# Patient Record
Sex: FEMALE | Race: WHITE | Employment: OTHER | URBAN - METROPOLITAN AREA
[De-identification: names, ages, dates, MRNs, and addresses within clinical notes are randomized per-mention and may not be internally consistent; named-entity substitution may affect disease eponyms.]

---

## 2019-03-19 ENCOUNTER — APPOINTMENT (OUTPATIENT)
Dept: CT IMAGING | Age: 84
DRG: 291 | End: 2019-03-19
Attending: EMERGENCY MEDICINE
Payer: MEDICARE

## 2019-03-19 ENCOUNTER — APPOINTMENT (OUTPATIENT)
Dept: GENERAL RADIOLOGY | Age: 84
DRG: 291 | End: 2019-03-19
Attending: EMERGENCY MEDICINE
Payer: MEDICARE

## 2019-03-19 ENCOUNTER — HOSPITAL ENCOUNTER (INPATIENT)
Age: 84
LOS: 2 days | Discharge: HOME | DRG: 291 | End: 2019-03-21
Attending: EMERGENCY MEDICINE | Admitting: INTERNAL MEDICINE
Payer: MEDICARE

## 2019-03-19 DIAGNOSIS — J81.0 ACUTE PULMONARY EDEMA (HCC): Primary | ICD-10-CM

## 2019-03-19 PROBLEM — I50.9 ACUTE CHF (CONGESTIVE HEART FAILURE) (HCC): Status: ACTIVE | Noted: 2019-03-19

## 2019-03-19 LAB
ALBUMIN SERPL-MCNC: 3.4 G/DL (ref 3.5–5)
ALBUMIN/GLOB SERPL: 0.8 {RATIO} (ref 1.1–2.2)
ALP SERPL-CCNC: 111 U/L (ref 45–117)
ALT SERPL-CCNC: 27 U/L (ref 12–78)
ANION GAP SERPL CALC-SCNC: 5 MMOL/L (ref 5–15)
APPEARANCE UR: CLEAR
AST SERPL-CCNC: 33 U/L (ref 15–37)
BACTERIA URNS QL MICRO: NEGATIVE /HPF
BASOPHILS # BLD: 0 K/UL (ref 0–0.1)
BASOPHILS NFR BLD: 1 % (ref 0–1)
BILIRUB SERPL-MCNC: 0.4 MG/DL (ref 0.2–1)
BILIRUB UR QL: NEGATIVE
BNP SERPL-MCNC: 4164 PG/ML
BUN SERPL-MCNC: 25 MG/DL (ref 6–20)
BUN/CREAT SERPL: 32 (ref 12–20)
CALCIUM SERPL-MCNC: 9.4 MG/DL (ref 8.5–10.1)
CHLORIDE SERPL-SCNC: 111 MMOL/L (ref 97–108)
CK SERPL-CCNC: 72 U/L (ref 26–192)
CO2 SERPL-SCNC: 27 MMOL/L (ref 21–32)
COLOR UR: ABNORMAL
COMMENT, HOLDF: NORMAL
CREAT SERPL-MCNC: 0.79 MG/DL (ref 0.55–1.02)
DIFFERENTIAL METHOD BLD: ABNORMAL
EOSINOPHIL # BLD: 0.2 K/UL (ref 0–0.4)
EOSINOPHIL NFR BLD: 2 % (ref 0–7)
EPITH CASTS URNS QL MICRO: ABNORMAL /LPF
ERYTHROCYTE [DISTWIDTH] IN BLOOD BY AUTOMATED COUNT: 19.1 % (ref 11.5–14.5)
FLUAV AG NPH QL IA: NEGATIVE
FLUBV AG NOSE QL IA: NEGATIVE
GLOBULIN SER CALC-MCNC: 4.3 G/DL (ref 2–4)
GLUCOSE SERPL-MCNC: 84 MG/DL (ref 65–100)
GLUCOSE UR STRIP.AUTO-MCNC: NEGATIVE MG/DL
HCT VFR BLD AUTO: 39.3 % (ref 35–47)
HGB BLD-MCNC: 11.8 G/DL (ref 11.5–16)
HGB UR QL STRIP: NEGATIVE
IMM GRANULOCYTES # BLD AUTO: 0 K/UL (ref 0–0.04)
IMM GRANULOCYTES NFR BLD AUTO: 0 % (ref 0–0.5)
KETONES UR QL STRIP.AUTO: NEGATIVE MG/DL
LACTATE BLD-SCNC: 1.18 MMOL/L (ref 0.4–2)
LEUKOCYTE ESTERASE UR QL STRIP.AUTO: NEGATIVE
LYMPHOCYTES # BLD: 1.7 K/UL (ref 0.8–3.5)
LYMPHOCYTES NFR BLD: 25 % (ref 12–49)
MAGNESIUM SERPL-MCNC: 2.4 MG/DL (ref 1.6–2.4)
MCH RBC QN AUTO: 25.5 PG (ref 26–34)
MCHC RBC AUTO-ENTMCNC: 30 G/DL (ref 30–36.5)
MCV RBC AUTO: 85.1 FL (ref 80–99)
MONOCYTES # BLD: 0.6 K/UL (ref 0–1)
MONOCYTES NFR BLD: 10 % (ref 5–13)
NEUTS SEG # BLD: 4.1 K/UL (ref 1.8–8)
NEUTS SEG NFR BLD: 62 % (ref 32–75)
NITRITE UR QL STRIP.AUTO: NEGATIVE
NRBC # BLD: 0 K/UL (ref 0–0.01)
NRBC BLD-RTO: 0 PER 100 WBC
PH UR STRIP: 5.5 [PH] (ref 5–8)
PLATELET # BLD AUTO: 314 K/UL (ref 150–400)
PMV BLD AUTO: 9.7 FL (ref 8.9–12.9)
POTASSIUM SERPL-SCNC: 4.7 MMOL/L (ref 3.5–5.1)
PROT SERPL-MCNC: 7.7 G/DL (ref 6.4–8.2)
PROT UR STRIP-MCNC: 30 MG/DL
RBC # BLD AUTO: 4.62 M/UL (ref 3.8–5.2)
RBC #/AREA URNS HPF: ABNORMAL /HPF (ref 0–5)
SAMPLES BEING HELD,HOLD: NORMAL
SODIUM SERPL-SCNC: 143 MMOL/L (ref 136–145)
SP GR UR REFRACTOMETRY: 1.02 (ref 1–1.03)
TROPONIN I SERPL-MCNC: <0.05 NG/ML
UA: UC IF INDICATED,UAUC: ABNORMAL
UROBILINOGEN UR QL STRIP.AUTO: 0.2 EU/DL (ref 0.2–1)
WBC # BLD AUTO: 6.7 K/UL (ref 3.6–11)
WBC URNS QL MICRO: ABNORMAL /HPF (ref 0–4)

## 2019-03-19 PROCEDURE — 74011250636 HC RX REV CODE- 250/636: Performed by: EMERGENCY MEDICINE

## 2019-03-19 PROCEDURE — 87804 INFLUENZA ASSAY W/OPTIC: CPT

## 2019-03-19 PROCEDURE — 82550 ASSAY OF CK (CPK): CPT

## 2019-03-19 PROCEDURE — 87040 BLOOD CULTURE FOR BACTERIA: CPT

## 2019-03-19 PROCEDURE — 71046 X-RAY EXAM CHEST 2 VIEWS: CPT

## 2019-03-19 PROCEDURE — 83880 ASSAY OF NATRIURETIC PEPTIDE: CPT

## 2019-03-19 PROCEDURE — 65660000000 HC RM CCU STEPDOWN

## 2019-03-19 PROCEDURE — 36415 COLL VENOUS BLD VENIPUNCTURE: CPT

## 2019-03-19 PROCEDURE — 83735 ASSAY OF MAGNESIUM: CPT

## 2019-03-19 PROCEDURE — 83605 ASSAY OF LACTIC ACID: CPT

## 2019-03-19 PROCEDURE — 81001 URINALYSIS AUTO W/SCOPE: CPT

## 2019-03-19 PROCEDURE — 99285 EMERGENCY DEPT VISIT HI MDM: CPT

## 2019-03-19 PROCEDURE — 93005 ELECTROCARDIOGRAM TRACING: CPT

## 2019-03-19 PROCEDURE — 85025 COMPLETE CBC W/AUTO DIFF WBC: CPT

## 2019-03-19 PROCEDURE — 70450 CT HEAD/BRAIN W/O DYE: CPT

## 2019-03-19 PROCEDURE — 80053 COMPREHEN METABOLIC PANEL: CPT

## 2019-03-19 PROCEDURE — 94761 N-INVAS EAR/PLS OXIMETRY MLT: CPT

## 2019-03-19 PROCEDURE — 84484 ASSAY OF TROPONIN QUANT: CPT

## 2019-03-19 PROCEDURE — 96374 THER/PROPH/DIAG INJ IV PUSH: CPT

## 2019-03-19 RX ORDER — SODIUM CHLORIDE 0.9 % (FLUSH) 0.9 %
5-40 SYRINGE (ML) INJECTION EVERY 8 HOURS
Status: DISCONTINUED | OUTPATIENT
Start: 2019-03-19 | End: 2019-03-21 | Stop reason: HOSPADM

## 2019-03-19 RX ORDER — SODIUM CHLORIDE 0.9 % (FLUSH) 0.9 %
5-40 SYRINGE (ML) INJECTION AS NEEDED
Status: DISCONTINUED | OUTPATIENT
Start: 2019-03-19 | End: 2019-03-21 | Stop reason: HOSPADM

## 2019-03-19 RX ORDER — METOPROLOL TARTRATE 25 MG/1
25 TABLET, FILM COATED ORAL 2 TIMES DAILY
Status: DISCONTINUED | OUTPATIENT
Start: 2019-03-20 | End: 2019-03-21 | Stop reason: HOSPADM

## 2019-03-19 RX ORDER — OXYBUTYNIN CHLORIDE 5 MG/1
10 TABLET, EXTENDED RELEASE ORAL
Status: DISCONTINUED | OUTPATIENT
Start: 2019-03-19 | End: 2019-03-21 | Stop reason: HOSPADM

## 2019-03-19 RX ORDER — POLYETHYLENE GLYCOL 3350 17 G/17G
17 POWDER, FOR SOLUTION ORAL DAILY
Status: ON HOLD | COMMUNITY
End: 2021-01-01 | Stop reason: CLARIF

## 2019-03-19 RX ORDER — POLYETHYLENE GLYCOL 3350 17 G/17G
17 POWDER, FOR SOLUTION ORAL DAILY
Status: DISCONTINUED | OUTPATIENT
Start: 2019-03-20 | End: 2019-03-21 | Stop reason: HOSPADM

## 2019-03-19 RX ORDER — FUROSEMIDE 10 MG/ML
40 INJECTION INTRAMUSCULAR; INTRAVENOUS
Status: COMPLETED | OUTPATIENT
Start: 2019-03-19 | End: 2019-03-19

## 2019-03-19 RX ORDER — ONDANSETRON 2 MG/ML
4 INJECTION INTRAMUSCULAR; INTRAVENOUS
Status: DISCONTINUED | OUTPATIENT
Start: 2019-03-19 | End: 2019-03-21 | Stop reason: HOSPADM

## 2019-03-19 RX ORDER — SENNOSIDES 8.6 MG/1
1 TABLET ORAL
Status: DISCONTINUED | OUTPATIENT
Start: 2019-03-19 | End: 2019-03-21 | Stop reason: HOSPADM

## 2019-03-19 RX ORDER — METOPROLOL TARTRATE 25 MG/1
25 TABLET, FILM COATED ORAL 2 TIMES DAILY
Status: ON HOLD | COMMUNITY
End: 2021-01-01 | Stop reason: CLARIF

## 2019-03-19 RX ORDER — OXYBUTYNIN CHLORIDE 10 MG/1
10 TABLET, EXTENDED RELEASE ORAL
Status: ON HOLD | COMMUNITY
End: 2021-01-01 | Stop reason: CLARIF

## 2019-03-19 RX ORDER — LOVASTATIN 20 MG/1
20 TABLET ORAL DAILY
Status: DISCONTINUED | OUTPATIENT
Start: 2019-03-20 | End: 2019-03-21 | Stop reason: HOSPADM

## 2019-03-19 RX ORDER — FUROSEMIDE 10 MG/ML
40 INJECTION INTRAMUSCULAR; INTRAVENOUS DAILY
Status: DISCONTINUED | OUTPATIENT
Start: 2019-03-20 | End: 2019-03-20

## 2019-03-19 RX ORDER — SENNOSIDES 8.6 MG/1
1 TABLET ORAL
Status: ON HOLD | COMMUNITY
End: 2021-01-01 | Stop reason: CLARIF

## 2019-03-19 RX ADMIN — FUROSEMIDE 40 MG: 10 INJECTION, SOLUTION INTRAMUSCULAR; INTRAVENOUS at 21:55

## 2019-03-19 RX ADMIN — Medication 10 ML: at 23:52

## 2019-03-20 ENCOUNTER — APPOINTMENT (OUTPATIENT)
Dept: MRI IMAGING | Age: 84
DRG: 291 | End: 2019-03-20
Attending: INTERNAL MEDICINE
Payer: MEDICARE

## 2019-03-20 ENCOUNTER — APPOINTMENT (OUTPATIENT)
Dept: NON INVASIVE DIAGNOSTICS | Age: 84
DRG: 291 | End: 2019-03-20
Attending: HOSPITALIST
Payer: MEDICARE

## 2019-03-20 LAB
ALBUMIN SERPL-MCNC: 3.4 G/DL (ref 3.5–5)
ALBUMIN/GLOB SERPL: 0.8 {RATIO} (ref 1.1–2.2)
ALP SERPL-CCNC: 103 U/L (ref 45–117)
ALT SERPL-CCNC: 30 U/L (ref 12–78)
ANION GAP SERPL CALC-SCNC: 7 MMOL/L (ref 5–15)
AST SERPL-CCNC: 33 U/L (ref 15–37)
ATRIAL RATE: 326 BPM
BASOPHILS # BLD: 0 K/UL (ref 0–0.1)
BASOPHILS NFR BLD: 0 % (ref 0–1)
BILIRUB SERPL-MCNC: 0.5 MG/DL (ref 0.2–1)
BUN SERPL-MCNC: 25 MG/DL (ref 6–20)
BUN/CREAT SERPL: 33 (ref 12–20)
CALCIUM SERPL-MCNC: 9.3 MG/DL (ref 8.5–10.1)
CALCULATED R AXIS, ECG10: -84 DEGREES
CALCULATED T AXIS, ECG11: -7 DEGREES
CHLORIDE SERPL-SCNC: 109 MMOL/L (ref 97–108)
CK MB CFR SERPL CALC: 3.2 % (ref 0–2.5)
CK MB SERPL-MCNC: 2.3 NG/ML (ref 5–25)
CK SERPL-CCNC: 71 U/L (ref 26–192)
CO2 SERPL-SCNC: 27 MMOL/L (ref 21–32)
CREAT SERPL-MCNC: 0.76 MG/DL (ref 0.55–1.02)
DIAGNOSIS, 93000: NORMAL
DIFFERENTIAL METHOD BLD: ABNORMAL
ECHO AO ROOT DIAM: 3.28 CM
ECHO AV AREA PEAK VELOCITY: 1.8 CM2
ECHO AV AREA VTI: 2.5 CM2
ECHO AV CUSP MM: 1.11 CM
ECHO AV MEAN GRADIENT: 1.9 MMHG
ECHO AV PEAK GRADIENT: 6.3 MMHG
ECHO AV PEAK VELOCITY: 125.61 CM/S
ECHO AV VTI: 13.51 CM
ECHO LA AREA 4C: 18.6 CM2
ECHO LA MAJOR AXIS: 4.11 CM
ECHO LA TO AORTIC ROOT RATIO: 1.25
ECHO LA VOL 2C: 71.15 ML (ref 22–52)
ECHO LA VOL 4C: 54.18 ML (ref 22–52)
ECHO LA VOL BP: 74.05 ML (ref 22–52)
ECHO LA VOL/BSA BIPLANE: 50.95 ML/M2 (ref 16–28)
ECHO LA VOLUME INDEX A2C: 48.96 ML/M2 (ref 16–28)
ECHO LA VOLUME INDEX A4C: 37.28 ML/M2 (ref 16–28)
ECHO LV E' LATERAL VELOCITY: 9.3 CM/S
ECHO LV E' SEPTAL VELOCITY: 5.87 CM/S
ECHO LV INTERNAL DIMENSION DIASTOLIC: 1.92 CM (ref 3.9–5.3)
ECHO LV INTERNAL DIMENSION SYSTOLIC: 1.37 CM
ECHO LV IVSD: 1.59 CM (ref 0.6–0.9)
ECHO LV MASS 2D: 107.5 G (ref 67–162)
ECHO LV MASS INDEX 2D: 74 G/M2 (ref 43–95)
ECHO LV POSTERIOR WALL DIASTOLIC: 1.47 CM (ref 0.6–0.9)
ECHO LVOT DIAM: 1.87 CM
ECHO LVOT PEAK GRADIENT: 2.6 MMHG
ECHO LVOT PEAK VELOCITY: 81.35 CM/S
ECHO LVOT SV: 33.9 ML
ECHO LVOT VTI: 12.28 CM
ECHO MV AREA VTI: 1.5 CM2
ECHO MV E VELOCITY: 108 CM/S
ECHO MV E/E' LATERAL: 11.61
ECHO MV E/E' RATIO (AVERAGED): 15.01
ECHO MV E/E' SEPTAL: 18.4
ECHO MV MAX VELOCITY: 121.66 CM/S
ECHO MV MEAN GRADIENT: 2.1 MMHG
ECHO MV PEAK GRADIENT: 5.9 MMHG
ECHO MV VTI: 22.93 CM
ECHO PV MAX VELOCITY: 72.37 CM/S
ECHO PV PEAK GRADIENT: 2.1 MMHG
ECHO RV INTERNAL DIMENSION: 3.48 CM
ECHO RV TAPSE: 1.27 CM (ref 1.5–2)
ECHO TV REGURGITANT MAX VELOCITY: 234.07 CM/S
ECHO TV REGURGITANT PEAK GRADIENT: 21.9 MMHG
EOSINOPHIL # BLD: 0.1 K/UL (ref 0–0.4)
EOSINOPHIL NFR BLD: 2 % (ref 0–7)
ERYTHROCYTE [DISTWIDTH] IN BLOOD BY AUTOMATED COUNT: 18.6 % (ref 11.5–14.5)
GLOBULIN SER CALC-MCNC: 4.1 G/DL (ref 2–4)
GLUCOSE BLD STRIP.AUTO-MCNC: 125 MG/DL (ref 65–100)
GLUCOSE BLD STRIP.AUTO-MCNC: 161 MG/DL (ref 65–100)
GLUCOSE BLD STRIP.AUTO-MCNC: 81 MG/DL (ref 65–100)
GLUCOSE SERPL-MCNC: 91 MG/DL (ref 65–100)
HCT VFR BLD AUTO: 36.9 % (ref 35–47)
HGB BLD-MCNC: 11.4 G/DL (ref 11.5–16)
IMM GRANULOCYTES # BLD AUTO: 0 K/UL (ref 0–0.04)
IMM GRANULOCYTES NFR BLD AUTO: 0 % (ref 0–0.5)
LYMPHOCYTES # BLD: 1.7 K/UL (ref 0.8–3.5)
LYMPHOCYTES NFR BLD: 25 % (ref 12–49)
MAGNESIUM SERPL-MCNC: 2.3 MG/DL (ref 1.6–2.4)
MCH RBC QN AUTO: 25.8 PG (ref 26–34)
MCHC RBC AUTO-ENTMCNC: 30.9 G/DL (ref 30–36.5)
MCV RBC AUTO: 83.5 FL (ref 80–99)
MONOCYTES # BLD: 0.7 K/UL (ref 0–1)
MONOCYTES NFR BLD: 10 % (ref 5–13)
NEUTS SEG # BLD: 4.2 K/UL (ref 1.8–8)
NEUTS SEG NFR BLD: 63 % (ref 32–75)
NRBC # BLD: 0 K/UL (ref 0–0.01)
NRBC BLD-RTO: 0 PER 100 WBC
PHOSPHATE SERPL-MCNC: 3.6 MG/DL (ref 2.6–4.7)
PLATELET # BLD AUTO: 287 K/UL (ref 150–400)
PMV BLD AUTO: 10 FL (ref 8.9–12.9)
POTASSIUM SERPL-SCNC: 4 MMOL/L (ref 3.5–5.1)
PROT SERPL-MCNC: 7.5 G/DL (ref 6.4–8.2)
Q-T INTERVAL, ECG07: 386 MS
QRS DURATION, ECG06: 114 MS
QTC CALCULATION (BEZET), ECG08: 469 MS
RBC # BLD AUTO: 4.42 M/UL (ref 3.8–5.2)
SERVICE CMNT-IMP: ABNORMAL
SERVICE CMNT-IMP: ABNORMAL
SERVICE CMNT-IMP: NORMAL
SODIUM SERPL-SCNC: 143 MMOL/L (ref 136–145)
TROPONIN I SERPL-MCNC: <0.05 NG/ML
TSH SERPL DL<=0.05 MIU/L-ACNC: 3.16 UIU/ML (ref 0.36–3.74)
VENTRICULAR RATE, ECG03: 89 BPM
WBC # BLD AUTO: 6.8 K/UL (ref 3.6–11)

## 2019-03-20 PROCEDURE — 82550 ASSAY OF CK (CPK): CPT

## 2019-03-20 PROCEDURE — 74011250637 HC RX REV CODE- 250/637: Performed by: INTERNAL MEDICINE

## 2019-03-20 PROCEDURE — 92610 EVALUATE SWALLOWING FUNCTION: CPT

## 2019-03-20 PROCEDURE — 36415 COLL VENOUS BLD VENIPUNCTURE: CPT

## 2019-03-20 PROCEDURE — 65660000000 HC RM CCU STEPDOWN

## 2019-03-20 PROCEDURE — 93306 TTE W/DOPPLER COMPLETE: CPT

## 2019-03-20 PROCEDURE — 76450000000

## 2019-03-20 PROCEDURE — 84100 ASSAY OF PHOSPHORUS: CPT

## 2019-03-20 PROCEDURE — 70551 MRI BRAIN STEM W/O DYE: CPT

## 2019-03-20 PROCEDURE — 84484 ASSAY OF TROPONIN QUANT: CPT

## 2019-03-20 PROCEDURE — 77030020186 HC BOOT HL PROTCT SAGE -B

## 2019-03-20 PROCEDURE — 85025 COMPLETE CBC W/AUTO DIFF WBC: CPT

## 2019-03-20 PROCEDURE — 84443 ASSAY THYROID STIM HORMONE: CPT

## 2019-03-20 PROCEDURE — 74011250636 HC RX REV CODE- 250/636: Performed by: INTERNAL MEDICINE

## 2019-03-20 PROCEDURE — 83735 ASSAY OF MAGNESIUM: CPT

## 2019-03-20 PROCEDURE — 80053 COMPREHEN METABOLIC PANEL: CPT

## 2019-03-20 PROCEDURE — 82962 GLUCOSE BLOOD TEST: CPT

## 2019-03-20 RX ORDER — METOPROLOL TARTRATE 25 MG/1
25 TABLET, FILM COATED ORAL ONCE
Status: COMPLETED | OUTPATIENT
Start: 2019-03-20 | End: 2019-03-20

## 2019-03-20 RX ORDER — FUROSEMIDE 40 MG/1
40 TABLET ORAL DAILY
Status: DISCONTINUED | OUTPATIENT
Start: 2019-03-21 | End: 2019-03-21 | Stop reason: HOSPADM

## 2019-03-20 RX ADMIN — APIXABAN 2.5 MG: 2.5 TABLET, FILM COATED ORAL at 17:24

## 2019-03-20 RX ADMIN — Medication 10 ML: at 21:34

## 2019-03-20 RX ADMIN — METOPROLOL TARTRATE 25 MG: 25 TABLET, FILM COATED ORAL at 21:33

## 2019-03-20 RX ADMIN — LOVASTATIN 20 MG: 20 TABLET ORAL at 10:16

## 2019-03-20 RX ADMIN — METOPROLOL TARTRATE 25 MG: 25 TABLET ORAL at 10:16

## 2019-03-20 RX ADMIN — Medication 10 ML: at 14:00

## 2019-03-20 RX ADMIN — FUROSEMIDE 40 MG: 10 INJECTION, SOLUTION INTRAMUSCULAR; INTRAVENOUS at 10:16

## 2019-03-20 RX ADMIN — Medication 10 ML: at 06:38

## 2019-03-20 RX ADMIN — APIXABAN 2.5 MG: 2.5 TABLET, FILM COATED ORAL at 10:16

## 2019-03-20 RX ADMIN — METOPROLOL TARTRATE 25 MG: 25 TABLET ORAL at 17:24

## 2019-03-20 RX ADMIN — SENNOSIDES 8.6 MG: 8.6 TABLET, FILM COATED ORAL at 21:34

## 2019-03-20 RX ADMIN — OXYBUTYNIN CHLORIDE 10 MG: 5 TABLET, EXTENDED RELEASE ORAL at 21:33

## 2019-03-20 NOTE — PROGRESS NOTES
Admission Medication Reconciliation: 
 
Information obtained from: Son provided medication list 
 
Significant PMH/Disease States:  
Past Medical History:  
Diagnosis Date  Chronic kidney disease   
 acute tubual necrosis  Clostridium difficile infection  Elevated troponin 9/10/2014  Hypertension  Skin cancer  Stroke ([facility] [address] 2001 left residual  
 
 
Chief Complaint for this Admission:  Fatigue, irregular heart beat Allergies:  Neosporin [benzalkonium chloride] Prior to Admission Medications:  
Prior to Admission Medications Prescriptions Last Dose Informant Patient Reported? Taking? apixaban (ELIQUIS) 2.5 mg tablet 3/19/2019 at Unknown time  Yes Yes Sig: Take 2.5 mg by mouth two (2) times a day. lovastatin (MEVACOR) 20 mg tablet 3/19/2019 at Unknown time  Yes Yes Sig: Take 20 mg by mouth daily. metoprolol tartrate (LOPRESSOR) 25 mg tablet 3/19/2019 at Unknown time  Yes Yes Sig: Take 25 mg by mouth two (2) times a day. oxybutynin chloride XL (DITROPAN XL) 10 mg CR tablet   Yes Yes Sig: Take 10 mg by mouth nightly. polyethylene glycol (MIRALAX) 17 gram packet 3/19/2019 at Unknown time  Yes Yes Sig: Take 17 g by mouth daily. senna (SENNA) 8.6 mg tablet 3/18/2019 at Unknown time  Yes Yes Sig: Take 1 Tab by mouth nightly. Facility-Administered Medications: None Comments/Recommendations: Patient's son provided medication list. 
 
Note: 1. Administration: per son written instructions: \"Crush all pills. Nectar thickened liquids only\" Added all agents. Thank you for allowing me to participate in the care of your patient. Armando Moura PharmD, RN #[id number]

## 2019-03-20 NOTE — PROGRESS NOTES
Problem: Heart Failure: Day 2 Goal: Medications Outcome: Progressing Towards Goal 
  
Speech to evaluate patient prior to medication administration

## 2019-03-20 NOTE — CONSULTS
[address] Name:  Anisa Patel 
MR#:  [id number] :  1922 ACCOUNT #:  [de-identified] DATE OF SERVICE:  2019 HISTORY OF PRESENT ILLNESS:  This 55-year-old woman is seen for evaluation of congestive heart failure. The patient has not been on any diuretic therapy. A recent echo done while in hospital at an another institution in 2018 showed LVH with an ejection fraction of 60% to 65%, moderate mitral regurgitation, dilated left atrium, patent foramen ovale, normal PA pressures. She was admitted to the hospital last evening presenting with increasing shortness of breath, possible Cheyne-Jacobo respiration and increasing fatigue and weakness over the past few weeks. She is not a reliable historian, thinks she is at [facility], chronically bed-bound requiring considerable care at home. She has chronic atrial fibrillation, has been anticoagulated with apixaban with rate controlled on metoprolol and there is a history of coronary artery disease followed by Dr. Noni Prescott  in the past.  I last saw her in 2014 without any further followup. She had a syncopal spell at that time and previous CVA with residual left hemiparesis. She apparently is breathing better. She has received IV Lasix and has recorded 1000 mL urine output. PAST MEDICAL HISTORY:  Previous stroke with left residual skin cancers, hypertension, elevated troponin in 2014 of unclear etiology, C. difficile infection, a history of chronic kidney disease and a history of coronary artery disease along with chronic atrial fibrillation, dyslipidemia, dysphagia, no mention of any dementia processes. MEDICINES AT HOME:  Include metoprolol, Ditropan, Eliquis, senna, MiraLAX, lovastatin. Here in hospital, she is receiving apixaban 2.5 twice daily, Lasix 40 mg IV daily, Mevacor 20 at bedtime, metoprolol 25 twice a day, Ditropan XL 10 mg at bedtime, MiraLAX 17 g a day, Senokot. ALLERGIES: NEOSPORIN. FAMILY HISTORY:  Noncontributory. SOCIAL HISTORY:  No cigarettes. No alcohol. Cared for by her son who is a physician. REVIEW OF SYSTEMS:  Unobtainable. PHYSICAL EXAMINATION: 
GENERAL:  On exam, frail elderly lady, in no acute distress, not oriented clearly to situation. VITAL SIGNS:  Blood pressure 132/77, pulse 112 and irregularly irregular. HEENT:  No JVD. Carotids are without bruits. Sclerae are clear. LUNGS:  Show scant crackles at both bases. HEART:  Irregularly irregular without murmur. ABDOMEN:  Soft, nontender without masses or organomegaly. EXTREMITIES:  Without edema. LABORATORY DATA:  CBC:  WBC 6800, hemoglobin 11.4, hematocrit 36.9, platelets 114,728. Chemistry:  Sodium 143, potassium 4.0, chloride 109, BUN 25, creatinine 0.76. Troponins are negative x2. CPK is normal.  Chest x-ray shows pulmonary congestion on a poorly inspired AP portable. EKG:  Atrial fibrillation with nonspecific ST-T wave changes, low voltage limb leads, right bundle-branch block. PROBLEMS: 
1. Acute diastolic congestive heart failure. 2.  Chronic atrial fibrillation. 3.  Altered mental status of unclear etiology. Urinalysis was negative. No clear reason, associated with progressive weakness and fatigue of unclear etiology. The TSH was normal.  Labs were otherwise unremarkable. 4.  History of a previous stroke. 5.  History of coronary artery disease. 6.  History of dyslipidemia. PLANS/RECOMMENDATIONS:  No further cardiac studies, diurese by clinical criteria and rate control can be maintained with beta blockers. No aggressive measures, no further cardiac followup. We will see again as needed. Aruna Soriano MD [id number] 
D:  03/20/2019 11:41 T:  03/20/2019 11:52 
JOB #:  [id number]

## 2019-03-20 NOTE — ED PROVIDER NOTES
80 y.o. female with past medical history significant for HTN, CKD, stroke, C-diff infection, skin cancer, and elevated troponin who presents from home with chief complaint of fatigue. Pt's son reports gradually worsening \"decreased alertness\" for 2 days accompanied by \"Cheyne-Jacobo\" breathing and occasional \"passing out\" while at rest. He also states Pt had \"purple hands and purple feet\" this morning. When asked about Pt's baseline mental status, Pt's son notes Pt is usually alert and talkative. He states Pt is currently on Eliquis and metoprolol for A-fib, but denies any narcotic medications. Per Pt's son, Pt has L-sided deficits d/t a stroke 18 years ago and some difficulty swallowing d/t 2 other strokes last year. Pt's son denies appetite change and fever. There are no other acute medical concerns at this time. PCP: Ketty Yoon., MD 
 
Note written by Matthew Ng, as dictated by Audelia Charles MD 7:28 PM 
 
 
 
  
 
Past Medical History:  
Diagnosis Date  Chronic kidney disease   
 acute tubual necrosis  Clostridium difficile infection  Elevated troponin 9/10/2014  Hypertension  Skin cancer  Stroke ([facility] [address] 2001 left residual  
 
 
Past Surgical History:  
Procedure Laterality Date  HX APPENDECTOMY  2/2008  HX HEENT    
 cataract  HX ORTHOPAEDIC  04/2010  
 left total hip - hip fx, left knee surgery History reviewed. No pertinent family history. Social History Socioeconomic History  Marital status:  Spouse name: Not on file  Number of children: Not on file  Years of education: Not on file  Highest education level: Not on file Social Needs  Financial resource strain: Not on file  Food insecurity - worry: Not on file  Food insecurity - inability: Not on file  Transportation needs - medical: Not on file  Transportation needs - non-medical: Not on file Occupational History  Not on file Tobacco Use  Smoking status: Never Smoker Substance and Sexual Activity  Alcohol use: No  
 Drug use: No  
 Sexual activity: Not on file Other Topics Concern  Not on file Social History Narrative  Not on file ALLERGIES: Neosporin [benzalkonium chloride] Review of Systems Constitutional: Positive for activity change and fatigue. Negative for appetite change and fever. Respiratory: Positive for shortness of breath. Skin: Positive for color change. Neurological: Positive for syncope. All other systems reviewed and are negative. Vitals:  
 03/19/19 1822 03/19/19 1856 03/19/19 1912 03/19/19 1922 BP:  (!) 126/96 (!) 126/96 (!) 139/106 Pulse: 88  96 85 Resp:   18 12 Temp:   98 °F (36.7 °C) SpO2: 94%  100% 100% Physical Exam  
Constitutional: No distress. MARCELLUS, debilitated appearing. HENT:  
Head: Normocephalic and atraumatic. Eyes: Conjunctivae are normal. No scleral icterus. Neck: Neck supple. JVD present. No tracheal deviation present. Cardiovascular: Normal rate, normal heart sounds and intact distal pulses. An irregularly irregular rhythm present. Exam reveals no gallop and no friction rub. No murmur heard. A-fib w/ well controlled rate. Pulmonary/Chest: Effort normal. She has no wheezes. She has rales (bibasilar). Abdominal: Soft. She exhibits no distension. There is no tenderness. There is no rebound and no guarding. Musculoskeletal: She exhibits no edema. Neurological: She is alert. L hemiplegia s/p stroke 18 years ago. Skin: Skin is warm and dry. No rash noted. Nursing note and vitals reviewed. Note written by Matthew Kerr, as dictated by Koko Oliva MD 7:28 PM 
 
MDM Number of Diagnoses or Management Options Acute pulmonary edema ([facility] [address]): Amount and/or Complexity of Data Reviewed Clinical lab tests: ordered and reviewed Tests in the radiology section of CPT®: ordered and reviewed Tests in the medicine section of CPT®: ordered and reviewed Obtain history from someone other than the patient: yes Procedures ED EKG interpretation: 
Rhythm: atrial fib; Rate (approx.): 89; Axis: normal; ST/T wave: non-specific changes; low voltage QRS; LAFB Note written by Matthew Aguilera, as dictated by Bereket Cardoza MD 7:00 PM 
 
 
Total critical care time spent exclusive of procedures: 34    minutes

## 2019-03-20 NOTE — H&P
[address] HISTORY AND PHYSICAL Name:  Anisa Patel 
MR#:  [id number] :  1922 ACCOUNT #:  [de-identified] ADMIT DATE:  2019 PRIMARY CARE PHYSICIAN:  Dr. Ashu Manning. SOURCE OF INFORMATION:  The patient, who is not a good historian, the patient son, who present at the bedside. The patient's son is also a physician. CHIEF COMPLAINT:  Fatigue, weakness. HISTORY OF PRESENT ILLNESS:  This is a 80-year-old woman with a past medical history significant for chronic atrial fibrillation, dyslipidemia, coronary artery disease, status post CVA with left-sided deficit, dysphagia, C. diff infection, and skin cancer, who was in her usual state of health until a couple of days ago when the patient developed fatigue and weakness. The weakness is generalized. The patient's son also reported decreased alertness as well as difficulty with breathing and shortness of breath. The patient's son described the difficulty with breathing as Cheyne-Jacobo breathing with vocational passing out while at rest.  He suspected that this patient probably has sleep apnea. This patient has not been officially diagnosed with sleep apnea, has not had any test to confirm a sleep apnea. About 3 months ago, the patient was admitted to [facility], because of chest pain. During that hospitalization, the patient was found to have a atrial fibrillation. She was started on metoprolol for rate control as well as Eliquis for anticoagulation. The patient has been doing relatively well until this past couple of days when the patient developed fatigue, weakness and decreased alertness. The patient is normally able to ambulate with a walker, but for the past couple of weeks the patient is more less bed-bound requiring considerable care at home. The patient has to be lifted and put on commode and put back on bed again. There was no history of fever, no rigors, and no chills.   The patient was last admitted to this hospital in 09/2014. She was admitted following a fall. During that hospitalization, the patient was found to have an elevated troponin level. She was seen by the cardiologist and the patient was treated conservatively. Echocardiogram done during that hospitalization shows no wall motion abnormalities or grade 2 diastolic dysfunction or mitral regurgitation. The patient was discharged to skilled nursing facility in stable condition. When the patient arrived at the emergency room today, the patient's chest x-ray shows evidence of vascular congestion. The patient remains in atrial fibrillation. BNP level was also elevated. The patient was referred to the hospitalist service for evaluation for admission. No fever, no rigors, no chills. PAST MEDICAL HISTORY:  Chronic atrial fibrillation, dyslipidemia, coronary artery disease, status post CVA with left-sided deficit, and dysphagia. ALLERGIES:  THE PATIENT IS ALLERGIC to NEOSPORIN. MEDICATIONS:  Eliquis 2.5 mg twice daily, Mevacor 20 mg daily, Lopressor 25 mg twice daily, Ditropan XL 10 mg daily, MiraLax 17 g daily, Senna 8.6 mg 1 tablet daily at bedtime. FAMILY HISTORY:  This was reviewed. It is not pertinent to present illness. PAST SURGICAL HISTORY:  This is significant for appendectomy, cataract extraction, left hip surgery. SOCIAL HISTORY:  No history of alcohol or tobacco abuse. REVIEW OF SYSTEMS: 
HEAD, EYES, EARS, NOSE AND THROAT:  No headache, no dizziness, no blurring of vision. No photophobia. RESPIRATORY SYSTEM:  This is positive for shortness of breath and cough. No hemoptysis. CARDIOVASCULAR SYSTEM:  This is positive for orthopnea. No chest pain, no palpitations. GASTROINTESTINAL SYSTEM:  No nausea or vomiting. No diarrhea. No constipation. GENITOURINARY SYSTEM:  No dysuria, no urgency and no frequency. All other systems are reviewed and they are negative.  
 
PHYSICAL EXAMINATION: 
GENERAL APPEARANCE:  The patient appeared ill, in moderate distress. VITAL SIGNS:  On arrival at the emergency room, temperature 98, pulse of 88, respiratory rate 18, blood pressure 126/96, oxygen saturation 94% on room air. HEENT:  Head:  Normocephalic, atraumatic. Eyes:  Normal eye movements. No redness, no drainage, no discharge. Ears:  Normal external ears with no evidence of drainage. Nose:  No deformity. No drainage. Mouth and Throat:  No visible oral lesion. NECK:  Supple. Mild JVD. No thyromegaly. CHEST:  Bilateral basilar crackles and few expiratory wheezing. HEART:  Normal S1 and S2, irregularly irregular. No clinically appreciable murmur. ABDOMEN:  Soft, nontender. Normal bowel sounds. CNS:  Alert and oriented to person. Left hemiplegia noted. EXTREMITIES:  Edema. Pulses 2+ bilaterally. MUSCULOSKELETAL SYSTEM:  Stiffness of left arm and left leg noted. SKIN:  Intact dressing to the right lower extremity noted and present on admission. PSYCHIATRY:  Normal mood and affect. LYMPHATIC SYSTEM:  No cervical lymphadenopathy. DIAGNOSTIC DATA:  EKG shows atrial fibrillation and nonspecific ST and T-waves abnormalities. Chest x-ray shows cardiomegaly, interstitial pulmonary edema and pleural effusions. LABORATORY DATA:  Hematology, WBC 6.7, hemoglobin 11.8, hematocrit 39.3, platelets of 626. Magnesium 2.4. Chemistry, sodium 143, potassium 4.7, chloride 111, CO2 27, glucose 84, BUN 25, creatinine 0.79, calcium 9.4, total bilirubin 0.4, ALT 27, AST 33, alkaline phosphatase 111, total protein at 7.7, albumin level 3.4, globulin at 4.3. Cardiac profile, troponin less than 0.05. Lactic acid level 1.18. Influenza A antigen negative, influenza B antigen negative. ProBNP 4164. Urinalysis, this is significant for negative nitrite, negative leuko esterase, negative bacteria. ASSESSMENT: 
1. Acute congestive heart failure. 2.  Persistent atrial fibrillation. 3.  Dyslipidemia.  
4.  Coronary artery disease. 5.  Status post cerebrovascular accident with left hemiplegia. 6.  Acute delirium. 7.  Dysphagia. 8.  Suspected obstructive sleep apnea. PLAN: 
1. Acute congestive heart failure. We will admit the patient for further evaluation and treatment. We will continue with Lasix, started in the emergency room. The patient most likely has acute diastolic congestive heart failure. The echocardiogram done in 09/2014 shows grade 2 diastolic dysfunction. We will repeat echocardiogram to determine the ejection fraction and also to determine the type of congestive heart failure. We will check cardiac markers to rule out acute myocardial infarction as a possible cause of acute congestive heart failure. Cardiology consult will be requested to assist in further evaluation and treatment of congestive heart failure. The patient is already on Eliquis for anticoagulation for atrial fibrillation, because of that we will not the patient for thromboembolism as a possible cause of shortness of breath. We will monitor the patient closely. 2.  Persistent atrial fibrillation. We will continue with metoprolol and Eliquis. We will check a TSH level. We will await further recommendation from the cardiologist. 
3.  Dyslipidemia. We will resume preadmission medication. 4.  Coronary artery disease. We will continue with cardiac medication. 5.  Status post CVA with left hemiplegia. We will continue with supportive therapy. 6.  Acute delirium. CT scan of the head done in the emergency room is negative, but because the patient has had stroke in the past, we will obtain MRI of the brain to rule out stroke as a possible cause of acute delirium, but the acute delirium in this case is most likely due to metabolic event such as the congestive heart failure. 7.  Dysphagia. The patient developed dysphagia as a complication of one of stroke. We will request speech therapy evaluation and treatment.   The patient will be placed on soft diet until seen by the speech therapist. 
8.  Suspected obstructive sleep apnea. The patient will require outpatient evaluation by pulmonologist for sleep studies to confirm the diagnosis of obstructive sleep apnea. 9.  Other Issues:  Code Status: The patient is a full code. The patient is on Eliquis for anticoagulation for atrial fibrillation, because of that there is no need for DVT prophylaxis. FUNCTIONAL STATUS PRIOR TO ADMISSION:  The patient is more or less bed-bound requiring son to carry out from bed to commode and back to bed, use of wheelchair was also mentioned by the son. The patient lives at home and her son, who is also a physician is the caregiver for the patient. Nusrat Webster MD 
 
 
[id number] 
D:  03/19/2019 22:52 
T:  03/19/2019 23:00 
JOB #:  [id number] CC:   Dwain Gregg MD

## 2019-03-20 NOTE — PROGRESS NOTES
Attempted Initial Spiritual Assessment in 654/01. Unable to assess patients needs. Pt sleeping. No family present at this time. Consulted with Nurse Shira De La Cruz. Chaplains will follow as able and/or as needed. Vy Sierra,  Intern, MDiv 
[phone]

## 2019-03-20 NOTE — WOUND CARE
WOCN Note:  
 
New consult for leg wound. Chart shows: 
Admitted for acute CHF; history of CVA, c-diff, skin cancer, falls Assessment: She is minimally communicative and requires assists in repositioning. She is wearing briefs for incontinence. Bed: total care Patient reports no pain. Bilateral heels are soco and slow to kenny - Prevalon boots already in use. Buttocks and sacral skin intact and without erythema - slight flush of pink. Skin tear on RLL = 3 x 4 x 0.1 cm partial skin flap. Moist red base. Petroleum applied with Mepilex border to cover. Recommendations:   
Skin tear: please apply petroleum ointment and cover with Mepilex border; change every 3 days. Minimize layers of linen/pads under patient to optimize support surface. Turn/reposition approximately every 2 hours and offload heels. Transition of Care: Plan to follow weekly and as needed while admitted to hospital.   
 
FLO MataN, RN, [facility] Certified Wound, Ostomy, Continence Nurse 
office [phone] 
pager [phone] or call  to page

## 2019-03-20 NOTE — PROGRESS NOTES
Hospitalist Progress Note Enmanuel Armstrong MD 
Answering service: [phone] from in house phone Cell: [phone] Date of Service:  3/20/2019 NAME:  Cathy Westbrook :  3/25/1922 MRN:  [id number] Admission Summary: This is a 59-year-old woman with a past medical history significant for chronic atrial fibrillation, dyslipidemia, coronary artery disease, status post CVA with left-sided deficit, dysphagia, C. diff infection, and skin cancer, who was in her usual state of health until a couple of days ago when the patient developed fatigue and weakness. The weakness is generalized. The patient's son also reported decreased alertness as well as difficulty with breathing and shortness of breath. The patient's son described the difficulty with breathing as Cheyne-Jacobo breathing with vocational passing out while at rest.  He suspected that this patient probably has sleep apnea. This patient has not been officially diagnosed with sleep apnea, has not had any test to confirm a sleep apnea. About 3 months ago, the patient was admitted to [facility], because of chest pain. During that hospitalization, the patient was found to have a atrial fibrillation. She was started on metoprolol for rate control as well as Eliquis for anticoagulation. The patient has been doing relatively well until this past couple of days when the patient developed fatigue, weakness and decreased alertness. The patient is normally able to ambulate with a walker, but for the past couple of weeks the patient is more less bed-bound requiring considerable care at home. The patient has to be lifted and put on commode and put back on bed again. There was no history of fever, no rigors, and no chills. The patient was last admitted to this hospital in 2014. She was admitted following a fall.   During that hospitalization, the patient was found to have an elevated troponin level. She was seen by the cardiologist and the patient was treated conservatively. Echocardiogram done during that hospitalization shows no wall motion abnormalities or grade 2 diastolic dysfunction or mitral regurgitation. The patient was discharged to skilled nursing facility in stable condition. When the patient arrived at the emergency room today, the patient's chest x-ray shows evidence of vascular congestion. The patient remains in atrial fibrillation. BNP level was also elevated. The patient was referred to the hospitalist service for evaluation for admission. No fever, no rigors, no chills Interval history / Subjective: F/u generalized weakness No new issues Assessment & Plan:  
 
Acute congestive heart failure 
-Elevated pro BNP 
-CXR 3/19 Cardiomegaly, interstitial pulmonary edema, and pleural effusions 
-IV diuresis 
-Strict I/Os 
-Follow Echo 
-Cardiology Moderate to severe MR 
-seen on Echo from 09/2014 Reported abnormal breathing 
-Currently seems to be better on 2l  
-Wean off supp oxygen to keep sat>90% Persistent atrial fibrillation 
-Continue Metoprolol/Eliquis Dyslipidemia 
-Continue home meds Coronary artery disease 
-stable Status post CVA with left hemiplegia 
-on Eliquis/statin AMS 
-sec to ?acute metabolic encephalopathy vs CVA 
-CT head unremarkable 
-Follow MRI 
-Fall precautions History of Dysphagia 
-sec to previous stroke 
-SLP Possible obstructive sleep apnea 
-Outpatient sleep study Dysphagia diet PT/OT awaited Code status: FULL CODE 
DVT prophylaxis: scd PTA: home with son who is a physician Functional baseline: wheelchair bound since last few months Plan: Follow Cardiology, discharge soon Care Plan discussed with: Patient/Family Disposition: Home w/Family and TBD Hospital Problems  Date Reviewed: 3/19/2019 Codes Class Noted POA  * (Principal) [facility] ICD-10-CM: I50.9 ICD-9-CM: 428.0  3/19/2019 Yes Review of Systems: A comprehensive review of systems was negative except for that written in the HPI. Vital Signs:  
 Last 24hrs VS reviewed since prior progress note. Most recent are: 
Visit Vitals /90 (BP 1 Location: Left arm, BP Patient Position: At rest) Pulse (!) 128 Temp 98.1 °F (36.7 °C) Resp 17 Ht 5' (1.524 m) Wt 50.8 kg (111 lb 15.9 oz) SpO2 100% Breastfeeding? No  
BMI 21.87 kg/m² Intake/Output Summary (Last 24 hours) at 3/20/2019 0495 Last data filed at 3/20/2019 5415 Gross per 24 hour Intake  Output 1000 ml Net -1000 ml Physical Examination:  
 
 
     
Constitutional:  No acute distress, cooperative, pleasant   
ENT:  Oral mucous moist, oropharynx benign. Neck supple, Resp:  CTA bilaterally. No wheezing/rhonchi/rales. No accessory muscle use CV:  Regular rhythm, normal rate, no murmurs, gallops, rubs GI:  Soft, non distended, non tender. normoactive bowel sounds, no hepatosplenomegaly Musculoskeletal:  No edema, warm, 2+ pulses throughout Neurologic:  Moves all extremities. AAOx3, CN II-XII reviewed Skin:  Good turgor, no rashes or ulcers Data Review:  
 Review and/or order of clinical lab test 
 
 
Labs:  
 
Recent Labs  
  03/20/19 
0127 03/19/19 1918 WBC 6.8 6.7 HGB 11.4* 11.8 HCT 36.9 39.3  314 Recent Labs  
  03/20/19 
0127 03/19/19 1918  143  
K 4.0 4.7 * 111* CO2 27 27 BUN 25* 25* CREA 0.76 0.79 GLU 91 84 CA 9.3 9.4 MG 2.3 2.4 PHOS 3.6  --   
 
Recent Labs  
  03/20/19 
0127 03/19/19 1918 SGOT 33 33 ALT 30 27  111 TBILI 0.5 0.4 TP 7.5 7.7 ALB 3.4* 3.4*  
GLOB 4.1* 4.3* No results for input(s): INR, PTP, APTT in the last 72 hours. No lab exists for component: INREXT No results for input(s): FE, TIBC, PSAT, FERR in the last 72 hours.   
No results found for: FOL, RBCF  
No results for input(s): PH, PCO2, PO2 in the last 72 hours. Recent Labs  
  03/20/19 
0127 03/19/19 
1918 CPK 71  --   
CKNDX 3.2*  --   
TROIQ <0.05 <0.05 No results found for: CHOL, CHOLX, CHLST, CHOLV, HDL, LDL, LDLC, DLDLP, TGLX, TRIGL, TRIGP, CHHD, CHHDX Lab Results Component Value Date/Time Glucose (POC) 81 03/20/2019 07:20 AM  
 Glucose (POC) 183 (H) 04/08/2010 11:39 AM  
 
Lab Results Component Value Date/Time Color YELLOW/STRAW 03/19/2019 08:23 PM  
 Appearance CLEAR 03/19/2019 08:23 PM  
 Specific gravity 1.025 03/19/2019 08:23 PM  
 pH (UA) 5.5 03/19/2019 08:23 PM  
 Protein 30 (A) 03/19/2019 08:23 PM  
 Glucose NEGATIVE  03/19/2019 08:23 PM  
 Ketone NEGATIVE  03/19/2019 08:23 PM  
 Bilirubin NEGATIVE  03/19/2019 08:23 PM  
 Urobilinogen 0.2 03/19/2019 08:23 PM  
 Nitrites NEGATIVE  03/19/2019 08:23 PM  
 Leukocyte Esterase NEGATIVE  03/19/2019 08:23 PM  
 Epithelial cells FEW 03/19/2019 08:23 PM  
 Bacteria NEGATIVE  03/19/2019 08:23 PM  
 WBC 5-10 03/19/2019 08:23 PM  
 RBC 0-5 03/19/2019 08:23 PM  
 
 
 
Medications Reviewed:  
 
Current Facility-Administered Medications Medication Dose Route Frequency  acetaminophen (TYLENOL) solution 650 mg  650 mg Oral Q4H PRN  
 apixaban (ELIQUIS) tablet 2.5 mg  2.5 mg Oral BID  lovastatin (MEVACOR) tablet 20 mg  20 mg Oral DAILY  metoprolol tartrate (LOPRESSOR) tablet 25 mg  25 mg Oral BID  
 oxybutynin chloride XL (DITROPAN XL) tablet 10 mg  10 mg Oral QHS  polyethylene glycol (MIRALAX) packet 17 g  17 g Oral DAILY  senna (SENOKOT) tablet 8.6 mg  1 Tab Oral QHS  sodium chloride (NS) flush 5-40 mL  5-40 mL IntraVENous Q8H  
 sodium chloride (NS) flush 5-40 mL  5-40 mL IntraVENous PRN  
 ondansetron (ZOFRAN) injection 4 mg  4 mg IntraVENous Q4H PRN  
 furosemide (LASIX) injection 40 mg  40 mg IntraVENous DAILY  
 
______________________________________________________________________ EXPECTED LENGTH OF STAY: - - - 
ACTUAL LENGTH OF STAY:          1 Enmanuel Armstrong MD

## 2019-03-20 NOTE — NURSE NAVIGATOR
Chart reviewed by Heart Failure Nurse Navigator. Heart Failure database completed. EF:  Pending. Previous EF 65% (echo dated 9/11/14) ACEi/ARB/ARNi: currently not indicated. BB: Lopressor 25 mg daily Aldosterone Antagonist: currently not indicated Obstructive Sleep Apnea Screening: none STOP-BANG score: 
 Referred to Sleep Medicine:  
 
CRT . Currently not indicated NYHA Functional Class not assigned. documentation requested via Provider message on [employer] Heart Failure Teach Back in Patient Education. Heart Failure Avoiding Triggers on Discharge Instructions. Cardiologist: not consulted. [address] [place] [address] Post discharge follow up phone call to be made within 48-72 hours of discharge.

## 2019-03-20 NOTE — PROGRESS NOTES
Problem: Dysphagia (Adult) Goal: *Acute Goals and Plan of Care (Insert Text) Description Speech pathology goals Initiated 3/20/2019 1. Patient will tolerate dysphagia 2/nectar liquid diet with no overt s/s aspiration within 7 days Outcome: Progressing Towards Goal 
  
SPEECH LANGUAGE PATHOLOGY BEDSIDE SWALLOW EVALUATION Patient: Edis Jameson (80 y.o. female) Date: 3/20/2019 Primary Diagnosis: Acute CH[facility] (congest[facility] heart [facility] [I50.9] Precautions: swallow ASSESSMENT : 
Based on the objective data described below, the patient presents with mild-moderate oropharyngeal dysphagia characterized by prolonged mastication, delayed posterior propulsion, delayed swallow initiation, and decreased hyolaryngeal elevation/excursion. Patient with weak cough x1 with thin liquids, however no overt s/s aspiration observed with nectar liquids. Per note left by son, patient drinks nectar liquids at baseline and requires meds crushed. Patient is at risk for aspiration secondary to h/o CVA and cognitive status. Recommend dysphagia 2/nectar liquid diet with aspiration precautions. Patient will benefit from skilled intervention to address the above impairments. Patient?s rehabilitation potential is considered to be Fair Factors which may influence rehabilitation potential include: ? None noted ? Mental ability/status ? Medical condition ? Home/family situation and support systems ? Safety awareness ? Pain tolerance/management ? Other: PLAN : 
Recommendations and Planned Interventions: 
--Dysphagia 2/NECTAR liquid diet 
--Meds crushed 
--No straws, allow patient to self-feed from cup 
--Supervision and assistance with PO Intake 
--SLP to follow for diet tolerance Frequency/Duration: Patient will be followed by speech-language pathology 2 times a week to address goals. Discharge Recommendations: To Be Determined SUBJECTIVE:  
Patient stated ? I don't know. ? Patient alert, cooperative. Oriented to person only. OBJECTIVE:  
 
Past Medical History:  
Diagnosis Date Chronic kidney disease   
 acute tubual necrosis Clostridium difficile infection Elevated troponin 9/10/2014 Hypertension Skin cancer Stroke [facility]) 2001 left residual  
 
Past Surgical History:  
Procedure Laterality Date HX APPENDECTOMY  2/2008 HX HEENT    
 cataract HX ORTHOPAEDIC  04/2010  
 left total hip - hip fx, left knee surgery Prior Level of Function/Home Situation:  
Home Situation Home Environment: Private residence One/Two Story Residence: One story Living Alone: No 
Support Systems: Family member(s), Child(reid) Patient Expects to be Discharged to[de-identified] Private residence Current DME Used/Available at Home: Wheelchair Diet prior to admission: nectar liquids, unknown solid consistency Current Diet:  dysphagia 3/thin  
Cognitive and Communication Status: 
Neurologic State: Alert, Confused Orientation Level: Oriented to person, Disoriented to time, Disoriented to situation, Disoriented to place Cognition: Decreased command following, Decreased attention/concentration Perception: Cues to attend left visual field, Cues to attend to left side of body Perseveration: No perseveration noted Safety/Judgement: Decreased awareness of environment, Decreased awareness of need for assistance, Decreased awareness of need for safety, Decreased insight into deficits Oral Assessment: 
Oral Assessment Labial: Left droop Dentition: Natural;Limited Oral Hygiene: moist oral mucosa Lingual: Left deviation Velum: Unable to visualize Mandible: No impairment P.O. Trials: 
Patient Position: upright in bed Vocal quality prior to P.O.: Low volume Consistency Presented: Ice chips; Thin liquid; Nectar thick liquid;Puree; Solid How Presented: Self-fed/presented;Cup/sip;Cup/gulp;SLP-fed/presented;Spoon Bolus Acceptance: No impairment Bolus Formation/Control: Impaired Type of Impairment: Delayed;Mastication Propulsion: Delayed (# of seconds) Oral Residue: None Initiation of Swallow: Delayed (# of seconds) Laryngeal Elevation: Decreased Aspiration Signs/Symptoms: Weak cough(with thin) Pharyngeal Phase Characteristics: No impairment, issues, or problems Effective Modifications: Cup/sip Cues for Modifications: None Oral Phase Severity: Mild-moderate Pharyngeal Phase Severity : Mild-moderate NOMS:  
The NOMS functional outcome measure was used to quantify this patient's level of swallowing impairment. Based on the NOMS, the patient was determined to be at level 3 for swallow function NOMS Swallowing Levels: 
Level 1 (CN): NPO Level 2 (CM): NPO but takes consistency in therapy Level 3 (CL): Takes less than 50% of nutrition p.o. and continues with nonoral feedings; and/or safe with mod cues; and/or max diet restriction Level 4 (CK): Safe swallow but needs mod cues; and/or mod diet restriction; and/or still requires some nonoral feeding/supplements Level 5 (CJ): Safe swallow with min diet restriction; and/or needs min cues Level 6 (CI): Independent with p.o.; rare cues; usually self cues; may need to avoid some foods or needs extra time Level 7 (CH): Independent for all p.o. COLEMAN. (2003). National Outcomes Measurement System (NOMS): Adult Speech-Language Pathology User's Guide. Pain: 
Pain Scale 1: Numeric (0 - 10) Pain Intensity 1: 0 After treatment:  
?            Patient left in no apparent distress sitting up in chair ? Patient left in no apparent distress in bed ? Call bell left within reach ? Nursing notified ? Caregiver present ? Bed alarm activated COMMUNICATION/EDUCATION:  
The patient?s plan of care including recommendations, planned interventions, and recommended diet changes were discussed with: Registered Nurse. ? Posted safety precautions in patient's room. ? Patient/family have participated as able in goal setting and plan of care. ?            Patient/family agree to work toward stated goals and plan of care. ?            Patient understands intent and goals of therapy, but is neutral about his/her participation. ? Patient is unable to participate in goal setting and plan of care. Thank you for this referral. 
Luis Coppola, SLP Time Calculation: 18 mins

## 2019-03-20 NOTE — PROGRESS NOTES
Advance Care Planning Note Name: Tara Lockhart YOB: 1922 MRN: [id number] Admission Date: 3/19/2019  6:43 PM 
 
Date of discussion: 3/20/2019 Active Diagnoses: 
 
Hospital Problems  Date Reviewed: 3/19/2019 Codes Class Noted POA * (Principal) Acute CHF (congestive heart failure) (HCC) ICD-10-CM: I50.9 ICD-9-CM: 428.0  3/19/2019 Yes These active diagnoses are of sufficient risk that focused discussion on advance care planning is indicated in order to allow the patient to thoughtfully consider personal goals of care, and if situations arise that prevent the ability to personally give input, to ensure appropriate representation of their personal desires for different levels and aggressiveness of care. Discussion:  
 
Persons present and participating in discussion: Lissette Miller MD, Dr Justin Patel (son) Discussion: Discussed about the patient's co-morbidities and the events leading to the admission. Dr Emery Boykin wants the patient to be FULL CODE. Time Spent:  
 
Total time spent face-to-face in education and discussion: 18 minutes.   
 
Jamie Diego MD 
3/20/2019 
11:36 AM

## 2019-03-20 NOTE — ROUTINE PROCESS
TRANSFER - OUT REPORT: 
 
Verbal report given to Avis Martinez RN(name) on Maria Elena Murphy  being transferred to NSTU(unit) for routine progression of care Report consisted of patients Situation, Background, Assessment and  
Recommendations(SBAR). Information from the following report(s) SBAR, ED Summary, Intake/Output, MAR, Accordion, Recent Results and Cardiac Rhythm Afib was reviewed with the receiving nurse. Lines:  
Peripheral IV 03/19/19 Right Antecubital (Active) Site Assessment Clean, dry, & intact 3/19/2019  7:10 PM  
Phlebitis Assessment 0 3/19/2019  7:10 PM  
Infiltration Assessment 0 3/19/2019  7:10 PM  
Dressing Status Clean, dry, & intact 3/19/2019  7:10 PM  
Dressing Type Transparent 3/19/2019  7:10 PM  
Alcohol Cap Used No 3/19/2019  7:10 PM  
  
 
Opportunity for questions and clarification was provided. Patient transported with: 
 Monitor Visit Vitals BP (!) 140/109 (BP 1 Location: Left arm, BP Patient Position: At rest) Pulse (!) 108 Temp 98 °F (36.7 °C) Resp 18 SpO2 99%

## 2019-03-21 ENCOUNTER — PATIENT OUTREACH (OUTPATIENT)
Dept: CASE MANAGEMENT | Age: 84
End: 2019-03-21

## 2019-03-21 ENCOUNTER — HOME HEALTH ADMISSION (OUTPATIENT)
Dept: HOME HEALTH SERVICES | Facility: HOME HEALTH | Age: 84
End: 2019-03-21

## 2019-03-21 VITALS
OXYGEN SATURATION: 94 % | BODY MASS INDEX: 21.86 KG/M2 | HEART RATE: 102 BPM | DIASTOLIC BLOOD PRESSURE: 65 MMHG | SYSTOLIC BLOOD PRESSURE: 111 MMHG | RESPIRATION RATE: 20 BRPM | WEIGHT: 111.33 LBS | HEIGHT: 60 IN | TEMPERATURE: 98.1 F

## 2019-03-21 LAB
ANION GAP SERPL CALC-SCNC: 7 MMOL/L (ref 5–15)
BUN SERPL-MCNC: 28 MG/DL (ref 6–20)
BUN/CREAT SERPL: 31 (ref 12–20)
CALCIUM SERPL-MCNC: 8.8 MG/DL (ref 8.5–10.1)
CHLORIDE SERPL-SCNC: 105 MMOL/L (ref 97–108)
CO2 SERPL-SCNC: 29 MMOL/L (ref 21–32)
CREAT SERPL-MCNC: 0.91 MG/DL (ref 0.55–1.02)
GLUCOSE SERPL-MCNC: 90 MG/DL (ref 65–100)
POTASSIUM SERPL-SCNC: 3.5 MMOL/L (ref 3.5–5.1)
SODIUM SERPL-SCNC: 141 MMOL/L (ref 136–145)

## 2019-03-21 PROCEDURE — 80048 BASIC METABOLIC PNL TOTAL CA: CPT

## 2019-03-21 PROCEDURE — 74011250637 HC RX REV CODE- 250/637: Performed by: INTERNAL MEDICINE

## 2019-03-21 PROCEDURE — 36415 COLL VENOUS BLD VENIPUNCTURE: CPT

## 2019-03-21 PROCEDURE — 97161 PT EVAL LOW COMPLEX 20 MIN: CPT

## 2019-03-21 RX ORDER — FUROSEMIDE 40 MG/1
TABLET ORAL
Qty: 30 TAB | Refills: 0 | Status: ON HOLD | OUTPATIENT
Start: 2019-03-21 | End: 2021-01-01 | Stop reason: CLARIF

## 2019-03-21 RX ADMIN — POLYETHYLENE GLYCOL 3350 17 G: 17 POWDER, FOR SOLUTION ORAL at 08:05

## 2019-03-21 RX ADMIN — METOPROLOL TARTRATE 25 MG: 25 TABLET ORAL at 08:05

## 2019-03-21 RX ADMIN — Medication 10 ML: at 05:50

## 2019-03-21 RX ADMIN — LOVASTATIN 20 MG: 20 TABLET ORAL at 08:05

## 2019-03-21 RX ADMIN — APIXABAN 2.5 MG: 2.5 TABLET, FILM COATED ORAL at 08:05

## 2019-03-21 RX ADMIN — FUROSEMIDE 40 MG: 40 TABLET ORAL at 08:05

## 2019-03-21 NOTE — PROGRESS NOTES
Bedside shift change report given to [place] RN (oncoming nurse) by Yolis Rhodes RN (offgoing nurse). Report included the following information SBAR, Kardex, ED Summary, Procedure Summary, Intake/Output, MAR, Accordion, Recent Results and Cardiac Rhythm A fib.

## 2019-03-21 NOTE — PROGRESS NOTES
physical Therapy EVALUATION/DISCHARGE Patient: Brian Anand (80 y.o. female) Date: 3/21/2019 Primary Diagnosis: Acute CHF (congestive heart failure) ([facility] [I50.9] Precautions:   Fall ASSESSMENT : 
Based on the objective data described below, the patient presents at/near her reported baseline level following admission for acute CHF. Pt currently A&O x1 and unable to provide detailed PLOF information. Per collective staff report and chart review - she lived at home with family who provide 24/7 caregiver assistance. She was essentially bed/wheelchair bound and son provided ? total assist for transfers. Received pt supine in bed - able to follow basic one step commands majority of the time. Demos B knee flexion contractures as well as L hand contracture. Required max/total A for rolling and repositioning in bed - infer total A for transfers. Pt is pending discharge today - she appears at her baseline level. No further acute PT indicated. Recommend continued 24/7 supervision and assist upon discharge. Further skilled acute physical therapy is not indicated at this time. PLAN : 
Discharge Recommendations: Continued 24/7 caregiver assistance Further Equipment Recommendations for Discharge: Has wheelchair SUBJECTIVE:  
Patient stated Acacia.  OBJECTIVE DATA SUMMARY:  
HISTORY:   
Past Medical History:  
Diagnosis Date  Chronic kidney disease   
 acute tubual necrosis  Clostridium difficile infection  Elevated troponin 9/10/2014  Hypertension  Skin cancer  Stroke ([facility] 2001 left residual  
 
Past Surgical History:  
Procedure Laterality Date  HX APPENDECTOMY  2/2008  HX HEENT    
 cataract  HX ORTHOPAEDIC  04/2010  
 left total hip - hip fx, left knee surgery Prior Level of Function/Home Situation: per collective report, pt lived at home with family providing 24/7 assistance and supervision; bed>w/c bound and required total A for transfers Personal factors and/or comorbidities impacting plan of care:  
 
Home Situation Home Environment: Private residence One/Two Story Residence: One story Living Alone: No 
Support Systems: Family member(s) Patient Expects to be Discharged to[de-identified] Private residence Current DME Used/Available at Home: Wheelchair EXAMINATION/PRESENTATION/DECISION MAKING:  
Critical Behavior: 
Neurologic State: Alert, Confused Orientation Level: Oriented to person Cognition: Decreased attention/concentration, Follows commands Safety/Judgement: Decreased awareness of environment, Decreased awareness of need for assistance, Decreased awareness of need for safety, Decreased insight into deficits Hearing: Auditory Auditory Impairment: None Skin:  Intact where exposed Edema: none noted Range Of Motion: 
AROM: Grossly decreased, non-functional 
PROM: Grossly decreased, non-functional 
  
Strength:   
Strength: Grossly decreased, non-functional 
  
Tone & Sensation:  
    
Coordination: 
  
Vision:  
  
Functional Mobility: 
Bed Mobility: 
Rolling: Maximum assistance Transfers: 
  
Balance:  
  
Ambulation/Gait Training: 
  
  
Therapeutic Exercises:  
 
Functional Measure: 
Barthel Index: 
 
Bathin Bladder: 0 Bowels: 0 Groomin Dressin Feedin Mobility: 0 Stairs: 0 Toilet Use: 0 Transfer (Bed to Chair and Back): 0 Total: 0/100 Percentage of impairment  
0% 1-19% 20-39% 40-59% 60-79% 80-99% 100% Barthel Score 0-100 100 99-80 79-60 59-40 20-39 1-19 
 0 The Barthel ADL Index: Guidelines 1. The index should be used as a record of what a patient does, not as a record of what a patient could do. 2. The main aim is to establish degree of independence from any help, physical or verbal, however minor and for whatever reason. 3. The need for supervision renders the patient not independent. 4. A patient's performance should be established using the best available evidence.  Asking the patient, friends/relatives and nurses are the usual sources, but direct observation and common sense are also important. However direct testing is not needed. 5. Usually the patient's performance over the preceding 24-48 hours is important, but occasionally longer periods will be relevant. 6. Middle categories imply that the patient supplies over 50 per cent of the effort. 7. Use of aids to be independent is allowed. Kellen Chavez., Barthel, DSiaW. (9321). Functional evaluation: the Barthel Index. [address] [facility] (14)2. Silvia Fox osmin MELISSA Caraballo, Yina Bentley., Cadence Alberts., [place], [address] (1999). Measuring the change indisability after inpatient rehabilitation; comparison of the responsiveness of the Barthel Index and Functional [place] Measure. Journal of Neurology, Neurosurgery, and Psychiatry, 66(4), 725-178. Bhakti Ann, NMIN, RAJINDER Stevens, & Kraig Khan MGRECIA. (2004.) Assessment of post-stroke quality of life in cost-effectiveness studies: The usefulness of the Barthel Index and the EuroQoL-5D. [facility], 13, 333-64 Physical Therapy Evaluation Charge Determination History Examination Presentation Decision-Making HIGH Complexity :3+ comorbidities / personal factors will impact the outcome/ POC  HIGH Complexity : 4+ Standardized tests and measures addressing body structure, function, activity limitation and / or participation in recreation  LOW Complexity : Stable, uncomplicated  HIGH Complexity : FOTO score of 1- 25 Based on the above components, the patient evaluation is determined to be of the following complexity level: LOW Pain: 
Pain Scale 1: Numeric (0 - 10) Pain Intensity 1: 0 Activity Tolerance:  
 
Please refer to the flowsheet for vital signs taken during this treatment. After treatment:  
[]   Patient left in no apparent distress sitting up in chair 
[x]   Patient left in no apparent distress in bed 
[x]   Call bell left within reach [x]   Nursing notified 
[]   Caregiver present 
[]   Bed alarm activated COMMUNICATION/EDUCATION:  
Communication/Collaboration: 
[x]   Fall prevention education was provided and the patient/caregiver indicated understanding. [x]   Patient/family have participated as able and agree with findings and recommendations. []   Patient is unable to participate in plan of care at this time. Findings and recommendations were discussed with: Registered Nurse Thank you for this referral. 
Nicolas Nogueira, PT, DPT Time Calculation: 13 mins

## 2019-03-21 NOTE — PROGRESS NOTES
Transitional Care Team: Initial HUG Note Date of Assessment: 03/21/19 Time of Assessment:  9:51 AM 
Hospital Nurse Navigator: Gerry Nunez, RN, BSN, CCM Tameka Llamas is a 80 y.o. female inpatient at [facility] with Acute CHF (congestive heart failure) ([facility] [address] [I50.9] on  3/19/2019. Assessment & Plan 1. Appointment - Spoke with son and he doesn't want to take mom to PCP on 3/25 b/c it is her birthday. Son will call to r/s appt. He is aware that she needs BMP checked within a week. 2. Medication Reconciliation - Reviewed with son. He asked that hard rx for Lasix be faxed to [facility] in [place]. Explained to him that the Lasix will cause increased UO and to be cognizant of the increased need to change pull ups more frequently. 3. HF/AFib - Discussed Dr Silvestre Freeman recommendations w/ son. Per Dr Silvestre Freeman note, no further cardiac studies and rate controlled with BB. No aggressive measures, no further cardiac follow up. Provided son w/ Dr Silvestre Freeman ph#. He would like to discuss her AFib and see if there is anything that can be done to convert her. 4. AMD/DDNR - Discussed goals of care. Son states he has POA, which he thinks includes medical.  He is legal NOK. He wants his mom to remain FC.   
 
5. Son declines [address] visit. CM made aware. Primary Diagnosis Acute CHF (congestive heart failure) ([facility] [address] 1. Advance Directive:  not on file. 2. Current Code Status:  Full Code 3. Referral to to Hospice/ Palliative Care Appropriate: no 
 
4. Awareness of Medical Conditions (Trajectory of illness and pts expectations). Pt with dementia so unable to assess. Son is aware of her conditions. 5. Discharge Needs (to include safety issues): Son states that he is caregiver for his mom along with aides. She is now bed bound for the most part. She has WC that is used for transport by son. He administers her meds and will crush them as needed.   He or caregivers prepares meals for her. 6. Barriers Identified:  Son declines scheduled PCP appt. Leaving it up to him to r/s per his request.  He wanted to speak with PCP before f/u with Dr Viet Rosa. He wanted to know why Dr Viet Rosa didn't call him to discuss his mom. Reviewed with him what Dr Viet Rosa recommended. 7. Patient is willing to go to SNF/Inpatient Rehab if recommended: N/A 
 
8. Medication Reconciliation:  was performed with son. 9. Can patient afford medications:  yes 10. Who manages medications at home: angie Westbrook is a 80 y.o. female inpatient at admitted with HF on 3/19/19. Chart reviewed by Melvina Dennis RN and HUG (Healthy Understanding of Goals) program introduced to patient/family. The Transitional Care Team bridges the gaps in care and education surrounding discharge from the acute care facility. The objective is to empower the patient and family in taking a proactive role in the task of preventing readmission within the first thirty days after discharge from the acute care setting, The team is also involved in the efforts to reduce readmission to the acute care setting after stabilization and discharge from the acute care environment either to skilled nursing facilities or community. No future appointments. Non-[facility] follow up appointment(s): Dr Mireles Parents - family to schedule Patient education focused on readmission zones as described as: The Red Zone: High risk for readmission, days 1-21 The Yellow Zone: Moderate  risk for readmission, days 22-29 The Green Zone: Lower risk for readmission, days 30 and after The TC Team will follow patient from a distance while inpatient as well as be available for further transition disposition as needed. The MORELIA TEAM will continue to offer support during the 30- 90 day discharge from acute care setting. Past Medical History:  
Diagnosis Date  Chronic kidney disease   
 acute tubual necrosis  Clostridium difficile infection  Elevated troponin 9/10/2014  Hypertension  Skin cancer  Stroke ([facility] [address]  2001 left residual

## 2019-03-21 NOTE — PROGRESS NOTES
RRAT Score:   14 Do you (patient/family) have any concerns for transition/discharge? No  
           
Plan for utilizing home health:    [facility]  
  
Likelihood of readmission? medium Transition of Care Plan:     CM spoke with pt's son, Dr. Lulu Lei, to introduce him to the role of Cm and transition of care. He verbalized understanding. Per son, he and his fiance' take care of this pt. He has a brother who is supportive as well. He is going to pick this pt up at 12 noon today. CM sent a H2H order to [address] [place] [address] for CHF. Deepak Pepper Care Management Interventions PCP Verified by CM: Yes 
Palliative Care Criteria Met (RRAT>21 & CHF Dx)?: No 
Transition of Care Consult (CM Consult): Discharge Planning MyChart Signup: No 
Discharge Durable Medical Equipment: No 
Physical Therapy Consult: Yes Occupational Therapy Consult: Yes Speech Therapy Consult: No 
Current Support Network: Nursing Facility([facility]) Confirm Follow Up Transport: Family Plan discussed with Pt/Family/Caregiver: Yes Freedom of Choice Offered: Yes [employer] Information Provided?: No

## 2019-03-21 NOTE — PROGRESS NOTES
Hospitalist Progress Note Richmond Shahid MD 
Answering service: [phone] from in house phone Cell: [phone] Date of Service:  3/21/2019 NAME:  Barb Rae :  3/25/1922 MRN:  [id number] Admission Summary: This is a 80-year-old woman with a past medical history significant for chronic atrial fibrillation, dyslipidemia, coronary artery disease, status post CVA with left-sided deficit, dysphagia, C. diff infection, and skin cancer, who was in her usual state of health until a couple of days ago when the patient developed fatigue and weakness. The weakness is generalized. The patient's son also reported decreased alertness as well as difficulty with breathing and shortness of breath. The patient's son described the difficulty with breathing as Cheyne-Jacobo breathing with vocational passing out while at rest.  He suspected that this patient probably has sleep apnea. This patient has not been officially diagnosed with sleep apnea, has not had any test to confirm a sleep apnea. About 3 months ago, the patient was admitted to [facility], because of chest pain. During that hospitalization, the patient was found to have a atrial fibrillation. She was started on metoprolol for rate control as well as Eliquis for anticoagulation. The patient has been doing relatively well until this past couple of days when the patient developed fatigue, weakness and decreased alertness. The patient is normally able to ambulate with a walker, but for the past couple of weeks the patient is more less bed-bound requiring considerable care at home. The patient has to be lifted and put on commode and put back on bed again. There was no history of fever, no rigors, and no chills. The patient was last admitted to this hospital in 2014. She was admitted following a fall.   During that hospitalization, the patient was found to have an elevated troponin level. She was seen by the cardiologist and the patient was treated conservatively. Echocardiogram done during that hospitalization shows no wall motion abnormalities or grade 2 diastolic dysfunction or mitral regurgitation. The patient was discharged to skilled nursing facility in stable condition. When the patient arrived at the emergency room today, the patient's chest x-ray shows evidence of vascular congestion. The patient remains in atrial fibrillation. BNP level was also elevated. The patient was referred to the hospitalist service for evaluation for admission. No fever, no rigors, no chills Interval history / Subjective: F/u generalized weakness No new issues Assessment & Plan:  
 
Acute diastolic congestive heart failure NYHA unable to determine 
-Elevated pro BNP 
-CXR 3/19 Cardiomegaly, interstitial pulmonary edema, and pleural effusions 
-IV diuresis switched to oral 
-Strict I/Os 
-Echo EF 56-60% with moderate concentric hypertrophy 
-Appreciate Cardiology, signed off Moderate to severe MR 
-seen on Echo from 09/2014 PFO 
-Outpatient follow up Severe Tricuspid regurgitation 
-Outpatient follow up with Cardiology Reported abnormal breathing 
-Currently seems to be better on 2l  
-Wean off supp oxygen to keep sat>90% Persistent atrial fibrillation 
-Continue Metoprolol/Eliquis Dyslipidemia 
-Continue home meds Coronary artery disease 
-stable Status post CVA with left hemiplegia 
-on Eliquis/statin AMS 
-sec to ?acute metabolic encephalopathy vs CVA 
-CT head unremarkable 
-Follow MRI 
-Fall precautions History of Dysphagia 
-sec to previous stroke 
-SLP Possible obstructive sleep apnea 
-Outpatient sleep study Dysphagia diet PT/OT awaited Code status: FULL CODE. The son declined Palliative care DVT prophylaxis: scd PTA: home with son who is a physician Functional baseline: wheelchair bound since last few months Plan: Discharge home today with son Care Plan discussed with: Patient/Family Disposition: Home w/Family and TBD Hospital Problems  Date Reviewed: 3/19/2019 Codes Class Noted POA * (Principal) Acute CHF (congestive heart failure) (HCC) ICD-10-CM: I50.9 ICD-9-CM: 428.0  3/19/2019 Yes Review of Systems: A comprehensive review of systems was negative except for that written in the HPI. Vital Signs:  
 Last 24hrs VS reviewed since prior progress note. Most recent are: 
Visit Vitals /71 (BP 1 Location: Left arm, BP Patient Position: At rest) Pulse (!) 102 Temp 99 °F (37.2 °C) Resp 19 Ht 5' (1.524 m) Wt 50.5 kg (111 lb 5.3 oz) SpO2 97% Breastfeeding? No  
BMI 21.74 kg/m² Intake/Output Summary (Last 24 hours) at 3/21/2019 4556 Last data filed at 3/20/2019 1358 Gross per 24 hour Intake  Output 550 ml Net -550 ml Physical Examination:  
 
 
     
Constitutional:  No acute distress, cooperative, pleasant   
ENT:  Oral mucous moist, oropharynx benign. Neck supple, Resp:  CTA bilaterally. No wheezing/rhonchi/rales. No accessory muscle use CV:  Regular rhythm, normal rate, no murmurs, gallops, rubs GI:  Soft, non distended, non tender. normoactive bowel sounds, no hepatosplenomegaly Musculoskeletal:  No edema, warm, 2+ pulses throughout Neurologic:  Moves all extremities. AAOx3, CN II-XII reviewed Skin:  Good turgor, no rashes or ulcers Data Review:  
 Review and/or order of clinical lab test 
 
 
Labs:  
 
Recent Labs  
  03/20/19 
0127 03/19/19 1918 WBC 6.8 6.7 HGB 11.4* 11.8 HCT 36.9 39.3  314 Recent Labs  
  03/21/19 
0141 03/20/19 
0127 03/19/19 1918  143 143  
K 3.5 4.0 4.7  109* 111* CO2 29 27 27 BUN 28* 25* 25* CREA 0.91 0.76 0.79 GLU 90 91 84 CA 8.8 9.3 9.4 MG  --  2.3 2.4 PHOS  --  3.6  --   
 
Recent Labs  
  03/20/19 
0127 03/19/19 
1918 OT 33 33 ALT 30 27  111 TBILI 0.5 0.4 TP 7.5 7.7 ALB 3.4* 3.4*  
GLOB 4.1* 4.3* No results for input(s): INR, PTP, APTT in the last 72 hours. No lab exists for component: INREXT, INREXT No results for input(s): FE, TIBC, PSAT, FERR in the last 72 hours. No results found for: FOL, RBCF No results for input(s): PH, PCO2, PO2 in the last 72 hours. Recent Labs  
  03/20/19 
0127 03/19/19 1918 CPK 71  --   
CKNDX 3.2*  --   
TROIQ <0.05 <0.05 No results found for: CHOL, CHOLX, CHLST, CHOLV, HDL, LDL, LDLC, DLDLP, TGLX, TRIGL, TRIGP, CHHD, CHHDX Lab Results Component Value Date/Time Glucose (POC) 125 (H) 03/20/2019 04:11 PM  
 Glucose (POC) 161 (H) 03/20/2019 12:04 PM  
 Glucose (POC) 81 03/20/2019 07:20 AM  
 Glucose (POC) 183 (H) 04/08/2010 11:39 AM  
 
Lab Results Component Value Date/Time Color YELLOW/STRAW 03/19/2019 08:23 PM  
 Appearance CLEAR 03/19/2019 08:23 PM  
 Specific gravity 1.025 03/19/2019 08:23 PM  
 pH (UA) 5.5 03/19/2019 08:23 PM  
 Protein 30 (A) 03/19/2019 08:23 PM  
 Glucose NEGATIVE  03/19/2019 08:23 PM  
 Ketone NEGATIVE  03/19/2019 08:23 PM  
 Bilirubin NEGATIVE  03/19/2019 08:23 PM  
 Urobilinogen 0.2 03/19/2019 08:23 PM  
 Nitrites NEGATIVE  03/19/2019 08:23 PM  
 Leukocyte Esterase NEGATIVE  03/19/2019 08:23 PM  
 Epithelial cells FEW 03/19/2019 08:23 PM  
 Bacteria NEGATIVE  03/19/2019 08:23 PM  
 WBC 5-10 03/19/2019 08:23 PM  
 RBC 0-5 03/19/2019 08:23 PM  
 
 
 
Medications Reviewed:  
 
Current Facility-Administered Medications Medication Dose Route Frequency  furosemide (LASIX) tablet 40 mg  40 mg Oral DAILY  acetaminophen (TYLENOL) solution 650 mg  650 mg Oral Q4H PRN  
 apixaban (ELIQUIS) tablet 2.5 mg  2.5 mg Oral BID  lovastatin (MEVACOR) tablet 20 mg  20 mg Oral DAILY  metoprolol tartrate (LOPRESSOR) tablet 25 mg  25 mg Oral BID  
 oxybutynin chloride XL (DITROPAN XL) tablet 10 mg  10 mg Oral QHS  polyethylene glycol (MIRALAX) packet 17 g  17 g Oral DAILY  senna (SENOKOT) tablet 8.6 mg  1 Tab Oral QHS  sodium chloride (NS) flush 5-40 mL  5-40 mL IntraVENous Q8H  
 sodium chloride (NS) flush 5-40 mL  5-40 mL IntraVENous PRN  
 ondansetron (ZOFRAN) injection 4 mg  4 mg IntraVENous Q4H PRN  
 
______________________________________________________________________ EXPECTED LENGTH OF STAY: 3d 7h 
ACTUAL LENGTH OF STAY:          2 Vasu Ambriz MD

## 2019-03-21 NOTE — PROGRESS NOTES
I have reviewed discharge instructions with the patient and caregiver. The patient and caregiver verbalized understanding. Patient home via POV, son is driving

## 2019-03-21 NOTE — PROGRESS NOTES
Hospital follow-up PCP transitional care appointment has been scheduled with Dr. Niru Adams for Monday, 3/25/19 at 3:30 p.m. Pending patient discharge.   Marrianne Riedel, Care Management Specialist.

## 2019-03-21 NOTE — NURSE NAVIGATOR
HFNN attempted to meet with patient. Patient resting with eyes closed. LIVING WITH HEART FAILURE book left on bedside table. Patient to be discharged to home today with family.  
Beltran Jones RN-CHFN/HFNN

## 2019-03-21 NOTE — PROGRESS NOTES
Problem: Falls - Risk of 
Goal: *Absence of Falls Description Document Glenn Stanford Fall Risk and appropriate interventions in the flowsheet. Outcome: Progressing Towards Goal 
  
Problem: Pressure Injury - Risk of 
Goal: *Prevention of pressure injury Description Document Charles Scale and appropriate interventions in the flowsheet.  
Outcome: Progressing Towards Goal

## 2019-03-21 NOTE — PROGRESS NOTES
[address] met with patient today to provide an introduction to self, the [address] [address] at [facility]. Bundled Payment Care Program: 
 
Patient was provided a copy of the [facility] letter. Patient states that they have a functioning scale at home. Patient was not provided a scale during this visit. Reinforced the importance of checking their weight at the same time daily and calling the cardiologist if their weight is up 3 lbs. in a day or 5 lbs. in a week (or per MD guidelines). Patient  has received a \"Living with Heart Failure\" patient education book. Hug At Home Program: 
 
Demonstration of IPad deferred as patient falling asleep during visit no other family members present. All questions answered at this time. .  
Patient verbalized understanding of all information discussed.

## 2019-03-21 NOTE — PROGRESS NOTES
Physical Therapy Note 3/21/19 Orders received, chart reviewed and patient evaluated by physical therapy. Recommend patient to discharge to home with continued 24/7 SUP/assist for all mobility. Pt appears at baseline level. Full evaluation to follow.   
 
Va Mckeon, PT, DPT

## 2019-03-21 NOTE — DISCHARGE INSTRUCTIONS
Discharge Instructions       PATIENT ID: Jerson Galindo  MRN: [id number]   YOB: 1922    DATE OF ADMISSION: 3/19/2019  6:43 PM    DATE OF DISCHARGE: 3/21/2019    PRIMARY CARE PROVIDER: Rafael Lindquist MD     ATTENDING PHYSICIAN: Saintclair Mola, MD  DISCHARGING PROVIDER: Khanh Barnes MD    To contact this individual call [phone] and ask the  to page. If unavailable ask to be transferred the Adult Hospitalist Department. DISCHARGE DIAGNOSES   Acute diastolic CHF    CONSULTATIONS: None    PROCEDURES/SURGERIES: * No surgery found *    PENDING TEST RESULTS:   At the time of discharge the following test results are still pending: none    FOLLOW UP APPOINTMENTS:   Follow-up Information     Follow up With Specialties Details Why Contact Info    Tino Barker MD [facility] In 1 week  [address] [place] [address]      Darius Monique MD Cardiology In 1 week  [address]  [phone]             ADDITIONAL CARE RECOMMENDATIONS:   Follow up with PMD and Cardiology  BMP in 1 week  Daily weight,if weight gain >2lbs in 3-4 days please call your cardiologist    DIET: Dysphagia Diet    ACTIVITY: Activity as tolerated        DISCHARGE MEDICATIONS:   See Medication Reconciliation Form    · It is important that you take the medication exactly as they are prescribed. · Keep your medication in the bottles provided by the pharmacist and keep a list of the medication names, dosages, and times to be taken in your wallet. · Do not take other medications without consulting your doctor. NOTIFY YOUR PHYSICIAN FOR ANY OF THE FOLLOWING:   Fever over 101 degrees for 24 hours. Chest pain, shortness of breath, fever, chills, nausea, vomiting, diarrhea, change in mentation, falling, weakness, bleeding. Severe pain or pain not relieved by medications.   Or, any other signs or symptoms that you may have questions about.      DISPOSITION:   x Home With:   OT  PT  HH  RN       SNF/Inpatient Rehab/LTAC    Independent/assisted living    Hospice    Other:     CDMP Checked:   Yes x     PROBLEM LIST Updated:  Yes x       Signed:   Grabiel Baires MD  3/21/2019  7:43 AM

## 2019-03-21 NOTE — PROGRESS NOTES
Problem: Heart Failure: Day 3 Goal: Discharge Planning Outcome: Progressing Towards Goal 
Note:  
 
Reviewed D/C criteria with patient

## 2019-03-21 NOTE — DISCHARGE SUMMARY
Discharge Summary PATIENT ID: Sidney Watkins MRN: [id number] YOB: 1922 DATE OF ADMISSION: 3/19/2019  6:43 PM   
DATE OF DISCHARGE: 3/21/2019 PRIMARY CARE PROVIDER: Jerry Parikh MD  
 
ATTENDING PHYSICIAN: Dr Merly Sims DISCHARGING PROVIDER: Merly Sims MD   
To contact this individual call [phone] and ask the  to page. If unavailable ask to be transferred the Adult Hospitalist Department. CONSULTATIONS: None PROCEDURES/SURGERIES: * No surgery found * [id number] COURSE:  
Acute diastolic congestive heart failure NYHA unable to determine 
-Elevated pro BNP 
-CXR 3/19 Cardiomegaly, interstitial pulmonary edema, and pleural effusions 
-IV diuresis switched to oral 
-Strict I/Os 
-Echo EF 56-60% with moderate concentric hypertrophy 
-Appreciate Cardiology, signed off Moderate to severe MR 
-seen on Echo from 09/2014 PFO 
-Outpatient follow up Severe Tricuspid regurgitation 
-Outpatient follow up with Cardiology Reported abnormal breathing 
-Currently seems to be better on 2l  
-Wean off supp oxygen to keep sat>90% Persistent atrial fibrillation 
-Continue Metoprolol/Eliquis Dyslipidemia 
-Continue home meds Coronary artery disease 
-stable Status post CVA with left hemiplegia 
-on Eliquis/statin AMS 
-sec to ?acute metabolic encephalopathy vs CVA 
-CT head unremarkable 
-Follow MRI 
-Fall precautions History of Dysphagia 
-sec to previous stroke 
-SLP Possible obstructive sleep apnea 
-Outpatient sleep study Dysphagia diet PT/OT awaited Code status: FULL CODE. The son declined Palliative care DVT prophylaxis: scd PTA: home with son who is a physician Dr. Constance Mariano (ph# [phone]) DISCHARGE DIAGNOSES / PLAN:   
 
1. Acute diastolic CHF  
 
ADDITIONAL CARE RECOMMENDATIONS:  
FOllow up with PMD and Cardiology BMP in 1 week Daily weight.  If weight gain >2lbs in 3-4 days, please call your cardiologist 
Thyroid function tests in 4 weeks PENDING TEST RESULTS:  
At the time of discharge the following test results are still pending: none FOLLOW UP APPOINTMENTS:   
Follow-up Information Follow up With Specialties Details Why Contact Info Margaret Rojas MD Family Practice In 1 week  [address] [facility] [address] 
[phone] Johanna Gosselin, MD Cardiology In 1 week  [address] [facility] [address] [place] [address] 
[phone] DIET: Dysphagia diet-pureed ACTIVITY: Activity as tolerated DISCHARGE MEDICATIONS: 
Current Discharge Medication List  
  
START taking these medications Details  
furosemide (LASIX) 40 mg tablet 1 tab PO daily 
Qty: 30 Tab, Refills: 0 CONTINUE these medications which have NOT CHANGED Details  
metoprolol tartrate (LOPRESSOR) 25 mg tablet Take 25 mg by mouth two (2) times a day. oxybutynin chloride XL (DITROPAN XL) 10 mg CR tablet Take 10 mg by mouth nightly. apixaban (ELIQUIS) 2.5 mg tablet Take 2.5 mg by mouth two (2) times a day. senna (SENNA) 8.6 mg tablet Take 1 Tab by mouth nightly. polyethylene glycol (MIRALAX) 17 gram packet Take 17 g by mouth daily. lovastatin (MEVACOR) 20 mg tablet Take 20 mg by mouth daily. NOTIFY YOUR PHYSICIAN FOR ANY OF THE FOLLOWING:  
Fever over 101 degrees for 24 hours. Chest pain, shortness of breath, fever, chills, nausea, vomiting, diarrhea, change in mentation, falling, weakness, bleeding. Severe pain or pain not relieved by medications. Or, any other signs or symptoms that you may have questions about. DISPOSITION: 
x  Home With: 
 OT  PT  HH  RN  
  
 Long term SNF/Inpatient Rehab Independent/assisted living Hospice Other:  
 
 
PATIENT CONDITION AT DISCHARGE:  
 
Functional status  
x Poor Deconditioned Independent Cognition Madhu Miranda Forgetful   
x Dementia Catheters/lines (plus indication) Xuan PICC   
 PEG   
x None Code status  
 x Full code DNR   
 
PHYSICAL EXAMINATION AT DISCHARGE: 
Please see progress note CHRONIC MEDICAL DIAGNOSES: 
Problem List as of 3/21/2019 Date Reviewed: 3/19/2019 Codes Class Noted - Resolved * (Principal) Acute CHF (congestive heart failure) (HCC) ICD-10-CM: I50.9 ICD-9-CM: 428.0  3/19/2019 - Present RESOLVED: SOB (shortness of breath) ICD-10-CM: R06.02 
ICD-9-CM: 786.05  9/10/2014 - 9/12/2014 RESOLVED: Elevated troponin ICD-10-CM: R74.8 ICD-9-CM: 790.6  9/10/2014 - 9/12/2014 Greater than 37 minutes were spent with the patient on counseling and coordination of care Signed:  
Yaz Treoj MD 
3/21/2019 
7:44 AM

## 2019-03-21 NOTE — PROGRESS NOTES
03/20/19 2200 Vital Signs Temp 98.8 °F (37.1 °C) Temp Source Axillary Pulse (Heart Rate) (!) 115 Heart Rate Source Monitor Cardiac Rhythm A Fib Resp Rate 18  
O2 Sat (%) 95 % Level of Consciousness Alert  
BP (!) 136/91 MAP (Calculated) 106 BP 1 Method Automatic  
BP 1 Location Left arm BP Patient Position At rest  
MEWS Score 3 Alarms Set and Audible Cardiac alarms Box Number 815 Electrodes Replaced No  
Pain 1 Pain Scale 1 Numeric (0 - 10) Pain Intensity 1 0 Patient Stated Pain Goal 0 Oxygen Therapy O2 Device Room air MD aware of heart rate. Additional dose of metoprolol given. Will continue to monitor.

## 2019-03-22 ENCOUNTER — PATIENT OUTREACH (OUTPATIENT)
Dept: CARDIOLOGY CLINIC | Age: 84
End: 2019-03-22

## 2019-03-22 NOTE — PROGRESS NOTES
Hospital Discharge Follow-Up Date/Time:  3/22/2019 4:27 PM 
 
Patient was admitted to [facility] on 3/19/19 and discharged on 3/21/19 for fatigue/lethargy. The physician discharge summary was available at the time of outreach. Patient was contacted within 1 business days of discharge. Top Challenges reviewed with the provider Patient's primary caregiver is her son, who is a physician. He declined palliative care for pt this admission. Declined H visit--is out of range for [facility] Cancelled PCP and Card appts made by inpatient NN--family will reschedule them. Patient started on lasix, no potassium ordered Per hospital discharge, is recommended that pt get a follow up BMP in one week, a thyroid function panel in 4 weeks, and recommended an outpatient sleep study Method of communication with provider :phone Inpatient RRAT score: 14 Was this a readmission? no  
Patient stated reason for the readmission: na 
 
Nurse Navigator (NN) contacted the family by telephone to perform post hospital discharge assessment. Verified name and  with family as identifiers. Provided introduction to self, and explanation of the Nurse Navigator role. Reviewed discharge instructions and red flags with family who verbalized understanding. Family given an opportunity to ask questions and does not have any further questions or concerns at this time. The family agrees to contact the PCP office for questions related to their healthcare. NN provided contact information for future reference. Disease Specific:   CHF Summary of patient's top problems: 
1. CHF--patient admitted with lethargy/fatigue. CXR 3/19 reveals pleural effusions and cardiomegaly. BNP 4164. CKMB 3.2, troponins (-). Patient has hx of CAD, Afib, MR and tricuspid regurgitation, CVA with left sided hemiperesis. Patient diruesed and condition improved.  
 
2. Pt family does not appear to appreciate the trajectory of illness. They want aggressive treatment measures. Refuses palliative care at this time. Home Health orders at discharge: Family refused [facility] Home Health company: NA 
Date of initial visit: NA 
 
Durable Medical Equipment ordered/company: none Durable Medical Equipment received: none Barriers to care? ineffective coping, lack of knowledge about disease, medication management, utilization of services Advance Care Planning:  
Does patient have an Advance Directive:  Not on file, patient has dementia Medication(s):  
New Medications at Discharge: Lasix 40 mg daily Changed Medications at Discharge: na 
Discontinued Medications at Discharge: na 
 
Medication reconciliation was performed with family, who verbalizes understanding of administration of home medications. There were no barriers to obtaining medications identified at this time. Referral to Pharm D needed: no  
 
Current Outpatient Medications Medication Sig  furosemide (LASIX) 40 mg tablet 1 tab PO daily  metoprolol tartrate (LOPRESSOR) 25 mg tablet Take 25 mg by mouth two (2) times a day.  oxybutynin chloride XL (DITROPAN XL) 10 mg CR tablet Take 10 mg by mouth nightly.  apixaban (ELIQUIS) 2.5 mg tablet Take 2.5 mg by mouth two (2) times a day.  senna (SENNA) 8.6 mg tablet Take 1 Tab by mouth nightly.  polyethylene glycol (MIRALAX) 17 gram packet Take 17 g by mouth daily.  lovastatin (MEVACOR) 20 mg tablet Take 20 mg by mouth daily. No current facility-administered medications for this visit. There are no discontinued medications. [facility] follow up appointment(s): No future appointments. Non-[facility] follow up appointment(s): Dr Ayaka Godoy  3/28/19 [facility]:  out of service area Goals  Reduce risk of CHF exacerbations and complications. 03/22/19 Spoke to Dr Colletta Kapur about pt today.  
 
NN asked pt about patient's mental condition at thsi time since she was originally brought in for fatigue and lethargy. He states that she is doing much better now that she can breathe. He reports that she is not quite back to baseline today. He reports that they are unable to weigh patient daily--that she is unable to stand independently and has hemiplegia. He states that he plans to monitor patient for s/sx chf by listening to her lungs and watching for increasing edema in feet/legs. NN discussed low sodium diet with pt's son. He reports that she has been on a heart healthy diet for many years since she had her stroke and that he buys everything  low sodium that have that option. Liliya Jonessterling He states he and family cook her meals at home, they do not add salt to foods. He did state however that they were taking patient out for BBQ on Monday for her birthday and although he knows there's sodium in that that he wanted her to have what she wanted on her birthday. NN instructed him to keep the rest of the meals low sodium that day and to watch her sodium intake for the rest of the week to keep fluid from building back up. He verbalizes understanding. Dr Camila Riggs stated he started his mother on the prescribed Lasix. He also states that he has some potassium at home that he is giving her since \"you shouldn't put anyone on lasix without giving them some potassium. \" He states he will get a prescription from the doctor when they go for follow ups. Pt's k was 3.5 at discharge. Dr Camila Riggs is aware to get BMP next week. NN will contact Dr Danny Yin office to make them aware that pt will need a prescription for potassium. Patient has follow up appt with Dr Betancur  on 3/28/19. He states that he contacted PCP office and that they are waiting for PCP to call back with appointment. Dr Camila Riggs voices concerns that no one will discuss performing procedures that may help pt's afib such as cardioversion or ablation since \"my mother is old\". He feels that if pt's afib was treated then pt would have better quality of life.  NN listened to his concerns, discussed that these were risky and may cause more harm than good. Dr Maldonado Ward states that he will talk to Dr Sherman Lucas about treating pt \"like she was a regular patient and not an old person\". NN encouraged him to have this discussion with MD to clarify what are the risks vs benefits. NN also discussed outpatient sleep study recommendation. Dr Maldonado Ward states that he is pretty sure pt has sleep apnea however he does not think patient will wear a mask if they get a CPAP machine. He also states that he is going wait a little while and see how well his mother does following this hospitalization, will consider having sleep study done in the future. NN discussed wound care to leg that was mentioned in the chart. He states that he is aware of skin tear and has supplies to perform wound care. He reports she has very fragile skin and can just bump into anything causing it to tear. He will monitor for healing and s/sx infection. NN will follow up with son next week for appointment updates and pt condition. ---marilin

## 2019-03-25 ENCOUNTER — PATIENT OUTREACH (OUTPATIENT)
Dept: CARDIOLOGY CLINIC | Age: 84
End: 2019-03-25

## 2019-03-25 LAB
BACTERIA SPEC CULT: NORMAL
SERVICE CMNT-IMP: NORMAL

## 2019-03-29 ENCOUNTER — PATIENT OUTREACH (OUTPATIENT)
Dept: CARDIOLOGY CLINIC | Age: 84
End: 2019-03-29

## 2019-03-29 NOTE — PROGRESS NOTES
Goals  Reduce risk of CHF exacerbations and complications. 03/22/19 Spoke to Dr Emigdio Garcia about pt today. NN asked pt about patient's mental condition at thsi time since she was originally brought in for fatigue and lethargy. He states that she is doing much better now that she can breathe. He reports that she is not quite back to baseline today. He reports that they are unable to weigh patient daily--that she is unable to stand independently and has hemiplegia. He states that he plans to monitor patient for s/sx chf by listening to her lungs and watching for increasing edema in feet/legs. NN discussed low sodium diet with pt's son. He reports that she has been on a heart healthy diet for many years since she had her stroke and that he buys everything  low sodium that have that option. Noy Cárdenas He states he and family cook her meals at home, they do not add salt to foods. He did state however that they were taking patient out for BBQ on Monday for her birthday and although he knows there's sodium in that that he wanted her to have what she wanted on her birthday. NN instructed him to keep the rest of the meals low sodium that day and to watch her sodium intake for the rest of the week to keep fluid from building back up. He verbalizes understanding. Dr Emigdio Garcia stated he started his mother on the prescribed Lasix. He also states that he has some potassium at home that he is giving her since \"you shouldn't put anyone on lasix without giving them some potassium. \" He states he will get a prescription from the doctor when they go for follow ups. Pt's k was 3.5 at discharge. Dr Emigdio Garcia is aware to get BMP next week. NN will contact Dr Cam Herrera office to make them aware that pt will need a prescription for potassium. Patient has follow up appt with Dr Marianna Butler on 3/28/19. He states that he contacted PCP office and that they are waiting for PCP to call back with appointment.  Dr Emigdio Garcia voices concerns that no one will discuss performing procedures that may help pt's afib such as cardioversion or ablation since \"my mother is old\". He feels that if pt's afib was treated then pt would have better quality of life. NN listened to his concerns, discussed that these were risky and may cause more harm than good. Dr Velvet Fisher states that he will talk to Dr Alejandra Ruth about treating pt \"like she was a regular patient and not an old person\". NN encouraged him to have this discussion with MD to clarify what are the risks vs benefits. NN also discussed outpatient sleep study recommendation. Dr Velvet Fisher states that he is pretty sure pt has sleep apnea however he does not think patient will wear a mask if they get a CPAP machine. He also states that he is going wait a little while and see how well his mother does following this hospitalization, will consider having sleep study done in the future. NN discussed wound care to leg that was mentioned in the chart. He states that he is aware of skin tear and has supplies to perform wound care. He reports she has very fragile skin and can just bump into anything causing it to tear. He will monitor for healing and s/sx infection. NN will follow up with son next week for appointment updates and pt condition. ---mkrw 03/25/19 Left a message at Dr Clement Him office with [facility] to let them know patient will need a potassium prescription and repeat BMP on 3/28/19. Left contact information as well. ---mkrw 03/29/19 NN unable to reach patient's son on either number, LM on  asking for return call. NN spoke to [facility], Dr Clement Him nurse to see if they attended appt and addressed getting repeat BMP and getting potassium prescription. She reports that she gave note to Dr Alejandra Ruth but that it wasn't addressed unfortunately. Per card note, pt's son was asking for MD to cardiovert patient--MD states afib rate is controlled and does not want to proceed with aggressive measures.  Office was also unable to weigh patient as she is unable to stand independently. [facility] reports per MD report CHF symptoms were stable at time of visit. NN called PCP office --pt has follow up appt on 4/2/19 at 2:30. NN called [phone] #[phone] and left detailed message for MD's nurse to let them know patient is on lasix without potassium supplement and to consider BMP be drawn. NN also requested a call back to ensure nurse received message. NN will follow up next week with son if no return call received today---marilin

## 2019-04-10 ENCOUNTER — PATIENT OUTREACH (OUTPATIENT)
Dept: CARDIOLOGY CLINIC | Age: 84
End: 2019-04-10

## 2019-04-10 NOTE — PROGRESS NOTES
Goals  Reduce risk of CHF exacerbations and complications. 03/22/19 Spoke to Dr Ivan Cherry about pt today. NN asked pt about patient's mental condition at thsi time since she was originally brought in for fatigue and lethargy. He states that she is doing much better now that she can breathe. He reports that she is not quite back to baseline today. He reports that they are unable to weigh patient daily--that she is unable to stand independently and has hemiplegia. He states that he plans to monitor patient for s/sx chf by listening to her lungs and watching for increasing edema in feet/legs. NN discussed low sodium diet with pt's son. He reports that she has been on a heart healthy diet for many years since she had her stroke and that he buys everything  low sodium that have that option. Nicole Ortezn He states he and family cook her meals at home, they do not add salt to foods. He did state however that they were taking patient out for BBQ on Monday for her birthday and although he knows there's sodium in that that he wanted her to have what she wanted on her birthday. NN instructed him to keep the rest of the meals low sodium that day and to watch her sodium intake for the rest of the week to keep fluid from building back up. He verbalizes understanding. Dr Ivan Cherry stated he started his mother on the prescribed Lasix. He also states that he has some potassium at home that he is giving her since \"you shouldn't put anyone on lasix without giving them some potassium. \" He states he will get a prescription from the doctor when they go for follow ups. Pt's k was 3.5 at discharge. Dr Ivan Cherry is aware to get BMP next week. NN will contact Dr Merline Love office to make them aware that pt will need a prescription for potassium. Patient has follow up appt with Dr Rashard Pro on 3/28/19. He states that he contacted PCP office and that they are waiting for PCP to call back with appointment.  Dr Ivan Cherry voices concerns that no one will discuss performing procedures that may help pt's afib such as cardioversion or ablation since \"my mother is old\". He feels that if pt's afib was treated then pt would have better quality of life. NN listened to his concerns, discussed that these were risky and may cause more harm than good. Dr Faustina Luna states that he will talk to Dr Conchetta Carrel about treating pt \"like she was a regular patient and not an old person\". NN encouraged him to have this discussion with MD to clarify what are the risks vs benefits. NN also discussed outpatient sleep study recommendation. Dr Faustina Luna states that he is pretty sure pt has sleep apnea however he does not think patient will wear a mask if they get a CPAP machine. He also states that he is going wait a little while and see how well his mother does following this hospitalization, will consider having sleep study done in the future. NN discussed wound care to leg that was mentioned in the chart. He states that he is aware of skin tear and has supplies to perform wound care. He reports she has very fragile skin and can just bump into anything causing it to tear. He will monitor for healing and s/sx infection. NN will follow up with son next week for appointment updates and pt condition. ---rw 03/25/19 Left a message at Dr Wilda Cleary office with [facility] to let them know patient will need a potassium prescription and repeat BMP on 3/28/19. Left contact information as well. ---rw 03/29/19 NN unable to reach patient's son on either number, LM on  asking for return call. NN spoke to [facility], Dr Wilda Cleary nurse to see if they attended appt and addressed getting repeat BMP and getting potassium prescription. She reports that she gave note to Dr Conchetta Carrel but that it wasn't addressed unfortunately. Per card note, pt's son was asking for MD to cardiovert patient--MD states afib rate is controlled and does not want to proceed with aggressive measures.  Office was also unable to weigh patient as she is unable to stand independently. [facility] Fernandez reports per MD report CHF symptoms were stable at time of visit. NN called PCP office --pt has follow up appt on 4/2/19 at 2:30. NN called [phone] #[phone] and left detailed message for MD's nurse to let them know patient is on lasix without potassium supplement and to consider BMP be drawn. NN also requested a call back to ensure nurse received message. NN will follow up next week with son if no return call received today---mkrw UPDATE: Received VM from Dr Shania schneider --Mrs Jeana Luna states she got the message and will give message to MD regarding labwork. --mkrw 04/10/19 NN left messages on both numbers for Dr paul asking for return call to obtain update on pt. NN called Dr Shania schneider-- confirms that patient attended appt on 4/2/19. NN will follow up next week for updates---mkrw

## 2019-04-25 ENCOUNTER — PATIENT OUTREACH (OUTPATIENT)
Dept: CARDIOLOGY CLINIC | Age: 84
End: 2019-04-25

## 2019-04-25 NOTE — PROGRESS NOTES
Goals  Reduce risk of CHF exacerbations and complications. 03/22/19 Spoke to Dr Samantha Pinon about pt today. NN asked pt about patient's mental condition at thsi time since she was originally brought in for fatigue and lethargy. He states that she is doing much better now that she can breathe. He reports that she is not quite back to baseline today. He reports that they are unable to weigh patient daily--that she is unable to stand independently and has hemiplegia. He states that he plans to monitor patient for s/sx chf by listening to her lungs and watching for increasing edema in feet/legs. NN discussed low sodium diet with pt's son. He reports that she has been on a heart healthy diet for many years since she had her stroke and that he buys everything  low sodium that have that option. Lauro Howe He states he and family cook her meals at home, they do not add salt to foods. He did state however that they were taking patient out for BBQ on Monday for her birthday and although he knows there's sodium in that that he wanted her to have what she wanted on her birthday. NN instructed him to keep the rest of the meals low sodium that day and to watch her sodium intake for the rest of the week to keep fluid from building back up. He verbalizes understanding. Dr Samantha Pinon stated he started his mother on the prescribed Lasix. He also states that he has some potassium at home that he is giving her since \"you shouldn't put anyone on lasix without giving them some potassium. \" He states he will get a prescription from the doctor when they go for follow ups. Pt's k was 3.5 at discharge. Dr Samantha Pinon is aware to get BMP next week. NN will contact Dr George Stone office to make them aware that pt will need a prescription for potassium. Patient has follow up appt with Dr Lizeth Justin on 3/28/19. He states that he contacted PCP office and that they are waiting for PCP to call back with appointment.  Dr Samantha Pinon voices concerns that no one will discuss performing procedures that may help pt's afib such as cardioversion or ablation since \"my mother is old\". He feels that if pt's afib was treated then pt would have better quality of life. NN listened to his concerns, discussed that these were risky and may cause more harm than good. Dr Wing Hoyos states that he will talk to Dr Miah Jon about treating pt \"like she was a regular patient and not an old person\". NN encouraged him to have this discussion with MD to clarify what are the risks vs benefits. NN also discussed outpatient sleep study recommendation. Dr Wing Hoyos states that he is pretty sure pt has sleep apnea however he does not think patient will wear a mask if they get a CPAP machine. He also states that he is going wait a little while and see how well his mother does following this hospitalization, will consider having sleep study done in the future. NN discussed wound care to leg that was mentioned in the chart. He states that he is aware of skin tear and has supplies to perform wound care. He reports she has very fragile skin and can just bump into anything causing it to tear. He will monitor for healing and s/sx infection. NN will follow up with son next week for appointment updates and pt condition. ---mkrw 03/25/19 Left a message at Dr Morgan Dias office with [facility] to let them know patient will need a potassium prescription and repeat BMP on 3/28/19. Left contact information as well. ---mkrw 03/29/19 NN unable to reach patient's son on either number, LM on  asking for return call. NN spoke to [facility], Dr Morgan Dias nurse to see if they attended appt and addressed getting repeat BMP and getting potassium prescription. She reports that she gave note to Dr Miah Jon but that it wasn't addressed unfortunately. Per card note, pt's son was asking for MD to cardiovert patient--MD states afib rate is controlled and does not want to proceed with aggressive measures.  Office was also unable to weigh patient as she is unable to stand independently. [facility] reports per MD report CHF symptoms were stable at time of visit. NN called PCP office --pt has follow up appt on 4/2/19 at 2:30. NN called [phone] and left detailed message for MD's nurse to let them know patient is on lasix without potassium supplement and to consider BMP be drawn. NN also requested a call back to ensure nurse received message. NN will follow up next week with son if no return call received today---mkrw UPDATE: Received VM from Dr Rob schneider --Mrs Carolyn Campo states she got the message and will give message to MD regarding labwork. --mkrw 04/10/19 NN left messages on both numbers for Dr Joan Curtis asking for return call to obtain update on pt. NN called Dr Rob schneider-- confirms that patient attended appt on 4/2/19. NN will follow up next week for updates---mkrw 04/25/19 Unable to reach patient or pt's son by phone. NN left VM message asking for return call for update. Will follow up next week---mkrw

## 2019-05-10 ENCOUNTER — PATIENT OUTREACH (OUTPATIENT)
Dept: CARDIOLOGY CLINIC | Age: 84
End: 2019-05-10

## 2019-05-10 NOTE — PROGRESS NOTES
Goals  Reduce risk of CHF exacerbations and complications. 03/22/19 Spoke to Dr Aubree Luna about pt today. NN asked pt about patient's mental condition at si time since she was originally brought in for fatigue and lethargy. He states that she is doing much better now that she can breathe. He reports that she is not quite back to baseline today. He reports that they are unable to weigh patient daily--that she is unable to stand independently and has hemiplegia. He states that he plans to monitor patient for s/sx chf by listening to her lungs and watching for increasing edema in feet/legs. NN discussed low sodium diet with pt's son. He reports that she has been on a heart healthy diet for many years since she had her stroke and that he buys everything  low sodium that have that option. Berkley Mejiacésar He states he and family cook her meals at home, they do not add salt to foods. He did state however that they were taking patient out for BBQ on Monday for her birthday and although he knows there's sodium in that that he wanted her to have what she wanted on her birthday. NN instructed him to keep the rest of the meals low sodium that day and to watch her sodium intake for the rest of the week to keep fluid from building back up. He verbalizes understanding. Dr Aubree Luna stated he started his mother on the prescribed Lasix. He also states that he has some potassium at home that he is giving her since \"you shouldn't put anyone on lasix without giving them some potassium. \" He states he will get a prescription from the doctor when they go for follow ups. Pt's k was 3.5 at discharge. Dr Aubree Luna is aware to get BMP next week. NN will contact Dr Ida Navarro office to make them aware that pt will need a prescription for potassium. Patient has follow up appt with Dr Andrade Herbert on 3/28/19. He states that he contacted PCP office and that they are waiting for PCP to call back with appointment.  Dr Aubree Luna voices concerns that no one will discuss performing procedures that may help pt's afib such as cardioversion or ablation since \"my mother is old\". He feels that if pt's afib was treated then pt would have better quality of life. NN listened to his concerns, discussed that these were risky and may cause more harm than good. Dr Daniel Whitaker states that he will talk to Dr Anais Antonio about treating pt \"like she was a regular patient and not an old person\". NN encouraged him to have this discussion with MD to clarify what are the risks vs benefits. NN also discussed outpatient sleep study recommendation. Dr Daniel Whtiaker states that he is pretty sure pt has sleep apnea however he does not think patient will wear a mask if they get a CPAP machine. He also states that he is going wait a little while and see how well his mother does following this hospitalization, will consider having sleep study done in the future. NN discussed wound care to leg that was mentioned in the chart. He states that he is aware of skin tear and has supplies to perform wound care. He reports she has very fragile skin and can just bump into anything causing it to tear. He will monitor for healing and s/sx infection. NN will follow up with son next week for appointment updates and pt condition. ---mkrw 03/25/19 Left a message at Dr Bronwyn Valdez office with [facility] to let them know patient will need a potassium prescription and repeat BMP on 3/28/19. Left contact information as well. ---mkrw 03/29/19 NN unable to reach patient's son on either number, LM on  asking for return call. NN spoke to Malgorzata Curtis, Dr Bronwyn Valdez nurse to see if they attended appt and addressed getting repeat BMP and getting potassium prescription. She reports that she gave note to Dr Anais Antonio but that it wasn't addressed unfortunately. Per card note, pt's son was asking for MD to cardiovert patient--MD states afib rate is controlled and does not want to proceed with aggressive measures.  Office was also unable to weigh patient as she is unable to stand independently. Chioma Ospina reports per MD report CHF symptoms were stable at time of visit. NN called PCP office --pt has follow up appt on 4/2/19 at 2:30. NN called [phone] #[phone] and left detailed message for MD's nurse to let them know patient is on lasix without potassium supplement and to consider BMP be drawn. NN also requested a call back to ensure nurse received message. NN will follow up next week with son if no return call received today---r UPDATE: Received VM from Dr Zhen schneider --Mrs Morgan Peralta states she got the message and will give message to MD regarding labwork. --mkrw 04/10/19 NN left messages on both numbers for Dr Cortney Azul asking for return call to obtain update on pt. NN called Dr Zhen schneider-- confirms that patient attended appt on 4/2/19. NN will follow up next week for updates---mkrw 04/25/19 Unable to reach patient or pt's son by phone. NN left VM message asking for return call for update. Will follow up next week---mkrw 05/10/19 NN unable to reaach pt's son by phone, LM on VM. NN will follow up in 2 weeks---rw

## 2019-05-29 ENCOUNTER — PATIENT OUTREACH (OUTPATIENT)
Dept: CARDIOLOGY CLINIC | Age: 84
End: 2019-05-29

## 2019-05-29 NOTE — PROGRESS NOTES
Goals  Reduce risk of CHF exacerbations and complications. 03/22/19 Spoke to Dr Ivan Cherry about pt today. NN asked pt about patient's mental condition at thsi time since she was originally brought in for fatigue and lethargy. He states that she is doing much better now that she can breathe. He reports that she is not quite back to baseline today. He reports that they are unable to weigh patient daily--that she is unable to stand independently and has hemiplegia. He states that he plans to monitor patient for s/sx chf by listening to her lungs and watching for increasing edema in feet/legs. NN discussed low sodium diet with pt's son. He reports that she has been on a heart healthy diet for many years since she had her stroke and that he buys everything  low sodium that have that option. Nicole Ortezn He states he and family cook her meals at home, they do not add salt to foods. He did state however that they were taking patient out for BBQ on Monday for her birthday and although he knows there's sodium in that that he wanted her to have what she wanted on her birthday. NN instructed him to keep the rest of the meals low sodium that day and to watch her sodium intake for the rest of the week to keep fluid from building back up. He verbalizes understanding. Dr Ivan Cherry stated he started his mother on the prescribed Lasix. He also states that he has some potassium at home that he is giving her since \"you shouldn't put anyone on lasix without giving them some potassium. \" He states he will get a prescription from the doctor when they go for follow ups. Pt's k was 3.5 at discharge. Dr Ivan Cherry is aware to get BMP next week. NN will contact Dr Merline Love office to make them aware that pt will need a prescription for potassium. Patient has follow up appt with Dr Rashard Pro on 3/28/19. He states that he contacted PCP office and that they are waiting for PCP to call back with appointment.  Dr Ivan Cherry voices concerns that no one will discuss performing procedures that may help pt's afib such as cardioversion or ablation since \"my mother is old\". He feels that if pt's afib was treated then pt would have better quality of life. NN listened to his concerns, discussed that these were risky and may cause more harm than good. Dr Haritha Bazzi states that he will talk to Dr Nolvia Garcia about treating pt \"like she was a regular patient and not an old person\". NN encouraged him to have this discussion with MD to clarify what are the risks vs benefits. NN also discussed outpatient sleep study recommendation. Dr Haritha Bazzi states that he is pretty sure pt has sleep apnea however he does not think patient will wear a mask if they get a CPAP machine. He also states that he is going wait a little while and see how well his mother does following this hospitalization, will consider having sleep study done in the future. NN discussed wound care to leg that was mentioned in the chart. He states that he is aware of skin tear and has supplies to perform wound care. He reports she has very fragile skin and can just bump into anything causing it to tear. He will monitor for healing and s/sx infection. NN will follow up with son next week for appointment updates and pt condition. ---mkrw 03/25/19 Left a message at Dr Karmen Patel office with [facility] to let them know patient will need a potassium prescription and repeat BMP on 3/28/19. Left contact information as well. ---mkrw 03/29/19 NN unable to reach patient's son on either number, LM on  asking for return call. NN spoke to Dr Karmen Clemente nurse to see if they attended appt and addressed getting repeat BMP and getting potassium prescription. She reports that she gave note to Dr Nolvia Garcia but that it wasn't addressed unfortunately. Per card note, pt's son was asking for MD to cardiovert patient--MD states afib rate is controlled and does not want to proceed with aggressive measures.  Office was also unable to weigh patient as she is unable to stand independently. Haydee Fernandez reports per MD report CHF symptoms were stable at time of visit. NN called PCP office --pt has follow up appt on 4/2/19 at 2:30. NN called [phone] and left detailed message for MD's nurse to let them know patient is on lasix without potassium supplement and to consider BMP be drawn. NN also requested a call back to ensure nurse received message. NN will follow up next week with son if no return call received today---r UPDATE: Received VM from Dr Shania schneider --Mrs Jeana Luna states she got the message and will give message to MD regarding labwork. --mkrw 04/10/19 NN left messages on both numbers for Dr Clara Izquierdo asking for return call to obtain update on pt. NN called Dr Shania schneider-- confirms that patient attended appt on 4/2/19. NN will follow up next week for updates---mkrw 04/25/19 Unable to reach patient or pt's son by phone. NN left VM message asking for return call for update. Will follow up next week---mkrw 05/10/19 NN unable to reaach pt's son by phone, LM on VM. NN will follow up in 2 weeks---mkrw 05/29/19 Unable to reach patient's son by phone, LM ov VM. NN will follow up in 2 weeks---rw

## 2019-06-21 ENCOUNTER — PATIENT OUTREACH (OUTPATIENT)
Dept: CARDIOLOGY CLINIC | Age: 84
End: 2019-06-21

## 2019-06-21 NOTE — PROGRESS NOTES
Patient has graduated from the Disease Specific Care Management  program on 6/21/19. Patient's symptoms are stable at this time. Patient/family has the ability to self-manage. Care management goals have been completed at this time. No further nurse navigator follow up scheduled. Goals Addressed This Visit's Progress  COMPLETED: Reduce risk of CHF exacerbations and complications. 03/22/19 Spoke to Dr Chris Richard about pt today. NN asked pt about patient's mental condition at si time since she was originally brought in for fatigue and lethargy. He states that she is doing much better now that she can breathe. He reports that she is not quite back to baseline today. He reports that they are unable to weigh patient daily--that she is unable to stand independently and has hemiplegia. He states that he plans to monitor patient for s/sx chf by listening to her lungs and watching for increasing edema in feet/legs. NN discussed low sodium diet with pt's son. He reports that she has been on a heart healthy diet for many years since she had her stroke and that he buys everything  low sodium that have that option. Abdiel Villanueva He states he and family cook her meals at home, they do not add salt to foods. He did state however that they were taking patient out for BBQ on Monday for her birthday and although he knows there's sodium in that that he wanted her to have what she wanted on her birthday. NN instructed him to keep the rest of the meals low sodium that day and to watch her sodium intake for the rest of the week to keep fluid from building back up. He verbalizes understanding. Dr Chris Richard stated he started his mother on the prescribed Lasix. He also states that he has some potassium at home that he is giving her since \"you shouldn't put anyone on lasix without giving them some potassium. \" He states he will get a prescription from the doctor when they go for follow ups.  Pt's k was 3.5 at discharge. Dr Rafael Harrison is aware to get BMP next week. NN will contact Dr Nader Stewart office to make them aware that pt will need a prescription for potassium. Patient has follow up appt with Dr Mickey Quinones on 3/28/19. He states that he contacted PCP office and that they are waiting for PCP to call back with appointment. Dr Rafael Harrison voices concerns that no one will discuss performing procedures that may help pt's afib such as cardioversion or ablation since \"my mother is old\". He feels that if pt's afib was treated then pt would have better quality of life. NN listened to his concerns, discussed that these were risky and may cause more harm than good. Dr Rafael Harrison states that he will talk to Dr Mickey Quinones about treating pt \"like she was a regular patient and not an old person\". NN encouraged him to have this discussion with MD to clarify what are the risks vs benefits. NN also discussed outpatient sleep study recommendation. Dr Rafael Harrison states that he is pretty sure pt has sleep apnea however he does not think patient will wear a mask if they get a CPAP machine. He also states that he is going wait a little while and see how well his mother does following this hospitalization, will consider having sleep study done in the future. NN discussed wound care to leg that was mentioned in the chart. He states that he is aware of skin tear and has supplies to perform wound care. He reports she has very fragile skin and can just bump into anything causing it to tear. He will monitor for healing and s/sx infection. NN will follow up with son next week for appointment updates and pt condition. ---mkrw 03/25/19 Left a message at Dr Nader Stewart office with [facility] to let them know patient will need a potassium prescription and repeat BMP on 3/28/19. Left contact information as well. ---mkrw 03/29/19 NN unable to reach patient's son on either number, LM on VM asking for return call.  
 
NN spoke to [facility], Dr Nader Stewart nurse to see if they attended appt and addressed getting repeat BMP and getting potassium prescription. She reports that she gave note to Dr Swati Goodson but that it wasn't addressed unfortunately. Per card note, pt's son was asking for MD to cardiovert patient--MD states afib rate is controlled and does not want to proceed with aggressive measures. Office was also unable to weigh patient as she is unable to stand independently. [facility] reports per MD report CHF symptoms were stable at time of visit. NN called PCP office --pt has follow up appt on 4/2/19 at 2:30. NN called [phone] #[phone] and left detailed message for MD's nurse to let them know patient is on lasix without potassium supplement and to consider BMP be drawn. NN also requested a call back to ensure nurse received message. NN will follow up next week with son if no return call received today---[facility] UPDATE: Received VM from Dr Rafael Larose of[facility] --Mrs Derek العراقي states she got the message and will give message to MD regarding labwork. --[facility] 04/10/19 NN left messages on both numbers for Dr Matthew Berger [place] asking for return call to obtain update on pt. NN called Dr Rafael Larose [facility]-- confirms that patient attended appt on 4/2/19. NN will follow up next week for updates---rw 04/25/19 Unable to reach patient or pt's son by phone. NN left VM message asking for return call for update. Will follow up next week---rw 05/10/19 NN unable to reaach pt's son by phone, LM on VM. NN will follow up in 2 weeks---mkrw 05/29/19 Unable to reach patient's son by phone, LM ov VM. NN will follow up in 2 weeks---mkrw 06/21/19 NN unable to reach pt's son on phone---JHOAN on VM. CHF Bundle period completed---rw Pt has nurse navigator's contact information for any further questions, concerns, or needs. Patients upcoming visits:  No future appointments.

## 2021-01-01 ENCOUNTER — HOSPITAL ENCOUNTER (INPATIENT)
Age: 86
LOS: 7 days | Discharge: NURSING, REHAB OR LONG-TERM CARE | DRG: 871 | End: 2022-01-06
Attending: HOSPITALIST | Admitting: HOSPITALIST
Payer: MEDICARE

## 2021-01-01 ENCOUNTER — HOSPITAL ENCOUNTER (EMERGENCY)
Age: 86
Discharge: HOME | End: 2021-12-25
Attending: EMERGENCY MEDICINE
Payer: MEDICARE

## 2021-01-01 ENCOUNTER — APPOINTMENT (OUTPATIENT)
Dept: GENERAL RADIOLOGY | Age: 86
DRG: 871 | End: 2021-01-01
Attending: NURSE PRACTITIONER
Payer: MEDICARE

## 2021-01-01 ENCOUNTER — APPOINTMENT (OUTPATIENT)
Dept: GENERAL RADIOLOGY | Age: 86
End: 2021-01-01
Attending: EMERGENCY MEDICINE
Payer: MEDICARE

## 2021-01-01 ENCOUNTER — APPOINTMENT (OUTPATIENT)
Dept: GENERAL RADIOLOGY | Age: 86
End: 2021-01-01
Attending: STUDENT IN AN ORGANIZED HEALTH CARE EDUCATION/TRAINING PROGRAM
Payer: MEDICARE

## 2021-01-01 ENCOUNTER — HOSPITAL ENCOUNTER (OUTPATIENT)
Age: 86
Setting detail: OBSERVATION
Discharge: HOME | End: 2021-12-17
Attending: EMERGENCY MEDICINE | Admitting: INTERNAL MEDICINE
Payer: MEDICARE

## 2021-01-01 VITALS
OXYGEN SATURATION: 99 % | SYSTOLIC BLOOD PRESSURE: 110 MMHG | RESPIRATION RATE: 16 BRPM | HEART RATE: 62 BPM | BODY MASS INDEX: 21.04 KG/M2 | HEIGHT: 60 IN | WEIGHT: 107.14 LBS | TEMPERATURE: 97.5 F | DIASTOLIC BLOOD PRESSURE: 47 MMHG

## 2021-01-01 VITALS
OXYGEN SATURATION: 98 % | RESPIRATION RATE: 16 BRPM | HEIGHT: 63 IN | WEIGHT: 135 LBS | SYSTOLIC BLOOD PRESSURE: 110 MMHG | TEMPERATURE: 97.9 F | HEART RATE: 60 BPM | DIASTOLIC BLOOD PRESSURE: 77 MMHG | BODY MASS INDEX: 23.92 KG/M2

## 2021-01-01 DIAGNOSIS — T14.8XXA WOUND INFECTION: ICD-10-CM

## 2021-01-01 DIAGNOSIS — S31.000D WOUND OF SACRAL REGION, SUBSEQUENT ENCOUNTER: ICD-10-CM

## 2021-01-01 DIAGNOSIS — R50.9 FEVER, UNSPECIFIED FEVER CAUSE: ICD-10-CM

## 2021-01-01 DIAGNOSIS — J90 PLEURAL EFFUSION: ICD-10-CM

## 2021-01-01 DIAGNOSIS — L03.90 CELLULITIS, UNSPECIFIED CELLULITIS SITE: Primary | ICD-10-CM

## 2021-01-01 DIAGNOSIS — L89.159 PRESSURE INJURY OF SKIN OF SACRAL REGION, UNSPECIFIED INJURY STAGE: Primary | ICD-10-CM

## 2021-01-01 DIAGNOSIS — L08.9 WOUND INFECTION: ICD-10-CM

## 2021-01-01 DIAGNOSIS — R06.02 SOB (SHORTNESS OF BREATH): Primary | ICD-10-CM

## 2021-01-01 LAB
ALBUMIN SERPL-MCNC: 2 G/DL (ref 3.5–5)
ALBUMIN SERPL-MCNC: 2.1 G/DL (ref 3.5–5)
ALBUMIN/GLOB SERPL: 0.5 {RATIO} (ref 1.1–2.2)
ALBUMIN/GLOB SERPL: 0.7 {RATIO} (ref 1.1–2.2)
ALP SERPL-CCNC: 112 U/L (ref 45–117)
ALP SERPL-CCNC: 96 U/L (ref 45–117)
ALT SERPL-CCNC: 17 U/L (ref 12–78)
ALT SERPL-CCNC: 22 U/L (ref 12–78)
ANION GAP SERPL CALC-SCNC: 3 MMOL/L (ref 5–15)
ANION GAP SERPL CALC-SCNC: 5 MMOL/L (ref 5–15)
APPEARANCE UR: CLEAR
APPEARANCE UR: CLEAR
AST SERPL W P-5'-P-CCNC: 35 U/L (ref 15–37)
AST SERPL W P-5'-P-CCNC: 38 U/L (ref 15–37)
BACTERIA SPEC CULT: NORMAL
BACTERIA URNS QL MICRO: NEGATIVE /HPF
BACTERIA URNS QL MICRO: NEGATIVE /HPF
BASOPHILS # BLD: 0 K/UL (ref 0–0.1)
BASOPHILS # BLD: 0.1 K/UL (ref 0–0.1)
BASOPHILS NFR BLD: 0 % (ref 0–1)
BASOPHILS NFR BLD: 1 % (ref 0–1)
BILIRUB DIRECT SERPL-MCNC: 0.3 MG/DL (ref 0–0.2)
BILIRUB SERPL-MCNC: 0.6 MG/DL (ref 0.2–1)
BILIRUB SERPL-MCNC: 1.3 MG/DL (ref 0.2–1)
BILIRUB UR QL: NEGATIVE
BILIRUB UR QL: NEGATIVE
BNP SERPL-MCNC: 6806 PG/ML
BUN SERPL-MCNC: 22 MG/DL (ref 6–20)
BUN SERPL-MCNC: 39 MG/DL (ref 6–20)
BUN/CREAT SERPL: 26 (ref 12–20)
BUN/CREAT SERPL: 36 (ref 12–20)
CA-I BLD-MCNC: 8.5 MG/DL (ref 8.5–10.1)
CA-I BLD-MCNC: 8.5 MG/DL (ref 8.5–10.1)
CHLORIDE SERPL-SCNC: 107 MMOL/L (ref 97–108)
CHLORIDE SERPL-SCNC: 118 MMOL/L (ref 97–108)
CO2 SERPL-SCNC: 29 MMOL/L (ref 21–32)
CO2 SERPL-SCNC: 31 MMOL/L (ref 21–32)
COLOR UR: ABNORMAL
COLOR UR: NORMAL
COVID-19 RAPID TEST, COVR: NOT DETECTED
COVID-19 RAPID TEST, COVR: NOT DETECTED
CREAT SERPL-MCNC: 0.86 MG/DL (ref 0.55–1.02)
CREAT SERPL-MCNC: 1.07 MG/DL (ref 0.55–1.02)
DIFFERENTIAL METHOD BLD: ABNORMAL
DIFFERENTIAL METHOD BLD: ABNORMAL
EOSINOPHIL # BLD: 0.1 K/UL (ref 0–0.4)
EOSINOPHIL # BLD: 0.2 K/UL (ref 0–0.4)
EOSINOPHIL NFR BLD: 1 % (ref 0–7)
EOSINOPHIL NFR BLD: 2 % (ref 0–7)
ERYTHROCYTE [DISTWIDTH] IN BLOOD BY AUTOMATED COUNT: 18.3 % (ref 11.5–14.5)
ERYTHROCYTE [DISTWIDTH] IN BLOOD BY AUTOMATED COUNT: 18.5 % (ref 11.5–14.5)
GLOBULIN SER CALC-MCNC: 3.2 G/DL (ref 2–4)
GLOBULIN SER CALC-MCNC: 3.8 G/DL (ref 2–4)
GLUCOSE BLD STRIP.AUTO-MCNC: 104 MG/DL (ref 65–117)
GLUCOSE BLD STRIP.AUTO-MCNC: 44 MG/DL (ref 65–117)
GLUCOSE BLD STRIP.AUTO-MCNC: 57 MG/DL (ref 65–117)
GLUCOSE BLD STRIP.AUTO-MCNC: 70 MG/DL (ref 65–117)
GLUCOSE BLD STRIP.AUTO-MCNC: 81 MG/DL (ref 65–117)
GLUCOSE SERPL-MCNC: 120 MG/DL (ref 65–100)
GLUCOSE SERPL-MCNC: 73 MG/DL (ref 65–100)
GLUCOSE UR STRIP.AUTO-MCNC: NEGATIVE MG/DL
GLUCOSE UR STRIP.AUTO-MCNC: NEGATIVE MG/DL
GRAM STN SPEC: NORMAL
HCT VFR BLD AUTO: 36 % (ref 35–47)
HCT VFR BLD AUTO: 43.5 % (ref 35–47)
HGB BLD-MCNC: 11.4 G/DL (ref 11.5–16)
HGB BLD-MCNC: 13.3 G/DL (ref 11.5–16)
HGB UR QL STRIP: NEGATIVE
HGB UR QL STRIP: NEGATIVE
HYALINE CASTS URNS QL MICRO: ABNORMAL /LPF (ref 0–5)
IMM GRANULOCYTES # BLD AUTO: 0.1 K/UL (ref 0–0.04)
IMM GRANULOCYTES # BLD AUTO: 0.1 K/UL (ref 0–0.04)
IMM GRANULOCYTES NFR BLD AUTO: 1 % (ref 0–0.5)
IMM GRANULOCYTES NFR BLD AUTO: 1 % (ref 0–0.5)
KETONES UR QL STRIP.AUTO: NEGATIVE MG/DL
KETONES UR QL STRIP.AUTO: NEGATIVE MG/DL
LACTATE SERPL-SCNC: 1.5 MMOL/L (ref 0.4–2)
LACTATE SERPL-SCNC: 1.9 MMOL/L (ref 0.4–2)
LACTATE SERPL-SCNC: 2.1 MMOL/L (ref 0.4–2)
LEUKOCYTE ESTERASE UR QL STRIP.AUTO: NEGATIVE
LEUKOCYTE ESTERASE UR QL STRIP.AUTO: NEGATIVE
LYMPHOCYTES # BLD: 1.1 K/UL (ref 0.8–3.5)
LYMPHOCYTES # BLD: 2.5 K/UL (ref 0.8–3.5)
LYMPHOCYTES NFR BLD: 16 % (ref 12–49)
LYMPHOCYTES NFR BLD: 23 % (ref 12–49)
MCH RBC QN AUTO: 28.7 PG (ref 26–34)
MCH RBC QN AUTO: 29.4 PG (ref 26–34)
MCHC RBC AUTO-ENTMCNC: 30.6 G/DL (ref 30–36.5)
MCHC RBC AUTO-ENTMCNC: 31.7 G/DL (ref 30–36.5)
MCV RBC AUTO: 92.8 FL (ref 80–99)
MCV RBC AUTO: 94 FL (ref 80–99)
MONOCYTES # BLD: 0.8 K/UL (ref 0–1)
MONOCYTES # BLD: 0.9 K/UL (ref 0–1)
MONOCYTES NFR BLD: 11 % (ref 5–13)
MONOCYTES NFR BLD: 8 % (ref 5–13)
MUCOUS THREADS URNS QL MICRO: ABNORMAL /LPF
NEUTS SEG # BLD: 5 K/UL (ref 1.8–8)
NEUTS SEG # BLD: 7 K/UL (ref 1.8–8)
NEUTS SEG NFR BLD: 65 % (ref 32–75)
NEUTS SEG NFR BLD: 71 % (ref 32–75)
NITRITE UR QL STRIP.AUTO: NEGATIVE
NITRITE UR QL STRIP.AUTO: NEGATIVE
NRBC # BLD: 0 K/UL (ref 0–0.01)
NRBC # BLD: 0 K/UL (ref 0–0.01)
NRBC BLD-RTO: 0 PER 100 WBC
NRBC BLD-RTO: 0 PER 100 WBC
PERFORMED BY, TECHID: ABNORMAL
PERFORMED BY, TECHID: ABNORMAL
PERFORMED BY, TECHID: NORMAL
PH UR STRIP: 7 [PH] (ref 5–8)
PH UR STRIP: 7 [PH] (ref 5–8)
PLATELET # BLD AUTO: 252 K/UL (ref 150–400)
PLATELET # BLD AUTO: 424 K/UL (ref 150–400)
PMV BLD AUTO: 9.4 FL (ref 8.9–12.9)
PMV BLD AUTO: 9.8 FL (ref 8.9–12.9)
POTASSIUM SERPL-SCNC: 3.6 MMOL/L (ref 3.5–5.1)
POTASSIUM SERPL-SCNC: 3.7 MMOL/L (ref 3.5–5.1)
PROT SERPL-MCNC: 5.3 G/DL (ref 6.4–8.2)
PROT SERPL-MCNC: 5.8 G/DL (ref 6.4–8.2)
PROT UR STRIP-MCNC: NEGATIVE MG/DL
PROT UR STRIP-MCNC: NEGATIVE MG/DL
RBC # BLD AUTO: 3.88 M/UL (ref 3.8–5.2)
RBC # BLD AUTO: 4.63 M/UL (ref 3.8–5.2)
RBC #/AREA URNS HPF: ABNORMAL /HPF (ref 0–5)
RBC #/AREA URNS HPF: NORMAL /HPF (ref 0–5)
SARS-COV-2, COV2: NORMAL
SODIUM SERPL-SCNC: 141 MMOL/L (ref 136–145)
SODIUM SERPL-SCNC: 152 MMOL/L (ref 136–145)
SP GR UR REFRACTOMETRY: 1.01 (ref 1–1.03)
SP GR UR REFRACTOMETRY: <1.005 (ref 1–1.03)
SPECIAL REQUESTS,SREQ: NORMAL
SPECIMEN SOURCE: NORMAL
SPECIMEN SOURCE: NORMAL
TROPONIN-HIGH SENSITIVITY: 147 NG/L (ref 0–51)
UROBILINOGEN UR QL STRIP.AUTO: 0.1 EU/DL (ref 0.1–1)
UROBILINOGEN UR QL STRIP.AUTO: 2 EU/DL (ref 0.1–1)
VANCOMYCIN SERPL-MCNC: 12.1 UG/ML
WBC # BLD AUTO: 10.7 K/UL (ref 3.6–11)
WBC # BLD AUTO: 7.1 K/UL (ref 3.6–11)
WBC URNS QL MICRO: ABNORMAL /HPF (ref 0–4)
WBC URNS QL MICRO: NORMAL /HPF (ref 0–4)

## 2021-01-01 PROCEDURE — 74011000258 HC RX REV CODE- 258: Performed by: INTERNAL MEDICINE

## 2021-01-01 PROCEDURE — 87186 SC STD MICRODIL/AGAR DIL: CPT

## 2021-01-01 PROCEDURE — 99232 SBSQ HOSP IP/OBS MODERATE 35: CPT | Performed by: INTERNAL MEDICINE

## 2021-01-01 PROCEDURE — 82962 GLUCOSE BLOOD TEST: CPT

## 2021-01-01 PROCEDURE — 74011250636 HC RX REV CODE- 250/636: Performed by: EMERGENCY MEDICINE

## 2021-01-01 PROCEDURE — 92610 EVALUATE SWALLOWING FUNCTION: CPT

## 2021-01-01 PROCEDURE — 83605 ASSAY OF LACTIC ACID: CPT

## 2021-01-01 PROCEDURE — 36415 COLL VENOUS BLD VENIPUNCTURE: CPT

## 2021-01-01 PROCEDURE — 87205 SMEAR GRAM STAIN: CPT

## 2021-01-01 PROCEDURE — 96374 THER/PROPH/DIAG INJ IV PUSH: CPT

## 2021-01-01 PROCEDURE — 74011250636 HC RX REV CODE- 250/636: Performed by: INTERNAL MEDICINE

## 2021-01-01 PROCEDURE — 81001 URINALYSIS AUTO W/SCOPE: CPT

## 2021-01-01 PROCEDURE — G0378 HOSPITAL OBSERVATION PER HR: HCPCS

## 2021-01-01 PROCEDURE — 84484 ASSAY OF TROPONIN QUANT: CPT

## 2021-01-01 PROCEDURE — 96375 TX/PRO/DX INJ NEW DRUG ADDON: CPT

## 2021-01-01 PROCEDURE — 96361 HYDRATE IV INFUSION ADD-ON: CPT

## 2021-01-01 PROCEDURE — 81003 URINALYSIS AUTO W/O SCOPE: CPT

## 2021-01-01 PROCEDURE — 74011250636 HC RX REV CODE- 250/636: Performed by: NURSE PRACTITIONER

## 2021-01-01 PROCEDURE — 80053 COMPREHEN METABOLIC PANEL: CPT

## 2021-01-01 PROCEDURE — 74011250637 HC RX REV CODE- 250/637: Performed by: EMERGENCY MEDICINE

## 2021-01-01 PROCEDURE — 99284 EMERGENCY DEPT VISIT MOD MDM: CPT

## 2021-01-01 PROCEDURE — 71045 X-RAY EXAM CHEST 1 VIEW: CPT

## 2021-01-01 PROCEDURE — 85025 COMPLETE CBC W/AUTO DIFF WBC: CPT

## 2021-01-01 PROCEDURE — 96376 TX/PRO/DX INJ SAME DRUG ADON: CPT

## 2021-01-01 PROCEDURE — 87635 SARS-COV-2 COVID-19 AMP PRB: CPT

## 2021-01-01 PROCEDURE — 65270000029 HC RM PRIVATE

## 2021-01-01 PROCEDURE — 87040 BLOOD CULTURE FOR BACTERIA: CPT

## 2021-01-01 PROCEDURE — 87077 CULTURE AEROBIC IDENTIFY: CPT

## 2021-01-01 PROCEDURE — 74011000250 HC RX REV CODE- 250: Performed by: INTERNAL MEDICINE

## 2021-01-01 PROCEDURE — 99222 1ST HOSP IP/OBS MODERATE 55: CPT | Performed by: SURGERY

## 2021-01-01 PROCEDURE — 74011250637 HC RX REV CODE- 250/637: Performed by: HOSPITALIST

## 2021-01-01 PROCEDURE — 74011000250 HC RX REV CODE- 250: Performed by: SURGERY

## 2021-01-01 PROCEDURE — 87147 CULTURE TYPE IMMUNOLOGIC: CPT

## 2021-01-01 PROCEDURE — 80048 BASIC METABOLIC PNL TOTAL CA: CPT

## 2021-01-01 PROCEDURE — 80202 ASSAY OF VANCOMYCIN: CPT

## 2021-01-01 PROCEDURE — 83880 ASSAY OF NATRIURETIC PEPTIDE: CPT

## 2021-01-01 PROCEDURE — 94761 N-INVAS EAR/PLS OXIMETRY MLT: CPT

## 2021-01-01 PROCEDURE — 99283 EMERGENCY DEPT VISIT LOW MDM: CPT

## 2021-01-01 PROCEDURE — 80076 HEPATIC FUNCTION PANEL: CPT

## 2021-01-01 PROCEDURE — 74011000250 HC RX REV CODE- 250: Performed by: EMERGENCY MEDICINE

## 2021-01-01 PROCEDURE — 99223 1ST HOSP IP/OBS HIGH 75: CPT | Performed by: INTERNAL MEDICINE

## 2021-01-01 PROCEDURE — 87070 CULTURE OTHR SPECIMN AEROBIC: CPT

## 2021-01-01 PROCEDURE — 99285 EMERGENCY DEPT VISIT HI MDM: CPT

## 2021-01-01 RX ORDER — ACETAMINOPHEN 650 MG/1
650 SUPPOSITORY RECTAL
Status: DISCONTINUED | OUTPATIENT
Start: 2021-01-01 | End: 2021-01-01 | Stop reason: HOSPADM

## 2021-01-01 RX ORDER — METOPROLOL TARTRATE 25 MG/1
25 TABLET, FILM COATED ORAL 2 TIMES DAILY
Status: DISCONTINUED | OUTPATIENT
Start: 2021-01-01 | End: 2021-01-01 | Stop reason: HOSPADM

## 2021-01-01 RX ORDER — SODIUM CHLORIDE 450 MG/100ML
75 INJECTION, SOLUTION INTRAVENOUS CONTINUOUS
Status: DISCONTINUED | OUTPATIENT
Start: 2021-01-01 | End: 2021-01-01

## 2021-01-01 RX ORDER — ONDANSETRON 2 MG/ML
4 INJECTION INTRAMUSCULAR; INTRAVENOUS
Status: DISCONTINUED | OUTPATIENT
Start: 2021-01-01 | End: 2022-01-01 | Stop reason: HOSPADM

## 2021-01-01 RX ORDER — DEXTROSE MONOHYDRATE 50 MG/ML
100 INJECTION, SOLUTION INTRAVENOUS CONTINUOUS
Status: DISCONTINUED | OUTPATIENT
Start: 2021-01-01 | End: 2022-01-01 | Stop reason: HOSPADM

## 2021-01-01 RX ORDER — SODIUM CHLORIDE 9 MG/ML
75 INJECTION, SOLUTION INTRAVENOUS CONTINUOUS
Status: DISCONTINUED | OUTPATIENT
Start: 2021-01-01 | End: 2021-01-01

## 2021-01-01 RX ORDER — ACETAMINOPHEN 650 MG/1
650 SUPPOSITORY RECTAL
Status: DISCONTINUED | OUTPATIENT
Start: 2021-01-01 | End: 2022-01-01 | Stop reason: HOSPADM

## 2021-01-01 RX ORDER — POLYETHYLENE GLYCOL 3350 17 G/17G
17 POWDER, FOR SOLUTION ORAL DAILY
Status: DISCONTINUED | OUTPATIENT
Start: 2021-01-01 | End: 2021-01-01 | Stop reason: HOSPADM

## 2021-01-01 RX ORDER — ACETAMINOPHEN 325 MG/1
650 TABLET ORAL
Status: DISCONTINUED | OUTPATIENT
Start: 2021-01-01 | End: 2021-01-01 | Stop reason: HOSPADM

## 2021-01-01 RX ORDER — POLYETHYLENE GLYCOL 3350 17 G/17G
17 POWDER, FOR SOLUTION ORAL DAILY PRN
Status: DISCONTINUED | OUTPATIENT
Start: 2021-01-01 | End: 2021-01-01 | Stop reason: HOSPADM

## 2021-01-01 RX ORDER — OXYBUTYNIN CHLORIDE 5 MG/1
10 TABLET, EXTENDED RELEASE ORAL
Status: DISCONTINUED | OUTPATIENT
Start: 2021-01-01 | End: 2021-01-01 | Stop reason: HOSPADM

## 2021-01-01 RX ORDER — SENNOSIDES 8.6 MG/1
1 TABLET ORAL
Status: DISCONTINUED | OUTPATIENT
Start: 2021-01-01 | End: 2021-01-01 | Stop reason: HOSPADM

## 2021-01-01 RX ORDER — ONDANSETRON 4 MG/1
4 TABLET, ORALLY DISINTEGRATING ORAL
Status: DISCONTINUED | OUTPATIENT
Start: 2021-01-01 | End: 2021-01-01 | Stop reason: HOSPADM

## 2021-01-01 RX ORDER — CEPHALEXIN 500 MG/1
500 CAPSULE ORAL EVERY 6 HOURS
Status: DISCONTINUED | OUTPATIENT
Start: 2021-01-01 | End: 2021-01-01 | Stop reason: HOSPADM

## 2021-01-01 RX ORDER — AMOXICILLIN AND CLAVULANATE POTASSIUM 500; 125 MG/1; MG/1
1 TABLET, FILM COATED ORAL EVERY 12 HOURS
Qty: 10 TABLET | Refills: 0 | Status: SHIPPED | OUTPATIENT
Start: 2021-01-01 | End: 2021-01-01

## 2021-01-01 RX ORDER — SODIUM CHLORIDE 0.9 % (FLUSH) 0.9 %
5-40 SYRINGE (ML) INJECTION AS NEEDED
Status: DISCONTINUED | OUTPATIENT
Start: 2021-01-01 | End: 2022-01-01 | Stop reason: HOSPADM

## 2021-01-01 RX ORDER — ONDANSETRON 4 MG/1
4 TABLET, ORALLY DISINTEGRATING ORAL
Status: DISCONTINUED | OUTPATIENT
Start: 2021-01-01 | End: 2022-01-01 | Stop reason: HOSPADM

## 2021-01-01 RX ORDER — ACETAMINOPHEN 325 MG/1
650 TABLET ORAL
Status: DISCONTINUED | OUTPATIENT
Start: 2021-01-01 | End: 2022-01-01 | Stop reason: HOSPADM

## 2021-01-01 RX ORDER — SODIUM CHLORIDE 0.9 % (FLUSH) 0.9 %
5-40 SYRINGE (ML) INJECTION EVERY 8 HOURS
Status: DISCONTINUED | OUTPATIENT
Start: 2021-01-01 | End: 2022-01-01 | Stop reason: HOSPADM

## 2021-01-01 RX ORDER — ONDANSETRON 2 MG/ML
4 INJECTION INTRAMUSCULAR; INTRAVENOUS
Status: DISCONTINUED | OUTPATIENT
Start: 2021-01-01 | End: 2021-01-01 | Stop reason: HOSPADM

## 2021-01-01 RX ORDER — FUROSEMIDE 10 MG/ML
40 INJECTION INTRAMUSCULAR; INTRAVENOUS DAILY
Status: DISCONTINUED | OUTPATIENT
Start: 2021-01-01 | End: 2021-01-01

## 2021-01-01 RX ORDER — CEPHALEXIN 500 MG/1
500 CAPSULE ORAL 4 TIMES DAILY
Qty: 28 CAPSULE | Refills: 3 | Status: SHIPPED | OUTPATIENT
Start: 2021-01-01 | End: 2022-01-01

## 2021-01-01 RX ORDER — SODIUM CHLORIDE 0.9 % (FLUSH) 0.9 %
5-40 SYRINGE (ML) INJECTION AS NEEDED
Status: DISCONTINUED | OUTPATIENT
Start: 2021-01-01 | End: 2021-01-01 | Stop reason: HOSPADM

## 2021-01-01 RX ORDER — METOPROLOL TARTRATE 25 MG/1
25 TABLET, FILM COATED ORAL 2 TIMES DAILY
Status: DISCONTINUED | OUTPATIENT
Start: 2021-01-01 | End: 2022-01-01 | Stop reason: HOSPADM

## 2021-01-01 RX ORDER — SODIUM CHLORIDE 0.9 % (FLUSH) 0.9 %
5-40 SYRINGE (ML) INJECTION EVERY 8 HOURS
Status: DISCONTINUED | OUTPATIENT
Start: 2021-01-01 | End: 2021-01-01 | Stop reason: HOSPADM

## 2021-01-01 RX ADMIN — VANCOMYCIN HYDROCHLORIDE 500 MG: 500 INJECTION, POWDER, LYOPHILIZED, FOR SOLUTION INTRAVENOUS at 14:56

## 2021-01-01 RX ADMIN — Medication 10 ML: at 22:52

## 2021-01-01 RX ADMIN — FUROSEMIDE 40 MG: 10 INJECTION, SOLUTION INTRAMUSCULAR; INTRAVENOUS at 22:53

## 2021-01-01 RX ADMIN — COLLAGENASE SANTYL: 250 OINTMENT TOPICAL at 10:05

## 2021-01-01 RX ADMIN — PIPERACILLIN AND TAZOBACTAM 3.38 G: 3; .375 INJECTION, POWDER, LYOPHILIZED, FOR SOLUTION INTRAVENOUS at 11:42

## 2021-01-01 RX ADMIN — SODIUM CHLORIDE 75 ML/HR: 4.5 INJECTION, SOLUTION INTRAVENOUS at 15:07

## 2021-01-01 RX ADMIN — CEFTRIAXONE 1 G: 1 INJECTION, POWDER, FOR SOLUTION INTRAMUSCULAR; INTRAVENOUS at 23:14

## 2021-01-01 RX ADMIN — PIPERACILLIN AND TAZOBACTAM 3.38 G: 3; .375 INJECTION, POWDER, LYOPHILIZED, FOR SOLUTION INTRAVENOUS at 11:44

## 2021-01-01 RX ADMIN — SODIUM CHLORIDE, PRESERVATIVE FREE 10 ML: 5 INJECTION INTRAVENOUS at 15:10

## 2021-01-01 RX ADMIN — SODIUM CHLORIDE, PRESERVATIVE FREE 10 ML: 5 INJECTION INTRAVENOUS at 06:40

## 2021-01-01 RX ADMIN — Medication 10 ML: at 18:00

## 2021-01-01 RX ADMIN — SODIUM CHLORIDE, PRESERVATIVE FREE 10 ML: 5 INJECTION INTRAVENOUS at 14:53

## 2021-01-01 RX ADMIN — DEXTROSE MONOHYDRATE 75 ML/HR: 50 INJECTION, SOLUTION INTRAVENOUS at 14:53

## 2021-01-01 RX ADMIN — VANCOMYCIN HYDROCHLORIDE 750 MG: 750 INJECTION, POWDER, LYOPHILIZED, FOR SOLUTION INTRAVENOUS at 15:00

## 2021-01-01 RX ADMIN — PIPERACILLIN AND TAZOBACTAM 3.38 G: 3; .375 INJECTION, POWDER, LYOPHILIZED, FOR SOLUTION INTRAVENOUS at 20:47

## 2021-01-01 RX ADMIN — PIPERACILLIN AND TAZOBACTAM 3.38 G: 3; .375 INJECTION, POWDER, LYOPHILIZED, FOR SOLUTION INTRAVENOUS at 20:06

## 2021-01-01 RX ADMIN — PIPERACILLIN AND TAZOBACTAM 3.38 G: 3; .375 INJECTION, POWDER, LYOPHILIZED, FOR SOLUTION INTRAVENOUS at 03:55

## 2021-01-01 RX ADMIN — SODIUM CHLORIDE 500 ML: 9 INJECTION, SOLUTION INTRAVENOUS at 12:08

## 2021-01-01 RX ADMIN — SODIUM CHLORIDE, PRESERVATIVE FREE 10 ML: 5 INJECTION INTRAVENOUS at 20:06

## 2021-01-01 RX ADMIN — Medication 10 ML: at 06:44

## 2021-01-01 RX ADMIN — SODIUM CHLORIDE, PRESERVATIVE FREE 10 ML: 5 INJECTION INTRAVENOUS at 21:10

## 2021-01-01 RX ADMIN — SODIUM CHLORIDE 75 ML/HR: 9 INJECTION, SOLUTION INTRAVENOUS at 00:29

## 2021-01-01 RX ADMIN — PIPERACILLIN AND TAZOBACTAM 3.38 G: 3; .375 INJECTION, POWDER, LYOPHILIZED, FOR SOLUTION INTRAVENOUS at 22:53

## 2021-01-01 RX ADMIN — CEPHALEXIN 500 MG: 500 CAPSULE ORAL at 23:14

## 2021-01-01 RX ADMIN — PIPERACILLIN AND TAZOBACTAM 3.38 G: 3; .375 INJECTION, POWDER, LYOPHILIZED, FOR SOLUTION INTRAVENOUS at 09:49

## 2021-12-16 PROBLEM — J69.0 ASPIRATION PNEUMONIA (HCC): Status: ACTIVE | Noted: 2021-01-01

## 2021-12-16 NOTE — H&P
History & Physical    Primary Care Provider: Peter Arboleda MD  Source of Information: Patients son    History of Presenting Illness: Zac Freeman is a 80 y.o. female who presents with reports of shortness of breath at home. Most history was obtained from the son on the phone. Patient reportedly had a pacemaker placed last week. She has been on puréed diet at home but was noted with heavy breathing this morning. No cough or productive sputum. No fevers or chills. Chest x-ray shows increased interstitial markings with possible fluid overload and a BNP of over 6000. Concerns of aspiration and fluid overload, patient will be monitored overnight in the hospital       Review of Systems:  Review of systems not obtained due to patient factors. 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 Authorizing Provider   furosemide (LASIX) 40 mg tablet 1 tab PO daily 3/21/19   Nicci Em MD   metoprolol tartrate (LOPRESSOR) 25 mg tablet Take 25 mg by mouth two (2) times a day. Provider, Historical   oxybutynin chloride XL (DITROPAN XL) 10 mg CR tablet Take 10 mg by mouth nightly. Provider, Historical   apixaban (ELIQUIS) 2.5 mg tablet Take 2.5 mg by mouth two (2) times a day. Provider, Historical   senna (SENNA) 8.6 mg tablet Take 1 Tab by mouth nightly. Provider, Historical   polyethylene glycol (MIRALAX) 17 gram packet Take 17 g by mouth daily.     Provider, Historical   lovastatin (MEVACOR) 20 mg tablet Take 20 mg by mouth daily. Other, MD Jose Alberto     Allergies   Allergen Reactions    Neosporin [Benzalkonium Chloride] Rash      History reviewed. No pertinent family history. SOCIAL HISTORY:  Patient resides:  Independently    Assisted Living    SNF    With family care x      Smoking history:   None x   Former    Chronic      Alcohol history:   None x   Social    Chronic      Ambulates:   Independently    w/cane    w/walker    w/wc x   CODE STATUS:  DNR    Full    Other x     Objective:     Physical Exam:     Visit Vitals  BP (!) 151/85   Pulse 60   Temp 98.5 °F (36.9 °C)   Resp 20   Ht 5' (1.524 m)   Wt 59 kg (130 lb)   SpO2 98%   BMI 25.39 kg/m²    O2 Flow Rate (L/min): 2 l/min O2 Device: Nasal cannula    General:  Difficult to arouse   Head:  Normocephalic, without obvious abnormality, atraumatic. Eyes:  Conjunctivae/corneas clear. PERRL, EOMs intact. Nose: Nares normal. Septum midline. Mucosa normal. No drainage or sinus tenderness. Throat: Lips, mucosa, and tongue normal. Teeth and gums normal.   Neck: Supple, symmetrical, trachea midline, no adenopathy, thyroid: no enlargement/tenderness/nodules, no carotid bruit and no JVD. Back:   Symmetric, no curvature. ROM normal. No CVA tenderness. Lungs:   Clear to auscultation bilaterally. Chest wall:  No tenderness or deformity. Heart:  Regular rate and rhythm, S1, S2 normal, no murmur, click, rub or gallop. Abdomen:   Soft, non-tender. Bowel sounds normal. No masses,  No organomegaly. Extremities: Extremities normal, atraumatic, no cyanosis or edema. Pulses: 2+ and symmetric all extremities. Skin: Skin color, texture, turgor normal. No rashes or lesions   Neurologic: CNII-XII intact. No motor or sensory deficits. EKG:  normal EKG, normal sinus rhythm, unchanged from previous tracings, nonspecific ST and T waves changes.       Data Review:     Recent Days:  Recent Labs     12/16/21  1409   WBC 7.1   HGB 11.4*   HCT 36.0        Recent Labs 12/16/21  1409      K 3.6      CO2 29   GLU 73   BUN 22*   CREA 0.86   CA 8.5   ALB 2.1*   TBILI 1.3*   ALT 17     No results for input(s): PH, PCO2, PO2, HCO3, FIO2 in the last 72 hours. 24 Hour Results:  Recent Results (from the past 24 hour(s))   SARS-COV-2    Collection Time: 12/16/21  2:00 PM   Result Value Ref Range    SARS-CoV-2 Please find results under separate order     COVID-19 RAPID TEST    Collection Time: 12/16/21  2:00 PM   Result Value Ref Range    Specimen source Nasopharyngeal      COVID-19 rapid test Not Detected Not Detected     CBC WITH AUTOMATED DIFF    Collection Time: 12/16/21  2:09 PM   Result Value Ref Range    WBC 7.1 3.6 - 11.0 K/uL    RBC 3.88 3.80 - 5.20 M/uL    HGB 11.4 (L) 11.5 - 16.0 g/dL    HCT 36.0 35.0 - 47.0 %    MCV 92.8 80.0 - 99.0 FL    MCH 29.4 26.0 - 34.0 PG    MCHC 31.7 30.0 - 36.5 g/dL    RDW 18.5 (H) 11.5 - 14.5 %    PLATELET 873 483 - 163 K/uL    MPV 9.4 8.9 - 12.9 FL    NRBC 0.0 0.0  WBC    ABSOLUTE NRBC 0.00 0.00 - 0.01 K/uL    NEUTROPHILS 71 32 - 75 %    LYMPHOCYTES 16 12 - 49 %    MONOCYTES 11 5 - 13 %    EOSINOPHILS 1 0 - 7 %    BASOPHILS 0 0 - 1 %    IMMATURE GRANULOCYTES 1 (H) 0 - 0.5 %    ABS. NEUTROPHILS 5.0 1.8 - 8.0 K/UL    ABS. LYMPHOCYTES 1.1 0.8 - 3.5 K/UL    ABS. MONOCYTES 0.8 0.0 - 1.0 K/UL    ABS. EOSINOPHILS 0.1 0.0 - 0.4 K/UL    ABS. BASOPHILS 0.0 0.0 - 0.1 K/UL    ABS. IMM.  GRANS. 0.1 (H) 0.00 - 0.04 K/UL    DF AUTOMATED     METABOLIC PANEL, COMPREHENSIVE    Collection Time: 12/16/21  2:09 PM   Result Value Ref Range    Sodium 141 136 - 145 mmol/L    Potassium 3.6 3.5 - 5.1 mmol/L    Chloride 107 97 - 108 mmol/L    CO2 29 21 - 32 mmol/L    Anion gap 5 5 - 15 mmol/L    Glucose 73 65 - 100 mg/dL    BUN 22 (H) 6 - 20 mg/dL    Creatinine 0.86 0.55 - 1.02 mg/dL    BUN/Creatinine ratio 26 (H) 12 - 20      GFR est AA >60 >60 ml/min/1.73m2    GFR est non-AA >60 >60 ml/min/1.73m2    Calcium 8.5 8.5 - 10.1 mg/dL    Bilirubin, total 1.3 (H) 0.2 - 1.0 mg/dL    AST (SGOT) 35 15 - 37 U/L    ALT (SGPT) 17 12 - 78 U/L    Alk. phosphatase 112 45 - 117 U/L    Protein, total 5.3 (L) 6.4 - 8.2 g/dL    Albumin 2.1 (L) 3.5 - 5.0 g/dL    Globulin 3.2 2.0 - 4.0 g/dL    A-G Ratio 0.7 (L) 1.1 - 2.2     LACTIC ACID    Collection Time: 12/16/21  2:09 PM   Result Value Ref Range    Lactic acid 1.5 0.4 - 2.0 mmol/L   TROPONIN-HIGH SENSITIVITY    Collection Time: 12/16/21  2:09 PM   Result Value Ref Range    Troponin-High Sensitivity 147 (HH) 0 - 51 ng/L   NT-PRO BNP    Collection Time: 12/16/21  2:09 PM   Result Value Ref Range    NT pro-BNP 6,806 (H) <450 pg/mL         Imaging:   XR CHEST SNGL V   Final Result   Limited. 1. Bilateral pulmonary airspace atelectasis, edema, or pneumonia, and pleural   effusions. 2. Cardiomegaly, with cardiac device. Assessment:     Probable aspiration pneumonia  Early fluid overload  Status post recent permanent pacemaker placement  Generalized debilitation from medical illness      Plan:     Admit To telemetry floor observation  IV Zosyn  Lasix 40 mg IV daily  Clinical updates provided to son  Modified code. Family wants intubation but no chest compression  We will repeat chest x-ray in a.m.       Signed By: Ml John MD     December 16, 2021

## 2021-12-16 NOTE — ED TRIAGE NOTES
just DC from [facility] on Tuesday for for pacemaker placement. Pt had recently become more lethargic thus why she has pacemaker placed. Since placement has been more alert. Woke up today breathing hard, not speaking, pt febrile with family at home. Son is a physician and primary caregiver, reports diminished lung sounds in [place]. Hx of prev CVA 20 years prior.
PAST SURGICAL HISTORY:  History of laminectomy     Status post colostomy

## 2021-12-17 NOTE — ROUTINE PROCESS
TRANSFER - OUT REPORT:    Verbal report given to phuong bonilla on [facility]  being transferred to 4w room  for routine progression of care       Report consisted of patients Situation, Background, Assessment and   Recommendations(SBAR). Information from the following report(s) ED Summary, Recent Results, Med Rec Status and Cardiac Rhythm paced was reviewed with the receiving nurse. Lines:   Peripheral IV 12/16/21 Right (Active)        Opportunity for questions and clarification was provided.       Patient transported with:   Monitor  O2 @ 2 liters  Tech

## 2021-12-17 NOTE — DISCHARGE INSTRUCTIONS
Patient Education        Aspiration Pneumonia: Care Instructions  Your Care Instructions     Aspiration pneumonia is an inflammation of the lungs. It may occur after you breathe in large amounts of foreign material, such as food, liquid, vomit, or mucus. Aspiration may happen because of a health problem that makes it hard to swallow. These problems include stroke or seizure. Pneumonia makes it hard to breathe. Follow-up care is a key part of your treatment and safety. Be sure to make and go to all appointments, and call your doctor if you are having problems. It's also a good idea to know your test results and keep a list of the medicines you take. How can you care for yourself at home? To help with swallowing   · You may need to do exercises to train your muscles to work together to help you swallow. You may also need to learn how to position your body or how to put food in your mouth to be able to swallow better. · You may need to change the foods you eat. Your doctor may tell you to eat certain foods and liquids to make swallowing easier. · You may need to change how you prepare foods. For example, you may need to add thickeners to some liquids, or puree certain foods in a . To help with pneumonia   · Take your antibiotics as directed. Do not stop taking them just because you feel better. You need to take the full course of antibiotics. · Take your medicines exactly as prescribed. For example, your doctor may have given you medicine that makes breathing easier. Call your doctor if you think you are having a problem with your medicine. · Get plenty of rest and sleep. You may feel weak and tired for a while, but your energy level will improve with time. · Take care of your cough so you can rest. A cough that brings up mucus from your lungs is common with pneumonia. It is one way your body gets rid of the infection.  But if coughing keeps you from resting or causes severe fatigue and chest-wall pain, talk to your doctor. He or she may suggest that you take a medicine to reduce the cough. · Use a humidifier to increase the moisture in the air. Dry air makes coughing worse. Follow the instructions for cleaning the machine. · Do not smoke, and avoid others' smoke. Smoke will make your cough last longer. If you need help quitting, talk to your doctor about stop-smoking programs and medicines. These can increase your chances of quitting for good. · Take an over-the-counter pain medicine, such as acetaminophen (Tylenol), ibuprofen (Advil, Motrin), or naproxen (Aleve) to help reduce fever and reduce chest pain caused by coughing. Read and follow all instructions on the label. · Do not take two or more pain medicines at the same time unless the doctor told you to. Many pain medicines have acetaminophen, which is Tylenol. Too much acetaminophen (Tylenol) can be harmful. When should you call for help? Call 911 anytime you think you may need emergency care. For example, call if:    · You have severe trouble breathing. Call your doctor now or seek immediate medical care if:    · You have a new or higher fever.     · You have new or worse trouble breathing.     · You cough up blood.     · You are dizzy or lightheaded, or you feel like you may faint. Watch closely for changes in your health, and be sure to contact your doctor if:    · You do not get better as expected.     · You are coughing more deeply or more often. Where can you learn more? Go to http://www.Alt[employer].com/  Enter D757 in the search box to learn more about \"Aspiration Pneumonia: Care Instructions. \"  Current as of: July 6, 2021               Content Version: 13.0  © 6818-5794 [employer]. Care instructions adapted under license by [employer] (which disclaims liability or warranty for this information).  If you have questions about a medical condition or this instruction, always ask your healthcare professional. [place] [address] any warranty or liability for your use of this information. Patient Education        Learning About the Diet for Swallowing Problems  What are swallowing problems? Difficulty swallowing is also called dysphagia (say \"qmu-PCM-mbz-uh\"). It is most often a sign of a problem with your throat or esophagus. This is the tube that moves food and liquids from the back of your mouth to your stomach. Trouble swallowing can occur when the muscles and nerves that move food through the throat and esophagus are not working right. To help you swallow food, your doctor or speech therapist may advise a special dysphagia diet for you. Why is a special diet important? A dysphagia diet can help you handle some problems that can occur when it's hard to swallow food and liquids easily. These problems can include:  · Malnutrition. This means you aren't getting enough healthy foods to keep your body working well. · Dehydration. This means you aren't getting enough liquids to keep your body healthy. · Aspiration. This means that food, liquid, or saliva goes down your windpipe (trachea) into your lungs, instead of down your esophagus to your stomach. This can lead to aspiration pneumonia, which is an inflammation of the lungs. What is the dysphagia diet? In the dysphagia diet, you change the foods you eat and the liquids you drink to make it easier to swallow them. You may:  · Change the texture of the foods you eat. Your doctor or speech therapist may advise you to eat one of these types of foods:  ? Easy-to-chew foods. These are foods that are soft or tender. ? Soft and bite-sized foods. These are soft foods that have been cut into small pieces. ? Minced and moist foods. These are very soft, small, and moist lumps of food that need very little chewing. ? Pureed foods. These are foods that have been blended smooth.  The puree must be thick enough to hold its shape on a spoon. These foods don't need to be chewed. ? Liquidized foods. These are foods that have been blended smooth but are not as thick as pureed foods. You can drink them from a cup. · Thicken the liquids you drink. Your doctor or speech therapist will tell you what kind of thickener to use and how thick to make the liquids. ? Slightly thick liquids. These are thicker than water but can flow through a straw. ? Mildly thick liquids. These can be sipped from a cup but take some effort to drink with a straw. ? Moderately thick liquids. These liquids are thick enough to drink from a cup or from a spoon. But they are hard to drink through a wide straw. ? Extremely thick liquids. These are thick enough to hold their shape on a spoon. They are too thick to drink from a cup or suck through a straw. Your speech therapist will help you learn exercises to train your muscles to work together so you can swallow. You may also need to learn how to position your body or how to put food in your mouth to be able to swallow better. Follow-up care is a key part of your treatment and safety. Be sure to make and go to all appointments, and call your doctor if you are having problems. It's also a good idea to know your test results and keep a list of the medicines you take. Where can you learn more? Go to http://www.[employer].com/  Enter B582 in the search box to learn more about \"Learning About the Diet for Swallowing Problems. \"  Current as of: July 1, 2021               Content Version: 13.0  © 2006-2021 [employer], [facility]. Care instructions adapted under license by [employer] (which disclaims liability or warranty for this information). If you have questions about a medical condition or this instruction, always ask your healthcare professional. [place] [address] any warranty or liability for your use of this information.          Patient Education        Shortness of Breath: Care Instructions  Your Care Instructions     Shortness of breath has many causes. Sometimes conditions such as anxiety can lead to shortness of breath. Some people get mild shortness of breath when they exercise. Trouble breathing also can be a symptom of a serious problem, such as asthma, lung disease, emphysema, heart problems, and pneumonia. If your shortness of breath continues, you may need tests and treatment. Watch for any changes in your breathing and other symptoms. Follow-up care is a key part of your treatment and safety. Be sure to make and go to all appointments, and call your doctor if you are having problems. It's also a good idea to know your test results and keep a list of the medicines you take. How can you care for yourself at home? · Do not smoke or allow others to smoke around you. If you need help quitting, talk to your doctor about stop-smoking programs and medicines. These can increase your chances of quitting for good. · Get plenty of rest and sleep. · Take your medicines exactly as prescribed. Call your doctor if you think you are having a problem with your medicine. · Find healthy ways to deal with stress. ? Exercise daily. ? Get plenty of sleep. ? Eat regularly and well. When should you call for help? Call 911 anytime you think you may need emergency care. For example, call if:    · You have severe shortness of breath.     · You have symptoms of a heart attack. These may include:  ? Chest pain or pressure, or a strange feeling in the chest.  ? Sweating. ? Shortness of breath. ? Nausea or vomiting. ? Pain, pressure, or a strange feeling in the back, neck, jaw, or upper belly or in one or both shoulders or arms. ? Lightheadedness or sudden weakness. ? A fast or irregular heartbeat. After you call 911, the  may tell you to chew 1 adult-strength or 2 to 4 low-dose aspirin. Wait for an ambulance. Do not try to drive yourself.    Call your doctor now or seek immediate medical care if:    · Your shortness of breath gets worse or you start to wheeze. Wheezing is a high-pitched sound when you breathe.     · You wake up at night out of breath or have to prop your head up on several pillows to breathe.     · You are short of breath after only light activity or while at rest.   Watch closely for changes in your health, and be sure to contact your doctor if:    · You do not get better over the next 1 to 2 days. Where can you learn more? Go to http://www.gray.com/  Enter S780 in the search box to learn more about \"Shortness of Breath: Care Instructions. \"  Current as of: July 6, 2021               Content Version: 13.0  © 2006-2021 [employer], [employer]. Care instructions adapted under license by [employer] (which disclaims liability or warranty for this information). If you have questions about a medical condition or this instruction, always ask your healthcare professional. [address] any warranty or liability for your use of this information.

## 2021-12-17 NOTE — PROGRESS NOTES
Problem: Dysphagia (Adult)  Goal: *Acute Goals and Plan of Care (Insert Text)  Description: Speech Therapy Goals  Initiated 12/17/2021  -Patient will tolerate puree diet with MODERATELY/ honey thick liquids without signs/symptoms of aspiration given moderate cues within 7day(s). [ ] Not met  [ ]  MET   [ ] Progressing  [ ] Chyrl Friend  -Patient will demonstrate understanding of swallow safety precautions and aspiration precautions, diet recs with moderate cues within 7 day(s). [ ] Not met  [ ]  MET   [ ] Progressing  [ ] Discontinue  Outcome: Not Met   SPEECH [address] EVALUATION  Patient: Ji Bazzi (80 y.o. female)  Date: 12/17/2021  Primary Diagnosis: Aspiration pneumonia ([facility] [address] [facility] [J69.0]        Precautions: aspiration       ASSESSMENT :  Based on the objective data described below, the patient presents with moderate-severe oropharyngeal dysphagia. Pt admitted with aspiration pna, typically is on puree/mod thick liquids at home per nsg according to pt's son. Previous SLP assessment in OSH acute setting with recs for mm5/mildly thick liquids at that time (3/2019). Hx of CVA with left sided weakness/contractures noted. Pt sleeping but easily arousable, positioned upright in bed. Limited verbalizations. Pt does not follow oral motor commands, largely intact dentition with some missing teeth noted. Vocal quality is weak. Left facial weakness overt. Pt with limited acceptance of PO trials of mildly thick, mod thick via tsp and cup, puree via tsp. Oral phase with reduced oral bolus acceptance and anterior spillage on left. Pt bites spoon with liquids, improves with cup sips. Delayed a-p propulsion, pt is able to clear oral cavity. Pharyngeal phase with persistent delay, appears WFL once initiated. With subsequent trials, increased delay is noted. Cough with mildly thick is present. Pt unable to drink from straw.      Patient will benefit from skilled intervention to address the above impairments. Patients rehabilitation potential is considered to be Fair     PLAN :  Recommendations and Planned Interventions: At this time, recommend resume previous puree/mod thick liquids diet with 1:1 assistance with ALL PO intake, STRICT aspiration and GERD precautions, monitor pt closely for s/s aspiration, meds crushed if able in puree, FEED ONLY IF AWAKE AND ALERT. Pt may benefit from follow-up with [place] SLP if/as indicated. Frequency/Duration: Patient will be followed by speech-language pathology 1 time a week to address goals. Discharge Recommendations: as indicated, pt appears to be at functional baseline at this time. Pt is for d/c home with son. SUBJECTIVE:   Patient sleeping but arouses easily, opens eyes to voice, limited verbalizations. OBJECTIVE:     CXR Results  (Last 48 hours)                 12/16/21 1224  XR CHEST SNGL V Final result    Impression:  Limited. 1. Bilateral pulmonary airspace atelectasis, edema, or pneumonia, and pleural   effusions. 2. Cardiomegaly, with cardiac device. Narrative:  Hypoxia. No comparisons are available under this PACs record number at this time. FINDINGS: Single frontal view of the chest. Left chest wall cardiac device,   distal lead not imaged; the inferior chest not completely imaged. Cardiomegaly. Calcific atherosclerosis aortic arch. Hypoinflation. Small bilateral pleural   effusions and adjacent airspace disease. No pneumothorax. Severe degenerative   changes right shoulder.                   Past Medical History:   Diagnosis Date    Chronic kidney disease     acute tubual necrosis    Clostridium difficile infection     Elevated troponin 9/10/2014    Hypertension     Skin cancer     Stroke [facility])     2001 left residual     Past Surgical History:   Procedure Laterality Date    HX APPENDECTOMY  2/2008    HX HEENT      cataract    HX ORTHOPAEDIC  04/2010    left total hip - hip fx, left knee surgery Prior Level of Function/Home Situation:   Home Situation  Support Systems: Child(reid)  Diet prior to admission: puree/mod thick per report  Current Diet:  NPO   Cognitive and Communication Status:  Neurologic State: Eyes open to voice  Orientation Level: Unable to verbalize    Pain:  Pain Scale 1: FLACC  Pain Intensity 1: 0       After treatment:   Patient left in no apparent distress in bed, Call bell within reach and Nursing notified    COMMUNICATION/EDUCATION:   Patient was educated regarding purpose of SLP assessment, POC, diet recs and sw safety precautions. Patient demonstrated Guarded understanding as evidenced by AMS, limited responsiveness. The patient's plan of care including recommendations, planned interventions, and recommended diet changes were discussed with: Registered nurse. Patient is unable to participate in goal setting and plan of care.     Thank you for this referral.  Marija Barone M.S. CCC-SLP  Time Calculation: 14 mins

## 2021-12-17 NOTE — DISCHARGE SUMMARY
Physician Discharge Summary     Patient ID:    Gatito Razo  [id number]  89 y.o.  3/25/1922    Admit date: 12/16/2021    Discharge date : 12/17/2021    Chronic Diagnoses:    Problem List as of 12/17/2021 Date Reviewed: 3/19/2019          Codes Class Noted - Resolved    Aspiration pneumonia ([facility] ICD-10-CM: J69.0  ICD-9-CM: 507.0  12/16/2021 - Present        Acute CHF (congestive heart failure) ([facility] ICD-10-CM: I50.9  ICD-9-CM: 428.0  3/19/2019 - Present        RESOLVED: SOB (shortness of breath) ICD-10-CM: R06.02  ICD-9-CM: 786.05  9/10/2014 - 9/12/2014        RESOLVED: Elevated troponin ICD-10-CM: R77.8  ICD-9-CM: 790.6  9/10/2014 - 9/12/2014          22    Final Diagnoses:   Aspiration pneumonia ([facility] [J69.0]  Acute on chronic diastolic heart failure    Reason for Hospitalization:  Shortness of breath and poor oral intake      Hospital Course:    80 y.o. female who presents with reports of shortness of breath at home. Most history was obtained from the son on the phone. Patient reportedly had a pacemaker placed last week. She has been on puréed diet at home but was noted with heavy breathing this morning. No cough or productive sputum. No fevers or chills. Chest x-ray shows increased interstitial markings with possible fluid overload and a BNP of over 6000. She received a dose of IV Lasix in the ED and was treated with IV antibiotics overnight. Remains very stable without rails or crackles. Son counseled on caution with feeding at home including aspiration risk. Overall clinically stable for discharge with outpatient follow-up              Discharge Medications:   Current Discharge Medication List      START taking these medications    Details   amoxicillin-clavulanate (Augmentin) 500-125 mg per tablet Take 1 Tablet by mouth every twelve (12) hours for 5 days.   Qty: 10 Tablet, Refills: 0  Start date: 12/17/2021, End date: 12/22/2021         CONTINUE these medications which have NOT CHANGED    Details   furosemide (LASIX) 40 mg tablet 1 tab PO daily  Qty: 30 Tab, Refills: 0      metoprolol tartrate (LOPRESSOR) 25 mg tablet Take 25 mg by mouth two (2) times a day. oxybutynin chloride XL (DITROPAN XL) 10 mg CR tablet Take 10 mg by mouth nightly. apixaban (ELIQUIS) 2.5 mg tablet Take 2.5 mg by mouth two (2) times a day. senna (SENNA) 8.6 mg tablet Take 1 Tab by mouth nightly. polyethylene glycol (MIRALAX) 17 gram packet Take 17 g by mouth daily. lovastatin (MEVACOR) 20 mg tablet Take 20 mg by mouth daily. Follow up Care:    1. Arun Lutz MD in 1-2 weeks. Please call to set up an appointment shortly after discharge. Diet:  Dysphagia diet-pureed    Disposition:  Home. Advanced Directive:   FULL    DNR      Discharge Exam:  Visit Vitals  BP (!) 113/54 (BP 1 Location: Left leg, BP Patient Position: Lying right side)   Pulse 62   Temp 97.5 °F (36.4 °C)   Resp 16   Ht 5' (1.524 m)   Wt 48.6 kg (107 lb 2.3 oz)   SpO2 99%   BMI 20.93 kg/m²    O2 Flow Rate (L/min): 2 l/min O2 Device: Nasal cannula    Temp (24hrs), Av.4 °F (36.9 °C), Min:97.5 °F (36.4 °C), Max:99.2 °F (37.3 °C)    No intake/output data recorded. 12/15 1901 -  0700  In: 500 [I.V.:500]  Out: -     General:  Alert, cooperative, no distress, appears stated age. Lungs:   Clear to auscultation bilaterally. Chest wall:  No tenderness or deformity. Heart:  Regular rate and rhythm, S1, S2 normal, no murmur, click, rub or gallop. Abdomen:   Soft, non-tender. Bowel sounds normal. No masses,  No organomegaly. Extremities: Extremities normal, atraumatic, no cyanosis or edema. Pulses: 2+ and symmetric all extremities. Skin: Skin color, texture, turgor normal. No rashes or lesions   Neurologic: CNII-XII intact. No gross sensory or motor deficits         CONSULTATIONS: None    Significant Diagnostic Studies:   2021: BUN 22 mg/dL (H; Ref range: 6 - 20 mg/dL);  Calcium 8.5 mg/dL (Ref range: 8.5 - 10.1 mg/dL); CO2 29 mmol/L (Ref range: 21 - 32 mmol/L); Creatinine 0.86 mg/dL (Ref range: 0.55 - 1.02 mg/dL); Glucose 73 mg/dL (Ref range: 65 - 100 mg/dL); HCT 36.0 % (Ref range: 35.0 - 47.0 %); HGB 11.4 g/dL (L; Ref range: 11.5 - 16.0 g/dL); Potassium 3.6 mmol/L (Ref range: 3.5 - 5.1 mmol/L); Sodium 141 mmol/L (Ref range: 136 - 145 mmol/L)  Recent Labs     12/16/21  1409   WBC 7.1   HGB 11.4*   HCT 36.0        Recent Labs     12/16/21  1409      K 3.6      CO2 29   BUN 22*   CREA 0.86   GLU 73   CA 8.5     Recent Labs     12/16/21  1409   ALT 17      TBILI 1.3*   TP 5.3*   ALB 2.1*   GLOB 3.2     No results for input(s): INR, PTP, APTT, INREXT in the last 72 hours. No results for input(s): FE, TIBC, PSAT, FERR in the last 72 hours. No results for input(s): PH, PCO2, PO2 in the last 72 hours. No results for input(s): CPK, CKMB in the last 72 hours.     No lab exists for component: TROPONINI  Lab Results   Component Value Date/Time    Glucose (POC) 57 (L) 12/17/2021 11:18 AM    Glucose (POC) 44 (LL) 12/17/2021 08:58 AM    Glucose (POC) 125 (H) 03/20/2019 04:11 PM    Glucose (POC) 161 (H) 03/20/2019 12:04 PM    Glucose (POC) 81 03/20/2019 07:20 AM       Total Time: 30 minutes    Signed:  Dot Parada MD  12/17/2021  11:21 AM

## 2021-12-17 NOTE — PROGRESS NOTES
CM called patients sonYolette @ [phone] to inform him that patient is on room air and does not qualify for home oxygen. Awais Clause verbalized understanding and stated that he will be up here shortly to pick his mother up and transport home. Discharge plan of care/case management plan validated with provider discharge order.

## 2021-12-17 NOTE — ED PROVIDER NOTES
EMERGENCY DEPARTMENT HISTORY AND PHYSICAL EXAM      Date: 12/16/2021  Patient Name: Jose Cortes    History of Presenting Illness     Chief Complaint   Patient presents with    Shortness of Breath    Fever       History Provided By: Patient and Patient's Son    HPI: Jose Cortes, 80 y.o. female with a past medical history significant hypertension and stroke presents to the ED with cc of shortness of breath and lethargy since this morning. According to patient's son at bedside, patient is not her typical self this morning. She was recently admitted at an outside facility for symptomatic bradycardia where she underwent pacemaker placement. There are no other complaints, changes, or physical findings at this time. PCP: Leo Monterroso MD    No current facility-administered medications on file prior to encounter. Current Outpatient Medications on File Prior to Encounter   Medication Sig Dispense Refill    furosemide (LASIX) 40 mg tablet 1 tab PO daily 30 Tab 0    metoprolol tartrate (LOPRESSOR) 25 mg tablet Take 25 mg by mouth two (2) times a day.  oxybutynin chloride XL (DITROPAN XL) 10 mg CR tablet Take 10 mg by mouth nightly.  apixaban (ELIQUIS) 2.5 mg tablet Take 2.5 mg by mouth two (2) times a day.  senna (SENNA) 8.6 mg tablet Take 1 Tab by mouth nightly.  polyethylene glycol (MIRALAX) 17 gram packet Take 17 g by mouth daily.  lovastatin (MEVACOR) 20 mg tablet Take 20 mg by mouth daily.          Past History     Past Medical History:  Past Medical History:   Diagnosis Date    Chronic kidney disease     acute tubual necrosis    Clostridium difficile infection     Elevated troponin 9/10/2014    Hypertension     Skin cancer     Stroke [facility])     2001 left residual       Past Surgical History:  Past Surgical History:   Procedure Laterality Date    HX APPENDECTOMY  2/2008    HX HEENT      cataract    HX ORTHOPAEDIC  04/2010    left total hip - hip fx, left knee surgery Family History:  History reviewed. No pertinent family history. Social History:  Social History     Tobacco Use    Smoking status: Never Smoker    Smokeless tobacco: Not on file   Substance Use Topics    Alcohol use: No    Drug use: No       Allergies: Allergies   Allergen Reactions    Neosporin [Benzalkonium Chloride] Rash         Review of Systems     Review of Systems   Unable to perform ROS: Patient nonverbal       Physical Exam     Physical Exam  Constitutional:       Appearance: Normal appearance. She is ill-appearing. HENT:      Head: Normocephalic and atraumatic. Right Ear: External ear normal.      Left Ear: External ear normal.      Nose: Nose normal.      Mouth/Throat:      Mouth: Mucous membranes are moist.   Eyes:      Extraocular Movements: Extraocular movements intact. Conjunctiva/sclera: Conjunctivae normal.      Pupils: Pupils are equal, round, and reactive to light. Cardiovascular:      Rate and Rhythm: Normal rate and regular rhythm. Pulses: Normal pulses. Heart sounds: Normal heart sounds. Pulmonary:      Effort: Pulmonary effort is normal.      Breath sounds: Normal breath sounds. Abdominal:      General: Abdomen is flat. There is no distension. Palpations: Abdomen is soft. Tenderness: There is no abdominal tenderness. Musculoskeletal:         General: Normal range of motion. Cervical back: Normal range of motion. Skin:     Capillary Refill: Capillary refill takes less than 2 seconds. Comments: Multiple areas bandaged on the right heel, right upper extremity, right back   Neurological:      Mental Status: She is oriented to person, place, and time. Mental status is at baseline.    Psychiatric:         Mood and Affect: Mood normal.         Behavior: Behavior normal.         Lab and Diagnostic Study Results     Labs -     Recent Results (from the past 12 hour(s))   SARS-COV-2    Collection Time: 12/16/21  2:00 PM   Result Value Ref Range    SARS-CoV-2 Please find results under separate order     COVID-19 RAPID TEST    Collection Time: 12/16/21  2:00 PM   Result Value Ref Range    Specimen source Nasopharyngeal      COVID-19 rapid test Not Detected Not Detected     CBC WITH AUTOMATED DIFF    Collection Time: 12/16/21  2:09 PM   Result Value Ref Range    WBC 7.1 3.6 - 11.0 K/uL    RBC 3.88 3.80 - 5.20 M/uL    HGB 11.4 (L) 11.5 - 16.0 g/dL    HCT 36.0 35.0 - 47.0 %    MCV 92.8 80.0 - 99.0 FL    MCH 29.4 26.0 - 34.0 PG    MCHC 31.7 30.0 - 36.5 g/dL    RDW 18.5 (H) 11.5 - 14.5 %    PLATELET 388 159 - 062 K/uL    MPV 9.4 8.9 - 12.9 FL    NRBC 0.0 0.0  WBC    ABSOLUTE NRBC 0.00 0.00 - 0.01 K/uL    NEUTROPHILS 71 32 - 75 %    LYMPHOCYTES 16 12 - 49 %    MONOCYTES 11 5 - 13 %    EOSINOPHILS 1 0 - 7 %    BASOPHILS 0 0 - 1 %    IMMATURE GRANULOCYTES 1 (H) 0 - 0.5 %    ABS. NEUTROPHILS 5.0 1.8 - 8.0 K/UL    ABS. LYMPHOCYTES 1.1 0.8 - 3.5 K/UL    ABS. MONOCYTES 0.8 0.0 - 1.0 K/UL    ABS. EOSINOPHILS 0.1 0.0 - 0.4 K/UL    ABS. BASOPHILS 0.0 0.0 - 0.1 K/UL    ABS. IMM. GRANS. 0.1 (H) 0.00 - 0.04 K/UL    DF AUTOMATED     METABOLIC PANEL, COMPREHENSIVE    Collection Time: 12/16/21  2:09 PM   Result Value Ref Range    Sodium 141 136 - 145 mmol/L    Potassium 3.6 3.5 - 5.1 mmol/L    Chloride 107 97 - 108 mmol/L    CO2 29 21 - 32 mmol/L    Anion gap 5 5 - 15 mmol/L    Glucose 73 65 - 100 mg/dL    BUN 22 (H) 6 - 20 mg/dL    Creatinine 0.86 0.55 - 1.02 mg/dL    BUN/Creatinine ratio 26 (H) 12 - 20      GFR est AA >60 >60 ml/min/1.73m2    GFR est non-AA >60 >60 ml/min/1.73m2    Calcium 8.5 8.5 - 10.1 mg/dL    Bilirubin, total 1.3 (H) 0.2 - 1.0 mg/dL    AST (SGOT) 35 15 - 37 U/L    ALT (SGPT) 17 12 - 78 U/L    Alk.  phosphatase 112 45 - 117 U/L    Protein, total 5.3 (L) 6.4 - 8.2 g/dL    Albumin 2.1 (L) 3.5 - 5.0 g/dL    Globulin 3.2 2.0 - 4.0 g/dL    A-G Ratio 0.7 (L) 1.1 - 2.2     LACTIC ACID    Collection Time: 12/16/21  2:09 PM   Result Value Ref Range Lactic acid 1.5 0.4 - 2.0 mmol/L   TROPONIN-HIGH SENSITIVITY    Collection Time: 12/16/21  2:09 PM   Result Value Ref Range    Troponin-High Sensitivity 147 (HH) 0 - 51 ng/L   NT-PRO BNP    Collection Time: 12/16/21  2:09 PM   Result Value Ref Range    NT pro-BNP 6,806 (H) <450 pg/mL       Radiologic Studies -   @lastxrresult@  CT Results  (Last 48 hours)    None        CXR Results  (Last 48 hours)               12/16/21 1224  XR CHEST SNGL V Final result    Impression:  Limited. 1. Bilateral pulmonary airspace atelectasis, edema, or pneumonia, and pleural   effusions. 2. Cardiomegaly, with cardiac device. Narrative:  Hypoxia. No comparisons are available under this PACs record number at this time. FINDINGS: Single frontal view of the chest. Left chest wall cardiac device,   distal lead not imaged; the inferior chest not completely imaged. Cardiomegaly. Calcific atherosclerosis aortic arch. Hypoinflation. Small bilateral pleural   effusions and adjacent airspace disease. No pneumothorax. Severe degenerative   changes right shoulder. Medical Decision Making   - I am the first provider for this patient. - I reviewed the vital signs, available nursing notes, past medical history, past surgical history, family history and social history. - Initial assessment performed. The patients presenting problems have been discussed, and they are in agreement with the care plan formulated and outlined with them. I have encouraged them to ask questions as they arise throughout their visit. Vital Signs-Reviewed the patient's vital signs.   Patient Vitals for the past 12 hrs:   Temp Pulse Resp BP SpO2   12/16/21 1700 98.5 °F (36.9 °C) 60 20 (!) 151/85 98 %   12/16/21 1203 99.2 °F (37.3 °C) 62 20 111/64 92 %       Records Reviewed: Nursing Notes    The patient presents with decreased responsiveness with a differential diagnosis of infection, electrolyte abnormality, ACS, CVA/TIA      ED Course:          Provider Notes (Medical Decision Making):   Patient is a 22-year-old female with past medical history significant for CVA who presents for evaluation of increased lethargy and shortness of breath. On physical examination, patient is resting comfortably in bed. She is unable to provide any history due to her baseline mentation. History obtained from patient's son at bedside. CBC, CMP, troponin, chest x-ray, BNP demonstrating bilateral pleural effusions with BNP greater than 6000 and mildly elevated high-sensitivity troponin. Initial SPO2 noted to be 92% on room air. Patient admitted to the hospital by Dr. Tereza Magdaleno for further evaluation and treatment. MDM       Procedures   Medical Decision Makingedical Decision Making  Performed by: Irma Barnes DO  Procedures       Disposition   Disposition: Admitted to Observation Unit the case was discussed with the admitting physician Tereza Magdaleno    Admitted      Diagnosis     Clinical Impression:   1. SOB (shortness of breath)    2. Pleural effusion        Attestations:    Irma Barnes DO    Please note that this dictation was completed with [employer], the computer voice recognition software. Quite often unanticipated grammatical, syntax, homophones, and other interpretive errors are inadvertently transcribed by the computer software. Please disregard these errors. Please excuse any errors that have escaped final proofreading. Thank you.

## 2021-12-17 NOTE — PROGRESS NOTES
Skin assessment revealed as follows:   Right hip is red and nonblanchable, back of right shoulder is red (bony prominence), left hip linear wound with minimal drainage (cultured), scattered scabs to upper and lower extremities, right labia is swollen, sacrum has tunneling wound and foul odor (cultured), right forearm has a small tear, both hands are bruised,left foot is red and blanchable at lateral bony prominence at base of toes, right great toe has 2 bloody scabbed areas, right heel has a scabbed area,right shin tear, right upper chest is scabbing. The left arm is retracted.

## 2021-12-17 NOTE — ED NOTES
Bedside and Verbal shift change report given to Asia Humphries RN (oncoming nurse) by Vandana Lopez RN (offgoing nurse). Report included the following information SBAR & care transferred.

## 2021-12-17 NOTE — PROGRESS NOTES
Medicare Outpatient Observation Notice (MOON) provided to patient/representative with verbal explanation of the notice. Time allotted for questions regarding the notice. Patient /representative provided a completed copy of the MOON notice. CM called patients son, Fernando Lynn @ [phone] to inform of MOON/OBS notice and copy provided at patients bedside. Son stated that he will be here at 1pm to transport patient home. DC: home, with family care. CM called Dr. Minerva Polanco to notify that patient is on 2L oxygen and does not have oxygen at home. Orders received for RT eval. CM entered order. CM will follow-up to see if patient needs O2 at home.

## 2021-12-17 NOTE — PROGRESS NOTES
Patient discharge paperwork will be given to patient's son when he comes to pick patient up. Patient will be going home in his care. IV and telemetry monitor will come off before patient leaves. 1400: son came to  patient.

## 2021-12-26 NOTE — ED PROVIDER NOTES
EMERGENCY DEPARTMENT HISTORY AND PHYSICAL EXAM      Date: 12/25/2021  Patient Name: Mickey Lopez    History of Presenting Illness     No chief complaint on file. History Provided By: Patient's Son    HPI: Mickey Lopez, 80 y.o. female with a past medical history significant stroke presents to the ED with cc of having a fever this morning. He says that she is got a decubitus ulcer on her sacrum that I think is probably the source. He denies any nausea, vomiting, chest pain, shortness of breath, rash, diarrhea, headache. She did have a pacemaker placed 2 weeks ago. She is had no other symptoms. She said a history of stroke and is at her baseline neurologically. There are no other complaints, changes, or physical findings at this time. PCP: Sarah Hope MD    No current facility-administered medications on file prior to encounter. Current Outpatient Medications on File Prior to Encounter   Medication Sig Dispense Refill    furosemide (LASIX) 40 mg tablet 1 tab PO daily 30 Tab 0    metoprolol tartrate (LOPRESSOR) 25 mg tablet Take 25 mg by mouth two (2) times a day.  oxybutynin chloride XL (DITROPAN XL) 10 mg CR tablet Take 10 mg by mouth nightly.  apixaban (ELIQUIS) 2.5 mg tablet Take 2.5 mg by mouth two (2) times a day.  senna (SENNA) 8.6 mg tablet Take 1 Tab by mouth nightly.  polyethylene glycol (MIRALAX) 17 gram packet Take 17 g by mouth daily.  lovastatin (MEVACOR) 20 mg tablet Take 20 mg by mouth daily.          Past History     Past Medical History:  Past Medical History:   Diagnosis Date    Chronic kidney disease     acute tubual necrosis    Clostridium difficile infection     Elevated troponin 9/10/2014    Hypertension     Skin cancer     Stroke [facility])     2001 left residual       Past Surgical History:  Past Surgical History:   Procedure Laterality Date    HX APPENDECTOMY  2/2008    HX HEENT      cataract    HX ORTHOPAEDIC  04/2010    left total hip - hip fx, left knee surgery       Family History:  No family history on file. Social History:  Social History     Tobacco Use    Smoking status: Never Smoker    Smokeless tobacco: Not on file   Substance Use Topics    Alcohol use: No    Drug use: No       Allergies: Allergies   Allergen Reactions    Neosporin [Benzalkonium Chloride] Rash         Review of Systems     Review of Systems   Unable to perform ROS: Patient nonverbal       Physical Exam     Physical Exam  Vitals and nursing note reviewed. Constitutional:       General: She is not in acute distress. Appearance: She is well-developed. She is not ill-appearing, toxic-appearing or diaphoretic. HENT:      Head: Normocephalic and atraumatic. Nose: Nose normal.      Mouth/Throat:      Mouth: Mucous membranes are moist.      Pharynx: Oropharynx is clear. No oropharyngeal exudate. Eyes:      General:         Right eye: No discharge. Left eye: No discharge. Conjunctiva/sclera: Conjunctivae normal.      Pupils: Pupils are equal, round, and reactive to light. Cardiovascular:      Rate and Rhythm: Normal rate and regular rhythm. Chest Wall: PMI is not displaced. No thrill. Heart sounds: Normal heart sounds. No murmur heard. No friction rub. No gallop. Comments: Pacer left upper chest  Pulmonary:      Effort: Pulmonary effort is normal. No respiratory distress. Breath sounds: Normal breath sounds. No wheezing or rales. Chest:      Chest wall: No tenderness. Abdominal:      General: Bowel sounds are normal. There is no distension. Palpations: Abdomen is soft. There is no mass. Tenderness: There is no abdominal tenderness. There is no guarding or rebound. Musculoskeletal:      Cervical back: Normal range of motion and neck supple. Comments: Unable to assess   Lymphadenopathy:      Cervical: No cervical adenopathy. Skin:     General: Skin is warm and dry.       Capillary Refill: Capillary refill takes less than 2 seconds. Findings: Erythema present. No rash. Comments: Stage III sacral decubitus ulcer multiple other well-healing ulcers no purulent drainage minimal erythema. Neurological:      Mental Status: She is alert. Cranial Nerves: No cranial nerve deficit. Coordination: Coordination normal.      Comments: Unable to assess   Psychiatric:      Comments: Unable to assess         Lab and Diagnostic Study Results     Labs -     Recent Results (from the past 12 hour(s))   URINALYSIS W/MICROSCOPIC    Collection Time: 12/25/21  8:45 PM   Result Value Ref Range    Color Yellow/Straw      Appearance Clear Clear      Specific gravity 1.011 1.003 - 1.030      pH (UA) 7.0 5.0 - 8.0      Protein Negative Negative mg/dL    Glucose Negative Negative mg/dL    Ketone Negative Negative mg/dL    Bilirubin Negative Negative      Blood Negative Negative      Urobilinogen 2.0 (H) 0.1 - 1.0 EU/dL    Nitrites Negative Negative      Leukocyte Esterase Negative Negative      WBC 0-4 0 - 4 /hpf    RBC 0-5 0 - 5 /hpf    Bacteria Negative Negative /hpf    Mucus Trace (A) Negative /lpf    Hyaline cast 5-10 0 - 5 /lpf       Radiologic Studies -   @lastxrresult@  CT Results  (Last 48 hours)    None        CXR Results  (Last 48 hours)               12/25/21 2102  XR CHEST PORT Final result    Impression:      Single portable AP view compared to December 16, 2021. Suboptimal inspiratory   film compromises visualization of the lower lung fields. The face obscures   detail over the mediastinum and upper half of the right lung. Left cardiac   pacemaker unchanged. Cardiac and diaphragmatic margins obscured. Presumed   cardiomegaly. Calcified aorta. No mediastinal shift. Hazy and patchy bibasal   airspace disease may be atelectasis, infiltrate, or edema. Small bilateral   effusions. No obvious pneumothorax.        Narrative:  Chest.                   Medical Decision Making   - I am the first provider for this patient. - I reviewed the vital signs, available nursing notes, past medical history, past surgical history, family history and social history. - Initial assessment performed. The patients presenting problems have been discussed, and they are in agreement with the care plan formulated and outlined with them. I have encouraged them to ask questions as they arise throughout their visit. Vital Signs-Reviewed the patient's vital signs. Patient Vitals for the past 12 hrs:   Temp Pulse Resp BP SpO2   12/25/21 1941 97.9 °F (36.6 °C) 60 20 102/67 95 %       The patient presents with fever with a differential diagnosis of cellulitis, decubitus ulcer, pneumonia, UTI. Will assess with UA and chest x-ray and will reevaluate. 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 Believe that she is got some superficial cellulitis will treat with Keflex as an outpatient dose of Rocephin in the emergency room  MDM       Procedures   Medical Decision Makingedical Decision Making  Performed by: Krishna Hernandez MD  PROCEDURES:  Procedures       Disposition   Disposition: Condition stable    Discharged    DISCHARGE PLAN:  1. Current Discharge Medication List      CONTINUE these medications which have NOT CHANGED    Details   furosemide (LASIX) 40 mg tablet 1 tab PO daily  Qty: 30 Tab, Refills: 0      metoprolol tartrate (LOPRESSOR) 25 mg tablet Take 25 mg by mouth two (2) times a day. oxybutynin chloride XL (DITROPAN XL) 10 mg CR tablet Take 10 mg by mouth nightly. apixaban (ELIQUIS) 2.5 mg tablet Take 2.5 mg by mouth two (2) times a day. senna (SENNA) 8.6 mg tablet Take 1 Tab by mouth nightly. polyethylene glycol (MIRALAX) 17 gram packet Take 17 g by mouth daily. lovastatin (MEVACOR) 20 mg tablet Take 20 mg by mouth daily. 2.   Follow-up Information    None       3.   Return to ED if worse   4. Current Discharge Medication List            Diagnosis     Clinical Impression:   1. Cellulitis, unspecified cellulitis site        Attestations:    Sorin Reich MD    Please note that this dictation was completed with [employer]Room, the computer voice recognition software. Quite often unanticipated grammatical, syntax, homophones, and other interpretive errors are inadvertently transcribed by the computer software. Please disregard these errors. Please excuse any errors that have escaped final proofreading. Thank you.

## 2021-12-26 NOTE — ED TRIAGE NOTES
Fever of 102 this morning, today wound coccyx site was bleeding and appeared worse today, pt lives with family, states she did eat today, no cough reported, no vomiting or diarrhea, mental status unchanged per family

## 2021-12-29 NOTE — Clinical Note
Status[de-identified] INPATIENT [101]   Type of Bed: Medical [8]   Cardiac Monitoring Required?: No   Inpatient Hospitalization Certified Necessary for the Following Reasons: 3.  Patient receiving treatment that can only be provided in an inpatient setting (further clarification in H&P documentation)   Admitting Diagnosis: Pressure ulcer of sacral region, unstageable [facility]) [[id number]]   Admitting Physician: Francisco Chew [[id number]]   Attending Physician: Francisco Chew [[id number]]   Estimated Length of Stay: 2 Midnights   Discharge Plan[de-identified] [address] [facility]

## 2021-12-30 PROBLEM — L89.150 PRESSURE INJURY OF SACRAL REGION, UNSTAGEABLE (HCC): Status: ACTIVE | Noted: 2021-01-01

## 2021-12-30 PROBLEM — L89.150 PRESSURE ULCER OF SACRAL REGION, UNSTAGEABLE (HCC): Status: ACTIVE | Noted: 2021-01-01

## 2021-12-30 NOTE — ROUTINE PROCESS
TRANSFER - OUT REPORT:    Verbal report given to Esperanza Cortez RN (name) on Chichi Traylor  being transferred to [facility] (unit) for routine progression of care       Report consisted of patients Situation, Background, Assessment and   Recommendations(SBAR). Information from the following report(s) SBAR, Kardex, ED Summary, [facility] and Recent Results was reviewed with the receiving nurse. Lines:   Peripheral IV 12/30/21 Distal;Posterior;Right Forearm (Active)       Peripheral IV 34/46/63 Left Cephalic (Active)        Opportunity for questions and clarification was provided.       Patient transported with:   Monitor  Tech, pt chart, personal belongings

## 2021-12-30 NOTE — PROGRESS NOTES
12/30/21. PCP Is Dr. Anika Wray. - seen a few mos ago. D/C Plan is home with son Oniel Orellana @ [phone])  & he will transport pt home. Family takes care of pt - pt has hospital bed & w/c.

## 2021-12-30 NOTE — PROGRESS NOTES
Consult for Vancomycin Dosing by Pharmacy by Aury Rojas provided for this 80y.o. year old female , for indication of SSTI. Day of Therapy: 1    Goal of Level(s): (AUC = 400-600) or (trough = 10-15mcg/dL)     Other Current Antibiotics: Zosyn    Significant Cultures:       Date                             Culture                                       Organism      12/30/21                               (Ex: Blood x1)                            Pending/NEG    All Micro Results       Procedure Component Value Units Date/Time    COVID-19 RAPID TEST [[id number]] Collected: 12/30/21 6441    Order Status: Completed Specimen: Nasopharyngeal Updated: 12/30/21 0653     Specimen source       Please find results under separate order           COVID-19 rapid test Not Detected        Comment: Rapid Abbott ID Now   Rapid NAAT:  The specimen is NEGATIVE for SARS-CoV-2, the novel coronavirus associated with COVID-19. Negative results should be treated as presumptive and, if inconsistent with clinical signs and symptoms or necessary for patient management, should be tested with an alternative molecular assay. Negative results do not preclude SARS-CoV-2 infection and should not be used as the sole basis for patient management decisions. This test has been authorized by the FDA under   an Emergency Use Authorization (EUA) for use by authorized laboratories.  Fact sheet for Healthcare Providers: ConventionUpdate.co.nz Fact sheet for Patients: ConventionUpdate.co.nz   Methodology: Isothermal Nucleic Acid Amplification         CULTURE, Mabeline [place] STAIN [[id number]]     Order Status: Sent Specimen: Sacral Wound             Serum Creatinine Creatinine   Date/Time Value Ref Range Status   12/30/2021 12:28 AM 1.07 (H) 0.55 - 1.02 mg/dL Final   12/16/2021 02:09 PM 0.86 0.55 - 1.02 mg/dL Final   03/21/2019 01:41 AM 0.91 0.55 - 1.02 MG/DL Final      Creatinine Clearance Estimated Creatinine Clearance: 19.4 mL/min (A) (based on SCr of 1.07 mg/dL (H)). BUN Lab Results   Component Value Date/Time    BUN 39 (H) 12/30/2021 12:28 AM      WBC Lab Results   Component Value Date/Time    WBC 10.7 12/30/2021 12:28 AM      Temp Temp Readings from Last 1 Encounters:   12/30/21 (!) 95.8 °F (35.4 °C)      C-Reactive Protein No results found for: CRP   Procalcitonin No results found for: PCT         Ht Readings from Last 1 Encounters:   12/29/21 147.3 cm (58\")        Wt Readings from Last 1 Encounters:   12/30/21 42.9 kg (94 lb 8 oz)     Ideal body weight: 47.1 kg (103 lb 14.5 oz)         New Regimen:   Pt is 79 YO Female with CRCL at 19.4 ml/min. Started pulse dosing. Gave 750 mg IV today at 1000. And a random level has been scheduled for 12/31/21 at  0600 am.    Pharmacy to follow daily and will make changes to dose and/or frequency based on clinical status.      _________________________________     Pharmacist Cleo Laguerre PHARMD

## 2021-12-30 NOTE — ED NOTES
Writer contacted Dr. Fatmata Angulo about pt's temp. Pt on bear hugger and has to go to the ICU while on it. Order to change to reflect new level of care.

## 2021-12-30 NOTE — ACP (ADVANCE CARE PLANNING)
Advance Care Planning   Healthcare Decision Maker:       Primary Decision Maker: Ashley Gutierrez [facility] [phone]

## 2021-12-30 NOTE — ED PROVIDER NOTES
EMERGENCY DEPARTMENT HISTORY AND PHYSICAL EXAM      Date: 12/29/2021  Patient Name: Jeana Kuo    History of Presenting Illness     Chief Complaint   Patient presents with    Fever       History Provided By: Patient's Son    HPI: Jeana Kuo, 80 y.o. female with a past medical history significant for  stroke presents to the ED with cc of having a fever. Son states he was seen here for the same on Gonz[place] Day and started on Keflex for suspected infection secondary to sacral decubitus ulcer. Son reports persistent fevers of 102103 despite taking Keflex x4 days. He states today patient has been much more lethargic with decreased p.o. intake. He states her sacral ulcer seems to be getting worse. Son denies any vomiting, diarrhea, shortness of breath and states mentation is at baseline. Of note, she did have a pacemaker placed 2 weeks ago. There are no other complaints, changes, or physical findings at this time. PCP: Tahir Clinton MD    No current facility-administered medications on file prior to encounter. Current Outpatient Medications on File Prior to Encounter   Medication Sig Dispense Refill    cephALEXin (Keflex) 500 mg capsule Take 1 Capsule by mouth four (4) times daily for 7 days. 28 Capsule 3    furosemide (LASIX) 40 mg tablet 1 tab PO daily 30 Tab 0    metoprolol tartrate (LOPRESSOR) 25 mg tablet Take 25 mg by mouth two (2) times a day.  oxybutynin chloride XL (DITROPAN XL) 10 mg CR tablet Take 10 mg by mouth nightly.  apixaban (ELIQUIS) 2.5 mg tablet Take 2.5 mg by mouth two (2) times a day.  senna (SENNA) 8.6 mg tablet Take 1 Tab by mouth nightly.  polyethylene glycol (MIRALAX) 17 gram packet Take 17 g by mouth daily.  lovastatin (MEVACOR) 20 mg tablet Take 20 mg by mouth daily.          Past History     Past Medical History:  Past Medical History:   Diagnosis Date    Chronic kidney disease     acute tubual necrosis    Clostridium difficile infection     Elevated troponin 9/10/2014    Hypertension     Skin cancer     Stroke [facility])     2001 left residual       Past Surgical History:  Past Surgical History:   Procedure Laterality Date    HX APPENDECTOMY  2/2008    HX HEENT      cataract    HX ORTHOPAEDIC  04/2010    left total hip - hip fx, left knee surgery       Family History:  Family History   Family history unknown: Yes       Social History:  Social History     Tobacco Use    Smoking status: Never Smoker    Smokeless tobacco: Never Used   Substance Use Topics    Alcohol use: Never    Drug use: Never       Allergies: Allergies   Allergen Reactions    Neosporin [Benzalkonium Chloride] Rash         Review of Systems     Review of Systems   Unable to perform ROS: Age       Physical Exam     Physical Exam  Vitals and nursing note reviewed. Constitutional:       General: She is not in acute distress. Appearance: She is underweight. She is ill-appearing. HENT:      Head: Normocephalic and atraumatic. Eyes:      Extraocular Movements: Extraocular movements intact. Conjunctiva/sclera: Conjunctivae normal.   Cardiovascular:      Rate and Rhythm: Normal rate and regular rhythm. Heart sounds: Normal heart sounds. Pulmonary:      Effort: Tachypnea and accessory muscle usage present. No respiratory distress. Breath sounds: Examination of the left-lower field reveals rales. Decreased breath sounds and rales present. No wheezing. Chest:      Comments: Left chest pacemaker insertion, incision with scabs, no erythema  Abdominal:      General: Bowel sounds are normal.      Palpations: Abdomen is soft. Tenderness: There is no abdominal tenderness. Musculoskeletal:         General: Normal range of motion. Right lower leg: No edema. Left lower leg: No edema. Skin:     General: Skin is warm and dry. Findings: Abrasion and wound present.       Comments: Scattered abrasions and skin tears bilateral upper extremities; stage III - IV sacral decubitus ulcer with surrounding erythema, no purulent drainage   Neurological:      General: No focal deficit present. Mental Status: Mental status is at baseline. She is lethargic. Lab and Diagnostic Study Results     Labs -     Recent Results (from the past 12 hour(s))   CBC WITH AUTOMATED DIFF    Collection Time: 12/30/21 12:28 AM   Result Value Ref Range    WBC 10.7 3.6 - 11.0 K/uL    RBC 4.63 3.80 - 5.20 M/uL    HGB 13.3 11.5 - 16.0 g/dL    HCT 43.5 35.0 - 47.0 %    MCV 94.0 80.0 - 99.0 FL    MCH 28.7 26.0 - 34.0 PG    MCHC 30.6 30.0 - 36.5 g/dL    RDW 18.3 (H) 11.5 - 14.5 %    PLATELET 011 (H) 173 - 400 K/uL    MPV 9.8 8.9 - 12.9 FL    NRBC 0.0 0.0  WBC    ABSOLUTE NRBC 0.00 0.00 - 0.01 K/uL    NEUTROPHILS 65 32 - 75 %    LYMPHOCYTES 23 12 - 49 %    MONOCYTES 8 5 - 13 %    EOSINOPHILS 2 0 - 7 %    BASOPHILS 1 0 - 1 %    IMMATURE GRANULOCYTES 1 (H) 0 - 0.5 %    ABS. NEUTROPHILS 7.0 1.8 - 8.0 K/UL    ABS. LYMPHOCYTES 2.5 0.8 - 3.5 K/UL    ABS. MONOCYTES 0.9 0.0 - 1.0 K/UL    ABS. EOSINOPHILS 0.2 0.0 - 0.4 K/UL    ABS. BASOPHILS 0.1 0.0 - 0.1 K/UL    ABS. IMM.  GRANS. 0.1 (H) 0.00 - 0.04 K/UL    DF AUTOMATED     METABOLIC PANEL, BASIC    Collection Time: 12/30/21 12:28 AM   Result Value Ref Range    Sodium 152 (H) 136 - 145 mmol/L    Potassium 3.7 3.5 - 5.1 mmol/L    Chloride 118 (H) 97 - 108 mmol/L    CO2 31 21 - 32 mmol/L    Anion gap 3 (L) 5 - 15 mmol/L    Glucose 120 (H) 65 - 100 mg/dL    BUN 39 (H) 6 - 20 mg/dL    Creatinine 1.07 (H) 0.55 - 1.02 mg/dL    BUN/Creatinine ratio 36 (H) 12 - 20      GFR est AA 57 (L) >60 ml/min/1.73m2    GFR est non-AA 47 (L) >60 ml/min/1.73m2    Calcium 8.5 8.5 - 10.1 mg/dL   LACTIC ACID    Collection Time: 12/30/21 12:28 AM   Result Value Ref Range    Lactic acid 2.1 (HH) 0.4 - 2.0 mmol/L       Radiologic Studies -   @lastxrresult@  CT Results  (Last 48 hours)    None        CXR Results  (Last 48 hours)               12/30/21 0047  XR CHEST PORT Final result    Impression:      Single portable AP view compared to December 25, 2021. Intact left cardiac   pacemaker with one electrode unchanged. Stable moderate cardiomegaly. Calcified   aorta. No mediastinal widening or shift. Improved aeration at the bases. Small   pleural reactions. Negative for pulmonary edema or pneumothorax. No new   consolidation. Narrative:  Chest                   Medical Decision Making   - I am the first provider for this patient. - I reviewed the vital signs, available nursing notes, past medical history, past surgical history, family history and social history. - Initial assessment performed. The patients presenting problems have been discussed, and they are in agreement with the care plan formulated and outlined with them. I have encouraged them to ask questions as they arise throughout their visit. Vital Signs-Reviewed the patient's vital signs. Patient Vitals for the past 12 hrs:   Temp Pulse Resp BP SpO2   12/29/21 2236 98.3 °F (36.8 °C) 60 15 117/73 97 %       Records Reviewed: Nursing Notes and Old Medical Records    The patient presents with fever/lethargy with a differential diagnosis of sepsis, cellulitis, decubitus ulcer, pneumonia, UTI      ED Course:   Patient presents with increased lethargy, poor oral intake and continued fevers of 102-103 x 5 days. Son reports worsening sacral decubitus ulcer despite taking Keflex. Patient is lethargic and ill-appearing upon arrival, vital signs stableafebrile, no hypoxia. Labs obtained-no leukocytosis, lactic acid 2.1, creatinine 1.07, BUN 39, NA+ 152, urinalysis pending. CXR without acute findings. Son who is a retired ER physician states he makes patient's medical decisions. Discussed code status, he states patient is a \"partial code\", specifically no chest compressions but would like everything else to be done including intubation.   Will admit to hospitalist for further work up given lethargy and worsening decubitus ulcer       Provider Notes (Medical Decision Making):     MDM  Number of Diagnoses or Management Options  Fever, unspecified fever cause: new, needed workup  Pressure injury of skin of sacral region, unspecified injury stage: established, worsening     Amount and/or Complexity of Data Reviewed  Clinical lab tests: ordered and reviewed  Tests in the radiology section of CPT®: ordered and reviewed  Obtain history from someone other than the patient: yes  Review and summarize past medical records: yes  Discuss the patient with other providers: yes    Risk of Complications, Morbidity, and/or Mortality  Presenting problems: moderate  Diagnostic procedures: moderate  Management options: moderate    Patient Progress  Patient progress: stable         Disposition   Disposition: Admitted to Floor Medical Floor the case was discussed with the admitting physician shawna        Diagnosis     Clinical Impression:   1. Pressure injury of skin of sacral region, unspecified injury stage    2. Fever, unspecified fever cause        Attestations:    Silvano Curling, NP    Please note that this dictation was completed with [employer], the computer voice recognition software. Quite often unanticipated grammatical, syntax, homophones, and other interpretive errors are inadvertently transcribed by the computer software. Please disregard these errors. Please excuse any errors that have escaped final proofreading. Thank you.

## 2021-12-30 NOTE — CONSULTS
History and Physical    Chief complaints: Sacral wounds   History of Presenting Illness: Kris Rose is a 80 y.o. very pleasant woman comes to the hospital with dehydration with generalized weakness with a sacral wound. I was consulted for evaluation of sacral wound. Patient is noncoherent. Patient is not conversant. Patient examined at bedside. Chart reviewed. Past Medical History:   Diagnosis Date    Chronic kidney disease     acute tubual necrosis    Clostridium difficile infection     Elevated troponin 9/10/2014    Hypertension     Skin cancer     Stroke [facility])     2001 left residual      Past Surgical History:   Procedure Laterality Date    HX APPENDECTOMY  2/2008    HX HEENT      cataract    HX ORTHOPAEDIC  04/2010    left total hip - hip fx, left knee surgery     Family History   Family history unknown: Yes      Social History     Tobacco Use    Smoking status: Never Smoker    Smokeless tobacco: Never Used   Substance Use Topics    Alcohol use: Never       Prior to Admission medications    Medication Sig Start Date End Date Taking? Authorizing Provider   cephALEXin (Keflex) 500 mg capsule Take 1 Capsule by mouth four (4) times daily for 7 days. 12/26/21 1/2/22  Joe Temple MD   furosemide (LASIX) 40 mg tablet 1 tab PO daily 3/21/19   Sage Kaba MD   metoprolol tartrate (LOPRESSOR) 25 mg tablet Take 25 mg by mouth two (2) times a day. Provider, Historical   oxybutynin chloride XL (DITROPAN XL) 10 mg CR tablet Take 10 mg by mouth nightly. Provider, Historical   apixaban (ELIQUIS) 2.5 mg tablet Take 2.5 mg by mouth two (2) times a day. Provider, Historical   senna (SENNA) 8.6 mg tablet Take 1 Tab by mouth nightly. Provider, Historical   polyethylene glycol (MIRALAX) 17 gram packet Take 17 g by mouth daily. Provider, Historical   lovastatin (MEVACOR) 20 mg tablet Take 20 mg by mouth daily.     Other, MD Jose Alberto     Allergies   Allergen Reactions    Neosporin [Benzalkonium Chloride] Rash        Review of Systems:  Pertinent review of systems discussed in HPI, and rest of organ systems personally reviewed and they are negative. Objective:   Vital signs reviewed:      Visit Vitals  /87   Pulse 80   Temp (!) 95.8 °F (35.4 °C)   Resp 18   Ht 4' 10\" (1.473 m)   Wt 94 lb 8 oz (42.9 kg)   SpO2 100%   BMI 19.75 kg/m²       Physical Exam:   General appearance:   Patient is awake not in particular distress. Head and neck atraumatic normocephalic. ENT shows normal oral mucosa, no jaundice no hoarse voice. Eyes: Pupil equal gaze appropriate. Cardiac system regular rate rhythm. Pulmonary: No audible wheeze. Chest wall: Chest wall excursion normal with respiration cycle, there is no deformity or chest trauma. Abdomen: Soft not tender or distended, bowel sounds active. There is no obvious palpable mass, or hernia. Neurologic: Nonfocal.  Cranial nerves intact, no new focal findings. Musculoskeletal system: Motor function normal limits, motor function 5 out of 5, range of motion normal in all 4 extremity  Skin: Warm and moist.  Hematologic system: No obvious bruising. Psychosocial: Appropriate and cooperative. Vascular examination: Lower and upper extremities warm to touch, no signs of ischemia or cyanosis. Data Review: Labs are reviewed.  Discussed  Recent Results (from the past 24 hour(s))   CBC WITH AUTOMATED DIFF    Collection Time: 12/30/21 12:28 AM   Result Value Ref Range    WBC 10.7 3.6 - 11.0 K/uL    RBC 4.63 3.80 - 5.20 M/uL    HGB 13.3 11.5 - 16.0 g/dL    HCT 43.5 35.0 - 47.0 %    MCV 94.0 80.0 - 99.0 FL    MCH 28.7 26.0 - 34.0 PG    MCHC 30.6 30.0 - 36.5 g/dL    RDW 18.3 (H) 11.5 - 14.5 %    PLATELET 874 (H) 239 - 400 K/uL    MPV 9.8 8.9 - 12.9 FL    NRBC 0.0 0.0  WBC    ABSOLUTE NRBC 0.00 0.00 - 0.01 K/uL    NEUTROPHILS 65 32 - 75 %    LYMPHOCYTES 23 12 - 49 %    MONOCYTES 8 5 - 13 %    EOSINOPHILS 2 0 - 7 %    BASOPHILS 1 0 - 1 % IMMATURE GRANULOCYTES 1 (H) 0 - 0.5 %    ABS. NEUTROPHILS 7.0 1.8 - 8.0 K/UL    ABS. LYMPHOCYTES 2.5 0.8 - 3.5 K/UL    ABS. MONOCYTES 0.9 0.0 - 1.0 K/UL    ABS. EOSINOPHILS 0.2 0.0 - 0.4 K/UL    ABS. BASOPHILS 0.1 0.0 - 0.1 K/UL    ABS. IMM. GRANS. 0.1 (H) 0.00 - 0.04 K/UL    DF AUTOMATED     METABOLIC PANEL, BASIC    Collection Time: 12/30/21 12:28 AM   Result Value Ref Range    Sodium 152 (H) 136 - 145 mmol/L    Potassium 3.7 3.5 - 5.1 mmol/L    Chloride 118 (H) 97 - 108 mmol/L    CO2 31 21 - 32 mmol/L    Anion gap 3 (L) 5 - 15 mmol/L    Glucose 120 (H) 65 - 100 mg/dL    BUN 39 (H) 6 - 20 mg/dL    Creatinine 1.07 (H) 0.55 - 1.02 mg/dL    BUN/Creatinine ratio 36 (H) 12 - 20      GFR est AA 57 (L) >60 ml/min/1.73m2    GFR est non-AA 47 (L) >60 ml/min/1.73m2    Calcium 8.5 8.5 - 10.1 mg/dL   LACTIC ACID    Collection Time: 12/30/21 12:28 AM   Result Value Ref Range    Lactic acid 2.1 (HH) 0.4 - 2.0 mmol/L   COVID-19 RAPID TEST    Collection Time: 12/30/21  6:14 AM   Result Value Ref Range    Specimen source Please find results under separate order      COVID-19 rapid test Not Detected Not Detected               Imagings reviewed: discussed as below. No name on file. Assessment:     Active Problems:    Pressure injury of sacral region, unstageable ([address] (12/30/2021)      Pressure ulcer of sacral region, unstageable ([address] (12/30/2021)        Plan:     Sacral wound measures to be about 10 cm diameter with a partial-thickness skin necrosis. Looks like a stage II sacral decubitus ulcer. Fat tissue is not exposed. I think we should approach this problem with the local wound care with a Santyl ointments or Medihoney ointment with appropriate couple and the protective dressings. I do not think patient will need a surgical wound debridement at this time. I will continue monitor her progress.

## 2021-12-30 NOTE — ED NOTES
Assumed care of pt from [place], [address] [place] [address]. Pt observed to be laying on stretcher, Sophia hugger in place. Pt lethargic, alert, but non-verbal.    1135 Son at bedside.

## 2021-12-30 NOTE — ED TRIAGE NOTES
Son states she had a fever of 102 around 9pm states she has a decubitus ulcer and is on keflex.  He normally crushes her meds and give them in apple sauce but she is not eating and is more lethargic today

## 2021-12-30 NOTE — PROGRESS NOTES
Hospitalist Progress Note               Daily Progress Note: 12/30/2021      Subjective: The patient is seen for follow up. She has a 60-year-old female with history of atrial fibrillation, CVA with left hemiparesis, chronically bedridden, CAD, permanent pacemaker who was brought in by family after they noticed she was having fever at home. She has a known sacral pressure ulcer which according to the son has been getting worse. During her recent admission about 2 weeks ago the wound culture grew Enterococcus and MRSA. She was treated for aspiration pneumonia during that admission and discharged on Augmentin    In the ED patient was hypothermic with an initial temperature of 93. She was started on bear hugger. She was not started on antibiotics. ID and surgery were consulted    Labs showed a sodium of 152, creatinine 1.07, WBC 10.7  ------    Patient seen for follow-up. Patient is somewhat lethargic, nonverbal at present. Unable to get any history from her. Temperature is currently 95.8. She continues on a Sophia hugger which will necessitate ICU admission. Patient was seen by general surgery this morning who feels this likely represents a stage II ulcer.   Surgical debridement not felt to be indicated at this time    Problem List:  Problem List as of 12/30/2021 Date Reviewed: 12/30/2021          Codes Class Noted - Resolved    [facility] ICD-10-CM: L89.150  ICD-9-CM: 707.03, 707.25  12/30/2021 - Present        Pressure ulcer of sacral region, [facility]) ICD-10-CM: L89.150  ICD-9-CM: 707.03, 707.25  12/30/2021 - Present        Aspiration pneumonia ([facility] ICD-10-CM: J69.0  ICD-9-CM: 507.0  12/16/2021 - Present        Acute CHF (congestive heart failure) ([facility] ICD-10-CM: I50.9  ICD-9-CM: 428.0  3/19/2019 - Present        RESOLVED: SOB (shortness of breath) ICD-10-CM: R06.02  ICD-9-CM: 786.05  9/10/2014 - 9/12/2014        RESOLVED: Elevated troponin ICD-10-CM: R77.8  ICD-9-CM: 790.6  9/10/2014 - 2014              Medications reviewed  Current Facility-Administered Medications   Medication Dose Route Frequency    0.9% sodium chloride infusion  75 mL/hr IntraVENous CONTINUOUS    metoprolol tartrate (LOPRESSOR) tablet 25 mg  25 mg Oral BID    sodium chloride (NS) flush 5-40 mL  5-40 mL IntraVENous Q8H    sodium chloride (NS) flush 5-40 mL  5-40 mL IntraVENous PRN    acetaminophen (TYLENOL) tablet 650 mg  650 mg Oral Q6H PRN    Or    acetaminophen (TYLENOL) suppository 650 mg  650 mg Rectal Q6H PRN    ondansetron (ZOFRAN ODT) tablet 4 mg  4 mg Oral Q6H PRN    Or    ondansetron (ZOFRAN) injection 4 mg  4 mg IntraVENous Q6H PRN     Current Outpatient Medications   Medication Sig    cephALEXin (Keflex) 500 mg capsule Take 1 Capsule by mouth four (4) times daily for 7 days.  furosemide (LASIX) 40 mg tablet 1 tab PO daily    metoprolol tartrate (LOPRESSOR) 25 mg tablet Take 25 mg by mouth two (2) times a day.  oxybutynin chloride XL (DITROPAN XL) 10 mg CR tablet Take 10 mg by mouth nightly.  apixaban (ELIQUIS) 2.5 mg tablet Take 2.5 mg by mouth two (2) times a day.  senna (SENNA) 8.6 mg tablet Take 1 Tab by mouth nightly.  polyethylene glycol (MIRALAX) 17 gram packet Take 17 g by mouth daily.  lovastatin (MEVACOR) 20 mg tablet Take 20 mg by mouth daily. Review of Systems:   Patient unable to provide review of systems    Objective:   Physical Exam:     Visit Vitals  /87   Pulse 80   Temp (!) 95.8 °F (35.4 °C)   Resp 18   Ht 4' 10\" (1.473 m)   Wt 42.9 kg (94 lb 8 oz)   SpO2 100%   BMI 19.75 kg/m²      O2 Device: None    Temp (24hrs), Av.5 °F (35.8 °C), Min:93.7 °F (34.3 °C), Max:98.3 °F (36.8 °C)    No intake/output data recorded. No intake/output data recorded. General:   Lethargic, nonverbal.  Appears chronically ill, cachectic   Lungs:   Clear to auscultation bilaterally. Chest wall:  No tenderness or deformity. Heart:  Regular rate and rhythm, S1, S2 normal, no murmur, click, rub or gallop. Abdomen:   Soft, non-tender. Bowel sounds normal. No masses,  No organomegaly. Extremities: Extremities normal, atraumatic, no cyanosis or edema. Pulses: 2+ and symmetric all extremities. Skin: Skin color, texture, turgor normal. No rashes or lesions   Neurologic: CNII-XII intact. left hemiparesis         Data Review:       Recent Days:  Recent Labs     12/30/21  0028   WBC 10.7   HGB 13.3   HCT 43.5   *     Recent Labs     12/30/21  0028   *   K 3.7   *   CO2 31   *   BUN 39*   CREA 1.07*   CA 8.5   ALB 2.0*   TBILI 0.6   ALT 22     No results for input(s): PH, PCO2, PO2, HCO3, FIO2 in the last 72 hours. 24 Hour Results:  Recent Results (from the past 24 hour(s))   CBC WITH AUTOMATED DIFF    Collection Time: 12/30/21 12:28 AM   Result Value Ref Range    WBC 10.7 3.6 - 11.0 K/uL    RBC 4.63 3.80 - 5.20 M/uL    HGB 13.3 11.5 - 16.0 g/dL    HCT 43.5 35.0 - 47.0 %    MCV 94.0 80.0 - 99.0 FL    MCH 28.7 26.0 - 34.0 PG    MCHC 30.6 30.0 - 36.5 g/dL    RDW 18.3 (H) 11.5 - 14.5 %    PLATELET 289 (H) 035 - 400 K/uL    MPV 9.8 8.9 - 12.9 FL    NRBC 0.0 0.0  WBC    ABSOLUTE NRBC 0.00 0.00 - 0.01 K/uL    NEUTROPHILS 65 32 - 75 %    LYMPHOCYTES 23 12 - 49 %    MONOCYTES 8 5 - 13 %    EOSINOPHILS 2 0 - 7 %    BASOPHILS 1 0 - 1 %    IMMATURE GRANULOCYTES 1 (H) 0 - 0.5 %    ABS. NEUTROPHILS 7.0 1.8 - 8.0 K/UL    ABS. LYMPHOCYTES 2.5 0.8 - 3.5 K/UL    ABS. MONOCYTES 0.9 0.0 - 1.0 K/UL    ABS. EOSINOPHILS 0.2 0.0 - 0.4 K/UL    ABS. BASOPHILS 0.1 0.0 - 0.1 K/UL    ABS. IMM.  GRANS. 0.1 (H) 0.00 - 0.04 K/UL    DF AUTOMATED     METABOLIC PANEL, BASIC    Collection Time: 12/30/21 12:28 AM   Result Value Ref Range    Sodium 152 (H) 136 - 145 mmol/L    Potassium 3.7 3.5 - 5.1 mmol/L    Chloride 118 (H) 97 - 108 mmol/L    CO2 31 21 - 32 mmol/L    Anion gap 3 (L) 5 - 15 mmol/L    Glucose 120 (H) 65 - 100 mg/dL BUN 39 (H) 6 - 20 mg/dL    Creatinine 1.07 (H) 0.55 - 1.02 mg/dL    BUN/Creatinine ratio 36 (H) 12 - 20      GFR est AA 57 (L) >60 ml/min/1.73m2    GFR est non-AA 47 (L) >60 ml/min/1.73m2    Calcium 8.5 8.5 - 10.1 mg/dL   LACTIC ACID    Collection Time: 12/30/21 12:28 AM   Result Value Ref Range    Lactic acid 2.1 (HH) 0.4 - 2.0 mmol/L   HEPATIC FUNCTION PANEL    Collection Time: 12/30/21 12:28 AM   Result Value Ref Range    Protein, total 5.8 (L) 6.4 - 8.2 g/dL    Albumin 2.0 (L) 3.5 - 5.0 g/dL    Globulin 3.8 2.0 - 4.0 g/dL    A-G Ratio 0.5 (L) 1.1 - 2.2      Bilirubin, total 0.6 0.2 - 1.0 mg/dL    Bilirubin, direct 0.3 (H) 0.0 - 0.2 mg/dL    Alk. phosphatase 96 45 - 117 U/L    AST (SGOT) 38 (H) 15 - 37 U/L    ALT (SGPT) 22 12 - 78 U/L   COVID-19 RAPID TEST    Collection Time: 12/30/21  6:14 AM   Result Value Ref Range    Specimen source Please find results under separate order      COVID-19 rapid test Not Detected Not Detected         XR CHEST PORT   Final Result      Single portable AP view compared to December 25, 2021. Intact left cardiac   pacemaker with one electrode unchanged. Stable moderate cardiomegaly. Calcified   aorta. No mediastinal widening or shift. Improved aeration at the bases. Small   pleural reactions. Negative for pulmonary edema or pneumothorax. No new   consolidation. Assessment:  Pressure ulcer of the sacrum, stage II with associated cellulitis.   Previous wound culture grew MRSA and Enterococcus    Sepsis with leukocytosis and hypothermia, present on admission    Dehydration with hypernatremia    History of CVA with left hemiparesis and chronic bedridden status    Chronic atrial fibrillation, on anticoagulation which is presently on hold    Coronary artery disease    Status post recent permanent pacemaker for bradycardia    Recent hospitalization for aspiration pneumonia      Plan:  Start vancomycin and Zosyn  Local wound care  Change to hypotonic IV fluids     her son has requested full [address] discussed with: Patient/Family    Disposition: Continued inpatient care    Total time spent with patient: 30 minutes.     Cecelia Arnold MD

## 2021-12-30 NOTE — PROGRESS NOTES
Reason for Admission: Sacral Wound                      RUR Score: 15%                 PCP: First and Last name:   Jacques Mario MD     Name of Practice:    Are you a current patient: Yes/No: Yes   Approximate date of last visit: A few mos ago. Can you participate in a virtual visit if needed: No    Do you (patient/family) have any concerns for transition/discharge? No                  Plan for utilizing home health:  None/w/c bound. Current Advanced Directive/Advance Care Plan:  Full Code      Healthcare Decision Maker:               Primary Decision Maker: Bulmaro Lin - Child - [phone]    Transition of Care Plan:        D/C Plan is home & son to transport home upon discharge.

## 2021-12-30 NOTE — H&P
History and Physical              Subjective :   Chief Complaint : Pressure ulcer sacral area with fever    Source of information : Mostly from the ED provider who had information from the patient's son. Previous records. History of present illness:   80 y.o. female with history of atrial fibrillation on anticoagulation, CVA with left hemiparesis with bedridden status, coronary artery disease, recently had a pacemaker presents to the emergency room by the son as she started having temperature. As per information she recently had pacemaker done, after that she was admitted at this facility for shortness of breath that was evaluated. She does have the pressure ulcer by the time that was documented, according to the son it started when she had the pacemaker done. He is taking care of her, but wound is not healing admits getting worse more extensive. It seems tunneled, as it needed further care admitted for management. She was given Keflex antibiotic before. Wound cultures that was done last admission on December 16 is growing Enterococcus and methicillin-resistant staph aureus. Past Medical History:   Diagnosis Date    Chronic kidney disease     acute tubual necrosis    Clostridium difficile infection     Elevated troponin 9/10/2014    Hypertension     Skin cancer     Stroke [facility])     2001 left residual     Past Surgical History:   Procedure Laterality Date    HX APPENDECTOMY  2/2008    HX HEENT      cataract    HX ORTHOPAEDIC  04/2010    left total hip - hip fx, left knee surgery     Family History   Family history unknown: Yes      Social History     Tobacco Use    Smoking status: Never Smoker    Smokeless tobacco: Never Used   Substance Use Topics    Alcohol use: Never       Prior to Admission medications    Medication Sig Start Date End Date Taking? Authorizing Provider   cephALEXin (Keflex) 500 mg capsule Take 1 Capsule by mouth four (4) times daily for 7 days.  12/26/21 1/2/22  Render Master, Zak Sanches MD   furosemide (LASIX) 40 mg tablet 1 tab PO daily 3/21/19   Sage Kaba MD   metoprolol tartrate (LOPRESSOR) 25 mg tablet Take 25 mg by mouth two (2) times a day. Provider, Historical   oxybutynin chloride XL (DITROPAN XL) 10 mg CR tablet Take 10 mg by mouth nightly. Provider, Historical   apixaban (ELIQUIS) 2.5 mg tablet Take 2.5 mg by mouth two (2) times a day. Provider, Historical   senna (SENNA) 8.6 mg tablet Take 1 Tab by mouth nightly. Provider, Historical   polyethylene glycol (MIRALAX) 17 gram packet Take 17 g by mouth daily. Provider, Historical   lovastatin (MEVACOR) 20 mg tablet Take 20 mg by mouth daily. Other, MD Jose Alberto     Allergies   Allergen Reactions    Neosporin [Benzalkonium Chloride] Rash             Review of Systems: Unable to obtain any information from patient due to her advanced dementia. Vitals:     Patient Vitals for the past 12 hrs:   Temp Pulse Resp BP SpO2   12/29/21 2236 98.3 °F (36.8 °C) 60 15 117/73 97 %       Physical Exam:   General : Thin built, no respiratory distress noted. HEENT : PERRLA, normal oral mucosa, atraumatic normocephalic, Normal ear and nose. Neck : Supple, no JVD, no masses noted, no carotid bruit. Lungs : Breath sounds with moderate air entry bilaterally, no wheezes or rales, no accessory muscle use. CVS : Rhythm rate irregular. No murmur or gallop. Abdomen : Soft, nontender,  bowel sounds active. Extremities : No edema noted,  pedal pulses not palpable. Musculoskeletal : Mild wasting of muscle mass. No joint swelling or effusion, muscle tone and power appears normal.   Skin : Appears very frail, easily bruising, no pathological rash. Lymphatic : No cervical lymphadenopathy. Neurological : Awake, alert, not sure if she is oriented. Noncommunicative. Psychiatric : Cannot evaluate. .         Data Review:   Recent Results (from the past 24 hour(s))   CBC WITH AUTOMATED DIFF    Collection Time: 12/30/21 12:28 AM Result Value Ref Range    WBC 10.7 3.6 - 11.0 K/uL    RBC 4.63 3.80 - 5.20 M/uL    HGB 13.3 11.5 - 16.0 g/dL    HCT 43.5 35.0 - 47.0 %    MCV 94.0 80.0 - 99.0 FL    MCH 28.7 26.0 - 34.0 PG    MCHC 30.6 30.0 - 36.5 g/dL    RDW 18.3 (H) 11.5 - 14.5 %    PLATELET 705 (H) 602 - 400 K/uL    MPV 9.8 8.9 - 12.9 FL    NRBC 0.0 0.0  WBC    ABSOLUTE NRBC 0.00 0.00 - 0.01 K/uL    NEUTROPHILS 65 32 - 75 %    LYMPHOCYTES 23 12 - 49 %    MONOCYTES 8 5 - 13 %    EOSINOPHILS 2 0 - 7 %    BASOPHILS 1 0 - 1 %    IMMATURE GRANULOCYTES 1 (H) 0 - 0.5 %    ABS. NEUTROPHILS 7.0 1.8 - 8.0 K/UL    ABS. LYMPHOCYTES 2.5 0.8 - 3.5 K/UL    ABS. MONOCYTES 0.9 0.0 - 1.0 K/UL    ABS. EOSINOPHILS 0.2 0.0 - 0.4 K/UL    ABS. BASOPHILS 0.1 0.0 - 0.1 K/UL    ABS. IMM. GRANS. 0.1 (H) 0.00 - 0.04 K/UL    DF AUTOMATED     METABOLIC PANEL, BASIC    Collection Time: 12/30/21 12:28 AM   Result Value Ref Range    Sodium 152 (H) 136 - 145 mmol/L    Potassium 3.7 3.5 - 5.1 mmol/L    Chloride 118 (H) 97 - 108 mmol/L    CO2 31 21 - 32 mmol/L    Anion gap 3 (L) 5 - 15 mmol/L    Glucose 120 (H) 65 - 100 mg/dL    BUN 39 (H) 6 - 20 mg/dL    Creatinine 1.07 (H) 0.55 - 1.02 mg/dL    BUN/Creatinine ratio 36 (H) 12 - 20      GFR est AA 57 (L) >60 ml/min/1.73m2    GFR est non-AA 47 (L) >60 ml/min/1.73m2    Calcium 8.5 8.5 - 10.1 mg/dL   LACTIC ACID    Collection Time: 12/30/21 12:28 AM   Result Value Ref Range    Lactic acid 2.1 (HH) 0.4 - 2.0 mmol/L       Radiologic Studies :     CXR Results  (Last 48 hours)               12/30/21 0047  XR CHEST PORT Final result    Impression:      Single portable AP view compared to December 25, 2021. Intact left cardiac   pacemaker with one electrode unchanged. Stable moderate cardiomegaly. Calcified   aorta. No mediastinal widening or shift. Improved aeration at the bases. Small   pleural reactions. Negative for pulmonary edema or pneumothorax. No new   consolidation.        Narrative:  Chest                   Assessment and Plan :     Pressure ulcer sacrum area unstageable: We will request for general surgery if patient needs any debridement, wound care management and infectious disease for antibiotic coverage. Cellulitis of the wounds on sacrum with MRSA and Enterococcus in December 17 admission. Will repeat the cultures today, will follow up with antibiotic regimen as per the infectious disease. History of CVA with left hemiparesis with bedridden status, needing total care    Chronic atrial fibrillation on anticoagulation, will hold at this time in case if she need procedures of the sacral pressure ulcer. Will restart once she is stable. Recent history of pacemaker placement, likely for the bradycardia. History of coronary artery disease, stable    Admitted to medical floor, full CODE STATUS as per the son. Son is the decision maker as patient cannot make any decisions. Home medications reviewed as per previous admission records. CC : Pancho Rose MD  Signed By: J Carlos Miller MD     December 30, 2021      This dictation was done by dragon, computer voice recognition software. Often unanticipated grammatical, syntax, [place] phones and other interpretive errors are inadvertently transcribed. Please excuse errors that have escaped final proofreading.

## 2021-12-30 NOTE — ED NOTES
Past Medical History:   Diagnosis Date    Chronic kidney disease     acute tubual necrosis    Clostridium difficile infection     Elevated troponin 9/10/2014    Hypertension     Skin cancer     Stroke [facility])     2001 left residual     Past Surgical History:   Procedure Laterality Date    HX APPENDECTOMY  2/2008    HX HEENT      cataract    HX ORTHOPAEDIC  04/2010    left total hip - hip fx, left knee surgery     80 y.o. female with a past medical history significant for  stroke presents to the ED with cc of having a fever. Son states he was seen here for the same on [place] Day and started on Keflex for suspected infection secondary to sacral decubitus ulcer. Son reports persistent fevers of 102103 despite taking Keflex x4 days. He states today patient has been much more lethargic with decreased p.o. intake. He states her sacral ulcer seems to be getting worse. Son denies any vomiting, diarrhea, shortness of breath and states mentation is at baseline. Of note, she did have a pacemaker placed 2 weeks ago. Pt alert but unresponsive to verbal stimuli, all care and history provided by son, oriented to room and call bell, cardiac and continuous spO2 monitoring initiated, IV started, blood samples collected and sent to lab for analysis, EKG and chest Xray completed, plan of care reviewed, IVF infusing as ordered, call bell in reach, side rails up x2, will continue to monitor.

## 2021-12-30 NOTE — CONSULTS
Consult Date: 12/30/2021    Consults  Sacral pressure ulcer    Subjective   This is a 80year old female with apparent infected sacral wound who was being treated with Keflex as outpatient. She was brought to the ED because of fever to 102. EMR shows that she was seen in the ED 5 days ago because of fever. She was described as having a Stage 3 sacral decubitus ulcer with purulent drainage. No wound culture was sent. Patient was given dose of Ceftriaxone and discharged on Keflex. She was afebrile on presentation with normal WBC but with elevated lactic acid. UA was unremarkable. CXR was unremarkable. Images reviewed by me. Wound culture and urine culture ordered and patient started on IV Vancomycin and Zosyn. ID has been consulted for this reason. Review of EMR revealed wound culture from 12/17/21 grew MRSA from ? sacral wound. Right heel grew E. Faecalis. Patient seen in the ED. She is nonverbal.  Son at the bedside. He stated that the sacral wound had been present for 3 weeks. He was concerned that she has not been eating and wanted her to get \"glucose\".   Past Medical History:   Diagnosis Date    Chronic kidney disease     acute tubual necrosis    Clostridium difficile infection     Elevated troponin 9/10/2014    Hypertension     Skin cancer     Stroke [facility])     2001 left residual      Past Surgical History:   Procedure Laterality Date    HX APPENDECTOMY  2/2008    HX HEENT      cataract    HX ORTHOPAEDIC  04/2010    left total hip - hip fx, left knee surgery     Family History   Family history unknown: Yes      Social History     Tobacco Use    Smoking status: Never Smoker    Smokeless tobacco: Never Used   Substance Use Topics    Alcohol use: Never       Current Facility-Administered Medications   Medication Dose Route Frequency Provider Last Rate Last Admin    0.9% sodium chloride infusion  75 mL/hr IntraVENous CONTINUOUS Krishan Black NP 75 mL/hr at 12/30/21 0029 75 mL/hr at 12/30/21 0029    metoprolol tartrate (LOPRESSOR) tablet 25 mg  25 mg Oral BID Gera Ward MD        sodium chloride (NS) flush 5-40 mL  5-40 mL IntraVENous Q8H Gera Ward MD        sodium chloride (NS) flush 5-40 mL  5-40 mL IntraVENous PRN Gera Ward MD        acetaminophen (TYLENOL) tablet 650 mg  650 mg Oral Q6H PRN Gera Ward MD        Or   Yvonne Mohr acetaminophen (TYLENOL) suppository 650 mg  650 mg Rectal Q6H PRN Gera Ward MD        ondansetron (ZOFRAN ODT) tablet 4 mg  4 mg Oral Q6H PRN Gera Ward MD        Or    ondansetron (ZOFRAN) injection 4 mg  4 mg IntraVENous Q6H PRN Gera Ward MD        piperacillin-tazobactam (ZOSYN) 3.375 g in 0.9% sodium chloride (MBP/ADV) 100 mL MBP  3.375 g IntraVENous Q8H Clari Tucker MD         Current Outpatient Medications   Medication Sig Dispense Refill    cephALEXin (Keflex) 500 mg capsule Take 1 Capsule by mouth four (4) times daily for 7 days. 28 Capsule 3    furosemide (LASIX) 40 mg tablet 1 tab PO daily 30 Tab 0    metoprolol tartrate (LOPRESSOR) 25 mg tablet Take 25 mg by mouth two (2) times a day.  oxybutynin chloride XL (DITROPAN XL) 10 mg CR tablet Take 10 mg by mouth nightly.  apixaban (ELIQUIS) 2.5 mg tablet Take 2.5 mg by mouth two (2) times a day.  senna (SENNA) 8.6 mg tablet Take 1 Tab by mouth nightly.  polyethylene glycol (MIRALAX) 17 gram packet Take 17 g by mouth daily.  lovastatin (MEVACOR) 20 mg tablet Take 20 mg by mouth daily. Review of Systems   Unable to perform ROS: Patient nonverbal       Objective     Vital signs for last 24 hours:  Visit Vitals  /87   Pulse 80   Temp (!) 95.8 °F (35.4 °C)   Resp 18   Ht 4' 10\" (1.473 m)   Wt 94 lb 8 oz (42.9 kg)   SpO2 100%   BMI 19.75 kg/m²       Intake/Output this shift:  Current Shift: No intake/output data recorded. Last 3 Shifts: No intake/output data recorded.     Data Review:   Recent Results (from the past 24 hour(s))   CBC WITH AUTOMATED DIFF    Collection Time: 12/30/21 12:28 AM   Result Value Ref Range    WBC 10.7 3.6 - 11.0 K/uL    RBC 4.63 3.80 - 5.20 M/uL    HGB 13.3 11.5 - 16.0 g/dL    HCT 43.5 35.0 - 47.0 %    MCV 94.0 80.0 - 99.0 FL    MCH 28.7 26.0 - 34.0 PG    MCHC 30.6 30.0 - 36.5 g/dL    RDW 18.3 (H) 11.5 - 14.5 %    PLATELET 284 (H) 508 - 400 K/uL    MPV 9.8 8.9 - 12.9 FL    NRBC 0.0 0.0  WBC    ABSOLUTE NRBC 0.00 0.00 - 0.01 K/uL    NEUTROPHILS 65 32 - 75 %    LYMPHOCYTES 23 12 - 49 %    MONOCYTES 8 5 - 13 %    EOSINOPHILS 2 0 - 7 %    BASOPHILS 1 0 - 1 %    IMMATURE GRANULOCYTES 1 (H) 0 - 0.5 %    ABS. NEUTROPHILS 7.0 1.8 - 8.0 K/UL    ABS. LYMPHOCYTES 2.5 0.8 - 3.5 K/UL    ABS. MONOCYTES 0.9 0.0 - 1.0 K/UL    ABS. EOSINOPHILS 0.2 0.0 - 0.4 K/UL    ABS. BASOPHILS 0.1 0.0 - 0.1 K/UL    ABS. IMM. GRANS. 0.1 (H) 0.00 - 0.04 K/UL    DF AUTOMATED     METABOLIC PANEL, BASIC    Collection Time: 12/30/21 12:28 AM   Result Value Ref Range    Sodium 152 (H) 136 - 145 mmol/L    Potassium 3.7 3.5 - 5.1 mmol/L    Chloride 118 (H) 97 - 108 mmol/L    CO2 31 21 - 32 mmol/L    Anion gap 3 (L) 5 - 15 mmol/L    Glucose 120 (H) 65 - 100 mg/dL    BUN 39 (H) 6 - 20 mg/dL    Creatinine 1.07 (H) 0.55 - 1.02 mg/dL    BUN/Creatinine ratio 36 (H) 12 - 20      GFR est AA 57 (L) >60 ml/min/1.73m2    GFR est non-AA 47 (L) >60 ml/min/1.73m2    Calcium 8.5 8.5 - 10.1 mg/dL   LACTIC ACID    Collection Time: 12/30/21 12:28 AM   Result Value Ref Range    Lactic acid 2.1 (HH) 0.4 - 2.0 mmol/L   HEPATIC FUNCTION PANEL    Collection Time: 12/30/21 12:28 AM   Result Value Ref Range    Protein, total 5.8 (L) 6.4 - 8.2 g/dL    Albumin 2.0 (L) 3.5 - 5.0 g/dL    Globulin 3.8 2.0 - 4.0 g/dL    A-G Ratio 0.5 (L) 1.1 - 2.2      Bilirubin, total 0.6 0.2 - 1.0 mg/dL    Bilirubin, direct 0.3 (H) 0.0 - 0.2 mg/dL    Alk.  phosphatase 96 45 - 117 U/L    AST (SGOT) 38 (H) 15 - 37 U/L    ALT (SGPT) 22 12 - 78 U/L   COVID-19 RAPID TEST    Collection Time: 12/30/21  6:14 AM   Result Value Ref Range    Specimen source Please find results under separate order      COVID-19 rapid test Not Detected Not Detected       CXR (12/29)          Physical Exam  Vitals and nursing note reviewed. Constitutional:       Appearance: She is ill-appearing. Comments: Cachectic, patient on warming blanket   HENT:      Head: Normocephalic and atraumatic. Right Ear: External ear normal.      Left Ear: External ear normal.      Nose: Nose normal.   Eyes:      Pupils: Pupils are equal, round, and reactive to light. Cardiovascular:      Rate and Rhythm: Regular rhythm. Tachycardia present. Heart sounds: No murmur heard. Pulmonary:      Effort: Pulmonary effort is normal.      Breath sounds: Normal breath sounds. Abdominal:      General: Bowel sounds are normal. There is no distension. Palpations: Abdomen is soft. Genitourinary:     Comments: No [place]  Musculoskeletal:      Cervical back: Neck supple. Right lower leg: No edema. Left lower leg: No edema. Comments: Stage 2 right heel wound   Skin:     Findings: No rash. Comments: Stage 3 sacral wound (see mobile media photo)    Stage 2-3 left buttock wound   Neurological:      Comments: Unable to assess     Psychiatric:      Comments: Unable to assess       ASSESSMENT/PLAN    1. Sacral wound infection, recently growing MRSA  2. Left buttock wound  3. Right heel wound  4. Sepsis with fever and elevated lactic acid  5. Failure to thrive/cachexia    1. Continue IV Vancomycin and Zosyn  2. Follow-up blood and wound culture, urine culture  3. In am, check procal and CRP    Seb Redd MD

## 2021-12-31 NOTE — PROGRESS NOTES
Hospitalist Progress Note               Daily Progress Note: 12/31/2021      Subjective: The patient is seen for follow up. She has a 51-year-old female with history of atrial fibrillation, CVA with left hemiparesis, chronically bedridden, CAD, permanent pacemaker who was brought in by family after they noticed she was having fever at home. She has a known sacral pressure ulcer which according to the son has been getting worse. During her recent admission about 2 weeks ago the wound culture grew Enterococcus and MRSA. She was treated for aspiration pneumonia during that admission and discharged on Augmentin    In the ED patient was hypothermic with an initial temperature of 93. She was started on bear hugger. She was not started on antibiotics. ID and surgery were consulted    Gen surgery: sacral ulcer looks stage 2, continue local care/offloading, santyl/medihoney. ID: recent mrsa, wound cs prelim= heavy prob staph aureus + gnr/gram variable rods. Rec continue zosyn/vanco for now. 12/30  Labs showed a sodium of 152, creatinine 1.07, WBC 10.7  ------    Patient seen for follow-up. Sons at bedside, poor oral intake. She is alert, tracks me but is not verbally interactive or following commands for me. She does not appear distressed. Temp>97.5 today. Off Sophia hugger. 12/30  Patient was seen by general surgery, feels this likely represents a stage II ulcer. Surgical debridement not felt to be indicated at this time.     Problem List:  Problem List as of 12/31/2021 Date Reviewed: 12/30/2021          Codes Class Noted - Resolved    Pressure injury of sacral region, unstageable [facility]) ICD-10-CM: L89.150  ICD-9-CM: 707.03, 707.25  12/30/2021 - Present        Pressure ulcer of sacral region, unstageable [facility]) ICD-10-CM: L89.150  ICD-9-CM: 707.03, 707.25  12/30/2021 - Present        Aspiration pneumonia ([facility] [address] ICD-10-CM: J69.0  ICD-9-CM: 507.0  12/16/2021 - Present        Acute CHF (congestive heart failure) (HCC) ICD-10-CM: I50.9  ICD-9-CM: 428.0  3/19/2019 - Present        RESOLVED: SOB (shortness of breath) ICD-10-CM: R06.02  ICD-9-CM: 786.05  9/10/2014 - 2014        RESOLVED: Elevated troponin ICD-10-CM: R77.8  ICD-9-CM: 790.6  9/10/2014 - 2014              Medications reviewed  Current Facility-Administered Medications   Medication Dose Route Frequency    collagenase (SANTYL) 250 unit/gram ointment   Topical DAILY    vancomycin (VANCOCIN) 500 mg in 0.9% sodium chloride (MBP/ADV) 100 mL MBP  500 mg IntraVENous ONCE    [START ON 2022] VANCOMYCIN Draw random at 0400 on 22   Other ONCE    metoprolol tartrate (LOPRESSOR) tablet 25 mg  25 mg Oral BID    sodium chloride (NS) flush 5-40 mL  5-40 mL IntraVENous Q8H    sodium chloride (NS) flush 5-40 mL  5-40 mL IntraVENous PRN    acetaminophen (TYLENOL) tablet 650 mg  650 mg Oral Q6H PRN    Or    acetaminophen (TYLENOL) suppository 650 mg  650 mg Rectal Q6H PRN    ondansetron (ZOFRAN ODT) tablet 4 mg  4 mg Oral Q6H PRN    Or    ondansetron (ZOFRAN) injection 4 mg  4 mg IntraVENous Q6H PRN    piperacillin-tazobactam (ZOSYN) 3.375 g in 0.9% sodium chloride (MBP/ADV) 100 mL MBP  3.375 g IntraVENous Q8H    VANCOMYCIN INFORMATION NOTE   Other PRN    0.45% sodium chloride infusion  75 mL/hr IntraVENous CONTINUOUS       Review of Systems:   Patient unable to provide review of systems    Objective:   Physical Exam:     Visit Vitals  /70   Pulse 60   Temp 97.6 °F (36.4 °C)   Resp 16   Ht 4' 10\" (1.473 m)   Wt 42.9 kg (94 lb 8 oz)   SpO2 97%   BMI 19.75 kg/m²      O2 Device: None (Room air)    Temp (24hrs), Av.8 °F (36.6 °C), Min:96.5 °F (35.8 °C), Max:99.5 °F (37.5 °C)    No intake/output data recorded. No intake/output data recorded. General:   Lethargic, nonverbal.  Appears chronically ill, cachectic, NAD   Lungs:   Clear to auscultation bilaterally, diminished, not labored.    Chest wall:  No tenderness or deformity. Heart:  Regular rate and rhythm, S1, S2 normal, no murmur, click, rub or gallop. Abdomen:   Soft, non-tender. Bowel sounds normal. No masses,  No organomegaly. Extremities: Extremities normal, atraumatic, no cyanosis or edema. Poor muscle mass. Pulses: 1+ and symmetric all extremities. Skin: Warn/dry, poor turgor   Neurologic: CNII-XII intact. left hemiparesis, not verbal         Data Review:       Recent Days:  Recent Labs     12/30/21  0028   WBC 10.7   HGB 13.3   HCT 43.5   *     Recent Labs     12/30/21  0028   *   K 3.7   *   CO2 31   *   BUN 39*   CREA 1.07*   CA 8.5   ALB 2.0*   TBILI 0.6   ALT 22     No results for input(s): PH, PCO2, PO2, HCO3, FIO2 in the last 72 hours. 24 Hour Results:  Recent Results (from the past 24 hour(s))   GLUCOSE, POC    Collection Time: 12/30/21  4:42 PM   Result Value Ref Range    Glucose (POC) 104 65 - 117 mg/dL    Performed by Isabelle Borrero, RANDOM    Collection Time: 12/31/21  6:13 AM   Result Value Ref Range    Vancomycin, random 12.1 ug/mL   GLUCOSE, POC    Collection Time: 12/31/21 11:25 AM   Result Value Ref Range    Glucose (POC) 81 65 - 117 mg/dL    Performed by Karma Haste        XR CHEST PORT   Final Result      Single portable AP view compared to December 25, 2021. Intact left cardiac   pacemaker with one electrode unchanged. Stable moderate cardiomegaly. Calcified   aorta. No mediastinal widening or shift. Improved aeration at the bases. Small   pleural reactions. Negative for pulmonary edema or pneumothorax. No new   consolidation. Assessment:  Pressure ulcer of the sacrum, stage II with associated cellulitis. Previous wound culture grew MRSA and Enterococcus. Prelim wcs here with prob heavy staph aureus, gnr/gram variable. ID and gen surgery following. Appears stage 2, not anticipating surgical debridement and will continue local care + Vanco/Zosyn for now.      Sepsis/ hypothermia, present on admission, improved. Wound appears source. Dehydration with hypernatremia, poor oral intake reported. History of CVA with left hemiparesis and chronic bedridden status    Chronic atrial fibrillation, on anticoagulation which is presently on hold and will resume as surgical debridement not anticipated. Coronary artery disease    Status post recent permanent pacemaker for bradycardia    Recent hospitalization for aspiration pneumonia      Plan:  Continue vancomycin and Zosyn  Follow wound and blood cultures. ID and gen surgery appreciated, continue to follow recommendations. Local wound care  Change to D5w and followw/ replete lytes as necessary. her son has requested full CODE STATUS  Son: Dr Chambers Letters [phone]    Care Plan discussed with: Patient/Family    Disposition: Continued inpatient care, anticipating home ultimately with sons. Per conversation today, hhc not likely as son states he is ED .    Total time spent with patient: 30 minutes.     Angel Luis Johnson MD

## 2021-12-31 NOTE — PROGRESS NOTES
Patient requires complete care. Turning every 2 hours. Attempting to feed patient breakfast- patient declined. Medications held in AM related to blood pressure parameters. Awaiting blood cultures. Family updated        Problem: Risk for Spread of Infection  Goal: Prevent transmission of infectious organism to others  Description: Prevent the transmission of infectious organisms to other patients, staff members, and visitors. Outcome: Progressing Towards Goal     Problem: Patient Education:  Go to Education Activity  Goal: Patient/Family Education  Outcome: Progressing Towards Goal     Problem: Pressure Injury - Risk of  Goal: *Prevention of pressure injury  Description: Document Charles Scale and appropriate interventions in the flowsheet.   Outcome: Progressing Towards Goal  Note: Pressure Injury Interventions:  Sensory Interventions: Assess changes in LOC    Moisture Interventions: Absorbent underpads    Activity Interventions: Assess need for specialty bed    Mobility Interventions: HOB 30 degrees or less    Nutrition Interventions: Document food/fluid/supplement intake    Friction and Shear Interventions: Apply protective barrier, creams and emollients

## 2021-12-31 NOTE — PROGRESS NOTES
Progress Note    Patient: Ana Vogel MRN: [id number]  SSN: [id number]    YOB: 1922  Age: 80 y.o. Sex: female      Admit Date: 12/29/2021    LOS: 1 day     Subjective:   Patient followed for sepsis with suspected sacral wound infection which recently grew MRSA. She is afebrile with normal lactic acid. Blood and wound culture pending. Currently on Vancomycin and Zosyn. Patient is awake. She is mostly nonverbal but responded that she felt \"bad\". Objective:     Vitals:    12/30/21 2003 12/31/21 0000 12/31/21 0026 12/31/21 0400   BP: (!) 101/57  (!) 103/56    Pulse: 60 60 60 60   Resp: 20  16    Temp: 97.6 °F (36.4 °C)  97.6 °F (36.4 °C)    SpO2: 99%  99%    Weight:       Height:            Intake and Output:  Current Shift: No intake/output data recorded. Last three shifts: No intake/output data recorded. Physical Exam:    Vitals and nursing note reviewed. Constitutional:  Room Air     Appearance: She is chronically ill appearing      Comments: Cachectic   HENT: eyes open, poor dentition  Cardiovascular:      Rate and Rhythm: Regular rhythm. Heart sounds: No murmur heard.   Pulmonary:      Effort: Pulmonary effort is normal.      Breath sounds: Normal breath sounds. Abdominal:      General: Bowel sounds are normal. There is no distension. Palpations: Abdomen is soft. Genitourinary:     Comments: No Govea  Musculoskeletal:      Right lower leg: No edema. Left lower leg: superficial wound posterior calf      Comments: Stage 2 right heel wound   Skin:     Findings: No rash.       Comments: Stage 3 sacral wound (see mobile media photo)  Stage 2-3 left buttock wound   Neurological:      Comments: Unable to assess  Psychiatric:      Comments: Unable to assess     Lab/Data Review:     WBC 10,700    Blood cultures (12/30) Pending  Sacral wound culture (12/30) 2+ Gram variable rods    Assessment:     Active Problems:    Pressure injury of sacral region, unstageable ([address] (12/30/2021)      Pressure ulcer of sacral region, unstageable ([facility] [address] (12/30/2021)    1. Sacral wound infection, recently growing MRSA, current culture pending. 2. Left buttock wound  3. Right heel wound  4. Sepsis with fever and elevated lactic acid  5. Failure to thrive/cachexia    Comment:  Gram stain on wound culture unimpressive as Gram variable rods often represent colonization. Plan:   1. Continue IV Vancomycin and Zosyn  2. Follow-up blood and wound culture, urine culture  3.  Follow-up procal and CRP       Signed By: Manpreet Akbar MD     December 31, 2021

## 2021-12-31 NOTE — PROGRESS NOTES
Consult for Vancomycin Dosing by Pharmacy by Dr. Manuela Lamb provided for this 80y.o. year old female , for indication of SSTI. Day of Therapy: 2  Goal of Level(s): (AUC = 400-600) or (trough = 10-15mcg/dL)     Other Current Antibiotics: Zosyn    New Regimen:     Vancomycin level resulted 12.1 mg/L. Pharmacy will pulse dose due to poor renal clearance. Will order Vancomycin 500 mg x 1 dose to be given at 1400 today and schedule a random level in the morning tomorrow. Pharmacy to follow daily and will make changes to dose and/or frequency based on clinical status.      _________________________________     Pharmacist Karin Alvarez PHARMD

## 2021-12-31 NOTE — PROGRESS NOTES
Dual Skin assessment performed per this Nurse along with Luis Enciso RN. Skin is remarkable with several skin tears, bruising noted to BUE. Bandages in place to both heels that are clean, dry and intact. Sacral wound noted to be approx stage 2 in the center and approx 1/2 dollar in size with surrounding area noted to have eschar and maceration. Although not ordered, in rounds Dr. Edison Beckahm note that he ordered Santyl and Medi-honey and to cover with protective dressing. Santyl not ordered and has to come from pharmacy. Notified provider that order needs to be placed in order for Santyl to be used. Dressing performed performed with irrigation of NS, applied Medi-Honey and covered with Mepilex. Patient did not tolerate well.

## 2021-12-31 NOTE — PROGRESS NOTES
Problem: Risk for Spread of Infection  Goal: Prevent transmission of infectious organism to others  Description: Prevent the transmission of infectious organisms to other patients, staff members, and visitors. Outcome: Progressing Towards Goal     Problem: Patient Education:  Go to Education Activity  Goal: Patient/Family Education  Outcome: Progressing Towards Goal     Problem: Pressure Injury - Risk of  Goal: *Prevention of pressure injury  Description: Document Charles Scale and appropriate interventions in the flowsheet. Outcome: Progressing Towards Goal  Note: Pressure Injury Interventions:  Sensory Interventions: Assess changes in LOC    Moisture Interventions: Absorbent underpads    Activity Interventions: Assess need for specialty bed,Trapeze to reposition    Mobility Interventions: HOB 30 degrees or less,Turn and reposition approx.  every two hours(pillow and wedges)    Nutrition Interventions: Document food/fluid/supplement intake                     Problem: Patient Education: Go to Patient Education Activity  Goal: Patient/Family Education  Outcome: Progressing Towards Goal

## 2021-12-31 NOTE — WOUND CARE
Patient transferred onto a [facility] P500 Low Air Loss bed for increased pressure reduction and climate control. Transferred with [facility], confirmation [id number].

## 2022-01-01 ENCOUNTER — HOSPITAL ENCOUNTER (OUTPATIENT)
Age: 87
Setting detail: OBSERVATION
Discharge: NURSING, REHAB OR LONG-TERM CARE | End: 2022-01-24
Attending: EMERGENCY MEDICINE | Admitting: INTERNAL MEDICINE
Payer: MEDICARE

## 2022-01-01 ENCOUNTER — ANESTHESIA (OUTPATIENT)
Dept: ENDOSCOPY | Age: 87
DRG: 871 | End: 2022-01-01
Payer: MEDICARE

## 2022-01-01 ENCOUNTER — APPOINTMENT (OUTPATIENT)
Dept: GENERAL RADIOLOGY | Age: 87
DRG: 871 | End: 2022-01-01
Attending: PHYSICIAN ASSISTANT
Payer: MEDICARE

## 2022-01-01 ENCOUNTER — APPOINTMENT (OUTPATIENT)
Dept: NON INVASIVE DIAGNOSTICS | Age: 87
DRG: 871 | End: 2022-01-01
Attending: INTERNAL MEDICINE
Payer: MEDICARE

## 2022-01-01 ENCOUNTER — APPOINTMENT (OUTPATIENT)
Dept: INTERVENTIONAL RADIOLOGY/VASCULAR | Age: 87
DRG: 871 | End: 2022-01-01
Attending: INTERNAL MEDICINE
Payer: MEDICARE

## 2022-01-01 ENCOUNTER — ANESTHESIA EVENT (OUTPATIENT)
Dept: ENDOSCOPY | Age: 87
DRG: 871 | End: 2022-01-01
Payer: MEDICARE

## 2022-01-01 ENCOUNTER — HOSPITAL ENCOUNTER (INPATIENT)
Age: 87
LOS: 9 days | Discharge: HOME HEALTH | DRG: 871 | End: 2022-02-18
Attending: EMERGENCY MEDICINE | Admitting: INTERNAL MEDICINE
Payer: MEDICARE

## 2022-01-01 ENCOUNTER — APPOINTMENT (OUTPATIENT)
Dept: GENERAL RADIOLOGY | Age: 87
End: 2022-01-01
Attending: EMERGENCY MEDICINE
Payer: MEDICARE

## 2022-01-01 ENCOUNTER — APPOINTMENT (OUTPATIENT)
Dept: GENERAL RADIOLOGY | Age: 87
DRG: 871 | End: 2022-01-01
Attending: EMERGENCY MEDICINE
Payer: MEDICARE

## 2022-01-01 ENCOUNTER — APPOINTMENT (OUTPATIENT)
Dept: ULTRASOUND IMAGING | Age: 87
DRG: 871 | End: 2022-01-01
Attending: INTERNAL MEDICINE
Payer: MEDICARE

## 2022-01-01 ENCOUNTER — HOSPITAL ENCOUNTER (INPATIENT)
Age: 87
LOS: 5 days | DRG: 871 | End: 2022-02-25
Attending: EMERGENCY MEDICINE | Admitting: INTERNAL MEDICINE
Payer: MEDICARE

## 2022-01-01 ENCOUNTER — APPOINTMENT (OUTPATIENT)
Dept: GENERAL RADIOLOGY | Age: 87
DRG: 871 | End: 2022-01-01
Attending: HOSPITALIST
Payer: MEDICARE

## 2022-01-01 ENCOUNTER — APPOINTMENT (OUTPATIENT)
Dept: GENERAL RADIOLOGY | Age: 87
DRG: 871 | End: 2022-01-01
Attending: INTERNAL MEDICINE
Payer: MEDICARE

## 2022-01-01 ENCOUNTER — APPOINTMENT (OUTPATIENT)
Dept: ENDOSCOPY | Age: 87
DRG: 871 | End: 2022-01-01
Attending: INTERNAL MEDICINE
Payer: MEDICARE

## 2022-01-01 ENCOUNTER — APPOINTMENT (OUTPATIENT)
Dept: INTERVENTIONAL RADIOLOGY/VASCULAR | Age: 87
DRG: 871 | End: 2022-01-01
Attending: PHYSICIAN ASSISTANT
Payer: MEDICARE

## 2022-01-01 VITALS
DIASTOLIC BLOOD PRESSURE: 61 MMHG | HEART RATE: 62 BPM | RESPIRATION RATE: 18 BRPM | HEIGHT: 58 IN | SYSTOLIC BLOOD PRESSURE: 118 MMHG | BODY MASS INDEX: 20.99 KG/M2 | WEIGHT: 100 LBS | OXYGEN SATURATION: 94 % | TEMPERATURE: 98.2 F

## 2022-01-01 VITALS
DIASTOLIC BLOOD PRESSURE: 63 MMHG | HEIGHT: 58 IN | OXYGEN SATURATION: 93 % | HEART RATE: 63 BPM | TEMPERATURE: 97.6 F | SYSTOLIC BLOOD PRESSURE: 111 MMHG | WEIGHT: 100 LBS | RESPIRATION RATE: 16 BRPM | BODY MASS INDEX: 20.99 KG/M2

## 2022-01-01 VITALS
OXYGEN SATURATION: 98 % | DIASTOLIC BLOOD PRESSURE: 89 MMHG | HEART RATE: 76 BPM | HEIGHT: 58 IN | RESPIRATION RATE: 16 BRPM | BODY MASS INDEX: 19.94 KG/M2 | SYSTOLIC BLOOD PRESSURE: 107 MMHG | WEIGHT: 95 LBS | TEMPERATURE: 97.5 F

## 2022-01-01 VITALS
TEMPERATURE: 96.4 F | HEIGHT: 59 IN | BODY MASS INDEX: 24.93 KG/M2 | DIASTOLIC BLOOD PRESSURE: 63 MMHG | OXYGEN SATURATION: 43 % | RESPIRATION RATE: 8 BRPM | SYSTOLIC BLOOD PRESSURE: 88 MMHG | HEART RATE: 85 BPM | WEIGHT: 123.68 LBS

## 2022-01-01 DIAGNOSIS — A41.9 SEPSIS, DUE TO UNSPECIFIED ORGANISM, UNSPECIFIED WHETHER ACUTE ORGAN DYSFUNCTION PRESENT (HCC): ICD-10-CM

## 2022-01-01 DIAGNOSIS — D64.9 ANEMIA, UNSPECIFIED TYPE: ICD-10-CM

## 2022-01-01 DIAGNOSIS — R79.89 ELEVATED BRAIN NATRIURETIC PEPTIDE (BNP) LEVEL: ICD-10-CM

## 2022-01-01 DIAGNOSIS — A41.9 SEPSIS WITH ACUTE HYPOXIC RESPIRATORY FAILURE WITHOUT SEPTIC SHOCK, DUE TO UNSPECIFIED ORGANISM (HCC): Primary | ICD-10-CM

## 2022-01-01 DIAGNOSIS — L89.150 PRESSURE INJURY OF SACRAL REGION, UNSTAGEABLE (HCC): ICD-10-CM

## 2022-01-01 DIAGNOSIS — J96.01 SEPSIS WITH ACUTE HYPOXIC RESPIRATORY FAILURE WITHOUT SEPTIC SHOCK, DUE TO UNSPECIFIED ORGANISM (HCC): Primary | ICD-10-CM

## 2022-01-01 DIAGNOSIS — U07.1 COVID-19 VIRUS INFECTION: Primary | ICD-10-CM

## 2022-01-01 DIAGNOSIS — U07.1 COVID-19 VIRUS INFECTION: ICD-10-CM

## 2022-01-01 DIAGNOSIS — R79.89 PRERENAL AZOTEMIA: ICD-10-CM

## 2022-01-01 DIAGNOSIS — E87.0 HYPERNATREMIA: ICD-10-CM

## 2022-01-01 DIAGNOSIS — R65.20 SEPSIS WITH ACUTE HYPOXIC RESPIRATORY FAILURE WITHOUT SEPTIC SHOCK, DUE TO UNSPECIFIED ORGANISM (HCC): Primary | ICD-10-CM

## 2022-01-01 DIAGNOSIS — R09.02 HYPOXIA: ICD-10-CM

## 2022-01-01 DIAGNOSIS — N17.9 AKI (ACUTE KIDNEY INJURY) (HCC): ICD-10-CM

## 2022-01-01 DIAGNOSIS — L89.150 PRESSURE INJURY OF SACRAL REGION, UNSTAGEABLE (HCC): Primary | ICD-10-CM

## 2022-01-01 DIAGNOSIS — A41.02 SEPSIS DUE TO METHICILLIN RESISTANT STAPHYLOCOCCUS AUREUS (MRSA) WITHOUT ACUTE ORGAN DYSFUNCTION (HCC): ICD-10-CM

## 2022-01-01 DIAGNOSIS — J12.82 PNEUMONIA DUE TO COVID-19 VIRUS: ICD-10-CM

## 2022-01-01 DIAGNOSIS — L97.919 LEG ULCER, RIGHT, WITH UNSPECIFIED SEVERITY (HCC): ICD-10-CM

## 2022-01-01 DIAGNOSIS — U07.1 PNEUMONIA DUE TO COVID-19 VIRUS: ICD-10-CM

## 2022-01-01 LAB
ABO + RH BLD: NORMAL
ALBUMIN SERPL-MCNC: 1.7 G/DL (ref 3.5–5)
ALBUMIN SERPL-MCNC: 1.7 G/DL (ref 3.5–5)
ALBUMIN SERPL-MCNC: 2 G/DL (ref 3.5–5)
ALBUMIN SERPL-MCNC: 2 G/DL (ref 3.5–5)
ALBUMIN SERPL-MCNC: 2.1 G/DL (ref 3.5–5)
ALBUMIN SERPL-MCNC: 2.1 G/DL (ref 3.5–5)
ALBUMIN SERPL-MCNC: 2.2 G/DL (ref 3.5–5)
ALBUMIN SERPL-MCNC: 2.3 G/DL (ref 3.5–5)
ALBUMIN SERPL-MCNC: 2.5 G/DL (ref 3.5–5)
ALBUMIN SERPL-MCNC: 2.5 G/DL (ref 3.5–5)
ALBUMIN/GLOB SERPL: 0.4 {RATIO} (ref 1.1–2.2)
ALBUMIN/GLOB SERPL: 0.6 {RATIO} (ref 1.1–2.2)
ALP SERPL-CCNC: 104 U/L (ref 45–117)
ALP SERPL-CCNC: 134 U/L (ref 45–117)
ALP SERPL-CCNC: 144 U/L (ref 45–117)
ALP SERPL-CCNC: 205 U/L (ref 45–117)
ALP SERPL-CCNC: 210 U/L (ref 45–117)
ALP SERPL-CCNC: 259 U/L (ref 45–117)
ALT SERPL-CCNC: 19 U/L (ref 12–78)
ALT SERPL-CCNC: 20 U/L (ref 12–78)
ALT SERPL-CCNC: 23 U/L (ref 12–78)
ALT SERPL-CCNC: 25 U/L (ref 12–78)
ALT SERPL-CCNC: 29 U/L (ref 12–78)
ALT SERPL-CCNC: 32 U/L (ref 12–78)
ANION GAP SERPL CALC-SCNC: 1 MMOL/L (ref 5–15)
ANION GAP SERPL CALC-SCNC: 10 MMOL/L (ref 5–15)
ANION GAP SERPL CALC-SCNC: 13 MMOL/L (ref 5–15)
ANION GAP SERPL CALC-SCNC: 19 MMOL/L (ref 5–15)
ANION GAP SERPL CALC-SCNC: 21 MMOL/L (ref 5–15)
ANION GAP SERPL CALC-SCNC: 24 MMOL/L (ref 5–15)
ANION GAP SERPL CALC-SCNC: 3 MMOL/L (ref 5–15)
ANION GAP SERPL CALC-SCNC: 4 MMOL/L (ref 5–15)
ANION GAP SERPL CALC-SCNC: 5 MMOL/L (ref 5–15)
ANION GAP SERPL CALC-SCNC: 6 MMOL/L (ref 5–15)
ANION GAP SERPL CALC-SCNC: 7 MMOL/L (ref 5–15)
ANION GAP SERPL CALC-SCNC: 8 MMOL/L (ref 5–15)
ANION GAP SERPL CALC-SCNC: 9 MMOL/L (ref 5–15)
APPEARANCE FLD: ABNORMAL
APPEARANCE UR: ABNORMAL
APPEARANCE UR: ABNORMAL
APPEARANCE UR: CLEAR
ARTERIAL PATENCY WRIST A: ABNORMAL
ARTERIAL PATENCY WRIST A: POSITIVE
AST SERPL W P-5'-P-CCNC: 27 U/L (ref 15–37)
AST SERPL W P-5'-P-CCNC: 29 U/L (ref 15–37)
AST SERPL W P-5'-P-CCNC: 33 U/L (ref 15–37)
AST SERPL W P-5'-P-CCNC: 40 U/L (ref 15–37)
AST SERPL W P-5'-P-CCNC: 43 U/L (ref 15–37)
AST SERPL W P-5'-P-CCNC: 56 U/L (ref 15–37)
ATRIAL RATE: 267 BPM
ATRIAL RATE: 329 BPM
BACTERIA SPEC CULT: ABNORMAL
BACTERIA SPEC CULT: NORMAL
BACTERIA URNS QL MICRO: NEGATIVE /HPF
BASE DEFICIT BLDA-SCNC: 11.6 MMOL/L (ref 0–2)
BASE EXCESS BLDA CALC-SCNC: 8.9 MMOL/L (ref 0–2)
BASOPHILS # BLD: 0 K/UL (ref 0–0.1)
BASOPHILS NFR BLD: 0 % (ref 0–1)
BDY SITE: ABNORMAL
BDY SITE: ABNORMAL
BILIRUB DIRECT SERPL-MCNC: 1.1 MG/DL (ref 0–0.2)
BILIRUB SERPL-MCNC: 0.5 MG/DL (ref 0.2–1)
BILIRUB SERPL-MCNC: 0.8 MG/DL (ref 0.2–1)
BILIRUB SERPL-MCNC: 0.9 MG/DL (ref 0.2–1)
BILIRUB SERPL-MCNC: 1.3 MG/DL (ref 0.2–1)
BILIRUB SERPL-MCNC: 1.6 MG/DL (ref 0.2–1)
BILIRUB SERPL-MCNC: 1.9 MG/DL (ref 0.2–1)
BILIRUB UR QL: NEGATIVE
BLD PROD TYP BPU: NORMAL
BLOOD GROUP ANTIBODIES SERPL: NEGATIVE
BNP SERPL-MCNC: 6969 PG/ML
BNP SERPL-MCNC: ABNORMAL PG/ML
BNP SERPL-MCNC: ABNORMAL PG/ML
BPU ID: NORMAL
BUN SERPL-MCNC: 116 MG/DL (ref 6–20)
BUN SERPL-MCNC: 118 MG/DL (ref 6–20)
BUN SERPL-MCNC: 132 MG/DL (ref 6–20)
BUN SERPL-MCNC: 133 MG/DL (ref 6–20)
BUN SERPL-MCNC: 133 MG/DL (ref 6–20)
BUN SERPL-MCNC: 135 MG/DL (ref 6–20)
BUN SERPL-MCNC: 16 MG/DL (ref 6–20)
BUN SERPL-MCNC: 17 MG/DL (ref 6–20)
BUN SERPL-MCNC: 19 MG/DL (ref 6–20)
BUN SERPL-MCNC: 24 MG/DL (ref 6–20)
BUN SERPL-MCNC: 30 MG/DL (ref 6–20)
BUN SERPL-MCNC: 34 MG/DL (ref 6–20)
BUN SERPL-MCNC: 36 MG/DL (ref 6–20)
BUN SERPL-MCNC: 51 MG/DL (ref 6–20)
BUN SERPL-MCNC: 52 MG/DL (ref 6–20)
BUN SERPL-MCNC: 53 MG/DL (ref 6–20)
BUN SERPL-MCNC: 53 MG/DL (ref 6–20)
BUN SERPL-MCNC: 57 MG/DL (ref 6–20)
BUN SERPL-MCNC: 58 MG/DL (ref 6–20)
BUN SERPL-MCNC: 59 MG/DL (ref 6–20)
BUN SERPL-MCNC: 60 MG/DL (ref 6–20)
BUN SERPL-MCNC: 60 MG/DL (ref 6–20)
BUN SERPL-MCNC: 73 MG/DL (ref 6–20)
BUN/CREAT SERPL: 19 (ref 12–20)
BUN/CREAT SERPL: 20 (ref 12–20)
BUN/CREAT SERPL: 22 (ref 12–20)
BUN/CREAT SERPL: 22 (ref 12–20)
BUN/CREAT SERPL: 29 (ref 12–20)
BUN/CREAT SERPL: 29 (ref 12–20)
BUN/CREAT SERPL: 39 (ref 12–20)
BUN/CREAT SERPL: 41 (ref 12–20)
BUN/CREAT SERPL: 43 (ref 12–20)
BUN/CREAT SERPL: 52 (ref 12–20)
BUN/CREAT SERPL: 52 (ref 12–20)
BUN/CREAT SERPL: 56 (ref 12–20)
BUN/CREAT SERPL: 61 (ref 12–20)
BUN/CREAT SERPL: 65 (ref 12–20)
BUN/CREAT SERPL: 71 (ref 12–20)
BUN/CREAT SERPL: 72 (ref 12–20)
BUN/CREAT SERPL: 74 (ref 12–20)
BUN/CREAT SERPL: 75 (ref 12–20)
BUN/CREAT SERPL: 75 (ref 12–20)
BUN/CREAT SERPL: 78 (ref 12–20)
BUN/CREAT SERPL: 78 (ref 12–20)
BUN/CREAT SERPL: 85 (ref 12–20)
BUN/CREAT SERPL: 97 (ref 12–20)
CA-I BLD-MCNC: 7.5 MG/DL (ref 8.5–10.1)
CA-I BLD-MCNC: 7.7 MG/DL (ref 8.5–10.1)
CA-I BLD-MCNC: 7.9 MG/DL (ref 8.5–10.1)
CA-I BLD-MCNC: 8 MG/DL (ref 8.5–10.1)
CA-I BLD-MCNC: 8.1 MG/DL (ref 8.5–10.1)
CA-I BLD-MCNC: 8.1 MG/DL (ref 8.5–10.1)
CA-I BLD-MCNC: 8.2 MG/DL (ref 8.5–10.1)
CA-I BLD-MCNC: 8.3 MG/DL (ref 8.5–10.1)
CA-I BLD-MCNC: 8.4 MG/DL (ref 8.5–10.1)
CA-I BLD-MCNC: 8.5 MG/DL (ref 8.5–10.1)
CA-I BLD-MCNC: 8.5 MG/DL (ref 8.5–10.1)
CA-I BLD-MCNC: 8.6 MG/DL (ref 8.5–10.1)
CA-I BLD-MCNC: 8.8 MG/DL (ref 8.5–10.1)
CA-I BLD-MCNC: 8.8 MG/DL (ref 8.5–10.1)
CA-I BLD-MCNC: 8.9 MG/DL (ref 8.5–10.1)
CA-I BLD-MCNC: 8.9 MG/DL (ref 8.5–10.1)
CA-I BLD-MCNC: 9.1 MG/DL (ref 8.5–10.1)
CA-I BLD-MCNC: 9.2 MG/DL (ref 8.5–10.1)
CA-I BLD-MCNC: 9.4 MG/DL (ref 8.5–10.1)
CALCULATED R AXIS, ECG10: -56 DEGREES
CALCULATED R AXIS, ECG10: -72 DEGREES
CALCULATED T AXIS, ECG11: 55 DEGREES
CALCULATED T AXIS, ECG11: 96 DEGREES
CHLORIDE SERPL-SCNC: 104 MMOL/L (ref 97–108)
CHLORIDE SERPL-SCNC: 105 MMOL/L (ref 97–108)
CHLORIDE SERPL-SCNC: 105 MMOL/L (ref 97–108)
CHLORIDE SERPL-SCNC: 106 MMOL/L (ref 97–108)
CHLORIDE SERPL-SCNC: 107 MMOL/L (ref 97–108)
CHLORIDE SERPL-SCNC: 109 MMOL/L (ref 97–108)
CHLORIDE SERPL-SCNC: 110 MMOL/L (ref 97–108)
CHLORIDE SERPL-SCNC: 110 MMOL/L (ref 97–108)
CHLORIDE SERPL-SCNC: 111 MMOL/L (ref 97–108)
CHLORIDE SERPL-SCNC: 113 MMOL/L (ref 97–108)
CHLORIDE SERPL-SCNC: 114 MMOL/L (ref 97–108)
CHLORIDE SERPL-SCNC: 116 MMOL/L (ref 97–108)
CHLORIDE SERPL-SCNC: 116 MMOL/L (ref 97–108)
CHLORIDE SERPL-SCNC: 117 MMOL/L (ref 97–108)
CHLORIDE SERPL-SCNC: 117 MMOL/L (ref 97–108)
CO2 SERPL-SCNC: 10 MMOL/L (ref 21–32)
CO2 SERPL-SCNC: 17 MMOL/L (ref 21–32)
CO2 SERPL-SCNC: 19 MMOL/L (ref 21–32)
CO2 SERPL-SCNC: 21 MMOL/L (ref 21–32)
CO2 SERPL-SCNC: 21 MMOL/L (ref 21–32)
CO2 SERPL-SCNC: 22 MMOL/L (ref 21–32)
CO2 SERPL-SCNC: 23 MMOL/L (ref 21–32)
CO2 SERPL-SCNC: 23 MMOL/L (ref 21–32)
CO2 SERPL-SCNC: 24 MMOL/L (ref 21–32)
CO2 SERPL-SCNC: 26 MMOL/L (ref 21–32)
CO2 SERPL-SCNC: 26 MMOL/L (ref 21–32)
CO2 SERPL-SCNC: 28 MMOL/L (ref 21–32)
CO2 SERPL-SCNC: 28 MMOL/L (ref 21–32)
CO2 SERPL-SCNC: 29 MMOL/L (ref 21–32)
CO2 SERPL-SCNC: 31 MMOL/L (ref 21–32)
CO2 SERPL-SCNC: 31 MMOL/L (ref 21–32)
CO2 SERPL-SCNC: 32 MMOL/L (ref 21–32)
CO2 SERPL-SCNC: 33 MMOL/L (ref 21–32)
CO2 SERPL-SCNC: 34 MMOL/L (ref 21–32)
CO2 SERPL-SCNC: 35 MMOL/L (ref 21–32)
CO2 SERPL-SCNC: 35 MMOL/L (ref 21–32)
COLOR FLD: YELLOW
COLOR UR: ABNORMAL
COLOR UR: ABNORMAL
COLOR UR: NORMAL
COVID-19 RAPID TEST, COVR: DETECTED
CREAT SERPL-MCNC: 0.51 MG/DL (ref 0.55–1.02)
CREAT SERPL-MCNC: 0.56 MG/DL (ref 0.55–1.02)
CREAT SERPL-MCNC: 0.6 MG/DL (ref 0.55–1.02)
CREAT SERPL-MCNC: 0.67 MG/DL (ref 0.55–1.02)
CREAT SERPL-MCNC: 0.71 MG/DL (ref 0.55–1.02)
CREAT SERPL-MCNC: 0.73 MG/DL (ref 0.55–1.02)
CREAT SERPL-MCNC: 0.74 MG/DL (ref 0.55–1.02)
CREAT SERPL-MCNC: 0.8 MG/DL (ref 0.55–1.02)
CREAT SERPL-MCNC: 0.8 MG/DL (ref 0.55–1.02)
CREAT SERPL-MCNC: 0.83 MG/DL (ref 0.55–1.02)
CREAT SERPL-MCNC: 0.84 MG/DL (ref 0.55–1.02)
CREAT SERPL-MCNC: 0.85 MG/DL (ref 0.55–1.02)
CREAT SERPL-MCNC: 0.86 MG/DL (ref 0.55–1.02)
CREAT SERPL-MCNC: 0.92 MG/DL (ref 0.55–1.02)
CREAT SERPL-MCNC: 0.93 MG/DL (ref 0.55–1.02)
CREAT SERPL-MCNC: 1.03 MG/DL (ref 0.55–1.02)
CREAT SERPL-MCNC: 1.07 MG/DL (ref 0.55–1.02)
CREAT SERPL-MCNC: 1.77 MG/DL (ref 0.55–1.02)
CREAT SERPL-MCNC: 2.07 MG/DL (ref 0.55–1.02)
CREAT SERPL-MCNC: 2.26 MG/DL (ref 0.55–1.02)
CREAT SERPL-MCNC: 2.6 MG/DL (ref 0.55–1.02)
CREAT SERPL-MCNC: 3.08 MG/DL (ref 0.55–1.02)
CREAT SERPL-MCNC: 3.26 MG/DL (ref 0.55–1.02)
CREAT SERPL-MCNC: 3.42 MG/DL (ref 0.55–1.02)
CROSSMATCH RESULT,%XM: NORMAL
CRP SERPL-MCNC: 11.2 MG/DL (ref 0–0.6)
CRP SERPL-MCNC: 2.45 MG/DL (ref 0–0.6)
CRP SERPL-MCNC: 3.09 MG/DL (ref 0–0.6)
CRP SERPL-MCNC: 3.14 MG/DL (ref 0–0.6)
CRP SERPL-MCNC: 3.21 MG/DL (ref 0–0.6)
CRP SERPL-MCNC: 3.42 MG/DL (ref 0–0.6)
CRP SERPL-MCNC: 3.79 MG/DL (ref 0–0.6)
CRP SERPL-MCNC: 5.78 MG/DL (ref 0–0.6)
CRP SERPL-MCNC: 6.48 MG/DL (ref 0–0.6)
CRP SERPL-MCNC: 7.4 MG/DL (ref 0–0.6)
CRP SERPL-MCNC: 8.13 MG/DL (ref 0–0.6)
CRP SERPL-MCNC: 8.24 MG/DL (ref 0–0.6)
CRP SERPL-MCNC: 8.24 MG/DL (ref 0–0.6)
CRP SERPL-MCNC: 8.65 MG/DL (ref 0–0.6)
CRP SERPL-MCNC: 8.81 MG/DL (ref 0–0.6)
CRP SERPL-MCNC: 9.34 MG/DL (ref 0–0.6)
D DIMER PPP FEU-MCNC: 8.87 UG/ML(FEU)
DATE LAST DOSE: ABNORMAL
DATE LAST DOSE: ABNORMAL
DATE LAST DOSE: NORMAL
DIAGNOSIS, 93000: NORMAL
DIAGNOSIS, 93000: NORMAL
DIFFERENTIAL METHOD BLD: ABNORMAL
ECHO AO ASC DIAM: 3 CM
ECHO AO ASCENDING AORTA INDEX: 2.21 CM/M2
ECHO AO DESC DIAM: 2.2 CM
ECHO AO DESCENDING AORTA INDEX: 1.62 CM/M2
ECHO AO ROOT DIAM: 3.1 CM
ECHO AO ROOT INDEX: 2.28 CM/M2
ECHO AR MAX VEL PISA: 3.1 M/S
ECHO AV AREA PEAK VELOCITY: 1.8 CM2
ECHO AV AREA/BSA PEAK VELOCITY: 1.3 CM2/M2
ECHO AV MEAN GRADIENT: 13 MMHG
ECHO AV MEAN VELOCITY: 1.7 M/S
ECHO AV PEAK GRADIENT: 4 MMHG
ECHO AV PEAK VELOCITY: 1 M/S
ECHO AV REGURGITANT PHT: 613 MS
ECHO AV VELOCITY RATIO: 0.9
ECHO EST RA PRESSURE: 15 MMHG
ECHO LA AREA 4C: 21.3 CM2
ECHO LA DIAMETER INDEX: 3.09 CM/M2
ECHO LA DIAMETER: 4.2 CM
ECHO LA MAJOR AXIS: 5.7 CM
ECHO LA TO AORTIC ROOT RATIO: 1.35
ECHO LV E' LATERAL VELOCITY: 8 CM/S
ECHO LV E' SEPTAL VELOCITY: 4 CM/S
ECHO LV FRACTIONAL SHORTENING: 37 % (ref 28–44)
ECHO LV INTERNAL DIMENSION DIASTOLE INDEX: 2.21 CM/M2
ECHO LV INTERNAL DIMENSION DIASTOLIC: 3 CM (ref 3.9–5.3)
ECHO LV INTERNAL DIMENSION SYSTOLIC INDEX: 1.4 CM/M2
ECHO LV INTERNAL DIMENSION SYSTOLIC: 1.9 CM
ECHO LV IVSD: 1.3 CM (ref 0.6–0.9)
ECHO LV MASS 2D: 95.1 G (ref 67–162)
ECHO LV MASS INDEX 2D: 69.9 G/M2 (ref 43–95)
ECHO LV POSTERIOR WALL DIASTOLIC: 0.9 CM (ref 0.6–0.9)
ECHO LV RELATIVE WALL THICKNESS RATIO: 0.6
ECHO LVOT AREA: 2 CM2
ECHO LVOT DIAM: 1.6 CM
ECHO LVOT MEAN GRADIENT: 1 MMHG
ECHO LVOT PEAK GRADIENT: 3 MMHG
ECHO LVOT PEAK VELOCITY: 0.9 M/S
ECHO LVOT STROKE VOLUME INDEX: 24.7 ML/M2
ECHO LVOT SV: 33.6 ML
ECHO LVOT VTI: 16.7 CM
ECHO MV A VELOCITY: 0.33 M/S
ECHO MV AREA VTI: 1.5 CM2
ECHO MV E DECELERATION TIME (DT): 169 MS
ECHO MV E VELOCITY: 1.04 M/S
ECHO MV E/A RATIO: 3.15
ECHO MV E/E' LATERAL: 13
ECHO MV E/E' RATIO (AVERAGED): 19.5
ECHO MV E/E' SEPTAL: 26
ECHO MV LVOT VTI INDEX: 1.38
ECHO MV MAX VELOCITY: 1.2 M/S
ECHO MV MEAN GRADIENT: 2 MMHG
ECHO MV MEAN VELOCITY: 0.5 M/S
ECHO MV PEAK GRADIENT: 6 MMHG
ECHO MV REGURGITANT PEAK GRADIENT: 81 MMHG
ECHO MV REGURGITANT PEAK VELOCITY: 4.5 M/S
ECHO MV REGURGITANT VTIA: 126 CM
ECHO MV VTI: 23.1 CM
ECHO PULMONARY ARTERY END DIASTOLIC PRESSURE: 4 MMHG
ECHO PV REGURGITANT MAX VELOCITY: 1.1 M/S
ECHO RIGHT VENTRICULAR SYSTOLIC PRESSURE (RVSP): 60 MMHG
ECHO RV TAPSE: 1.3 CM (ref 1.5–2)
ECHO TV REGURGITANT MAX VELOCITY: 3.35 M/S
ECHO TV REGURGITANT PEAK GRADIENT: 45 MMHG
EOSINOPHIL # BLD: 0 K/UL (ref 0–0.4)
EOSINOPHIL # BLD: 0.1 K/UL (ref 0–0.4)
EOSINOPHIL # BLD: 0.2 K/UL (ref 0–0.4)
EOSINOPHIL # BLD: 0.2 K/UL (ref 0–0.4)
EOSINOPHIL # BLD: 0.4 K/UL (ref 0–0.4)
EOSINOPHIL # BLD: 0.5 K/UL (ref 0–0.4)
EOSINOPHIL # BLD: 0.5 K/UL (ref 0–0.4)
EOSINOPHIL NFR BLD: 0 % (ref 0–7)
EOSINOPHIL NFR BLD: 1 % (ref 0–7)
EOSINOPHIL NFR BLD: 2 % (ref 0–7)
EOSINOPHIL NFR BLD: 4 % (ref 0–7)
EOSINOPHIL NFR BLD: 5 % (ref 0–7)
EOSINOPHIL NFR BLD: 5 % (ref 0–7)
EPAP/CPAP/PEEP, PAPEEP: 7
ERYTHROCYTE [DISTWIDTH] IN BLOOD BY AUTOMATED COUNT: 18.2 % (ref 11.5–14.5)
ERYTHROCYTE [DISTWIDTH] IN BLOOD BY AUTOMATED COUNT: 18.3 % (ref 11.5–14.5)
ERYTHROCYTE [DISTWIDTH] IN BLOOD BY AUTOMATED COUNT: 18.5 % (ref 11.5–14.5)
ERYTHROCYTE [DISTWIDTH] IN BLOOD BY AUTOMATED COUNT: 19.6 % (ref 11.5–14.5)
ERYTHROCYTE [DISTWIDTH] IN BLOOD BY AUTOMATED COUNT: 19.7 % (ref 11.5–14.5)
ERYTHROCYTE [DISTWIDTH] IN BLOOD BY AUTOMATED COUNT: 20.3 % (ref 11.5–14.5)
ERYTHROCYTE [DISTWIDTH] IN BLOOD BY AUTOMATED COUNT: 20.5 % (ref 11.5–14.5)
ERYTHROCYTE [DISTWIDTH] IN BLOOD BY AUTOMATED COUNT: 20.7 % (ref 11.5–14.5)
ERYTHROCYTE [DISTWIDTH] IN BLOOD BY AUTOMATED COUNT: 20.8 % (ref 11.5–14.5)
ERYTHROCYTE [DISTWIDTH] IN BLOOD BY AUTOMATED COUNT: 20.9 % (ref 11.5–14.5)
ERYTHROCYTE [DISTWIDTH] IN BLOOD BY AUTOMATED COUNT: 21 % (ref 11.5–14.5)
ERYTHROCYTE [DISTWIDTH] IN BLOOD BY AUTOMATED COUNT: 21.2 % (ref 11.5–14.5)
ERYTHROCYTE [DISTWIDTH] IN BLOOD BY AUTOMATED COUNT: 21.4 % (ref 11.5–14.5)
ERYTHROCYTE [DISTWIDTH] IN BLOOD BY AUTOMATED COUNT: 21.4 % (ref 11.5–14.5)
ERYTHROCYTE [DISTWIDTH] IN BLOOD BY AUTOMATED COUNT: 22.4 % (ref 11.5–14.5)
ERYTHROCYTE [DISTWIDTH] IN BLOOD BY AUTOMATED COUNT: 22.5 % (ref 11.5–14.5)
ERYTHROCYTE [DISTWIDTH] IN BLOOD BY AUTOMATED COUNT: 22.6 % (ref 11.5–14.5)
EST. AVERAGE GLUCOSE BLD GHB EST-MCNC: 146 MG/DL
FERRITIN SERPL-MCNC: 108 NG/ML (ref 8–252)
FERRITIN SERPL-MCNC: 205 NG/ML (ref 8–252)
FERRITIN SERPL-MCNC: 229 NG/ML (ref 8–252)
FERRITIN SERPL-MCNC: 263 NG/ML (ref 8–252)
FIO2 ON VENT: 100 %
FLUAV AG NPH QL IA: NEGATIVE
FLUBV AG NOSE QL IA: NEGATIVE
FOLATE SERPL-MCNC: 29.7 NG/ML (ref 5–21)
GAS FLOW.O2 O2 DELIVERY SYS: 6 L/MIN
GAS FLOW.O2 SETTING OXYMISER: 12 L/MIN
GLOBULIN SER CALC-MCNC: 3.8 G/DL (ref 2–4)
GLOBULIN SER CALC-MCNC: 3.9 G/DL (ref 2–4)
GLOBULIN SER CALC-MCNC: 4 G/DL (ref 2–4)
GLOBULIN SER CALC-MCNC: 4.3 G/DL (ref 2–4)
GLOBULIN SER CALC-MCNC: 4.6 G/DL (ref 2–4)
GLOBULIN SER CALC-MCNC: 4.6 G/DL (ref 2–4)
GLUCOSE BLD STRIP.AUTO-MCNC: 101 MG/DL (ref 65–117)
GLUCOSE BLD STRIP.AUTO-MCNC: 103 MG/DL (ref 65–117)
GLUCOSE BLD STRIP.AUTO-MCNC: 105 MG/DL (ref 65–117)
GLUCOSE BLD STRIP.AUTO-MCNC: 107 MG/DL (ref 65–117)
GLUCOSE BLD STRIP.AUTO-MCNC: 117 MG/DL (ref 65–117)
GLUCOSE BLD STRIP.AUTO-MCNC: 119 MG/DL (ref 65–117)
GLUCOSE BLD STRIP.AUTO-MCNC: 121 MG/DL (ref 65–117)
GLUCOSE BLD STRIP.AUTO-MCNC: 125 MG/DL (ref 65–117)
GLUCOSE BLD STRIP.AUTO-MCNC: 125 MG/DL (ref 65–117)
GLUCOSE BLD STRIP.AUTO-MCNC: 128 MG/DL (ref 65–117)
GLUCOSE BLD STRIP.AUTO-MCNC: 134 MG/DL (ref 65–117)
GLUCOSE BLD STRIP.AUTO-MCNC: 135 MG/DL (ref 65–117)
GLUCOSE BLD STRIP.AUTO-MCNC: 137 MG/DL (ref 65–117)
GLUCOSE BLD STRIP.AUTO-MCNC: 144 MG/DL (ref 65–117)
GLUCOSE BLD STRIP.AUTO-MCNC: 144 MG/DL (ref 65–117)
GLUCOSE BLD STRIP.AUTO-MCNC: 146 MG/DL (ref 65–117)
GLUCOSE BLD STRIP.AUTO-MCNC: 147 MG/DL (ref 65–117)
GLUCOSE BLD STRIP.AUTO-MCNC: 150 MG/DL (ref 65–117)
GLUCOSE BLD STRIP.AUTO-MCNC: 170 MG/DL (ref 65–117)
GLUCOSE BLD STRIP.AUTO-MCNC: 170 MG/DL (ref 65–117)
GLUCOSE BLD STRIP.AUTO-MCNC: 171 MG/DL (ref 65–117)
GLUCOSE BLD STRIP.AUTO-MCNC: 173 MG/DL (ref 65–117)
GLUCOSE BLD STRIP.AUTO-MCNC: 180 MG/DL (ref 65–117)
GLUCOSE BLD STRIP.AUTO-MCNC: 19 MG/DL (ref 65–117)
GLUCOSE BLD STRIP.AUTO-MCNC: 199 MG/DL (ref 65–117)
GLUCOSE BLD STRIP.AUTO-MCNC: 200 MG/DL (ref 65–117)
GLUCOSE BLD STRIP.AUTO-MCNC: 205 MG/DL (ref 65–117)
GLUCOSE BLD STRIP.AUTO-MCNC: 207 MG/DL (ref 65–117)
GLUCOSE BLD STRIP.AUTO-MCNC: 207 MG/DL (ref 65–117)
GLUCOSE BLD STRIP.AUTO-MCNC: 21 MG/DL (ref 65–117)
GLUCOSE BLD STRIP.AUTO-MCNC: 21 MG/DL (ref 65–117)
GLUCOSE BLD STRIP.AUTO-MCNC: 217 MG/DL (ref 65–117)
GLUCOSE BLD STRIP.AUTO-MCNC: 232 MG/DL (ref 65–117)
GLUCOSE BLD STRIP.AUTO-MCNC: 238 MG/DL (ref 65–117)
GLUCOSE BLD STRIP.AUTO-MCNC: 249 MG/DL (ref 65–117)
GLUCOSE BLD STRIP.AUTO-MCNC: 290 MG/DL (ref 65–117)
GLUCOSE BLD STRIP.AUTO-MCNC: 316 MG/DL (ref 65–117)
GLUCOSE BLD STRIP.AUTO-MCNC: 36 MG/DL (ref 65–117)
GLUCOSE BLD STRIP.AUTO-MCNC: 361 MG/DL (ref 65–117)
GLUCOSE BLD STRIP.AUTO-MCNC: 43 MG/DL (ref 65–117)
GLUCOSE BLD STRIP.AUTO-MCNC: 48 MG/DL (ref 65–117)
GLUCOSE BLD STRIP.AUTO-MCNC: 51 MG/DL (ref 65–117)
GLUCOSE BLD STRIP.AUTO-MCNC: 53 MG/DL (ref 65–117)
GLUCOSE BLD STRIP.AUTO-MCNC: 58 MG/DL (ref 65–117)
GLUCOSE BLD STRIP.AUTO-MCNC: 75 MG/DL (ref 65–117)
GLUCOSE BLD STRIP.AUTO-MCNC: 78 MG/DL (ref 65–117)
GLUCOSE BLD STRIP.AUTO-MCNC: 82 MG/DL (ref 65–117)
GLUCOSE BLD STRIP.AUTO-MCNC: 84 MG/DL (ref 65–117)
GLUCOSE BLD STRIP.AUTO-MCNC: 87 MG/DL (ref 65–117)
GLUCOSE BLD STRIP.AUTO-MCNC: 88 MG/DL (ref 65–117)
GLUCOSE BLD STRIP.AUTO-MCNC: 90 MG/DL (ref 65–117)
GLUCOSE BLD STRIP.AUTO-MCNC: 92 MG/DL (ref 65–117)
GLUCOSE BLD STRIP.AUTO-MCNC: 97 MG/DL (ref 65–117)
GLUCOSE FLD-MCNC: 226 MG/DL
GLUCOSE SERPL-MCNC: 101 MG/DL (ref 65–100)
GLUCOSE SERPL-MCNC: 108 MG/DL (ref 65–100)
GLUCOSE SERPL-MCNC: 110 MG/DL (ref 65–100)
GLUCOSE SERPL-MCNC: 113 MG/DL (ref 65–100)
GLUCOSE SERPL-MCNC: 125 MG/DL (ref 65–100)
GLUCOSE SERPL-MCNC: 135 MG/DL (ref 65–100)
GLUCOSE SERPL-MCNC: 139 MG/DL (ref 65–100)
GLUCOSE SERPL-MCNC: 140 MG/DL (ref 65–100)
GLUCOSE SERPL-MCNC: 17 MG/DL (ref 65–100)
GLUCOSE SERPL-MCNC: 174 MG/DL (ref 65–100)
GLUCOSE SERPL-MCNC: 177 MG/DL (ref 65–100)
GLUCOSE SERPL-MCNC: 181 MG/DL (ref 65–100)
GLUCOSE SERPL-MCNC: 188 MG/DL (ref 65–100)
GLUCOSE SERPL-MCNC: 204 MG/DL (ref 65–100)
GLUCOSE SERPL-MCNC: 210 MG/DL (ref 65–100)
GLUCOSE SERPL-MCNC: 239 MG/DL (ref 65–100)
GLUCOSE SERPL-MCNC: 333 MG/DL (ref 65–100)
GLUCOSE SERPL-MCNC: 334 MG/DL (ref 65–100)
GLUCOSE SERPL-MCNC: 5 MG/DL (ref 65–100)
GLUCOSE SERPL-MCNC: 66 MG/DL (ref 65–100)
GLUCOSE SERPL-MCNC: 68 MG/DL (ref 65–100)
GLUCOSE SERPL-MCNC: 74 MG/DL (ref 65–100)
GLUCOSE SERPL-MCNC: 98 MG/DL (ref 65–100)
GLUCOSE UR STRIP.AUTO-MCNC: NEGATIVE MG/DL
GRAM STN SPEC: NORMAL
HBA1C MFR BLD: 6.7 % (ref 4–5.6)
HCO3 BLDA-SCNC: 15 MMOL/L (ref 22–26)
HCO3 BLDA-SCNC: 32 MMOL/L (ref 22–26)
HCT VFR BLD AUTO: 22.4 % (ref 35–47)
HCT VFR BLD AUTO: 22.7 % (ref 35–47)
HCT VFR BLD AUTO: 22.8 % (ref 35–47)
HCT VFR BLD AUTO: 24.1 % (ref 35–47)
HCT VFR BLD AUTO: 24.9 % (ref 35–47)
HCT VFR BLD AUTO: 25 % (ref 35–47)
HCT VFR BLD AUTO: 26.2 % (ref 35–47)
HCT VFR BLD AUTO: 26.9 % (ref 35–47)
HCT VFR BLD AUTO: 27.8 % (ref 35–47)
HCT VFR BLD AUTO: 27.9 % (ref 35–47)
HCT VFR BLD AUTO: 28 % (ref 35–47)
HCT VFR BLD AUTO: 28.3 % (ref 35–47)
HCT VFR BLD AUTO: 28.7 % (ref 35–47)
HCT VFR BLD AUTO: 29.6 % (ref 35–47)
HCT VFR BLD AUTO: 29.9 % (ref 35–47)
HCT VFR BLD AUTO: 30 % (ref 35–47)
HCT VFR BLD AUTO: 30.6 % (ref 35–47)
HCT VFR BLD AUTO: 34.7 % (ref 35–47)
HCT VFR BLD AUTO: 35.4 % (ref 35–47)
HCT VFR BLD AUTO: 36.2 % (ref 35–47)
HGB BLD-MCNC: 11.1 G/DL (ref 11.5–16)
HGB BLD-MCNC: 11.1 G/DL (ref 11.5–16)
HGB BLD-MCNC: 11.7 G/DL (ref 11.5–16)
HGB BLD-MCNC: 6.5 G/DL (ref 11.5–16)
HGB BLD-MCNC: 6.6 G/DL (ref 11.5–16)
HGB BLD-MCNC: 6.7 G/DL (ref 11.5–16)
HGB BLD-MCNC: 7.2 G/DL (ref 11.5–16)
HGB BLD-MCNC: 7.5 G/DL (ref 11.5–16)
HGB BLD-MCNC: 7.5 G/DL (ref 11.5–16)
HGB BLD-MCNC: 7.8 G/DL (ref 11.5–16)
HGB BLD-MCNC: 8 G/DL (ref 11.5–16)
HGB BLD-MCNC: 8.4 G/DL (ref 11.5–16)
HGB BLD-MCNC: 8.4 G/DL (ref 11.5–16)
HGB BLD-MCNC: 8.5 G/DL (ref 11.5–16)
HGB BLD-MCNC: 8.7 G/DL (ref 11.5–16)
HGB BLD-MCNC: 8.8 G/DL (ref 11.5–16)
HGB BLD-MCNC: 8.9 G/DL (ref 11.5–16)
HGB BLD-MCNC: 9.4 G/DL (ref 11.5–16)
HGB BLD-MCNC: 9.4 G/DL (ref 11.5–16)
HGB BLD-MCNC: 9.6 G/DL (ref 11.5–16)
HGB UR QL STRIP: ABNORMAL
HGB UR QL STRIP: NEGATIVE
HGB UR QL STRIP: NEGATIVE
IMM GRANULOCYTES # BLD AUTO: 0 K/UL
IMM GRANULOCYTES # BLD AUTO: 0 K/UL (ref 0–0.04)
IMM GRANULOCYTES # BLD AUTO: 0.1 K/UL (ref 0–0.04)
IMM GRANULOCYTES # BLD AUTO: 0.2 K/UL (ref 0–0.04)
IMM GRANULOCYTES # BLD AUTO: 0.4 K/UL (ref 0–0.04)
IMM GRANULOCYTES # BLD AUTO: 0.5 K/UL (ref 0–0.04)
IMM GRANULOCYTES NFR BLD AUTO: 0 %
IMM GRANULOCYTES NFR BLD AUTO: 0 % (ref 0–0.5)
IMM GRANULOCYTES NFR BLD AUTO: 1 % (ref 0–0.5)
IMM GRANULOCYTES NFR BLD AUTO: 2 % (ref 0–0.5)
IRON SATN MFR SERPL: 17 % (ref 20–50)
IRON SERPL-MCNC: 33 UG/DL (ref 35–150)
KETONES UR QL STRIP.AUTO: 5 MG/DL
KETONES UR QL STRIP.AUTO: NEGATIVE MG/DL
KETONES UR QL STRIP.AUTO: NEGATIVE MG/DL
LACTATE SERPL-SCNC: 1.4 MMOL/L (ref 0.4–2)
LACTATE SERPL-SCNC: 11.4 MMOL/L (ref 0.4–2)
LACTATE SERPL-SCNC: 3.1 MMOL/L (ref 0.4–2)
LACTATE SERPL-SCNC: 3.6 MMOL/L (ref 0.4–2)
LACTATE SERPL-SCNC: 5.2 MMOL/L (ref 0.4–2)
LDH FLD L TO P-CCNC: 99 U/L
LDH SERPL L TO P-CCNC: 222 U/L (ref 81–246)
LDH SERPL L TO P-CCNC: 261 U/L (ref 81–246)
LDH SERPL L TO P-CCNC: 309 U/L (ref 81–246)
LDH SERPL L TO P-CCNC: 310 U/L (ref 81–246)
LDH SERPL L TO P-CCNC: 331 U/L (ref 81–246)
LEUKOCYTE ESTERASE UR QL STRIP.AUTO: ABNORMAL
LEUKOCYTE ESTERASE UR QL STRIP.AUTO: NEGATIVE
LEUKOCYTE ESTERASE UR QL STRIP.AUTO: NEGATIVE
LYMPHOCYTES # BLD: 0.5 K/UL (ref 0.8–3.5)
LYMPHOCYTES # BLD: 0.5 K/UL (ref 0.8–3.5)
LYMPHOCYTES # BLD: 0.6 K/UL (ref 0.8–3.5)
LYMPHOCYTES # BLD: 0.7 K/UL (ref 0.8–3.5)
LYMPHOCYTES # BLD: 0.7 K/UL (ref 0.8–3.5)
LYMPHOCYTES # BLD: 0.8 K/UL (ref 0.8–3.5)
LYMPHOCYTES # BLD: 0.9 K/UL (ref 0.8–3.5)
LYMPHOCYTES # BLD: 0.9 K/UL (ref 0.8–3.5)
LYMPHOCYTES # BLD: 1 K/UL (ref 0.8–3.5)
LYMPHOCYTES # BLD: 1 K/UL (ref 0.8–3.5)
LYMPHOCYTES # BLD: 1.2 K/UL (ref 0.8–3.5)
LYMPHOCYTES # BLD: 1.2 K/UL (ref 0.8–3.5)
LYMPHOCYTES # BLD: 1.6 K/UL (ref 0.8–3.5)
LYMPHOCYTES # BLD: 1.6 K/UL (ref 0.8–3.5)
LYMPHOCYTES # BLD: 2 K/UL (ref 0.8–3.5)
LYMPHOCYTES NFR BLD: 10 % (ref 12–49)
LYMPHOCYTES NFR BLD: 11 % (ref 12–49)
LYMPHOCYTES NFR BLD: 12 % (ref 12–49)
LYMPHOCYTES NFR BLD: 18 % (ref 12–49)
LYMPHOCYTES NFR BLD: 2 % (ref 12–49)
LYMPHOCYTES NFR BLD: 23 % (ref 12–49)
LYMPHOCYTES NFR BLD: 3 % (ref 12–49)
LYMPHOCYTES NFR BLD: 3 % (ref 12–49)
LYMPHOCYTES NFR BLD: 4 % (ref 12–49)
LYMPHOCYTES NFR BLD: 4 % (ref 12–49)
LYMPHOCYTES NFR BLD: 5 % (ref 12–49)
LYMPHOCYTES NFR BLD: 6 % (ref 12–49)
LYMPHOCYTES NFR BLD: 7 % (ref 12–49)
LYMPHOCYTES NFR BLD: 8 % (ref 12–49)
LYMPHOCYTES NFR BLD: 9 % (ref 12–49)
LYMPHOCYTES NFR FLD: 17 %
MAGNESIUM SERPL-MCNC: 1.7 MG/DL (ref 1.6–2.4)
MAGNESIUM SERPL-MCNC: 1.8 MG/DL (ref 1.6–2.4)
MAGNESIUM SERPL-MCNC: 1.9 MG/DL (ref 1.6–2.4)
MAGNESIUM SERPL-MCNC: 2 MG/DL (ref 1.6–2.4)
MAGNESIUM SERPL-MCNC: 2.3 MG/DL (ref 1.6–2.4)
MAGNESIUM SERPL-MCNC: 3 MG/DL (ref 1.6–2.4)
MCH RBC QN AUTO: 28.6 PG (ref 26–34)
MCH RBC QN AUTO: 28.8 PG (ref 26–34)
MCH RBC QN AUTO: 28.8 PG (ref 26–34)
MCH RBC QN AUTO: 29 PG (ref 26–34)
MCH RBC QN AUTO: 29.1 PG (ref 26–34)
MCH RBC QN AUTO: 29.2 PG (ref 26–34)
MCH RBC QN AUTO: 29.3 PG (ref 26–34)
MCH RBC QN AUTO: 29.4 PG (ref 26–34)
MCH RBC QN AUTO: 29.4 PG (ref 26–34)
MCH RBC QN AUTO: 29.7 PG (ref 26–34)
MCH RBC QN AUTO: 29.9 PG (ref 26–34)
MCH RBC QN AUTO: 30.5 PG (ref 26–34)
MCHC RBC AUTO-ENTMCNC: 29 G/DL (ref 30–36.5)
MCHC RBC AUTO-ENTMCNC: 29.1 G/DL (ref 30–36.5)
MCHC RBC AUTO-ENTMCNC: 29.4 G/DL (ref 30–36.5)
MCHC RBC AUTO-ENTMCNC: 29.7 G/DL (ref 30–36.5)
MCHC RBC AUTO-ENTMCNC: 29.7 G/DL (ref 30–36.5)
MCHC RBC AUTO-ENTMCNC: 29.8 G/DL (ref 30–36.5)
MCHC RBC AUTO-ENTMCNC: 29.9 G/DL (ref 30–36.5)
MCHC RBC AUTO-ENTMCNC: 30 G/DL (ref 30–36.5)
MCHC RBC AUTO-ENTMCNC: 30 G/DL (ref 30–36.5)
MCHC RBC AUTO-ENTMCNC: 30.1 G/DL (ref 30–36.5)
MCHC RBC AUTO-ENTMCNC: 30.1 G/DL (ref 30–36.5)
MCHC RBC AUTO-ENTMCNC: 30.6 G/DL (ref 30–36.5)
MCHC RBC AUTO-ENTMCNC: 30.7 G/DL (ref 30–36.5)
MCHC RBC AUTO-ENTMCNC: 30.7 G/DL (ref 30–36.5)
MCHC RBC AUTO-ENTMCNC: 31.4 G/DL (ref 30–36.5)
MCHC RBC AUTO-ENTMCNC: 31.8 G/DL (ref 30–36.5)
MCHC RBC AUTO-ENTMCNC: 32 G/DL (ref 30–36.5)
MCHC RBC AUTO-ENTMCNC: 32.3 G/DL (ref 30–36.5)
MCV RBC AUTO: 100 FL (ref 80–99)
MCV RBC AUTO: 100.4 FL (ref 80–99)
MCV RBC AUTO: 100.7 FL (ref 80–99)
MCV RBC AUTO: 100.9 FL (ref 80–99)
MCV RBC AUTO: 102.7 FL (ref 80–99)
MCV RBC AUTO: 89.4 FL (ref 80–99)
MCV RBC AUTO: 90 FL (ref 80–99)
MCV RBC AUTO: 92.5 FL (ref 80–99)
MCV RBC AUTO: 92.6 FL (ref 80–99)
MCV RBC AUTO: 93 FL (ref 80–99)
MCV RBC AUTO: 93.2 FL (ref 80–99)
MCV RBC AUTO: 94.6 FL (ref 80–99)
MCV RBC AUTO: 95.3 FL (ref 80–99)
MCV RBC AUTO: 96.2 FL (ref 80–99)
MCV RBC AUTO: 96.5 FL (ref 80–99)
MCV RBC AUTO: 96.7 FL (ref 80–99)
MCV RBC AUTO: 96.9 FL (ref 80–99)
MCV RBC AUTO: 96.9 FL (ref 80–99)
MCV RBC AUTO: 97.2 FL (ref 80–99)
MCV RBC AUTO: 97.2 FL (ref 80–99)
MESOTHL CELL NFR FLD: 47 %
METAMYELOCYTES NFR BLD MANUAL: 2 %
MONOCYTES # BLD: 0.3 K/UL (ref 0–1)
MONOCYTES # BLD: 0.4 K/UL (ref 0–1)
MONOCYTES # BLD: 0.5 K/UL (ref 0–1)
MONOCYTES # BLD: 0.6 K/UL (ref 0–1)
MONOCYTES # BLD: 0.7 K/UL (ref 0–1)
MONOCYTES # BLD: 0.8 K/UL (ref 0–1)
MONOCYTES # BLD: 0.8 K/UL (ref 0–1)
MONOCYTES # BLD: 0.9 K/UL (ref 0–1)
MONOCYTES # BLD: 1 K/UL (ref 0–1)
MONOCYTES # BLD: 1 K/UL (ref 0–1)
MONOCYTES # BLD: 1.1 K/UL (ref 0–1)
MONOCYTES # BLD: 1.2 K/UL (ref 0–1)
MONOCYTES # BLD: 1.2 K/UL (ref 0–1)
MONOCYTES NFR BLD: 11 % (ref 5–13)
MONOCYTES NFR BLD: 13 % (ref 5–13)
MONOCYTES NFR BLD: 2 % (ref 5–13)
MONOCYTES NFR BLD: 2 % (ref 5–13)
MONOCYTES NFR BLD: 3 % (ref 5–13)
MONOCYTES NFR BLD: 3 % (ref 5–13)
MONOCYTES NFR BLD: 4 % (ref 5–13)
MONOCYTES NFR BLD: 5 % (ref 5–13)
MONOCYTES NFR BLD: 5 % (ref 5–13)
MONOCYTES NFR BLD: 6 % (ref 5–13)
MONOCYTES NFR BLD: 6 % (ref 5–13)
MONOCYTES NFR BLD: 7 % (ref 5–13)
MONOCYTES NFR BLD: 9 % (ref 5–13)
MONOCYTES NFR FLD: 5 %
MRSA DNA SPEC QL NAA+PROBE: DETECTED
MUCOUS THREADS URNS QL MICRO: ABNORMAL /LPF
NEUTROPHILS NFR FLD: 31 %
NEUTS SEG # BLD: 12.5 K/UL (ref 1.8–8)
NEUTS SEG # BLD: 13.1 K/UL (ref 1.8–8)
NEUTS SEG # BLD: 14.7 K/UL (ref 1.8–8)
NEUTS SEG # BLD: 15.1 K/UL (ref 1.8–8)
NEUTS SEG # BLD: 15.6 K/UL (ref 1.8–8)
NEUTS SEG # BLD: 16 K/UL (ref 1.8–8)
NEUTS SEG # BLD: 19.9 K/UL (ref 1.8–8)
NEUTS SEG # BLD: 20.9 K/UL (ref 1.8–8)
NEUTS SEG # BLD: 22.1 K/UL (ref 1.8–8)
NEUTS SEG # BLD: 5.3 K/UL (ref 1.8–8)
NEUTS SEG # BLD: 6.1 K/UL (ref 1.8–8)
NEUTS SEG # BLD: 7.3 K/UL (ref 1.8–8)
NEUTS SEG # BLD: 7.4 K/UL (ref 1.8–8)
NEUTS SEG # BLD: 8.3 K/UL (ref 1.8–8)
NEUTS SEG # BLD: 9.5 K/UL (ref 1.8–8)
NEUTS SEG # BLD: 9.5 K/UL (ref 1.8–8)
NEUTS SEG # BLD: 9.9 K/UL (ref 1.8–8)
NEUTS SEG NFR BLD: 60 % (ref 32–75)
NEUTS SEG NFR BLD: 68 % (ref 32–75)
NEUTS SEG NFR BLD: 76 % (ref 32–75)
NEUTS SEG NFR BLD: 76 % (ref 32–75)
NEUTS SEG NFR BLD: 80 % (ref 32–75)
NEUTS SEG NFR BLD: 82 % (ref 32–75)
NEUTS SEG NFR BLD: 82 % (ref 32–75)
NEUTS SEG NFR BLD: 87 % (ref 32–75)
NEUTS SEG NFR BLD: 88 % (ref 32–75)
NEUTS SEG NFR BLD: 90 % (ref 32–75)
NEUTS SEG NFR BLD: 90 % (ref 32–75)
NEUTS SEG NFR BLD: 91 % (ref 32–75)
NEUTS SEG NFR BLD: 92 % (ref 32–75)
NEUTS SEG NFR BLD: 93 % (ref 32–75)
NEUTS SEG NFR BLD: 95 % (ref 32–75)
NITRITE UR QL STRIP.AUTO: NEGATIVE
NRBC # BLD: 0 K/UL (ref 0–0.01)
NRBC # BLD: 0 K/UL (ref 0–0.01)
NRBC # BLD: 0.02 K/UL (ref 0–0.01)
NRBC # BLD: 0.03 K/UL (ref 0–0.01)
NRBC # BLD: 0.04 K/UL (ref 0–0.01)
NRBC # BLD: 0.05 K/UL (ref 0–0.01)
NRBC # BLD: 0.05 K/UL (ref 0–0.01)
NRBC # BLD: 0.06 K/UL (ref 0–0.01)
NRBC # BLD: 0.07 K/UL (ref 0–0.01)
NRBC # BLD: 0.15 K/UL (ref 0–0.01)
NRBC # BLD: 0.19 K/UL (ref 0–0.01)
NRBC # BLD: 0.34 K/UL (ref 0–0.01)
NRBC # BLD: 0.39 K/UL (ref 0–0.01)
NRBC # BLD: 0.82 K/UL (ref 0–0.01)
NRBC # BLD: 2.31 K/UL (ref 0–0.01)
NRBC # BLD: 2.66 K/UL
NRBC BLD MANUAL-RTO: 15 PER 100 WBC
NRBC BLD-RTO: 0 PER 100 WBC
NRBC BLD-RTO: 0 PER 100 WBC
NRBC BLD-RTO: 0.1 PER 100 WBC
NRBC BLD-RTO: 0.1 PER 100 WBC
NRBC BLD-RTO: 0.2 PER 100 WBC
NRBC BLD-RTO: 0.3 PER 100 WBC
NRBC BLD-RTO: 0.6 PER 100 WBC
NRBC BLD-RTO: 0.7 PER 100 WBC
NRBC BLD-RTO: 0.8 PER 100 WBC
NRBC BLD-RTO: 1.3 PER 100 WBC
NRBC BLD-RTO: 11.3 PER 100 WBC
NRBC BLD-RTO: 2.8 PER 100 WBC
NRBC BLD-RTO: 3.6 PER 100 WBC
NRBC BLD-RTO: 5.5 PER 100 WBC
NUC CELL # FLD: 66 /CU MM
NUC CELL # FLD: 66 /CU MM (ref 0–5)
PCO2 BLDA: 36 MMHG (ref 35–45)
PCO2 BLDA: 37 MMHG (ref 35–45)
PERFORMED BY, TECHID: ABNORMAL
PERFORMED BY, TECHID: NORMAL
PH BLDA: 7.23 [PH] (ref 7.35–7.45)
PH BLDA: 7.55 [PH] (ref 7.35–7.45)
PH UR STRIP: 5 [PH] (ref 5–8)
PH UR STRIP: 5 [PH] (ref 5–8)
PH UR STRIP: 6 [PH] (ref 5–8)
PHOSPHATE SERPL-MCNC: 3 MG/DL (ref 2.6–4.7)
PHOSPHATE SERPL-MCNC: 3.5 MG/DL (ref 2.6–4.7)
PHOSPHATE SERPL-MCNC: 5.6 MG/DL (ref 2.6–4.7)
PHOSPHATE SERPL-MCNC: 7 MG/DL (ref 2.6–4.7)
PLATELET # BLD AUTO: 123 K/UL (ref 150–400)
PLATELET # BLD AUTO: 128 K/UL (ref 150–400)
PLATELET # BLD AUTO: 129 K/UL (ref 150–400)
PLATELET # BLD AUTO: 131 K/UL (ref 150–400)
PLATELET # BLD AUTO: 135 K/UL (ref 150–400)
PLATELET # BLD AUTO: 142 K/UL (ref 150–400)
PLATELET # BLD AUTO: 145 K/UL (ref 150–400)
PLATELET # BLD AUTO: 157 K/UL (ref 150–400)
PLATELET # BLD AUTO: 179 K/UL (ref 150–400)
PLATELET # BLD AUTO: 187 K/UL (ref 150–400)
PLATELET # BLD AUTO: 222 K/UL (ref 150–400)
PLATELET # BLD AUTO: 265 K/UL (ref 150–400)
PLATELET # BLD AUTO: 281 K/UL (ref 150–400)
PLATELET # BLD AUTO: 309 K/UL (ref 150–400)
PLATELET # BLD AUTO: 313 K/UL (ref 150–400)
PLATELET # BLD AUTO: 331 K/UL (ref 150–400)
PLATELET # BLD AUTO: 333 K/UL (ref 150–400)
PLATELET # BLD AUTO: 339 K/UL (ref 150–400)
PLATELET # BLD AUTO: 341 K/UL (ref 150–400)
PLATELET # BLD AUTO: 349 K/UL (ref 150–400)
PMV BLD AUTO: 10 FL (ref 8.9–12.9)
PMV BLD AUTO: 10.3 FL (ref 8.9–12.9)
PMV BLD AUTO: 10.3 FL (ref 8.9–12.9)
PMV BLD AUTO: 10.4 FL (ref 8.9–12.9)
PMV BLD AUTO: 10.5 FL (ref 8.9–12.9)
PMV BLD AUTO: 10.5 FL (ref 8.9–12.9)
PMV BLD AUTO: 10.6 FL (ref 8.9–12.9)
PMV BLD AUTO: 10.7 FL (ref 8.9–12.9)
PMV BLD AUTO: 10.7 FL (ref 8.9–12.9)
PMV BLD AUTO: 10.9 FL (ref 8.9–12.9)
PMV BLD AUTO: 11.3 FL (ref 8.9–12.9)
PMV BLD AUTO: 12.1 FL (ref 8.9–12.9)
PMV BLD AUTO: 12.3 FL (ref 8.9–12.9)
PMV BLD AUTO: 12.7 FL (ref 8.9–12.9)
PMV BLD AUTO: 13 FL (ref 8.9–12.9)
PMV BLD AUTO: 9.5 FL (ref 8.9–12.9)
PMV BLD AUTO: 9.5 FL (ref 8.9–12.9)
PMV BLD AUTO: 9.7 FL (ref 8.9–12.9)
PMV BLD AUTO: 9.9 FL (ref 8.9–12.9)
PO2 BLDA: 126 MMHG (ref 75–100)
PO2 BLDA: 97 MMHG (ref 75–100)
POTASSIUM SERPL-SCNC: 2.9 MMOL/L (ref 3.5–5.1)
POTASSIUM SERPL-SCNC: 3.1 MMOL/L (ref 3.5–5.1)
POTASSIUM SERPL-SCNC: 3.4 MMOL/L (ref 3.5–5.1)
POTASSIUM SERPL-SCNC: 3.6 MMOL/L (ref 3.5–5.1)
POTASSIUM SERPL-SCNC: 3.6 MMOL/L (ref 3.5–5.1)
POTASSIUM SERPL-SCNC: 3.7 MMOL/L (ref 3.5–5.1)
POTASSIUM SERPL-SCNC: 3.7 MMOL/L (ref 3.5–5.1)
POTASSIUM SERPL-SCNC: 3.9 MMOL/L (ref 3.5–5.1)
POTASSIUM SERPL-SCNC: 4 MMOL/L (ref 3.5–5.1)
POTASSIUM SERPL-SCNC: 4 MMOL/L (ref 3.5–5.1)
POTASSIUM SERPL-SCNC: 4.1 MMOL/L (ref 3.5–5.1)
POTASSIUM SERPL-SCNC: 4.1 MMOL/L (ref 3.5–5.1)
POTASSIUM SERPL-SCNC: 4.2 MMOL/L (ref 3.5–5.1)
POTASSIUM SERPL-SCNC: 4.2 MMOL/L (ref 3.5–5.1)
POTASSIUM SERPL-SCNC: 4.3 MMOL/L (ref 3.5–5.1)
POTASSIUM SERPL-SCNC: 4.4 MMOL/L (ref 3.5–5.1)
POTASSIUM SERPL-SCNC: 4.6 MMOL/L (ref 3.5–5.1)
POTASSIUM SERPL-SCNC: 4.6 MMOL/L (ref 3.5–5.1)
POTASSIUM SERPL-SCNC: 5.2 MMOL/L (ref 3.5–5.1)
POTASSIUM SERPL-SCNC: 5.2 MMOL/L (ref 3.5–5.1)
POTASSIUM SERPL-SCNC: 5.3 MMOL/L (ref 3.5–5.1)
PROCALCITONIN SERPL-MCNC: 0.15 NG/ML
PROCALCITONIN SERPL-MCNC: 0.15 NG/ML
PROCALCITONIN SERPL-MCNC: 0.22 NG/ML
PROCALCITONIN SERPL-MCNC: 0.35 NG/ML
PROCALCITONIN SERPL-MCNC: 0.38 NG/ML
PROCALCITONIN SERPL-MCNC: 0.44 NG/ML
PROCALCITONIN SERPL-MCNC: 0.87 NG/ML
PROCALCITONIN SERPL-MCNC: 1.1 NG/ML
PROCALCITONIN SERPL-MCNC: 1.35 NG/ML
PROCALCITONIN SERPL-MCNC: 3.57 NG/ML
PROT FLD-MCNC: 1.9 G/DL
PROT SERPL-MCNC: 5.9 G/DL (ref 6.4–8.2)
PROT SERPL-MCNC: 6 G/DL (ref 6.4–8.2)
PROT SERPL-MCNC: 6.2 G/DL (ref 6.4–8.2)
PROT SERPL-MCNC: 6.3 G/DL (ref 6.4–8.2)
PROT SERPL-MCNC: 6.5 G/DL (ref 6.4–8.2)
PROT SERPL-MCNC: 6.6 G/DL (ref 6.4–8.2)
PROT UR STRIP-MCNC: 30 MG/DL
PROT UR STRIP-MCNC: >300 MG/DL
PROT UR STRIP-MCNC: NEGATIVE MG/DL
Q-T INTERVAL, ECG07: 512 MS
Q-T INTERVAL, ECG07: 520 MS
QRS DURATION, ECG06: 144 MS
QRS DURATION, ECG06: 150 MS
QTC CALCULATION (BEZET), ECG08: 512 MS
QTC CALCULATION (BEZET), ECG08: 520 MS
RBC # BLD AUTO: 2.22 M/UL (ref 3.8–5.2)
RBC # BLD AUTO: 2.26 M/UL (ref 3.8–5.2)
RBC # BLD AUTO: 2.28 M/UL (ref 3.8–5.2)
RBC # BLD AUTO: 2.48 M/UL (ref 3.8–5.2)
RBC # BLD AUTO: 2.57 M/UL (ref 3.8–5.2)
RBC # BLD AUTO: 2.6 M/UL (ref 3.8–5.2)
RBC # BLD AUTO: 2.62 M/UL (ref 3.8–5.2)
RBC # BLD AUTO: 2.71 M/UL (ref 3.8–5.2)
RBC # BLD AUTO: 2.86 M/UL (ref 3.8–5.2)
RBC # BLD AUTO: 2.89 M/UL (ref 3.8–5.2)
RBC # BLD AUTO: 2.89 M/UL (ref 3.8–5.2)
RBC # BLD AUTO: 2.98 M/UL (ref 3.8–5.2)
RBC # BLD AUTO: 2.99 M/UL (ref 3.8–5.2)
RBC # BLD AUTO: 3.01 M/UL (ref 3.8–5.2)
RBC # BLD AUTO: 3.2 M/UL (ref 3.8–5.2)
RBC # BLD AUTO: 3.23 M/UL (ref 3.8–5.2)
RBC # BLD AUTO: 3.29 M/UL (ref 3.8–5.2)
RBC # BLD AUTO: 3.8 M/UL (ref 3.8–5.2)
RBC # BLD AUTO: 3.88 M/UL (ref 3.8–5.2)
RBC # BLD AUTO: 4.02 M/UL (ref 3.8–5.2)
RBC # FLD: 1000 /CU MM
RBC #/AREA URNS HPF: >100 /HPF (ref 0–5)
RBC #/AREA URNS HPF: >100 /HPF (ref 0–5)
RBC #/AREA URNS HPF: ABNORMAL /HPF (ref 0–5)
RBC MORPH BLD: ABNORMAL
RBC MORPH BLD: ABNORMAL
REPORTED DOSE,DOSE: ABNORMAL UNITS
REPORTED DOSE,DOSE: ABNORMAL UNITS
REPORTED DOSE,DOSE: NORMAL UNITS
SAO2 % BLD: 97 %
SAO2 % BLD: >100 %
SAO2% DEVICE SAO2% SENSOR NAME: ABNORMAL
SAO2% DEVICE SAO2% SENSOR NAME: ABNORMAL
SERVICE CMNT-IMP: ABNORMAL
SODIUM SERPL-SCNC: 135 MMOL/L (ref 136–145)
SODIUM SERPL-SCNC: 136 MMOL/L (ref 136–145)
SODIUM SERPL-SCNC: 136 MMOL/L (ref 136–145)
SODIUM SERPL-SCNC: 138 MMOL/L (ref 136–145)
SODIUM SERPL-SCNC: 139 MMOL/L (ref 136–145)
SODIUM SERPL-SCNC: 140 MMOL/L (ref 136–145)
SODIUM SERPL-SCNC: 143 MMOL/L (ref 136–145)
SODIUM SERPL-SCNC: 144 MMOL/L (ref 136–145)
SODIUM SERPL-SCNC: 145 MMOL/L (ref 136–145)
SODIUM SERPL-SCNC: 145 MMOL/L (ref 136–145)
SODIUM SERPL-SCNC: 147 MMOL/L (ref 136–145)
SODIUM SERPL-SCNC: 148 MMOL/L (ref 136–145)
SODIUM SERPL-SCNC: 150 MMOL/L (ref 136–145)
SODIUM SERPL-SCNC: 151 MMOL/L (ref 136–145)
SODIUM SERPL-SCNC: 151 MMOL/L (ref 136–145)
SODIUM SERPL-SCNC: 153 MMOL/L (ref 136–145)
SP GR UR REFRACTOMETRY: 1.01 (ref 1–1.03)
SP GR UR REFRACTOMETRY: 1.02 (ref 1–1.03)
SP GR UR REFRACTOMETRY: 1.02 (ref 1–1.03)
SPECIAL REQUESTS,SREQ: ABNORMAL
SPECIAL REQUESTS,SREQ: NORMAL
SPECIMEN EXP DATE BLD: NORMAL
SPECIMEN SOURCE FLD: ABNORMAL
SPECIMEN SOURCE FLD: NORMAL
SPECIMEN SOURCE: ABNORMAL
STATUS OF UNIT,%ST: NORMAL
TIBC SERPL-MCNC: 192 UG/DL (ref 250–450)
TRANSFUSION STATUS PATIENT QL: NORMAL
TROPONIN-HIGH SENSITIVITY: 69 NG/L (ref 0–51)
TSH SERPL DL<=0.05 MIU/L-ACNC: 6.15 UIU/ML (ref 0.36–3.74)
UA: UC IF INDICATED,UAUC: ABNORMAL
UNIT DIVISION, %UDIV: 0
UROBILINOGEN UR QL STRIP.AUTO: 0.1 EU/DL (ref 0.1–1)
UROBILINOGEN UR QL STRIP.AUTO: 2 EU/DL (ref 0.1–1)
UROBILINOGEN UR QL STRIP.AUTO: 4 EU/DL (ref 0.1–1)
VANCOMYCIN SERPL-MCNC: 10.7 UG/ML
VANCOMYCIN SERPL-MCNC: 12.5 UG/ML
VANCOMYCIN SERPL-MCNC: 14.2 UG/ML
VANCOMYCIN SERPL-MCNC: 14.3 UG/ML
VANCOMYCIN SERPL-MCNC: 14.4 UG/ML
VANCOMYCIN SERPL-MCNC: 14.7 UG/ML
VANCOMYCIN SERPL-MCNC: 14.9 UG/ML
VANCOMYCIN SERPL-MCNC: 16.2 UG/ML
VANCOMYCIN SERPL-MCNC: 24.7 UG/ML
VANCOMYCIN SERPL-MCNC: 24.8 UG/ML
VANCOMYCIN SERPL-MCNC: 30.5 UG/ML
VANCOMYCIN SERPL-MCNC: 8.3 UG/ML
VANCOMYCIN TROUGH SERPL-MCNC: 11.4 UG/ML (ref 5–10)
VANCOMYCIN TROUGH SERPL-MCNC: 12.4 UG/ML (ref 5–10)
VANCOMYCIN TROUGH SERPL-MCNC: 17.1 UG/ML (ref 5–10)
VENTRICULAR RATE, ECG03: 60 BPM
VENTRICULAR RATE, ECG03: 60 BPM
VIT B12 SERPL-MCNC: 1871 PG/ML (ref 193–986)
WBC # BLD AUTO: 10.7 K/UL (ref 3.6–11)
WBC # BLD AUTO: 10.9 K/UL (ref 3.6–11)
WBC # BLD AUTO: 11.5 K/UL (ref 3.6–11)
WBC # BLD AUTO: 12.1 K/UL (ref 3.6–11)
WBC # BLD AUTO: 14 K/UL (ref 3.6–11)
WBC # BLD AUTO: 14.9 K/UL (ref 3.6–11)
WBC # BLD AUTO: 16.1 K/UL (ref 3.6–11)
WBC # BLD AUTO: 16.7 K/UL (ref 3.6–11)
WBC # BLD AUTO: 17.6 K/UL (ref 3.6–11)
WBC # BLD AUTO: 17.7 K/UL (ref 3.6–11)
WBC # BLD AUTO: 21.2 K/UL (ref 3.6–11)
WBC # BLD AUTO: 22.1 K/UL (ref 3.6–11)
WBC # BLD AUTO: 25.5 K/UL (ref 3.6–11)
WBC # BLD AUTO: 7.9 K/UL (ref 3.6–11)
WBC # BLD AUTO: 8.9 K/UL (ref 3.6–11)
WBC # BLD AUTO: 9 K/UL (ref 3.6–11)
WBC # BLD AUTO: 9 K/UL (ref 3.6–11)
WBC # BLD AUTO: 9.2 K/UL (ref 3.6–11)
WBC # BLD AUTO: 9.7 K/UL (ref 3.6–11)
WBC # BLD AUTO: 9.8 K/UL (ref 3.6–11)
WBC CASTS URNS QL MICRO: PRESENT
WBC URNS QL MICRO: >100 /HPF (ref 0–4)
WBC URNS QL MICRO: >100 /HPF (ref 0–4)
WBC URNS QL MICRO: ABNORMAL /HPF (ref 0–4)
YEAST URNS QL MICRO: PRESENT

## 2022-01-01 PROCEDURE — 65270000029 HC RM PRIVATE

## 2022-01-01 PROCEDURE — 76060000032 HC ANESTHESIA 0.5 TO 1 HR: Performed by: INTERNAL MEDICINE

## 2022-01-01 PROCEDURE — 85027 COMPLETE CBC AUTOMATED: CPT

## 2022-01-01 PROCEDURE — 74011250636 HC RX REV CODE- 250/636: Performed by: EMERGENCY MEDICINE

## 2022-01-01 PROCEDURE — 82962 GLUCOSE BLOOD TEST: CPT

## 2022-01-01 PROCEDURE — 99232 SBSQ HOSP IP/OBS MODERATE 35: CPT | Performed by: SURGERY

## 2022-01-01 PROCEDURE — 77001 FLUOROGUIDE FOR VEIN DEVICE: CPT

## 2022-01-01 PROCEDURE — 80076 HEPATIC FUNCTION PANEL: CPT

## 2022-01-01 PROCEDURE — 80202 ASSAY OF VANCOMYCIN: CPT

## 2022-01-01 PROCEDURE — 74011250636 HC RX REV CODE- 250/636: Performed by: INTERNAL MEDICINE

## 2022-01-01 PROCEDURE — 36415 COLL VENOUS BLD VENIPUNCTURE: CPT

## 2022-01-01 PROCEDURE — 82728 ASSAY OF FERRITIN: CPT

## 2022-01-01 PROCEDURE — 93306 TTE W/DOPPLER COMPLETE: CPT

## 2022-01-01 PROCEDURE — 80048 BASIC METABOLIC PNL TOTAL CA: CPT

## 2022-01-01 PROCEDURE — 74011000250 HC RX REV CODE- 250: Performed by: INTERNAL MEDICINE

## 2022-01-01 PROCEDURE — 84484 ASSAY OF TROPONIN QUANT: CPT

## 2022-01-01 PROCEDURE — 74011250636 HC RX REV CODE- 250/636: Performed by: PHYSICIAN ASSISTANT

## 2022-01-01 PROCEDURE — 74011000258 HC RX REV CODE- 258: Performed by: INTERNAL MEDICINE

## 2022-01-01 PROCEDURE — 86140 C-REACTIVE PROTEIN: CPT

## 2022-01-01 PROCEDURE — 74011250637 HC RX REV CODE- 250/637: Performed by: INTERNAL MEDICINE

## 2022-01-01 PROCEDURE — 84157 ASSAY OF PROTEIN OTHER: CPT

## 2022-01-01 PROCEDURE — 85025 COMPLETE CBC W/AUTO DIFF WBC: CPT

## 2022-01-01 PROCEDURE — 99232 SBSQ HOSP IP/OBS MODERATE 35: CPT | Performed by: INTERNAL MEDICINE

## 2022-01-01 PROCEDURE — 77010033678 HC OXYGEN DAILY

## 2022-01-01 PROCEDURE — 84145 PROCALCITONIN (PCT): CPT

## 2022-01-01 PROCEDURE — 74011000250 HC RX REV CODE- 250: Performed by: HOSPITALIST

## 2022-01-01 PROCEDURE — P9047 ALBUMIN (HUMAN), 25%, 50ML: HCPCS | Performed by: PHYSICIAN ASSISTANT

## 2022-01-01 PROCEDURE — 97165 OT EVAL LOW COMPLEX 30 MIN: CPT

## 2022-01-01 PROCEDURE — 65610000006 HC RM INTENSIVE CARE

## 2022-01-01 PROCEDURE — 71045 X-RAY EXAM CHEST 1 VIEW: CPT

## 2022-01-01 PROCEDURE — C9113 INJ PANTOPRAZOLE SODIUM, VIA: HCPCS | Performed by: INTERNAL MEDICINE

## 2022-01-01 PROCEDURE — 36573 INSJ PICC RS&I 5 YR+: CPT | Performed by: INTERNAL MEDICINE

## 2022-01-01 PROCEDURE — 36600 WITHDRAWAL OF ARTERIAL BLOOD: CPT

## 2022-01-01 PROCEDURE — 87205 SMEAR GRAM STAIN: CPT

## 2022-01-01 PROCEDURE — C1751 CATH, INF, PER/CENT/MIDLINE: HCPCS

## 2022-01-01 PROCEDURE — 99223 1ST HOSP IP/OBS HIGH 75: CPT | Performed by: INTERNAL MEDICINE

## 2022-01-01 PROCEDURE — 74011250637 HC RX REV CODE- 250/637: Performed by: EMERGENCY MEDICINE

## 2022-01-01 PROCEDURE — 80053 COMPREHEN METABOLIC PANEL: CPT

## 2022-01-01 PROCEDURE — 96374 THER/PROPH/DIAG INJ IV PUSH: CPT

## 2022-01-01 PROCEDURE — 99233 SBSQ HOSP IP/OBS HIGH 50: CPT | Performed by: INTERNAL MEDICINE

## 2022-01-01 PROCEDURE — 94762 N-INVAS EAR/PLS OXIMTRY CONT: CPT

## 2022-01-01 PROCEDURE — 94660 CPAP INITIATION&MGMT: CPT

## 2022-01-01 PROCEDURE — 74011250637 HC RX REV CODE- 250/637: Performed by: HOSPITALIST

## 2022-01-01 PROCEDURE — 86900 BLOOD TYPING SEROLOGIC ABO: CPT

## 2022-01-01 PROCEDURE — 74011250636 HC RX REV CODE- 250/636: Performed by: NURSE ANESTHETIST, CERTIFIED REGISTERED

## 2022-01-01 PROCEDURE — 84443 ASSAY THYROID STIM HORMONE: CPT

## 2022-01-01 PROCEDURE — 77030012058: Performed by: INTERNAL MEDICINE

## 2022-01-01 PROCEDURE — P9016 RBC LEUKOCYTES REDUCED: HCPCS

## 2022-01-01 PROCEDURE — 81003 URINALYSIS AUTO W/O SCOPE: CPT

## 2022-01-01 PROCEDURE — 89050 BODY FLUID CELL COUNT: CPT

## 2022-01-01 PROCEDURE — 74018 RADEX ABDOMEN 1 VIEW: CPT

## 2022-01-01 PROCEDURE — 82040 ASSAY OF SERUM ALBUMIN: CPT

## 2022-01-01 PROCEDURE — 87635 SARS-COV-2 COVID-19 AMP PRB: CPT

## 2022-01-01 PROCEDURE — 74011636637 HC RX REV CODE- 636/637: Performed by: PHYSICIAN ASSISTANT

## 2022-01-01 PROCEDURE — 82607 VITAMIN B-12: CPT

## 2022-01-01 PROCEDURE — 87181 SC STD AGAR DILUTION PER AGT: CPT

## 2022-01-01 PROCEDURE — 83615 LACTATE (LD) (LDH) ENZYME: CPT

## 2022-01-01 PROCEDURE — 76040000007: Performed by: INTERNAL MEDICINE

## 2022-01-01 PROCEDURE — 87040 BLOOD CULTURE FOR BACTERIA: CPT

## 2022-01-01 PROCEDURE — 74011250636 HC RX REV CODE- 250/636: Performed by: HOSPITALIST

## 2022-01-01 PROCEDURE — 02H633Z INSERTION OF INFUSION DEVICE INTO RIGHT ATRIUM, PERCUTANEOUS APPROACH: ICD-10-PCS | Performed by: RADIOLOGY

## 2022-01-01 PROCEDURE — 83735 ASSAY OF MAGNESIUM: CPT

## 2022-01-01 PROCEDURE — 76937 US GUIDE VASCULAR ACCESS: CPT

## 2022-01-01 PROCEDURE — 87106 FUNGI IDENTIFICATION YEAST: CPT

## 2022-01-01 PROCEDURE — 85379 FIBRIN DEGRADATION QUANT: CPT

## 2022-01-01 PROCEDURE — 74011000258 HC RX REV CODE- 258: Performed by: PHYSICIAN ASSISTANT

## 2022-01-01 PROCEDURE — 96376 TX/PRO/DX INJ SAME DRUG ADON: CPT

## 2022-01-01 PROCEDURE — 83036 HEMOGLOBIN GLYCOSYLATED A1C: CPT

## 2022-01-01 PROCEDURE — 83880 ASSAY OF NATRIURETIC PEPTIDE: CPT

## 2022-01-01 PROCEDURE — 82565 ASSAY OF CREATININE: CPT

## 2022-01-01 PROCEDURE — 93005 ELECTROCARDIOGRAM TRACING: CPT

## 2022-01-01 PROCEDURE — 84100 ASSAY OF PHOSPHORUS: CPT

## 2022-01-01 PROCEDURE — 97161 PT EVAL LOW COMPLEX 20 MIN: CPT

## 2022-01-01 PROCEDURE — 94760 N-INVAS EAR/PLS OXIMETRY 1: CPT

## 2022-01-01 PROCEDURE — 87015 SPECIMEN INFECT AGNT CONCNTJ: CPT

## 2022-01-01 PROCEDURE — 0W993ZZ DRAINAGE OF RIGHT PLEURAL CAVITY, PERCUTANEOUS APPROACH: ICD-10-PCS | Performed by: PHYSICIAN ASSISTANT

## 2022-01-01 PROCEDURE — 87186 SC STD MICRODIL/AGAR DIL: CPT

## 2022-01-01 PROCEDURE — 74011000258 HC RX REV CODE- 258: Performed by: EMERGENCY MEDICINE

## 2022-01-01 PROCEDURE — 77030005122 HC CATH GASTMY PEG BSC -B: Performed by: INTERNAL MEDICINE

## 2022-01-01 PROCEDURE — 80069 RENAL FUNCTION PANEL: CPT

## 2022-01-01 PROCEDURE — 81001 URINALYSIS AUTO W/SCOPE: CPT

## 2022-01-01 PROCEDURE — 83605 ASSAY OF LACTIC ACID: CPT

## 2022-01-01 PROCEDURE — C1729 CATH, DRAINAGE: HCPCS

## 2022-01-01 PROCEDURE — 87641 MR-STAPH DNA AMP PROBE: CPT

## 2022-01-01 PROCEDURE — 36430 TRANSFUSION BLD/BLD COMPNT: CPT

## 2022-01-01 PROCEDURE — 74011000250 HC RX REV CODE- 250: Performed by: NURSE ANESTHETIST, CERTIFIED REGISTERED

## 2022-01-01 PROCEDURE — 87077 CULTURE AEROBIC IDENTIFY: CPT

## 2022-01-01 PROCEDURE — 82803 BLOOD GASES ANY COMBINATION: CPT

## 2022-01-01 PROCEDURE — 74011000250 HC RX REV CODE- 250

## 2022-01-01 PROCEDURE — 99283 EMERGENCY DEPT VISIT LOW MDM: CPT

## 2022-01-01 PROCEDURE — 74011636637 HC RX REV CODE- 636/637: Performed by: INTERNAL MEDICINE

## 2022-01-01 PROCEDURE — 99284 EMERGENCY DEPT VISIT MOD MDM: CPT

## 2022-01-01 PROCEDURE — 87804 INFLUENZA ASSAY W/OPTIC: CPT

## 2022-01-01 PROCEDURE — 82945 GLUCOSE OTHER FLUID: CPT

## 2022-01-01 PROCEDURE — 96375 TX/PRO/DX INJ NEW DRUG ADDON: CPT

## 2022-01-01 PROCEDURE — 74011000250 HC RX REV CODE- 250: Performed by: PHYSICIAN ASSISTANT

## 2022-01-01 PROCEDURE — 83540 ASSAY OF IRON: CPT

## 2022-01-01 PROCEDURE — 99285 EMERGENCY DEPT VISIT HI MDM: CPT

## 2022-01-01 PROCEDURE — G0378 HOSPITAL OBSERVATION PER HR: HCPCS

## 2022-01-01 PROCEDURE — 87086 URINE CULTURE/COLONY COUNT: CPT

## 2022-01-01 PROCEDURE — 86923 COMPATIBILITY TEST ELECTRIC: CPT

## 2022-01-01 PROCEDURE — 76770 US EXAM ABDO BACK WALL COMP: CPT

## 2022-01-01 PROCEDURE — 5A09457 ASSISTANCE WITH RESPIRATORY VENTILATION, 24-96 CONSECUTIVE HOURS, CONTINUOUS POSITIVE AIRWAY PRESSURE: ICD-10-PCS | Performed by: INTERNAL MEDICINE

## 2022-01-01 RX ORDER — POLYETHYLENE GLYCOL 3350 17 G/17G
17 POWDER, FOR SOLUTION ORAL DAILY PRN
Status: DISCONTINUED | OUTPATIENT
Start: 2022-01-01 | End: 2022-01-01 | Stop reason: HOSPADM

## 2022-01-01 RX ORDER — FLUCONAZOLE 2 MG/ML
200 INJECTION, SOLUTION INTRAVENOUS EVERY 24 HOURS
Status: DISCONTINUED | OUTPATIENT
Start: 2022-01-01 | End: 2022-01-01

## 2022-01-01 RX ORDER — ZINC SULFATE 50(220)MG
1 CAPSULE ORAL DAILY
Status: DISCONTINUED | OUTPATIENT
Start: 2022-01-01 | End: 2022-01-01 | Stop reason: HOSPADM

## 2022-01-01 RX ORDER — LIDOCAINE HYDROCHLORIDE 20 MG/ML
INJECTION, SOLUTION EPIDURAL; INFILTRATION; INTRACAUDAL; PERINEURAL
Status: COMPLETED
Start: 2022-01-01 | End: 2022-01-01

## 2022-01-01 RX ORDER — DEXTROSE 50 % IN WATER (D50W) INTRAVENOUS SYRINGE
25 ONCE
Status: DISCONTINUED | OUTPATIENT
Start: 2022-01-01 | End: 2022-01-01 | Stop reason: RX

## 2022-01-01 RX ORDER — SODIUM CHLORIDE 0.9 % (FLUSH) 0.9 %
5-40 SYRINGE (ML) INJECTION AS NEEDED
Status: DISCONTINUED | OUTPATIENT
Start: 2022-01-01 | End: 2022-01-01 | Stop reason: HOSPADM

## 2022-01-01 RX ORDER — FUROSEMIDE 10 MG/ML
20 INJECTION INTRAMUSCULAR; INTRAVENOUS EVERY 12 HOURS
Status: DISCONTINUED | OUTPATIENT
Start: 2022-01-01 | End: 2022-01-01

## 2022-01-01 RX ORDER — ONDANSETRON 2 MG/ML
4 INJECTION INTRAMUSCULAR; INTRAVENOUS
Status: DISCONTINUED | OUTPATIENT
Start: 2022-01-01 | End: 2022-01-01 | Stop reason: HOSPADM

## 2022-01-01 RX ORDER — SODIUM CHLORIDE 0.9 % (FLUSH) 0.9 %
5-40 SYRINGE (ML) INJECTION AS NEEDED
Status: CANCELLED | OUTPATIENT
Start: 2022-01-01

## 2022-01-01 RX ORDER — ASCORBIC ACID 500 MG
500 TABLET ORAL 2 TIMES DAILY
Qty: 60 TABLET | Refills: 0 | Status: SHIPPED | OUTPATIENT
Start: 2022-01-01

## 2022-01-01 RX ORDER — APIXABAN 2.5 MG/1
2.5 TABLET, FILM COATED ORAL 2 TIMES DAILY
COMMUNITY
Start: 2022-01-01

## 2022-01-01 RX ORDER — ACETAMINOPHEN 325 MG/1
650 TABLET ORAL
Status: DISCONTINUED | OUTPATIENT
Start: 2022-01-01 | End: 2022-01-01 | Stop reason: HOSPADM

## 2022-01-01 RX ORDER — PREDNISONE 20 MG/1
40 TABLET ORAL
Status: COMPLETED | OUTPATIENT
Start: 2022-01-01 | End: 2022-01-01

## 2022-01-01 RX ORDER — SODIUM CHLORIDE 9 MG/ML
75 INJECTION, SOLUTION INTRAVENOUS CONTINUOUS
Status: CANCELLED | OUTPATIENT
Start: 2022-01-01

## 2022-01-01 RX ORDER — DEXAMETHASONE SODIUM PHOSPHATE 10 MG/ML
6 INJECTION INTRAMUSCULAR; INTRAVENOUS ONCE
Status: COMPLETED | OUTPATIENT
Start: 2022-01-01 | End: 2022-01-01

## 2022-01-01 RX ORDER — DEXAMETHASONE SODIUM PHOSPHATE 4 MG/ML
6 INJECTION, SOLUTION INTRA-ARTICULAR; INTRALESIONAL; INTRAMUSCULAR; INTRAVENOUS; SOFT TISSUE EVERY 8 HOURS
Status: DISCONTINUED | OUTPATIENT
Start: 2022-01-01 | End: 2022-01-01

## 2022-01-01 RX ORDER — DILTIAZEM HYDROCHLORIDE 30 MG/1
30 TABLET, FILM COATED ORAL EVERY 8 HOURS
Status: DISCONTINUED | OUTPATIENT
Start: 2022-01-01 | End: 2022-01-01

## 2022-01-01 RX ORDER — MAGNESIUM SULFATE 100 %
4 CRYSTALS MISCELLANEOUS AS NEEDED
Status: DISCONTINUED | OUTPATIENT
Start: 2022-01-01 | End: 2022-01-01 | Stop reason: HOSPADM

## 2022-01-01 RX ORDER — POTASSIUM CHLORIDE 7.45 MG/ML
10 INJECTION INTRAVENOUS
Status: DISPENSED | OUTPATIENT
Start: 2022-01-01 | End: 2022-01-01

## 2022-01-01 RX ORDER — FUROSEMIDE 10 MG/ML
40 INJECTION INTRAMUSCULAR; INTRAVENOUS EVERY 12 HOURS
Status: DISCONTINUED | OUTPATIENT
Start: 2022-01-01 | End: 2022-01-01 | Stop reason: HOSPADM

## 2022-01-01 RX ORDER — DEXAMETHASONE SODIUM PHOSPHATE 4 MG/ML
6 INJECTION, SOLUTION INTRA-ARTICULAR; INTRALESIONAL; INTRAMUSCULAR; INTRAVENOUS; SOFT TISSUE EVERY 24 HOURS
Status: DISCONTINUED | OUTPATIENT
Start: 2022-01-01 | End: 2022-01-01

## 2022-01-01 RX ORDER — NOREPINEPHRINE BITARTRATE/D5W 4MG/250ML
.5-16 PLASTIC BAG, INJECTION (ML) INTRAVENOUS
Status: DISCONTINUED | OUTPATIENT
Start: 2022-01-01 | End: 2022-01-01 | Stop reason: SDUPTHER

## 2022-01-01 RX ORDER — SODIUM CHLORIDE 0.9 % (FLUSH) 0.9 %
5-40 SYRINGE (ML) INJECTION EVERY 8 HOURS
Status: DISCONTINUED | OUTPATIENT
Start: 2022-01-01 | End: 2022-01-01 | Stop reason: HOSPADM

## 2022-01-01 RX ORDER — ACETAMINOPHEN 650 MG/1
650 SUPPOSITORY RECTAL
Status: DISCONTINUED | OUTPATIENT
Start: 2022-01-01 | End: 2022-01-01 | Stop reason: HOSPADM

## 2022-01-01 RX ORDER — DEXTROSE MONOHYDRATE 100 MG/ML
125-250 INJECTION, SOLUTION INTRAVENOUS AS NEEDED
Status: DISCONTINUED | OUTPATIENT
Start: 2022-01-01 | End: 2022-01-01 | Stop reason: HOSPADM

## 2022-01-01 RX ORDER — DEXTROSE MONOHYDRATE 100 MG/ML
50 INJECTION, SOLUTION INTRAVENOUS CONTINUOUS
Status: DISCONTINUED | OUTPATIENT
Start: 2022-01-01 | End: 2022-01-01 | Stop reason: HOSPADM

## 2022-01-01 RX ORDER — POTASSIUM CHLORIDE 20 MEQ/1
60 TABLET, EXTENDED RELEASE ORAL
Status: COMPLETED | OUTPATIENT
Start: 2022-01-01 | End: 2022-01-01

## 2022-01-01 RX ORDER — VANCOMYCIN HYDROCHLORIDE 500 MG/10ML
500 INJECTION, POWDER, LYOPHILIZED, FOR SOLUTION INTRAVENOUS EVERY 24 HOURS
Qty: 7000 MG | Refills: 0 | Status: SHIPPED | OUTPATIENT
Start: 2022-01-01 | End: 2022-03-03

## 2022-01-01 RX ORDER — AMOXICILLIN AND CLAVULANATE POTASSIUM 875; 125 MG/1; MG/1
1 TABLET, FILM COATED ORAL ONCE
Status: COMPLETED | OUTPATIENT
Start: 2022-01-01 | End: 2022-01-01

## 2022-01-01 RX ORDER — ACETAMINOPHEN 325 MG/1
650 TABLET ORAL
Qty: 60 TABLET | Refills: 0 | Status: SHIPPED | OUTPATIENT
Start: 2022-01-01

## 2022-01-01 RX ORDER — FUROSEMIDE 10 MG/ML
20 INJECTION INTRAMUSCULAR; INTRAVENOUS 2 TIMES DAILY
Status: DISCONTINUED | OUTPATIENT
Start: 2022-01-01 | End: 2022-01-01 | Stop reason: HOSPADM

## 2022-01-01 RX ORDER — ASCORBIC ACID 500 MG
500 TABLET ORAL 2 TIMES DAILY
Status: DISCONTINUED | OUTPATIENT
Start: 2022-01-01 | End: 2022-01-01 | Stop reason: HOSPADM

## 2022-01-01 RX ORDER — ALBUMIN HUMAN 250 G/1000ML
12.5 SOLUTION INTRAVENOUS EVERY 6 HOURS
Status: DISCONTINUED | OUTPATIENT
Start: 2022-01-01 | End: 2022-01-01

## 2022-01-01 RX ORDER — INSULIN LISPRO 100 [IU]/ML
INJECTION, SOLUTION INTRAVENOUS; SUBCUTANEOUS
Qty: 1 EACH | Refills: 0 | Status: SHIPPED
Start: 2022-01-01 | End: 2022-01-01

## 2022-01-01 RX ORDER — DEXTROSE 50 % IN WATER (D50W) INTRAVENOUS SYRINGE
25-50 AS NEEDED
Status: DISCONTINUED | OUTPATIENT
Start: 2022-01-01 | End: 2022-01-01 | Stop reason: RX

## 2022-01-01 RX ORDER — NORETHINDRONE AND ETHINYL ESTRADIOL 0.5-0.035
KIT ORAL
Status: DISPENSED
Start: 2022-01-01 | End: 2022-01-01

## 2022-01-01 RX ORDER — SODIUM CHLORIDE 9 MG/ML
75 INJECTION, SOLUTION INTRAVENOUS CONTINUOUS
Status: DISCONTINUED | OUTPATIENT
Start: 2022-01-01 | End: 2022-01-01

## 2022-01-01 RX ORDER — SODIUM CHLORIDE 9 MG/ML
250 INJECTION, SOLUTION INTRAVENOUS AS NEEDED
Status: DISCONTINUED | OUTPATIENT
Start: 2022-01-01 | End: 2022-01-01 | Stop reason: HOSPADM

## 2022-01-01 RX ORDER — FUROSEMIDE 10 MG/ML
20 INJECTION INTRAMUSCULAR; INTRAVENOUS
Status: COMPLETED | OUTPATIENT
Start: 2022-01-01 | End: 2022-01-01

## 2022-01-01 RX ORDER — SODIUM CHLORIDE 0.9 % (FLUSH) 0.9 %
5-40 SYRINGE (ML) INJECTION EVERY 8 HOURS
Status: CANCELLED | OUTPATIENT
Start: 2022-01-01

## 2022-01-01 RX ORDER — NOREPINEPHRINE BIT/0.9 % NACL 8 MG/250ML
.5-12 INFUSION BOTTLE (ML) INTRAVENOUS
Status: DISCONTINUED | OUTPATIENT
Start: 2022-01-01 | End: 2022-01-01 | Stop reason: HOSPADM

## 2022-01-01 RX ORDER — PROPOFOL 10 MG/ML
INJECTION, EMULSION INTRAVENOUS AS NEEDED
Status: DISCONTINUED | OUTPATIENT
Start: 2022-01-01 | End: 2022-01-01 | Stop reason: HOSPADM

## 2022-01-01 RX ORDER — PREDNISONE 20 MG/1
20 TABLET ORAL
Status: DISCONTINUED | OUTPATIENT
Start: 2022-01-01 | End: 2022-01-01

## 2022-01-01 RX ORDER — ACETAMINOPHEN 650 MG/1
650 SUPPOSITORY RECTAL
Status: COMPLETED | OUTPATIENT
Start: 2022-01-01 | End: 2022-01-01

## 2022-01-01 RX ORDER — DILTIAZEM HYDROCHLORIDE 120 MG/1
120 CAPSULE, EXTENDED RELEASE ORAL 2 TIMES DAILY
Status: ON HOLD | COMMUNITY
Start: 2021-01-01 | End: 2022-01-01

## 2022-01-01 RX ORDER — INSULIN LISPRO 100 [IU]/ML
INJECTION, SOLUTION INTRAVENOUS; SUBCUTANEOUS
Status: DISCONTINUED | OUTPATIENT
Start: 2022-01-01 | End: 2022-01-01 | Stop reason: HOSPADM

## 2022-01-01 RX ORDER — NOREPINEPHRINE BIT/0.9 % NACL 8 MG/250ML
.5-3 INFUSION BOTTLE (ML) INTRAVENOUS
Status: DISCONTINUED | OUTPATIENT
Start: 2022-01-01 | End: 2022-01-01

## 2022-01-01 RX ORDER — NOREPINEPHRINE BITARTRATE/D5W 8 MG/250ML
.5-12 PLASTIC BAG, INJECTION (ML) INTRAVENOUS
Status: DISCONTINUED | OUTPATIENT
Start: 2022-01-01 | End: 2022-01-01

## 2022-01-01 RX ORDER — POTASSIUM CHLORIDE 7.45 MG/ML
10 INJECTION INTRAVENOUS ONCE
Status: DISCONTINUED | OUTPATIENT
Start: 2022-01-01 | End: 2022-01-01

## 2022-01-01 RX ORDER — PROPOFOL 10 MG/ML
INJECTION, EMULSION INTRAVENOUS
Status: COMPLETED
Start: 2022-01-01 | End: 2022-01-01

## 2022-01-01 RX ORDER — NOREPINEPHRINE BIT/0.9 % NACL 8 MG/250ML
INFUSION BOTTLE (ML) INTRAVENOUS
Status: DISCONTINUED
Start: 2022-01-01 | End: 2022-01-01

## 2022-01-01 RX ORDER — ONDANSETRON 4 MG/1
4 TABLET, ORALLY DISINTEGRATING ORAL
Status: DISCONTINUED | OUTPATIENT
Start: 2022-01-01 | End: 2022-01-01 | Stop reason: HOSPADM

## 2022-01-01 RX ORDER — FUROSEMIDE 10 MG/ML
40 INJECTION INTRAMUSCULAR; INTRAVENOUS 2 TIMES DAILY
Status: DISCONTINUED | OUTPATIENT
Start: 2022-01-01 | End: 2022-01-01 | Stop reason: HOSPADM

## 2022-01-01 RX ORDER — ACETAMINOPHEN 500 MG
1000 TABLET ORAL ONCE
Status: DISCONTINUED | OUTPATIENT
Start: 2022-01-01 | End: 2022-01-01

## 2022-01-01 RX ORDER — DILTIAZEM HYDROCHLORIDE 120 MG/1
120 CAPSULE, COATED, EXTENDED RELEASE ORAL DAILY
Status: DISCONTINUED | OUTPATIENT
Start: 2022-01-01 | End: 2022-01-01 | Stop reason: HOSPADM

## 2022-01-01 RX ORDER — SODIUM BICARBONATE 1 MEQ/ML
100 SYRINGE (ML) INTRAVENOUS ONCE
Status: COMPLETED | OUTPATIENT
Start: 2022-01-01 | End: 2022-01-01

## 2022-01-01 RX ORDER — INSULIN PUMP/INFUS. SET/METER
KIT MISCELLANEOUS
Qty: 1 KIT | Refills: 0 | Status: SHIPPED | OUTPATIENT
Start: 2022-01-01

## 2022-01-01 RX ORDER — LANCING DEVICE/LANCETS
KIT MISCELLANEOUS
Qty: 1 KIT | Refills: 0 | Status: SHIPPED | OUTPATIENT
Start: 2022-01-01

## 2022-01-01 RX ORDER — FUROSEMIDE 40 MG/1
40 TABLET ORAL DAILY
COMMUNITY
Start: 2021-01-01

## 2022-01-01 RX ORDER — ZINC SULFATE 50(220)MG
1 CAPSULE ORAL DAILY
Qty: 30 CAPSULE | Refills: 0 | Status: SHIPPED
Start: 2022-01-01

## 2022-01-01 RX ORDER — CEFEPIME HYDROCHLORIDE 2 G/1
2 INJECTION, POWDER, FOR SOLUTION INTRAVENOUS EVERY 24 HOURS
Status: DISCONTINUED | OUTPATIENT
Start: 2022-01-01 | End: 2022-01-01

## 2022-01-01 RX ORDER — CEFEPIME HYDROCHLORIDE 2 G/1
2 INJECTION, POWDER, FOR SOLUTION INTRAVENOUS EVERY 12 HOURS
Status: DISCONTINUED | OUTPATIENT
Start: 2022-01-01 | End: 2022-01-01

## 2022-01-01 RX ORDER — POTASSIUM CHLORIDE 750 MG/1
40 TABLET, FILM COATED, EXTENDED RELEASE ORAL
Status: DISCONTINUED | OUTPATIENT
Start: 2022-01-01 | End: 2022-01-01

## 2022-01-01 RX ORDER — LIDOCAINE HYDROCHLORIDE 20 MG/ML
INJECTION, SOLUTION EPIDURAL; INFILTRATION; INTRACAUDAL; PERINEURAL AS NEEDED
Status: DISCONTINUED | OUTPATIENT
Start: 2022-01-01 | End: 2022-01-01 | Stop reason: HOSPADM

## 2022-01-01 RX ORDER — CEFEPIME HYDROCHLORIDE 2 G/1
2 INJECTION, POWDER, FOR SOLUTION INTRAVENOUS EVERY 12 HOURS
Qty: 56 G | Refills: 0 | Status: SHIPPED | OUTPATIENT
Start: 2022-01-01 | End: 2022-03-03

## 2022-01-01 RX ORDER — DEXTROSE MONOHYDRATE 100 MG/ML
0-250 INJECTION, SOLUTION INTRAVENOUS AS NEEDED
Status: DISCONTINUED | OUTPATIENT
Start: 2022-01-01 | End: 2022-01-01 | Stop reason: HOSPADM

## 2022-01-01 RX ORDER — FLUCONAZOLE 2 MG/ML
200 INJECTION, SOLUTION INTRAVENOUS
Status: DISCONTINUED | OUTPATIENT
Start: 2022-01-01 | End: 2022-01-01 | Stop reason: HOSPADM

## 2022-01-01 RX ORDER — ACETAMINOPHEN 650 MG/1
975 SUPPOSITORY RECTAL
Status: COMPLETED | OUTPATIENT
Start: 2022-01-01 | End: 2022-01-01

## 2022-01-01 RX ORDER — SODIUM CHLORIDE, SODIUM LACTATE, POTASSIUM CHLORIDE, CALCIUM CHLORIDE 600; 310; 30; 20 MG/100ML; MG/100ML; MG/100ML; MG/100ML
INJECTION, SOLUTION INTRAVENOUS
Status: DISCONTINUED | OUTPATIENT
Start: 2022-01-01 | End: 2022-01-01 | Stop reason: HOSPADM

## 2022-01-01 RX ORDER — INSULIN LISPRO 100 [IU]/ML
INJECTION, SOLUTION INTRAVENOUS; SUBCUTANEOUS
Qty: 1 EACH | Refills: 0 | Status: SHIPPED | OUTPATIENT
Start: 2022-01-01

## 2022-01-01 RX ORDER — EPHEDRINE SULFATE/0.9% NACL/PF 50 MG/5 ML
SYRINGE (ML) INTRAVENOUS AS NEEDED
Status: DISCONTINUED | OUTPATIENT
Start: 2022-01-01 | End: 2022-01-01 | Stop reason: HOSPADM

## 2022-01-01 RX ORDER — FUROSEMIDE 40 MG/1
40 TABLET ORAL
Status: COMPLETED | OUTPATIENT
Start: 2022-01-01 | End: 2022-01-01

## 2022-01-01 RX ADMIN — SODIUM CHLORIDE, PRESERVATIVE FREE 10 ML: 5 INJECTION INTRAVENOUS at 08:50

## 2022-01-01 RX ADMIN — FLUCONAZOLE 200 MG: 2 INJECTION, SOLUTION INTRAVENOUS at 21:45

## 2022-01-01 RX ADMIN — SODIUM CHLORIDE, PRESERVATIVE FREE 10 ML: 5 INJECTION INTRAVENOUS at 15:19

## 2022-01-01 RX ADMIN — Medication 81 MCG/MIN: at 11:53

## 2022-01-01 RX ADMIN — SODIUM CHLORIDE, PRESERVATIVE FREE 10 ML: 5 INJECTION INTRAVENOUS at 21:12

## 2022-01-01 RX ADMIN — SODIUM CHLORIDE, PRESERVATIVE FREE 500 MG: 5 INJECTION INTRAVENOUS at 11:14

## 2022-01-01 RX ADMIN — VANCOMYCIN HYDROCHLORIDE 500 MG: 500 INJECTION, POWDER, LYOPHILIZED, FOR SOLUTION INTRAVENOUS at 04:57

## 2022-01-01 RX ADMIN — FUROSEMIDE 40 MG: 10 INJECTION, SOLUTION INTRAMUSCULAR; INTRAVENOUS at 23:56

## 2022-01-01 RX ADMIN — APIXABAN 2.5 MG: 2.5 TABLET, FILM COATED ORAL at 22:57

## 2022-01-01 RX ADMIN — INSULIN LISPRO 2 UNITS: 100 INJECTION, SOLUTION INTRAVENOUS; SUBCUTANEOUS at 00:57

## 2022-01-01 RX ADMIN — SODIUM CHLORIDE, PRESERVATIVE FREE 10 ML: 5 INJECTION INTRAVENOUS at 13:14

## 2022-01-01 RX ADMIN — INSULIN LISPRO 7 UNITS: 100 INJECTION, SOLUTION INTRAVENOUS; SUBCUTANEOUS at 15:19

## 2022-01-01 RX ADMIN — SODIUM BICARBONATE 100 MEQ: 84 INJECTION INTRAVENOUS at 13:00

## 2022-01-01 RX ADMIN — OXYCODONE HYDROCHLORIDE AND ACETAMINOPHEN 500 MG: 500 TABLET ORAL at 23:56

## 2022-01-01 RX ADMIN — CEFEPIME 2 G: 2 INJECTION, POWDER, FOR SOLUTION INTRAVENOUS at 02:10

## 2022-01-01 RX ADMIN — FUROSEMIDE 40 MG: 10 INJECTION, SOLUTION INTRAMUSCULAR; INTRAVENOUS at 09:52

## 2022-01-01 RX ADMIN — ZINC SULFATE 220 MG (50 MG) CAPSULE 1 CAPSULE: CAPSULE at 09:20

## 2022-01-01 RX ADMIN — GLUCAGON HYDROCHLORIDE 1 MG: KIT at 08:20

## 2022-01-01 RX ADMIN — SODIUM CHLORIDE 75 ML/HR: 9 INJECTION, SOLUTION INTRAVENOUS at 02:43

## 2022-01-01 RX ADMIN — APIXABAN 2.5 MG: 2.5 TABLET, FILM COATED ORAL at 22:29

## 2022-01-01 RX ADMIN — INSULIN LISPRO 2 UNITS: 100 INJECTION, SOLUTION INTRAVENOUS; SUBCUTANEOUS at 17:08

## 2022-01-01 RX ADMIN — OXYCODONE HYDROCHLORIDE AND ACETAMINOPHEN 500 MG: 500 TABLET ORAL at 09:52

## 2022-01-01 RX ADMIN — FUROSEMIDE 40 MG: 10 INJECTION, SOLUTION INTRAMUSCULAR; INTRAVENOUS at 21:55

## 2022-01-01 RX ADMIN — POTASSIUM CHLORIDE 10 MEQ: 7.46 INJECTION, SOLUTION INTRAVENOUS at 11:51

## 2022-01-01 RX ADMIN — APIXABAN 2.5 MG: 2.5 TABLET, FILM COATED ORAL at 09:27

## 2022-01-01 RX ADMIN — INSULIN LISPRO 3 UNITS: 100 INJECTION, SOLUTION INTRAVENOUS; SUBCUTANEOUS at 16:30

## 2022-01-01 RX ADMIN — SODIUM CHLORIDE, PRESERVATIVE FREE 10 ML: 5 INJECTION INTRAVENOUS at 21:56

## 2022-01-01 RX ADMIN — PIPERACILLIN AND TAZOBACTAM 3.38 G: 3; .375 INJECTION, POWDER, LYOPHILIZED, FOR SOLUTION INTRAVENOUS at 03:26

## 2022-01-01 RX ADMIN — Medication 80 MCG/MIN: at 03:23

## 2022-01-01 RX ADMIN — PIPERACILLIN AND TAZOBACTAM 3.38 G: 3; .375 INJECTION, POWDER, LYOPHILIZED, FOR SOLUTION INTRAVENOUS at 20:18

## 2022-01-01 RX ADMIN — ZINC SULFATE 220 MG (50 MG) CAPSULE 1 CAPSULE: CAPSULE at 09:14

## 2022-01-01 RX ADMIN — ALBUMIN (HUMAN) 12.5 G: 0.25 INJECTION, SOLUTION INTRAVENOUS at 12:33

## 2022-01-01 RX ADMIN — ALBUMIN (HUMAN) 12.5 G: 0.25 INJECTION, SOLUTION INTRAVENOUS at 14:51

## 2022-01-01 RX ADMIN — COLLAGENASE SANTYL: 250 OINTMENT TOPICAL at 08:20

## 2022-01-01 RX ADMIN — Medication 70 MCG/MIN: at 00:30

## 2022-01-01 RX ADMIN — CEFTAZIDIME, AVIBACTAM 1.25 G: 2; .5 POWDER, FOR SOLUTION INTRAVENOUS at 23:56

## 2022-01-01 RX ADMIN — CEFIDEROCOL SULFATE TOSYLATE 0.75 G: 1 INJECTION, POWDER, FOR SOLUTION INTRAVENOUS at 22:43

## 2022-01-01 RX ADMIN — INSULIN LISPRO 3 UNITS: 100 INJECTION, SOLUTION INTRAVENOUS; SUBCUTANEOUS at 22:10

## 2022-01-01 RX ADMIN — DEXTROSE MONOHYDRATE 250 ML: 100 INJECTION, SOLUTION INTRAVENOUS at 07:52

## 2022-01-01 RX ADMIN — OXYCODONE HYDROCHLORIDE AND ACETAMINOPHEN 500 MG: 500 TABLET ORAL at 22:29

## 2022-01-01 RX ADMIN — SODIUM CHLORIDE, PRESERVATIVE FREE 10 ML: 5 INJECTION INTRAVENOUS at 21:33

## 2022-01-01 RX ADMIN — ZINC SULFATE 220 MG (50 MG) CAPSULE 1 CAPSULE: CAPSULE at 09:52

## 2022-01-01 RX ADMIN — FUROSEMIDE 20 MG: 10 INJECTION INTRAMUSCULAR; INTRAVENOUS at 20:49

## 2022-01-01 RX ADMIN — INSULIN LISPRO 2 UNITS: 100 INJECTION, SOLUTION INTRAVENOUS; SUBCUTANEOUS at 17:20

## 2022-01-01 RX ADMIN — ALBUMIN (HUMAN) 12.5 G: 0.25 INJECTION, SOLUTION INTRAVENOUS at 14:37

## 2022-01-01 RX ADMIN — DEXAMETHASONE SODIUM PHOSPHATE 6 MG: 4 INJECTION, SOLUTION INTRAMUSCULAR; INTRAVENOUS at 20:39

## 2022-01-01 RX ADMIN — OXYCODONE HYDROCHLORIDE AND ACETAMINOPHEN 500 MG: 500 TABLET ORAL at 10:22

## 2022-01-01 RX ADMIN — OXYCODONE HYDROCHLORIDE AND ACETAMINOPHEN 500 MG: 500 TABLET ORAL at 10:02

## 2022-01-01 RX ADMIN — SODIUM CHLORIDE: 234 INJECTION, SOLUTION, CONCENTRATE INTRAVENOUS at 01:06

## 2022-01-01 RX ADMIN — ZINC SULFATE 220 MG (50 MG) CAPSULE 1 CAPSULE: CAPSULE at 09:07

## 2022-01-01 RX ADMIN — SODIUM CHLORIDE, PRESERVATIVE FREE 10 ML: 5 INJECTION INTRAVENOUS at 06:05

## 2022-01-01 RX ADMIN — APIXABAN 2.5 MG: 2.5 TABLET, FILM COATED ORAL at 08:55

## 2022-01-01 RX ADMIN — APIXABAN 2.5 MG: 2.5 TABLET, FILM COATED ORAL at 21:27

## 2022-01-01 RX ADMIN — APIXABAN 2.5 MG: 2.5 TABLET, FILM COATED ORAL at 09:45

## 2022-01-01 RX ADMIN — OXYCODONE HYDROCHLORIDE AND ACETAMINOPHEN 500 MG: 500 TABLET ORAL at 21:27

## 2022-01-01 RX ADMIN — OXYCODONE HYDROCHLORIDE AND ACETAMINOPHEN 500 MG: 500 TABLET ORAL at 20:27

## 2022-01-01 RX ADMIN — PIPERACILLIN SODIUM AND TAZOBACTAM SODIUM 3.38 G: 3; .375 INJECTION, POWDER, LYOPHILIZED, FOR SOLUTION INTRAVENOUS at 20:55

## 2022-01-01 RX ADMIN — DOXYCYCLINE 100 MG: 100 INJECTION, POWDER, LYOPHILIZED, FOR SOLUTION INTRAVENOUS at 02:35

## 2022-01-01 RX ADMIN — CEFEPIME 2 G: 2 INJECTION, POWDER, FOR SOLUTION INTRAVENOUS at 17:08

## 2022-01-01 RX ADMIN — SODIUM CHLORIDE, POTASSIUM CHLORIDE, SODIUM LACTATE AND CALCIUM CHLORIDE: 600; 310; 30; 20 INJECTION, SOLUTION INTRAVENOUS at 09:08

## 2022-01-01 RX ADMIN — ZINC SULFATE 220 MG (50 MG) CAPSULE 1 CAPSULE: CAPSULE at 10:22

## 2022-01-01 RX ADMIN — OXYCODONE HYDROCHLORIDE AND ACETAMINOPHEN 500 MG: 500 TABLET ORAL at 21:55

## 2022-01-01 RX ADMIN — SODIUM CHLORIDE, PRESERVATIVE FREE 10 ML: 5 INJECTION INTRAVENOUS at 00:50

## 2022-01-01 RX ADMIN — Medication 30 MCG/MIN: at 20:05

## 2022-01-01 RX ADMIN — CEFEPIME 2 G: 2 INJECTION, POWDER, FOR SOLUTION INTRAVENOUS at 09:35

## 2022-01-01 RX ADMIN — APIXABAN 2.5 MG: 2.5 TABLET, FILM COATED ORAL at 21:31

## 2022-01-01 RX ADMIN — APIXABAN 2.5 MG: 2.5 TABLET, FILM COATED ORAL at 09:44

## 2022-01-01 RX ADMIN — Medication 84 MCG/MIN: at 02:07

## 2022-01-01 RX ADMIN — OXYCODONE HYDROCHLORIDE AND ACETAMINOPHEN 500 MG: 500 TABLET ORAL at 15:12

## 2022-01-01 RX ADMIN — ZINC SULFATE 220 MG (50 MG) CAPSULE 1 CAPSULE: CAPSULE at 09:44

## 2022-01-01 RX ADMIN — CEFEPIME 2 G: 2 INJECTION, POWDER, FOR SOLUTION INTRAVENOUS at 02:37

## 2022-01-01 RX ADMIN — ALBUMIN (HUMAN) 12.5 G: 0.25 INJECTION, SOLUTION INTRAVENOUS at 23:42

## 2022-01-01 RX ADMIN — OXYCODONE HYDROCHLORIDE AND ACETAMINOPHEN 500 MG: 500 TABLET ORAL at 09:20

## 2022-01-01 RX ADMIN — CEFEPIME 2 G: 2 INJECTION, POWDER, FOR SOLUTION INTRAVENOUS at 11:41

## 2022-01-01 RX ADMIN — DEXTROSE MONOHYDRATE 250 ML: 100 INJECTION, SOLUTION INTRAVENOUS at 12:24

## 2022-01-01 RX ADMIN — SODIUM CHLORIDE, PRESERVATIVE FREE 10 ML: 5 INJECTION INTRAVENOUS at 05:05

## 2022-01-01 RX ADMIN — METOPROLOL TARTRATE 25 MG: 25 TABLET, FILM COATED ORAL at 22:11

## 2022-01-01 RX ADMIN — SODIUM BICARBONATE 100 MEQ: 84 INJECTION INTRAVENOUS at 15:00

## 2022-01-01 RX ADMIN — SODIUM CHLORIDE, PRESERVATIVE FREE 10 ML: 5 INJECTION INTRAVENOUS at 14:00

## 2022-01-01 RX ADMIN — SODIUM CHLORIDE, PRESERVATIVE FREE 10 ML: 5 INJECTION INTRAVENOUS at 22:43

## 2022-01-01 RX ADMIN — SODIUM CHLORIDE, PRESERVATIVE FREE 40 MG: 5 INJECTION INTRAVENOUS at 14:32

## 2022-01-01 RX ADMIN — INSULIN LISPRO 3 UNITS: 100 INJECTION, SOLUTION INTRAVENOUS; SUBCUTANEOUS at 10:21

## 2022-01-01 RX ADMIN — ALBUMIN (HUMAN) 12.5 G: 0.25 INJECTION, SOLUTION INTRAVENOUS at 06:25

## 2022-01-01 RX ADMIN — PIPERACILLIN AND TAZOBACTAM 3.38 G: 3; .375 INJECTION, POWDER, LYOPHILIZED, FOR SOLUTION INTRAVENOUS at 13:12

## 2022-01-01 RX ADMIN — Medication 11 MCG/MIN: at 12:31

## 2022-01-01 RX ADMIN — ZINC SULFATE 220 MG (50 MG) CAPSULE 1 CAPSULE: CAPSULE at 09:27

## 2022-01-01 RX ADMIN — ZINC SULFATE 220 MG (50 MG) CAPSULE 1 CAPSULE: CAPSULE at 11:15

## 2022-01-01 RX ADMIN — ALBUMIN (HUMAN) 12.5 G: 0.25 INJECTION, SOLUTION INTRAVENOUS at 00:19

## 2022-01-01 RX ADMIN — ALBUMIN (HUMAN) 12.5 G: 0.25 INJECTION, SOLUTION INTRAVENOUS at 11:41

## 2022-01-01 RX ADMIN — PIPERACILLIN AND TAZOBACTAM 3.38 G: 3; .375 INJECTION, POWDER, LYOPHILIZED, FOR SOLUTION INTRAVENOUS at 13:44

## 2022-01-01 RX ADMIN — Medication 84 MCG/MIN: at 22:43

## 2022-01-01 RX ADMIN — SODIUM CHLORIDE, PRESERVATIVE FREE 500 MG: 5 INJECTION INTRAVENOUS at 01:22

## 2022-01-01 RX ADMIN — Medication 82 MCG/MIN: at 16:45

## 2022-01-01 RX ADMIN — CEFTAZIDIME, AVIBACTAM 1.25 G: 2; .5 POWDER, FOR SOLUTION INTRAVENOUS at 08:04

## 2022-01-01 RX ADMIN — INSULIN LISPRO 2 UNITS: 100 INJECTION, SOLUTION INTRAVENOUS; SUBCUTANEOUS at 09:14

## 2022-01-01 RX ADMIN — DILTIAZEM HYDROCHLORIDE 30 MG: 30 TABLET, FILM COATED ORAL at 15:46

## 2022-01-01 RX ADMIN — ALBUMIN (HUMAN) 12.5 G: 0.25 INJECTION, SOLUTION INTRAVENOUS at 23:57

## 2022-01-01 RX ADMIN — FUROSEMIDE 20 MG: 10 INJECTION INTRAMUSCULAR; INTRAVENOUS at 09:45

## 2022-01-01 RX ADMIN — PROPOFOL 50 MG: 10 INJECTION, EMULSION INTRAVENOUS at 09:10

## 2022-01-01 RX ADMIN — PIPERACILLIN SODIUM AND TAZOBACTAM SODIUM 3.38 G: 3; .375 INJECTION, POWDER, LYOPHILIZED, FOR SOLUTION INTRAVENOUS at 04:08

## 2022-01-01 RX ADMIN — DEXTROSE MONOHYDRATE 250 ML: 100 INJECTION, SOLUTION INTRAVENOUS at 03:57

## 2022-01-01 RX ADMIN — DAPTOMYCIN 350 MG: 500 INJECTION, POWDER, LYOPHILIZED, FOR SOLUTION INTRAVENOUS at 17:25

## 2022-01-01 RX ADMIN — FUROSEMIDE 40 MG: 10 INJECTION, SOLUTION INTRAMUSCULAR; INTRAVENOUS at 20:46

## 2022-01-01 RX ADMIN — OXYCODONE HYDROCHLORIDE AND ACETAMINOPHEN 500 MG: 500 TABLET ORAL at 20:19

## 2022-01-01 RX ADMIN — SODIUM CHLORIDE, PRESERVATIVE FREE 500 MG: 5 INJECTION INTRAVENOUS at 00:18

## 2022-01-01 RX ADMIN — Medication 17 MCG/MIN: at 17:51

## 2022-01-01 RX ADMIN — OXYCODONE HYDROCHLORIDE AND ACETAMINOPHEN 500 MG: 500 TABLET ORAL at 09:45

## 2022-01-01 RX ADMIN — CEFEPIME 2 G: 2 INJECTION, POWDER, FOR SOLUTION INTRAVENOUS at 09:27

## 2022-01-01 RX ADMIN — INSULIN LISPRO 2 UNITS: 100 INJECTION, SOLUTION INTRAVENOUS; SUBCUTANEOUS at 16:59

## 2022-01-01 RX ADMIN — DEXTROSE MONOHYDRATE 100 ML/HR: 50 INJECTION, SOLUTION INTRAVENOUS at 11:14

## 2022-01-01 RX ADMIN — PREDNISONE 20 MG: 20 TABLET ORAL at 09:44

## 2022-01-01 RX ADMIN — SODIUM CHLORIDE, PRESERVATIVE FREE 10 ML: 5 INJECTION INTRAVENOUS at 14:52

## 2022-01-01 RX ADMIN — APIXABAN 2.5 MG: 2.5 TABLET, FILM COATED ORAL at 21:55

## 2022-01-01 RX ADMIN — INSULIN LISPRO 3 UNITS: 100 INJECTION, SOLUTION INTRAVENOUS; SUBCUTANEOUS at 08:55

## 2022-01-01 RX ADMIN — ZINC SULFATE 220 MG (50 MG) CAPSULE 1 CAPSULE: CAPSULE at 09:45

## 2022-01-01 RX ADMIN — PIPERACILLIN AND TAZOBACTAM 3.38 G: 3; .375 INJECTION, POWDER, LYOPHILIZED, FOR SOLUTION INTRAVENOUS at 22:56

## 2022-01-01 RX ADMIN — CEFIDEROCOL SULFATE TOSYLATE 0.75 G: 1 INJECTION, POWDER, FOR SOLUTION INTRAVENOUS at 17:25

## 2022-01-01 RX ADMIN — OXYCODONE HYDROCHLORIDE AND ACETAMINOPHEN 500 MG: 500 TABLET ORAL at 09:27

## 2022-01-01 RX ADMIN — SODIUM CHLORIDE, PRESERVATIVE FREE 10 ML: 5 INJECTION INTRAVENOUS at 21:20

## 2022-01-01 RX ADMIN — SODIUM CHLORIDE, PRESERVATIVE FREE 10 ML: 5 INJECTION INTRAVENOUS at 20:19

## 2022-01-01 RX ADMIN — Medication 80 MCG/MIN: at 14:36

## 2022-01-01 RX ADMIN — SODIUM CHLORIDE, PRESERVATIVE FREE 10 ML: 5 INJECTION INTRAVENOUS at 09:08

## 2022-01-01 RX ADMIN — PIPERACILLIN AND TAZOBACTAM 3.38 G: 3; .375 INJECTION, POWDER, LYOPHILIZED, FOR SOLUTION INTRAVENOUS at 15:12

## 2022-01-01 RX ADMIN — DEXAMETHASONE SODIUM PHOSPHATE 6 MG: 4 INJECTION, SOLUTION INTRA-ARTICULAR; INTRALESIONAL; INTRAMUSCULAR; INTRAVENOUS; SOFT TISSUE at 18:00

## 2022-01-01 RX ADMIN — OXYCODONE HYDROCHLORIDE AND ACETAMINOPHEN 500 MG: 500 TABLET ORAL at 22:11

## 2022-01-01 RX ADMIN — Medication 83 MCG/MIN: at 16:55

## 2022-01-01 RX ADMIN — SODIUM CHLORIDE, PRESERVATIVE FREE 40 MG: 5 INJECTION INTRAVENOUS at 10:05

## 2022-01-01 RX ADMIN — DILTIAZEM HYDROCHLORIDE 30 MG: 30 TABLET, FILM COATED ORAL at 06:04

## 2022-01-01 RX ADMIN — Medication 81 MCG/MIN: at 09:51

## 2022-01-01 RX ADMIN — PREDNISONE 40 MG: 20 TABLET ORAL at 21:31

## 2022-01-01 RX ADMIN — FLUCONAZOLE 200 MG: 2 INJECTION, SOLUTION INTRAVENOUS at 21:11

## 2022-01-01 RX ADMIN — Medication 84 MCG/MIN: at 19:25

## 2022-01-01 RX ADMIN — ALBUMIN (HUMAN) 12.5 G: 0.25 INJECTION, SOLUTION INTRAVENOUS at 16:53

## 2022-01-01 RX ADMIN — OXYCODONE HYDROCHLORIDE AND ACETAMINOPHEN 500 MG: 500 TABLET ORAL at 22:44

## 2022-01-01 RX ADMIN — SODIUM CHLORIDE, PRESERVATIVE FREE 10 ML: 5 INJECTION INTRAVENOUS at 15:11

## 2022-01-01 RX ADMIN — SODIUM CHLORIDE, PRESERVATIVE FREE 40 MG: 5 INJECTION INTRAVENOUS at 09:52

## 2022-01-01 RX ADMIN — SODIUM CHLORIDE, PRESERVATIVE FREE 10 ML: 5 INJECTION INTRAVENOUS at 22:00

## 2022-01-01 RX ADMIN — OXYCODONE HYDROCHLORIDE AND ACETAMINOPHEN 500 MG: 500 TABLET ORAL at 22:57

## 2022-01-01 RX ADMIN — SODIUM CHLORIDE, PRESERVATIVE FREE 10 ML: 5 INJECTION INTRAVENOUS at 06:00

## 2022-01-01 RX ADMIN — DEXTROSE MONOHYDRATE 250 ML: 100 INJECTION, SOLUTION INTRAVENOUS at 12:01

## 2022-01-01 RX ADMIN — SODIUM CHLORIDE, PRESERVATIVE FREE 10 ML: 5 INJECTION INTRAVENOUS at 05:14

## 2022-01-01 RX ADMIN — OXYCODONE HYDROCHLORIDE AND ACETAMINOPHEN 500 MG: 500 TABLET ORAL at 21:31

## 2022-01-01 RX ADMIN — DEXTROSE MONOHYDRATE 75 ML/HR: 50 INJECTION, SOLUTION INTRAVENOUS at 10:52

## 2022-01-01 RX ADMIN — VANCOMYCIN HYDROCHLORIDE 500 MG: 500 INJECTION, POWDER, LYOPHILIZED, FOR SOLUTION INTRAVENOUS at 21:30

## 2022-01-01 RX ADMIN — VANCOMYCIN HYDROCHLORIDE 1000 MG: 1 INJECTION, POWDER, LYOPHILIZED, FOR SOLUTION INTRAVENOUS at 15:37

## 2022-01-01 RX ADMIN — COLLAGENASE SANTYL: 250 OINTMENT TOPICAL at 09:00

## 2022-01-01 RX ADMIN — SODIUM CHLORIDE: 234 INJECTION, SOLUTION, CONCENTRATE INTRAVENOUS at 10:34

## 2022-01-01 RX ADMIN — SODIUM CHLORIDE, PRESERVATIVE FREE 10 ML: 5 INJECTION INTRAVENOUS at 06:08

## 2022-01-01 RX ADMIN — CEFTAZIDIME, AVIBACTAM 1.25 G: 2; .5 POWDER, FOR SOLUTION INTRAVENOUS at 17:56

## 2022-01-01 RX ADMIN — DILTIAZEM HYDROCHLORIDE 30 MG: 30 TABLET, FILM COATED ORAL at 15:19

## 2022-01-01 RX ADMIN — Medication 80 MCG/MIN: at 05:14

## 2022-01-01 RX ADMIN — ALBUMIN (HUMAN) 12.5 G: 0.25 INJECTION, SOLUTION INTRAVENOUS at 06:07

## 2022-01-01 RX ADMIN — SODIUM CHLORIDE, PRESERVATIVE FREE 10 ML: 5 INJECTION INTRAVENOUS at 04:00

## 2022-01-01 RX ADMIN — POTASSIUM CHLORIDE 10 MEQ: 7.46 INJECTION, SOLUTION INTRAVENOUS at 18:05

## 2022-01-01 RX ADMIN — SODIUM CHLORIDE, PRESERVATIVE FREE 10 ML: 5 INJECTION INTRAVENOUS at 14:15

## 2022-01-01 RX ADMIN — PIPERACILLIN AND TAZOBACTAM 3.38 G: 3; .375 INJECTION, POWDER, LYOPHILIZED, FOR SOLUTION INTRAVENOUS at 21:21

## 2022-01-01 RX ADMIN — SODIUM CHLORIDE, PRESERVATIVE FREE 500 MG: 5 INJECTION INTRAVENOUS at 00:15

## 2022-01-01 RX ADMIN — PREDNISONE 20 MG: 20 TABLET ORAL at 09:14

## 2022-01-01 RX ADMIN — DEXAMETHASONE SODIUM PHOSPHATE 6 MG: 4 INJECTION, SOLUTION INTRAMUSCULAR; INTRAVENOUS at 12:06

## 2022-01-01 RX ADMIN — APIXABAN 2.5 MG: 2.5 TABLET, FILM COATED ORAL at 21:06

## 2022-01-01 RX ADMIN — CEFEPIME 2 G: 2 INJECTION, POWDER, FOR SOLUTION INTRAVENOUS at 17:52

## 2022-01-01 RX ADMIN — ZINC SULFATE 220 MG (50 MG) CAPSULE 1 CAPSULE: CAPSULE at 08:56

## 2022-01-01 RX ADMIN — SODIUM CHLORIDE, PRESERVATIVE FREE 40 ML: 5 INJECTION INTRAVENOUS at 22:00

## 2022-01-01 RX ADMIN — FUROSEMIDE 40 MG: 10 INJECTION, SOLUTION INTRAMUSCULAR; INTRAVENOUS at 09:20

## 2022-01-01 RX ADMIN — DOXYCYCLINE 100 MG: 100 INJECTION, POWDER, LYOPHILIZED, FOR SOLUTION INTRAVENOUS at 12:14

## 2022-01-01 RX ADMIN — METOPROLOL TARTRATE 25 MG: 25 TABLET, FILM COATED ORAL at 08:20

## 2022-01-01 RX ADMIN — SODIUM CHLORIDE, PRESERVATIVE FREE 10 ML: 5 INJECTION INTRAVENOUS at 22:12

## 2022-01-01 RX ADMIN — FUROSEMIDE 20 MG: 10 INJECTION, SOLUTION INTRAMUSCULAR; INTRAVENOUS at 00:00

## 2022-01-01 RX ADMIN — SODIUM CHLORIDE, PRESERVATIVE FREE 40 MG: 5 INJECTION INTRAVENOUS at 09:00

## 2022-01-01 RX ADMIN — PIPERACILLIN SODIUM AND TAZOBACTAM SODIUM 3.38 G: 3; .375 INJECTION, POWDER, LYOPHILIZED, FOR SOLUTION INTRAVENOUS at 14:38

## 2022-01-01 RX ADMIN — SODIUM CHLORIDE, PRESERVATIVE FREE 10 ML: 5 INJECTION INTRAVENOUS at 15:12

## 2022-01-01 RX ADMIN — SODIUM CHLORIDE, PRESERVATIVE FREE 5 ML: 5 INJECTION INTRAVENOUS at 22:57

## 2022-01-01 RX ADMIN — OXYCODONE HYDROCHLORIDE AND ACETAMINOPHEN 500 MG: 500 TABLET ORAL at 09:44

## 2022-01-01 RX ADMIN — SODIUM CHLORIDE, PRESERVATIVE FREE 10 ML: 5 INJECTION INTRAVENOUS at 07:12

## 2022-01-01 RX ADMIN — FUROSEMIDE 40 MG: 10 INJECTION, SOLUTION INTRAMUSCULAR; INTRAVENOUS at 22:10

## 2022-01-01 RX ADMIN — VANCOMYCIN HYDROCHLORIDE 500 MG: 500 INJECTION, POWDER, LYOPHILIZED, FOR SOLUTION INTRAVENOUS at 12:15

## 2022-01-01 RX ADMIN — PIPERACILLIN SODIUM AND TAZOBACTAM SODIUM 3.38 G: 3; .375 INJECTION, POWDER, LYOPHILIZED, FOR SOLUTION INTRAVENOUS at 12:55

## 2022-01-01 RX ADMIN — Medication 81 MCG/MIN: at 07:17

## 2022-01-01 RX ADMIN — SODIUM CHLORIDE, PRESERVATIVE FREE 10 ML: 5 INJECTION INTRAVENOUS at 15:43

## 2022-01-01 RX ADMIN — PREDNISONE 20 MG: 20 TABLET ORAL at 10:22

## 2022-01-01 RX ADMIN — SODIUM CHLORIDE, PRESERVATIVE FREE 10 ML: 5 INJECTION INTRAVENOUS at 21:49

## 2022-01-01 RX ADMIN — SODIUM CHLORIDE, PRESERVATIVE FREE 10 ML: 5 INJECTION INTRAVENOUS at 22:51

## 2022-01-01 RX ADMIN — SODIUM CHLORIDE, PRESERVATIVE FREE 500 MG: 5 INJECTION INTRAVENOUS at 12:41

## 2022-01-01 RX ADMIN — ACETAMINOPHEN 650 MG: 325 TABLET, FILM COATED ORAL at 23:18

## 2022-01-01 RX ADMIN — Medication 81 MCG/MIN: at 15:00

## 2022-01-01 RX ADMIN — SODIUM CHLORIDE: 234 INJECTION, SOLUTION, CONCENTRATE INTRAVENOUS at 14:14

## 2022-01-01 RX ADMIN — INSULIN LISPRO 2 UNITS: 100 INJECTION, SOLUTION INTRAVENOUS; SUBCUTANEOUS at 11:41

## 2022-01-01 RX ADMIN — CEFEPIME 2 G: 2 INJECTION, POWDER, FOR SOLUTION INTRAVENOUS at 17:00

## 2022-01-01 RX ADMIN — FUROSEMIDE 20 MG: 10 INJECTION, SOLUTION INTRAMUSCULAR; INTRAVENOUS at 08:52

## 2022-01-01 RX ADMIN — SODIUM CHLORIDE, PRESERVATIVE FREE 10 ML: 5 INJECTION INTRAVENOUS at 01:34

## 2022-01-01 RX ADMIN — VANCOMYCIN HYDROCHLORIDE 500 MG: 500 INJECTION, POWDER, LYOPHILIZED, FOR SOLUTION INTRAVENOUS at 09:36

## 2022-01-01 RX ADMIN — SODIUM CHLORIDE, PRESERVATIVE FREE 10 ML: 5 INJECTION INTRAVENOUS at 06:22

## 2022-01-01 RX ADMIN — SODIUM CHLORIDE, PRESERVATIVE FREE 10 ML: 5 INJECTION INTRAVENOUS at 18:40

## 2022-01-01 RX ADMIN — OXYCODONE HYDROCHLORIDE AND ACETAMINOPHEN 500 MG: 500 TABLET ORAL at 09:35

## 2022-01-01 RX ADMIN — INSULIN LISPRO 2 UNITS: 100 INJECTION, SOLUTION INTRAVENOUS; SUBCUTANEOUS at 11:58

## 2022-01-01 RX ADMIN — PIPERACILLIN SODIUM AND TAZOBACTAM SODIUM 3.38 G: 3; .375 INJECTION, POWDER, LYOPHILIZED, FOR SOLUTION INTRAVENOUS at 15:00

## 2022-01-01 RX ADMIN — ALBUMIN (HUMAN) 12.5 G: 0.25 INJECTION, SOLUTION INTRAVENOUS at 06:18

## 2022-01-01 RX ADMIN — SODIUM BICARBONATE: 84 INJECTION, SOLUTION INTRAVENOUS at 13:07

## 2022-01-01 RX ADMIN — Medication 25 MG: at 09:15

## 2022-01-01 RX ADMIN — PIPERACILLIN AND TAZOBACTAM 3.38 G: 3; .375 INJECTION, POWDER, LYOPHILIZED, FOR SOLUTION INTRAVENOUS at 20:24

## 2022-01-01 RX ADMIN — PIPERACILLIN AND TAZOBACTAM 3.38 G: 3; .375 INJECTION, POWDER, LYOPHILIZED, FOR SOLUTION INTRAVENOUS at 05:58

## 2022-01-01 RX ADMIN — LIDOCAINE HYDROCHLORIDE 40 MG: 20 INJECTION, SOLUTION EPIDURAL; INFILTRATION; INTRACAUDAL; PERINEURAL at 09:10

## 2022-01-01 RX ADMIN — APIXABAN 2.5 MG: 2.5 TABLET, FILM COATED ORAL at 20:40

## 2022-01-01 RX ADMIN — SODIUM CHLORIDE, PRESERVATIVE FREE 10 ML: 5 INJECTION INTRAVENOUS at 06:01

## 2022-01-01 RX ADMIN — COLLAGENASE SANTYL: 250 OINTMENT TOPICAL at 21:00

## 2022-01-01 RX ADMIN — APIXABAN 2.5 MG: 2.5 TABLET, FILM COATED ORAL at 09:20

## 2022-01-01 RX ADMIN — COLLAGENASE SANTYL: 250 OINTMENT TOPICAL at 10:02

## 2022-01-01 RX ADMIN — POTASSIUM CHLORIDE 10 MEQ: 7.46 INJECTION, SOLUTION INTRAVENOUS at 11:22

## 2022-01-01 RX ADMIN — FUROSEMIDE 20 MG: 10 INJECTION, SOLUTION INTRAMUSCULAR; INTRAVENOUS at 01:19

## 2022-01-01 RX ADMIN — SODIUM CHLORIDE, PRESERVATIVE FREE 10 ML: 5 INJECTION INTRAVENOUS at 14:29

## 2022-01-01 RX ADMIN — ALBUMIN (HUMAN) 12.5 G: 0.25 INJECTION, SOLUTION INTRAVENOUS at 05:11

## 2022-01-01 RX ADMIN — SODIUM CHLORIDE, PRESERVATIVE FREE 40 ML: 5 INJECTION INTRAVENOUS at 22:34

## 2022-01-01 RX ADMIN — POTASSIUM CHLORIDE 10 MEQ: 7.46 INJECTION, SOLUTION INTRAVENOUS at 11:15

## 2022-01-01 RX ADMIN — INSULIN LISPRO 3 UNITS: 100 INJECTION, SOLUTION INTRAVENOUS; SUBCUTANEOUS at 09:28

## 2022-01-01 RX ADMIN — CEFEPIME HYDROCHLORIDE 2 G: 2 INJECTION, POWDER, FOR SOLUTION INTRAVENOUS at 21:12

## 2022-01-01 RX ADMIN — Medication 84 MCG/MIN: at 00:04

## 2022-01-01 RX ADMIN — ZINC SULFATE 220 MG (50 MG) CAPSULE 1 CAPSULE: CAPSULE at 09:35

## 2022-01-01 RX ADMIN — SODIUM CHLORIDE, PRESERVATIVE FREE 10 ML: 5 INJECTION INTRAVENOUS at 05:10

## 2022-01-01 RX ADMIN — METOPROLOL TARTRATE 25 MG: 25 TABLET, FILM COATED ORAL at 20:27

## 2022-01-01 RX ADMIN — ALBUMIN (HUMAN) 12.5 G: 0.25 INJECTION, SOLUTION INTRAVENOUS at 23:38

## 2022-01-01 RX ADMIN — Medication 28 MCG/MIN: at 19:55

## 2022-01-01 RX ADMIN — ALBUMIN (HUMAN) 12.5 G: 0.25 INJECTION, SOLUTION INTRAVENOUS at 17:08

## 2022-01-01 RX ADMIN — Medication 81 MCG/MIN: at 11:09

## 2022-01-01 RX ADMIN — ZINC SULFATE 220 MG (50 MG) CAPSULE 1 CAPSULE: CAPSULE at 09:00

## 2022-01-01 RX ADMIN — FUROSEMIDE 40 MG: 10 INJECTION, SOLUTION INTRAMUSCULAR; INTRAVENOUS at 09:00

## 2022-01-01 RX ADMIN — INSULIN LISPRO 3 UNITS: 100 INJECTION, SOLUTION INTRAVENOUS; SUBCUTANEOUS at 09:19

## 2022-01-01 RX ADMIN — APIXABAN 2.5 MG: 2.5 TABLET, FILM COATED ORAL at 20:51

## 2022-01-01 RX ADMIN — OXYCODONE HYDROCHLORIDE AND ACETAMINOPHEN 500 MG: 500 TABLET ORAL at 20:40

## 2022-01-01 RX ADMIN — SODIUM CHLORIDE 500 ML: 9 INJECTION, SOLUTION INTRAVENOUS at 17:45

## 2022-01-01 RX ADMIN — VANCOMYCIN HYDROCHLORIDE 500 MG: 500 INJECTION, POWDER, LYOPHILIZED, FOR SOLUTION INTRAVENOUS at 00:16

## 2022-01-01 RX ADMIN — Medication 4 MCG/MIN: at 19:33

## 2022-01-01 RX ADMIN — DEXTROSE MONOHYDRATE 250 ML: 100 INJECTION, SOLUTION INTRAVENOUS at 02:24

## 2022-01-01 RX ADMIN — AMOXICILLIN AND CLAVULANATE POTASSIUM 1 TABLET: 875; 125 TABLET, FILM COATED ORAL at 21:31

## 2022-01-01 RX ADMIN — POTASSIUM CHLORIDE 10 MEQ: 7.46 INJECTION, SOLUTION INTRAVENOUS at 16:26

## 2022-01-01 RX ADMIN — SODIUM CHLORIDE 75 ML/HR: 9 INJECTION, SOLUTION INTRAVENOUS at 18:38

## 2022-01-01 RX ADMIN — ALBUMIN (HUMAN) 12.5 G: 0.25 INJECTION, SOLUTION INTRAVENOUS at 15:01

## 2022-01-01 RX ADMIN — INSULIN LISPRO 2 UNITS: 100 INJECTION, SOLUTION INTRAVENOUS; SUBCUTANEOUS at 23:43

## 2022-01-01 RX ADMIN — APIXABAN 2.5 MG: 2.5 TABLET, FILM COATED ORAL at 10:22

## 2022-01-01 RX ADMIN — Medication 80 MCG/MIN: at 12:30

## 2022-01-01 RX ADMIN — APIXABAN 2.5 MG: 2.5 TABLET, FILM COATED ORAL at 21:48

## 2022-01-01 RX ADMIN — OXYCODONE HYDROCHLORIDE AND ACETAMINOPHEN 500 MG: 500 TABLET ORAL at 21:48

## 2022-01-01 RX ADMIN — FLUCONAZOLE 200 MG: 2 INJECTION, SOLUTION INTRAVENOUS at 20:54

## 2022-01-01 RX ADMIN — METOPROLOL TARTRATE 25 MG: 25 TABLET, FILM COATED ORAL at 11:15

## 2022-01-01 RX ADMIN — COLLAGENASE SANTYL: 250 OINTMENT TOPICAL at 10:52

## 2022-01-01 RX ADMIN — COLLAGENASE SANTYL: 250 OINTMENT TOPICAL at 10:55

## 2022-01-01 RX ADMIN — DEXAMETHASONE SODIUM PHOSPHATE 6 MG: 10 INJECTION INTRAMUSCULAR; INTRAVENOUS at 14:24

## 2022-01-01 RX ADMIN — Medication 13 MCG/MIN: at 15:11

## 2022-01-01 RX ADMIN — DOXYCYCLINE 100 MG: 100 INJECTION, POWDER, LYOPHILIZED, FOR SOLUTION INTRAVENOUS at 14:25

## 2022-01-01 RX ADMIN — FUROSEMIDE 40 MG: 10 INJECTION, SOLUTION INTRAMUSCULAR; INTRAVENOUS at 23:18

## 2022-01-01 RX ADMIN — Medication 25 MG: at 09:16

## 2022-01-01 RX ADMIN — VANCOMYCIN HYDROCHLORIDE 500 MG: 500 INJECTION, POWDER, LYOPHILIZED, FOR SOLUTION INTRAVENOUS at 11:58

## 2022-01-01 RX ADMIN — SODIUM CHLORIDE, PRESERVATIVE FREE 5 ML: 5 INJECTION INTRAVENOUS at 06:04

## 2022-01-01 RX ADMIN — ZINC SULFATE 220 MG (50 MG) CAPSULE 1 CAPSULE: CAPSULE at 10:02

## 2022-01-01 RX ADMIN — Medication 84 MCG/MIN: at 05:53

## 2022-01-01 RX ADMIN — SODIUM CHLORIDE, PRESERVATIVE FREE 10 ML: 5 INJECTION INTRAVENOUS at 05:44

## 2022-01-01 RX ADMIN — CEFEPIME 2 G: 2 INJECTION, POWDER, FOR SOLUTION INTRAVENOUS at 02:11

## 2022-01-01 RX ADMIN — OXYCODONE HYDROCHLORIDE AND ACETAMINOPHEN 500 MG: 500 TABLET ORAL at 10:05

## 2022-01-01 RX ADMIN — PIPERACILLIN AND TAZOBACTAM 3.38 G: 3; .375 INJECTION, POWDER, LYOPHILIZED, FOR SOLUTION INTRAVENOUS at 04:45

## 2022-01-01 RX ADMIN — PIPERACILLIN AND TAZOBACTAM 3.38 G: 3; .375 INJECTION, POWDER, LYOPHILIZED, FOR SOLUTION INTRAVENOUS at 03:59

## 2022-01-01 RX ADMIN — SODIUM CHLORIDE, PRESERVATIVE FREE 10 ML: 5 INJECTION INTRAVENOUS at 18:07

## 2022-01-01 RX ADMIN — OXYCODONE HYDROCHLORIDE AND ACETAMINOPHEN 500 MG: 500 TABLET ORAL at 09:14

## 2022-01-01 RX ADMIN — APIXABAN 2.5 MG: 2.5 TABLET, FILM COATED ORAL at 10:29

## 2022-01-01 RX ADMIN — ALBUMIN (HUMAN) 12.5 G: 0.25 INJECTION, SOLUTION INTRAVENOUS at 05:23

## 2022-01-01 RX ADMIN — FUROSEMIDE 40 MG: 10 INJECTION, SOLUTION INTRAMUSCULAR; INTRAVENOUS at 09:35

## 2022-01-01 RX ADMIN — CEFTAZIDIME, AVIBACTAM 1.25 G: 2; .5 POWDER, FOR SOLUTION INTRAVENOUS at 23:45

## 2022-01-01 RX ADMIN — PIPERACILLIN AND TAZOBACTAM 3.38 G: 3; .375 INJECTION, POWDER, LYOPHILIZED, FOR SOLUTION INTRAVENOUS at 13:29

## 2022-01-01 RX ADMIN — ALBUMIN (HUMAN) 12.5 G: 0.25 INJECTION, SOLUTION INTRAVENOUS at 23:00

## 2022-01-01 RX ADMIN — POTASSIUM CHLORIDE 10 MEQ: 7.46 INJECTION, SOLUTION INTRAVENOUS at 14:47

## 2022-01-01 RX ADMIN — SODIUM CHLORIDE, PRESERVATIVE FREE 10 ML: 5 INJECTION INTRAVENOUS at 13:52

## 2022-01-01 RX ADMIN — DEXTROSE MONOHYDRATE 100 ML/HR: 50 INJECTION, SOLUTION INTRAVENOUS at 01:11

## 2022-01-01 RX ADMIN — INSULIN LISPRO 2 UNITS: 100 INJECTION, SOLUTION INTRAVENOUS; SUBCUTANEOUS at 22:50

## 2022-01-01 RX ADMIN — SODIUM CHLORIDE, PRESERVATIVE FREE 10 ML: 5 INJECTION INTRAVENOUS at 23:57

## 2022-01-01 RX ADMIN — SODIUM CHLORIDE, PRESERVATIVE FREE 500 MG: 5 INJECTION INTRAVENOUS at 01:19

## 2022-01-01 RX ADMIN — OXYCODONE HYDROCHLORIDE AND ACETAMINOPHEN 500 MG: 500 TABLET ORAL at 23:18

## 2022-01-01 RX ADMIN — SODIUM CHLORIDE, PRESERVATIVE FREE 10 ML: 5 INJECTION INTRAVENOUS at 14:33

## 2022-01-01 RX ADMIN — Medication 12 MCG/MIN: at 14:45

## 2022-01-01 RX ADMIN — APIXABAN 2.5 MG: 2.5 TABLET, FILM COATED ORAL at 09:14

## 2022-01-01 RX ADMIN — APIXABAN 2.5 MG: 2.5 TABLET, FILM COATED ORAL at 10:05

## 2022-01-01 RX ADMIN — FLUCONAZOLE 200 MG: 2 INJECTION, SOLUTION INTRAVENOUS at 22:43

## 2022-01-01 RX ADMIN — OXYCODONE HYDROCHLORIDE AND ACETAMINOPHEN 500 MG: 500 TABLET ORAL at 09:00

## 2022-01-01 RX ADMIN — CEFEPIME 2 G: 2 INJECTION, POWDER, FOR SOLUTION INTRAVENOUS at 17:56

## 2022-01-01 RX ADMIN — APIXABAN 2.5 MG: 2.5 TABLET, FILM COATED ORAL at 23:56

## 2022-01-01 RX ADMIN — SODIUM CHLORIDE, PRESERVATIVE FREE 10 ML: 5 INJECTION INTRAVENOUS at 06:31

## 2022-01-01 RX ADMIN — ACETAMINOPHEN 650 MG: 650 SUPPOSITORY RECTAL at 14:24

## 2022-01-01 RX ADMIN — FUROSEMIDE 40 MG: 10 INJECTION, SOLUTION INTRAMUSCULAR; INTRAVENOUS at 22:43

## 2022-01-01 RX ADMIN — FUROSEMIDE 40 MG: 10 INJECTION, SOLUTION INTRAMUSCULAR; INTRAVENOUS at 10:22

## 2022-01-01 RX ADMIN — DEXTROSE MONOHYDRATE 75 ML/HR: 50 INJECTION, SOLUTION INTRAVENOUS at 10:56

## 2022-01-01 RX ADMIN — APIXABAN 2.5 MG: 2.5 TABLET, FILM COATED ORAL at 09:35

## 2022-01-01 RX ADMIN — SODIUM CHLORIDE, PRESERVATIVE FREE 10 ML: 5 INJECTION INTRAVENOUS at 20:27

## 2022-01-01 RX ADMIN — SODIUM CHLORIDE, PRESERVATIVE FREE 10 ML: 5 INJECTION INTRAVENOUS at 05:50

## 2022-01-01 RX ADMIN — FUROSEMIDE 40 MG: 10 INJECTION, SOLUTION INTRAMUSCULAR; INTRAVENOUS at 08:55

## 2022-01-01 RX ADMIN — FUROSEMIDE 40 MG: 10 INJECTION, SOLUTION INTRAMUSCULAR; INTRAVENOUS at 09:27

## 2022-01-01 RX ADMIN — ZINC SULFATE 220 MG (50 MG) CAPSULE 1 CAPSULE: CAPSULE at 10:05

## 2022-01-01 RX ADMIN — DOXYCYCLINE 100 MG: 100 INJECTION, POWDER, LYOPHILIZED, FOR SOLUTION INTRAVENOUS at 00:55

## 2022-01-01 RX ADMIN — OXYCODONE HYDROCHLORIDE AND ACETAMINOPHEN 500 MG: 500 TABLET ORAL at 21:11

## 2022-01-01 RX ADMIN — DILTIAZEM HYDROCHLORIDE 30 MG: 30 TABLET, FILM COATED ORAL at 14:51

## 2022-01-01 RX ADMIN — INSULIN LISPRO 3 UNITS: 100 INJECTION, SOLUTION INTRAVENOUS; SUBCUTANEOUS at 12:32

## 2022-01-01 RX ADMIN — SODIUM CHLORIDE, PRESERVATIVE FREE 10 ML: 5 INJECTION INTRAVENOUS at 05:55

## 2022-01-01 RX ADMIN — DEXTROSE MONOHYDRATE 50 ML/HR: 100 INJECTION, SOLUTION INTRAVENOUS at 12:26

## 2022-01-01 RX ADMIN — SODIUM CHLORIDE, PRESERVATIVE FREE 10 ML: 5 INJECTION INTRAVENOUS at 15:08

## 2022-01-01 RX ADMIN — FUROSEMIDE 20 MG: 10 INJECTION INTRAMUSCULAR; INTRAVENOUS at 12:21

## 2022-01-01 RX ADMIN — VANCOMYCIN HYDROCHLORIDE 750 MG: 750 INJECTION, POWDER, LYOPHILIZED, FOR SOLUTION INTRAVENOUS at 20:40

## 2022-01-01 RX ADMIN — ALBUMIN (HUMAN) 12.5 G: 0.25 INJECTION, SOLUTION INTRAVENOUS at 11:09

## 2022-01-01 RX ADMIN — DOXYCYCLINE 100 MG: 100 INJECTION, POWDER, LYOPHILIZED, FOR SOLUTION INTRAVENOUS at 12:10

## 2022-01-01 RX ADMIN — ZINC SULFATE 220 MG (50 MG) CAPSULE 1 CAPSULE: CAPSULE at 12:10

## 2022-01-01 RX ADMIN — DILTIAZEM HYDROCHLORIDE 30 MG: 30 TABLET, FILM COATED ORAL at 06:33

## 2022-01-01 RX ADMIN — CEFEPIME HYDROCHLORIDE 2 G: 2 INJECTION, POWDER, FOR SOLUTION INTRAVENOUS at 21:47

## 2022-01-01 RX ADMIN — POTASSIUM CHLORIDE 60 MEQ: 1500 TABLET, EXTENDED RELEASE ORAL at 11:15

## 2022-01-01 RX ADMIN — INSULIN LISPRO 18 UNITS: 100 INJECTION, SOLUTION INTRAVENOUS; SUBCUTANEOUS at 18:57

## 2022-01-01 RX ADMIN — SODIUM CHLORIDE, PRESERVATIVE FREE 40 ML: 5 INJECTION INTRAVENOUS at 05:05

## 2022-01-01 RX ADMIN — ALBUMIN (HUMAN) 12.5 G: 0.25 INJECTION, SOLUTION INTRAVENOUS at 05:02

## 2022-01-01 RX ADMIN — POTASSIUM CHLORIDE 10 MEQ: 7.46 INJECTION, SOLUTION INTRAVENOUS at 13:07

## 2022-01-01 RX ADMIN — DOXYCYCLINE 100 MG: 100 INJECTION, POWDER, LYOPHILIZED, FOR SOLUTION INTRAVENOUS at 03:47

## 2022-01-01 RX ADMIN — ACETAMINOPHEN 975 MG: 650 SUPPOSITORY RECTAL at 16:46

## 2022-01-01 RX ADMIN — ZINC SULFATE 220 MG (50 MG) CAPSULE 1 CAPSULE: CAPSULE at 10:28

## 2022-01-01 RX ADMIN — PIPERACILLIN SODIUM AND TAZOBACTAM SODIUM 3.38 G: 3; .375 INJECTION, POWDER, LYOPHILIZED, FOR SOLUTION INTRAVENOUS at 20:39

## 2022-01-01 RX ADMIN — VANCOMYCIN HYDROCHLORIDE 500 MG: 500 INJECTION, POWDER, LYOPHILIZED, FOR SOLUTION INTRAVENOUS at 12:47

## 2022-01-01 RX ADMIN — ALBUMIN (HUMAN) 12.5 G: 0.25 INJECTION, SOLUTION INTRAVENOUS at 01:25

## 2022-01-01 RX ADMIN — ALBUMIN (HUMAN) 12.5 G: 0.25 INJECTION, SOLUTION INTRAVENOUS at 18:09

## 2022-01-01 RX ADMIN — SODIUM CHLORIDE, PRESERVATIVE FREE 500 MG: 5 INJECTION INTRAVENOUS at 18:06

## 2022-01-01 RX ADMIN — CEFIDEROCOL SULFATE TOSYLATE 0.75 G: 1 INJECTION, POWDER, FOR SOLUTION INTRAVENOUS at 09:52

## 2022-01-01 RX ADMIN — SODIUM CHLORIDE, PRESERVATIVE FREE 500 MG: 5 INJECTION INTRAVENOUS at 11:50

## 2022-01-01 RX ADMIN — DEXTROSE MONOHYDRATE 250 ML: 100 INJECTION, SOLUTION INTRAVENOUS at 09:20

## 2022-01-01 RX ADMIN — OXYCODONE HYDROCHLORIDE AND ACETAMINOPHEN 500 MG: 500 TABLET ORAL at 08:55

## 2022-01-01 RX ADMIN — DILTIAZEM HYDROCHLORIDE 30 MG: 30 TABLET, FILM COATED ORAL at 07:27

## 2022-01-01 RX ADMIN — OXYCODONE HYDROCHLORIDE AND ACETAMINOPHEN 500 MG: 500 TABLET ORAL at 20:51

## 2022-01-01 RX ADMIN — SODIUM CHLORIDE, PRESERVATIVE FREE 10 ML: 5 INJECTION INTRAVENOUS at 13:54

## 2022-01-01 RX ADMIN — DEXTROSE MONOHYDRATE 250 ML: 100 INJECTION, SOLUTION INTRAVENOUS at 11:53

## 2022-01-01 RX ADMIN — Medication 84 MCG/MIN: at 04:30

## 2022-01-01 RX ADMIN — CEFEPIME HYDROCHLORIDE 2 G: 2 INJECTION, POWDER, FOR SOLUTION INTRAVENOUS at 21:06

## 2022-01-01 RX ADMIN — INSULIN LISPRO 2 UNITS: 100 INJECTION, SOLUTION INTRAVENOUS; SUBCUTANEOUS at 12:14

## 2022-01-01 RX ADMIN — FUROSEMIDE 40 MG: 10 INJECTION, SOLUTION INTRAMUSCULAR; INTRAVENOUS at 09:14

## 2022-01-01 RX ADMIN — ALBUMIN (HUMAN) 12.5 G: 0.25 INJECTION, SOLUTION INTRAVENOUS at 21:17

## 2022-01-01 RX ADMIN — CEFEPIME 2 G: 2 INJECTION, POWDER, FOR SOLUTION INTRAVENOUS at 09:00

## 2022-01-01 RX ADMIN — FUROSEMIDE 20 MG: 10 INJECTION INTRAMUSCULAR; INTRAVENOUS at 20:40

## 2022-01-01 RX ADMIN — DOXYCYCLINE 100 MG: 100 INJECTION, POWDER, LYOPHILIZED, FOR SOLUTION INTRAVENOUS at 12:15

## 2022-01-01 RX ADMIN — OXYCODONE HYDROCHLORIDE AND ACETAMINOPHEN 500 MG: 500 TABLET ORAL at 10:28

## 2022-01-01 RX ADMIN — DILTIAZEM HYDROCHLORIDE 30 MG: 30 TABLET, FILM COATED ORAL at 22:57

## 2022-01-01 RX ADMIN — OXYCODONE HYDROCHLORIDE AND ACETAMINOPHEN 500 MG: 500 TABLET ORAL at 11:15

## 2022-01-01 RX ADMIN — APIXABAN 2.5 MG: 2.5 TABLET, FILM COATED ORAL at 18:06

## 2022-01-01 RX ADMIN — OXYCODONE HYDROCHLORIDE AND ACETAMINOPHEN 500 MG: 500 TABLET ORAL at 21:06

## 2022-01-01 RX ADMIN — FUROSEMIDE 40 MG: 40 TABLET ORAL at 21:31

## 2022-01-01 NOTE — PROGRESS NOTES
Dr Nirmal Luna notified of pt's blood pressure. \"Pt's bp is 97/59, MAP 72, pulse 58. Metoprolol was held last night and I'm holding morning dose. She has D5 at 75 ml/hr. Do you want to add anything? \"

## 2022-01-01 NOTE — WOUND CARE
IP WOUND CONSULT    Maggy Manning  MEDICAL RECORD NUMBER:  [id number]  AGE: 80 y.o. GENDER: female  : 3/25/1922  TODAY'S DATE:  2022    GENERAL     [] Follow-up   [x] New Consult    Maggy Manning is a 80 y.o. female referred by:   [x] Physician  [] Nursing  [] Other:         PAST MEDICAL HISTORY    Past Medical History:   Diagnosis Date    Chronic kidney disease     acute tubual necrosis    Clostridium difficile infection     Elevated troponin 9/10/2014    Hypertension     Skin cancer     Stroke [facility])     2001 left residual        PAST SURGICAL HISTORY    Past Surgical History:   Procedure Laterality Date    HX APPENDECTOMY  2008    HX HEENT      cataract    HX ORTHOPAEDIC  2010    left total hip - hip fx, left knee surgery       FAMILY HISTORY    Family History   Family history unknown: Yes         ALLERGIES    Allergies   Allergen Reactions    Neosporin [Benzalkonium Chloride] Rash       MEDICATIONS    No current facility-administered medications on file prior to encounter. Current Outpatient Medications on File Prior to Encounter   Medication Sig Dispense Refill    cephALEXin (Keflex) 500 mg capsule Take 1 Capsule by mouth four (4) times daily for 7 days. 28 Capsule 3    apixaban (ELIQUIS) 2.5 mg tablet Take 2.5 mg by mouth two (2) times a day.  lovastatin (MEVACOR) 20 mg tablet Take 20 mg by mouth daily.  (Patient not taking: Reported on 2021)           [unfilled]  Visit Vitals  BP (!) 97/59 (BP 1 Location: Left upper arm, BP Patient Position: At rest;Lying)   Pulse (!) 58   Temp 97.7 °F (36.5 °C)   Resp 18   Ht 4' 10\" (1.473 m)   Wt 42.9 kg (94 lb 8 oz)   SpO2 97%   BMI 19.75 kg/m²       ASSESSMENT     Wound Identification & Type: Stage 4 PI with bone exposure to sacrum and dry eschar to left buttock; sDTI to left lateral foot; skin tear to LLE lateral; laceration to right medial foot; sDTI to right heel with 2nd PI in healing stages; stage 1 PI to right back  Dressing change: Yes, see flow chart  Verbal consent for picture: Cognitive Impairment    Contributing Factors: chronic pressure, decreased mobility, shear force, incontinence of stool, incontinence of urine, malnutrition and Cachectic, hx of stroke with left-side weakness    Wound Arm lower Right (Active)   Number of days:        Wound Sacrum (Active)   Wound Image   01/01/22 1108   Wound Etiology Pressure Stage 4 01/01/22 1108   Dressing Status New dressing applied 01/01/22 1108   Cleansed Cleansed with saline 01/01/22 1108   Dressing/Treatment Alginate with Ag;Pharmaceutical agent (see MAR); Foam 01/01/22 1108   Dressing Change Due 01/02/22 01/01/22 1108   Wound Length (cm) 9 cm 01/01/22 1108   Wound Width (cm) 8 cm 01/01/22 1108   Wound Depth (cm) 0.3 cm 01/01/22 1108   Wound Surface Area (cm^2) 72 cm^2 01/01/22 1108   Wound Volume (cm^3) 21.6 cm^3 01/01/22 1108   Wound Assessment Exposed Structure Bone;Eschar dry;Pink/red 01/01/22 1108   Drainage Amount None 01/01/22 1108   Drainage Description Serosanguinous 12/31/21 1040   Wound Odor None 01/01/22 1108   Shanon-Wound/Incision Assessment Fragile 01/01/22 1108   Edges Unattached edges 01/01/22 1108   Wound Thickness Description Full thickness 01/01/22 1108   Number of days: 7       Wound Back Right Non-blanchable Erythema 01/01/22 (Active)   Wound Image   01/01/22 1111   Wound Etiology Pressure Stage 1 01/01/22 1111   Dressing/Treatment Open to air 01/01/22 1111   Wound Assessment Pink/red;Non-blanchable erythema 01/01/22 1111   Drainage Amount None 01/01/22 1111   Wound Odor None 01/01/22 1111   Shanon-Wound/Incision Assessment Blanchable erythema;Fragile 01/01/22 1111   Edges Attached edges 01/01/22 1111   Number of days: 0       Wound Heel Right Non-blanchable Erythema 01/01/22 (Active)   Wound Image   01/01/22 1115   Wound Etiology Deep Tissue/Injury 01/01/22 1115   Cleansed Cleansed with saline 01/01/22 1115   Dressing/Treatment Foam 01/01/22 1115   Offloading for Diabetic Foot Ulcers Other (comment) 01/01/22 1115   Dressing Change Due 01/03/22 01/01/22 1115   Wound Length (cm) 1 cm 01/01/22 1115   Wound Width (cm) 0.5 cm 01/01/22 1115   Wound Depth (cm) 0 cm 01/01/22 1115   Wound Surface Area (cm^2) 0.5 cm^2 01/01/22 1115   Wound Volume (cm^3) 0 cm^3 01/01/22 1115   Wound Assessment Non-blanchable erythema;Purple/maroon 01/01/22 1115   Drainage Amount None 01/01/22 1115   Wound Odor None 01/01/22 1115   Shanon-Wound/Incision Assessment Dry/flaky;Fragile 01/01/22 1115   Edges Attached edges 01/01/22 1115   Number of days: 0       Wound Heel Right;Lateral Dry w/eschar 01/01/22 (Active)   Wound Etiology Other (Comment) 01/01/22 1116   Cleansed Cleansed with saline 01/01/22 1116   Dressing/Treatment Foam 01/01/22 1116   Offloading for Diabetic Foot Ulcers Other (comment) 01/01/22 1116   Dressing Change Due 01/03/22 01/01/22 1116   Wound Length (cm) 1 cm 01/01/22 1116   Wound Width (cm) 1 cm 01/01/22 1116   Wound Depth (cm) 0.1 cm 01/01/22 1116   Wound Surface Area (cm^2) 1 cm^2 01/01/22 1116   Wound Volume (cm^3) 0.1 cm^3 01/01/22 1116   Wound Assessment Dry;Eschar dry;Pink/red 01/01/22 1116   Drainage Amount None 01/01/22 1116   Wound Odor None 01/01/22 1116   Shanon-Wound/Incision Assessment Dry/flaky;Fragile 01/01/22 1116   Edges Undefined edges 01/01/22 1116   Number of days: 0       Wound Foot Left;Lateral Non-blanchable Erythema, Purple/Maroon 01/01/22 (Active)   Wound Image   01/01/22 1119   Wound Etiology Deep Tissue/Injury 01/01/22 1119   Dressing/Treatment Open to air 01/01/22 1119   Wound Length (cm) 1 cm 01/01/22 1119   Wound Width (cm) 0.5 cm 01/01/22 1119   Wound Depth (cm) 0 cm 01/01/22 1119   Wound Surface Area (cm^2) 0.5 cm^2 01/01/22 1119   Wound Volume (cm^3) 0 cm^3 01/01/22 1119   Wound Assessment Non-blanchable erythema;Purple/maroon 01/01/22 1119   Drainage Amount None 01/01/22 1119   Wound Odor None 01/01/22 1119   Shanon-Wound/Incision Assessment Blanchable erythema 01/01/22 1119   Edges Attached edges 01/01/22 1119   Number of days: 0       Wound Leg lower Left;Lateral Partial Thickness 01/01/22 (Active)   Wound Image   01/01/22 1122   Wound Etiology Skin Tear 01/01/22 1122   Cleansed Cleansed with saline 01/01/22 1122   Dressing/Treatment Foam;Honey gel/honey paste 01/01/22 1122   Dressing Change Due 01/03/22 01/01/22 1122   Wound Length (cm) 1 cm 01/01/22 1122   Wound Width (cm) 1 cm 01/01/22 1122   Wound Depth (cm) 0.1 cm 01/01/22 1122   Wound Surface Area (cm^2) 1 cm^2 01/01/22 1122   Wound Volume (cm^3) 0.1 cm^3 01/01/22 1122   Wound Assessment Pink/red;Dry 01/01/22 1122   Drainage Amount None 01/01/22 1122   Wound Odor None 01/01/22 1122   Shanon-Wound/Incision Assessment Fragile 01/01/22 1122   Edges Flat/open edges; Unattached edges 01/01/22 1122   Wound Thickness Description Partial thickness 01/01/22 1122   Number of days: 0       Wound Foot Right;Medial Partial Thickness 01/01/22 (Active)   Wound Image   01/01/22 1124   Wound Etiology Other (Comment) 01/01/22 1124   Dressing Status New dressing applied 01/01/22 1124   Cleansed Cleansed with saline 01/01/22 1124   Dressing/Treatment Foam;Honey gel/honey paste 01/01/22 1124   Dressing Change Due 01/03/22 01/01/22 1124   Wound Assessment Dry;Pink/red 01/01/22 1124   Drainage Amount None 01/01/22 1124   Wound Odor None 01/01/22 1124   Edges Undefined edges 01/01/22 1124   Wound Thickness Description Partial thickness 01/01/22 1124   Number of days: 0       [REMOVED] Wound (Removed)   Number of days: 1003          PLAN     Skin Care & Pressure Relief Recommendations  Speciality bed [facility] P500 Low Air Loss  Minimize layers of linen  Turn/reposition approximately every 2 hours  Pillow wedges  Manage incontinence   Promote continence; Skin Protective lotion/cream to buttocks and sacrum daily and as needed with incontinence care  Offload heels pillows    Charles 14  Blood Glucose: 81 on 12/31/21 Albumin: 2.0 on 12/30/21  WBCs: 10.7 on 12/30/21    Support Surface: [facility] P500 Low Air Loss for increased pressure reduction and climate control. Physician/Provider notified:   Recommendations: Patient has bone exposure to sacral wound. Attempted to page Infectious Disease physician but none on call for today. Attempted to inform Dr. Car Farr via [employer]. Continue with Santyl daily to wound as ordered by Dr. Reinaldo Garza and cover with [facility] Sacral dressing daily and prn for soiling. Wound appears improved compared to when admitted. Ensure patient is turned q2h at 30 degree angle or more to offload sacrum and buttocks and promote wound healing, may use pillow for turning. Patient is contracted and remains on side when repositioned. Float heels with 2-3 pillows while in bed for offloading of heels and bony prominences. Heel boots may cause twisting of lower extremities due to patient's small frame. Maintain a pillow between BLEs at all times to prevent pressure related skin-to-skin injury. Maintain PureWick to manage  incontinence. Will continue to follow.          Discharge Wound Care Needs: TBD    Teaching completed with:   [] Patient           [] Family member       [] Caregiver          [] Nursing  [] Other    Patient/Caregiver Teaching:  Level of patient/caregiver understanding able to:   [] Indicates understanding       [] Needs reinforcement  [] Unsuccessful      [] Verbal Understanding  [] Demonstrated understanding       [] No evidence of learning  [] Refused teaching         [] N/A       Electronically signed by Erika Lazaro RN on 1/1/2022 at 11:31 AM

## 2022-01-01 NOTE — PROGRESS NOTES
Day #3 of Vancomycin    Indication for Antimicrobials: SSTI     Consult placed by: Dr. Lucio Simeon    Significant Cultures:    blood: ngtd - prelim   wound: heavy probably staph aureus, light GNR - prelim    Labs:  Recent Labs     21  0028   CREA 1.07*   BUN 39*   WBC 10.7     Temp (24hrs), Av.7 °F (36.5 °C), Min:97.7 °F (36.5 °C), Max:97.7 °F (36.5 °C)      Is the Patient on Dialysis? No    Creatinine Clearance (mL/min):   CrCl (Actual Body Weight): 19.4  CrCl (Adjusted Body Weight): 20.1  CrCl (Ideal Body Weight): 20.6    Goal level: AUC/MARCY 400-500 and trough 10-15 mg/L     Impression/Plan:   Possible small CARRIE based on previous labs  WBC at upper end of normal range  Will hold dose today  A vancomycin 500 mg IV q24h dosing would result in a supratherapeutic regimen  --500 mg IV q36h would be subtherapeutic  Will get random level for tomorrow  --hopefully can start a vancomycin 750 mg IV q48h regimen starting tomorrow (projected AUC ~431 with trough 11.6)  Antimicrobial stop date 1/3 (per original consult)     Pharmacy will follow daily and adjust medications as appropriate for renal function and/or serum levels.     Thank you,  [facility]

## 2022-01-01 NOTE — PROGRESS NOTES
Hospitalist Progress Note               Daily Progress Note: 1/1/2022      Subjective: The patient is seen for follow up. She has a 35-year-old female with history of atrial fibrillation, CVA with left hemiparesis, chronically bedridden, CAD, permanent pacemaker who was brought in by family after they noticed she was having fever at home. She has a known sacral pressure ulcer which according to the son has been getting worse. During her recent admission about 2 weeks ago the wound culture grew Enterococcus and MRSA. She was treated for aspiration pneumonia during that admission and discharged on Augmentin    In the ED patient was hypothermic with an initial temperature of 93. She was started on bear hugger. She was not started on antibiotics. ID and surgery were consulted    Gen surgery: sacral ulcer looks stage 2, continue local care/offloading, santyl/medihoney. ID: recent mrsa, wound cs prelim= heavy prob staph aureus + gnr/gram variable rods. Rec continue zosyn/vanco for now. 12/30  Labs showed a sodium of 152, creatinine 1.07, WBC 10.7  LAbs still pending today.   ------    Patient seen for follow-up. Sons at bedside, poor oral intake. She is alert, tracks me but is not verbally interactive or following commands for me. She does not appear distressed. Temp>97.5 today. Off Sophia hugger. 12/30  Patient was seen by general surgery, feels this likely represents a stage II ulcer. Surgical debridement not felt to be indicated at this time.     Problem List:  Problem List as of 1/1/2022 Date Reviewed: 12/30/2021          Codes Class Noted - Resolved    [facility], [facility]) ICD-10-CM: L89.150  ICD-9-CM: 707.03, 707.25  12/30/2021 - Present        [facility]ral [facility], [facility] ICD-10-CM: L89.150  ICD-9-CM: 707.03, 707.25  12/30/2021 - Present        Aspiration pneumonia ([facility] [address] ICD-10-CM: J69.0  ICD-9-CM: 507.0  12/16/2021 - Present Acute CHF (congestive heart failure) (HCC) ICD-10-CM: I50.9  ICD-9-CM: 428.0  3/19/2019 - Present        RESOLVED: SOB (shortness of breath) ICD-10-CM: R06.02  ICD-9-CM: 786.05  9/10/2014 - 2014        RESOLVED: Elevated troponin ICD-10-CM: R77.8  ICD-9-CM: 790.6  9/10/2014 - 2014              Medications reviewed  Current Facility-Administered Medications   Medication Dose Route Frequency    collagenase (SANTYL) 250 unit/gram ointment   Topical DAILY    dextrose 5% infusion  75 mL/hr IntraVENous CONTINUOUS    metoprolol tartrate (LOPRESSOR) tablet 25 mg  25 mg Oral BID    sodium chloride (NS) flush 5-40 mL  5-40 mL IntraVENous Q8H    sodium chloride (NS) flush 5-40 mL  5-40 mL IntraVENous PRN    acetaminophen (TYLENOL) tablet 650 mg  650 mg Oral Q6H PRN    Or    acetaminophen (TYLENOL) suppository 650 mg  650 mg Rectal Q6H PRN    ondansetron (ZOFRAN ODT) tablet 4 mg  4 mg Oral Q6H PRN    Or    ondansetron (ZOFRAN) injection 4 mg  4 mg IntraVENous Q6H PRN    piperacillin-tazobactam (ZOSYN) 3.375 g in 0.9% sodium chloride (MBP/ADV) 100 mL MBP  3.375 g IntraVENous Q8H    VANCOMYCIN INFORMATION NOTE   Other PRN       Review of Systems:   Patient unable to provide review of systems    Objective:   Physical Exam:     Visit Vitals  /75   Pulse 70   Temp 97.2 °F (36.2 °C)   Resp 18   Ht 4' 10\" (1.473 m)   Wt 42.9 kg (94 lb 8 oz)   SpO2 97%   BMI 19.75 kg/m²      O2 Device: None (Room air)    Temp (24hrs), Av.8 °F (36.6 °C), Min:97.2 °F (36.2 °C), Max:98.5 °F (36.9 °C)    No intake/output data recorded. No intake/output data recorded. General:   Lethargic, nonverbal.  Appears chronically ill, cachectic, NAD   Lungs:   Clear to auscultation bilaterally, diminished, not labored. Chest wall:  No tenderness or deformity. Heart:  Regular rate and rhythm, S1, S2 normal, no murmur, click, rub or gallop. Abdomen:   Soft, non-tender. Bowel sounds normal. No masses,  No organomegaly. Extremities: Extremities normal, atraumatic, no cyanosis or edema. Poor muscle mass. Pulses: 1+ and symmetric all extremities. Skin: Warn/dry, poor turgor   Neurologic: CNII-XII intact. left hemiparesis, not verbal         Data Review:       Recent Days:  Recent Labs     12/30/21  0028   WBC 10.7   HGB 13.3   HCT 43.5   *     Recent Labs     12/30/21  0028   *   K 3.7   *   CO2 31   *   BUN 39*   CREA 1.07*   CA 8.5   ALB 2.0*   TBILI 0.6   ALT 22     No results for input(s): PH, PCO2, PO2, HCO3, FIO2 in the last 72 hours. 24 Hour Results:  Recent Results (from the past 24 hour(s))   VANCOMYCIN, TROUGH    Collection Time: 01/01/22  5:44 AM   Result Value Ref Range    Vancomycin,trough 17.1 (H) 5.0 - 10.0 ug/mL   GLUCOSE, POC    Collection Time: 01/01/22 11:14 AM   Result Value Ref Range    Glucose (POC) 125 (H) 65 - 117 mg/dL    Performed by Tami Leung    GLUCOSE, POC    Collection Time: 01/01/22  5:09 PM   Result Value Ref Range    Glucose (POC) 103 65 - 117 mg/dL    Performed by BOB CALDERON        XR CHEST PORT   Final Result      Single portable AP view compared to December 25, 2021. Intact left cardiac   pacemaker with one electrode unchanged. Stable moderate cardiomegaly. Calcified   aorta. No mediastinal widening or shift. Improved aeration at the bases. Small   pleural reactions. Negative for pulmonary edema or pneumothorax. No new   consolidation. Assessment:  Pressure ulcer of the sacrum, stage II with associated cellulitis. Previous wound culture grew MRSA and Enterococcus. Prelim wcs here with prob heavy staph aureus, gnr/gram variable. ID and gen surgery following. Appears stage 2, not anticipating surgical debridement and will continue local care + Vanco/Zosyn for now. Sepsis/ hypothermia, present on admission, improved. Wound appears source. Dehydration with hypernatremia, poor oral intake reported.     History of CVA with left hemiparesis and chronic bedridden status    Chronic atrial fibrillation, on anticoagulation which is presently on hold and will resume as surgical debridement not anticipated. Coronary artery disease    Status post recent permanent pacemaker for bradycardia    Recent hospitalization for aspiration pneumonia      Plan:  Continue vancomycin and Zosyn  Follow wound and blood cultures. ID and gen surgery appreciated, continue to follow recommendations. Local wound care  Continue D5w and followw/ replete lytes as necessary. Follow pending labs. her son has requested full CODE STATUS  Son: Dr Derrick Thompson [phone]    Care Plan discussed with: Patient/Family    Disposition: Continued inpatient care, anticipating home ultimately with sons. Per conversation today, hhc not likely as son states he is ED dr.    Total time spent with patient: 30 minutes.     Yfn Dubois MD

## 2022-01-02 NOTE — PROGRESS NOTES
Hospitalist Progress Note               Daily Progress Note: 1/2/2022      Subjective: The patient is seen for follow up. She has a 63-year-old female with history of atrial fibrillation, CVA with left hemiparesis, chronically bedridden, CAD, permanent pacemaker who was brought in by family after they noticed she was having fever at home. She has a known sacral pressure ulcer which according to the son has been getting worse. During her recent admission about 2 weeks ago the wound culture grew Enterococcus and MRSA. She was treated for aspiration pneumonia during that admission and discharged on Augmentin    In the ED patient was hypothermic with an initial temperature of 93. She was started on bear hugger. She was not started on antibiotics. ID and surgery were consulted    Gen surgery: sacral ulcer looks stage 2, continue local care/offloading, santyl/medihoney. ID: recent mrsa, wound cs prelim= heavy prob staph aureus + gnr/gram variable rods. Rec continue zosyn/vanco for now. 12/30  Labs showed a sodium of 152, creatinine 1.07, WBC 10.7  LAbs still pending today.   ------    Patient seen for follow-up. She is awake but unable to follow commands  She does not appear distressed.        Problem List:  Problem List as of 1/2/2022 Date Reviewed: 12/30/2021          Codes Class Noted - Resolved    Pressure injury of sacral region, [facility]) ICD-10-CM: L89.150  ICD-9-CM: 707.03, 707.25  12/30/2021 - Present        Pressure ulcer of sacral region, [facility]) ICD-10-CM: L89.150  ICD-9-CM: 707.03, 707.25  12/30/2021 - Present        Aspiration pneumonia ([facility] [address] ICD-10-CM: J69.0  ICD-9-CM: 507.0  12/16/2021 - Present        Acute CHF (congestive heart failure) ([facility] [address] ICD-10-CM: I50.9  ICD-9-CM: 428.0  3/19/2019 - Present        RESOLVED: SOB (shortness of breath) ICD-10-CM: R06.02  ICD-9-CM: 786.05  9/10/2014 - 9/12/2014        RESOLVED: Elevated troponin ICD-10-CM: R77.8  ICD-9-CM: 790.6  9/10/2014 - 2014              Medications reviewed  Current Facility-Administered Medications   Medication Dose Route Frequency    collagenase (SANTYL) 250 unit/gram ointment   Topical DAILY    dextrose 5% infusion  75 mL/hr IntraVENous CONTINUOUS    metoprolol tartrate (LOPRESSOR) tablet 25 mg  25 mg Oral BID    sodium chloride (NS) flush 5-40 mL  5-40 mL IntraVENous Q8H    sodium chloride (NS) flush 5-40 mL  5-40 mL IntraVENous PRN    acetaminophen (TYLENOL) tablet 650 mg  650 mg Oral Q6H PRN    Or    acetaminophen (TYLENOL) suppository 650 mg  650 mg Rectal Q6H PRN    ondansetron (ZOFRAN ODT) tablet 4 mg  4 mg Oral Q6H PRN    Or    ondansetron (ZOFRAN) injection 4 mg  4 mg IntraVENous Q6H PRN    piperacillin-tazobactam (ZOSYN) 3.375 g in 0.9% sodium chloride (MBP/ADV) 100 mL MBP  3.375 g IntraVENous Q8H    VANCOMYCIN INFORMATION NOTE   Other PRN       Review of Systems:   Patient unable to provide review of systems    Objective:   Physical Exam:     Visit Vitals  BP 98/69 (BP 1 Location: Left upper arm, BP Patient Position: At rest;Lying right side)   Pulse 63   Temp 97.5 °F (36.4 °C)   Resp 16   Ht 4' 10\" (1.473 m)   Wt 42.9 kg (94 lb 8 oz)   SpO2 98%   BMI 19.75 kg/m²      O2 Device: None (Room air)    Temp (24hrs), Av.4 °F (36.3 °C), Min:97.2 °F (36.2 °C), Max:97.5 °F (36.4 °C)    No intake/output data recorded.  1901 -  0700  In: 0   Out: 250 [Urine:250]    General:   Lethargic, nonverbal.  Appears chronically ill, cachectic, NAD   Lungs:   Clear to auscultation bilaterally, diminished, not labored. Chest wall:  No tenderness or deformity. Heart:  Regular rate and rhythm, S1, S2 normal, no murmur, click, rub or gallop. Abdomen:   Soft, non-tender. Bowel sounds normal. No masses,  No organomegaly. Extremities: Extremities normal, atraumatic, no cyanosis or edema. Poor muscle mass. Pulses: 1+ and symmetric all extremities.    Skin: Warn/dry, poor turgor Neurologic: CNII-XII intact. left hemiparesis, not verbal         Data Review:       Recent Days:  Recent Labs     01/02/22  0503   WBC 9.8   HGB 11.1*   HCT 35.4        Recent Labs     01/02/22  0503   *   K 4.0   *   CO2 23   GLU 98   BUN 51*   CREA 1.77*   CA 8.3*     No results for input(s): PH, PCO2, PO2, HCO3, FIO2 in the last 72 hours.     24 Hour Results:  Recent Results (from the past 24 hour(s))   GLUCOSE, POC    Collection Time: 01/01/22 11:14 AM   Result Value Ref Range    Glucose (POC) 125 (H) 65 - 117 mg/dL    Performed by Sophie Castro    GLUCOSE, POC    Collection Time: 01/01/22  5:09 PM   Result Value Ref Range    Glucose (POC) 103 65 - 117 mg/dL    Performed by Randolph Campuzano, RANDOM    Collection Time: 01/02/22  5:03 AM   Result Value Ref Range    Vancomycin, random 19.7 ug/mL   METABOLIC PANEL, BASIC    Collection Time: 01/02/22  5:03 AM   Result Value Ref Range    Sodium 150 (H) 136 - 145 mmol/L    Potassium 4.0 3.5 - 5.1 mmol/L    Chloride 117 (H) 97 - 108 mmol/L    CO2 23 21 - 32 mmol/L    Anion gap 10 5 - 15 mmol/L    Glucose 98 65 - 100 mg/dL    BUN 51 (H) 6 - 20 mg/dL    Creatinine 1.77 (H) 0.55 - 1.02 mg/dL    BUN/Creatinine ratio 29 (H) 12 - 20      GFR est AA 32 (L) >60 ml/min/1.73m2    GFR est non-AA 26 (L) >60 ml/min/1.73m2    Calcium 8.3 (L) 8.5 - 10.1 mg/dL   CBC WITH AUTOMATED DIFF    Collection Time: 01/02/22  5:03 AM   Result Value Ref Range    WBC 9.8 3.6 - 11.0 K/uL    RBC 3.80 3.80 - 5.20 M/uL    HGB 11.1 (L) 11.5 - 16.0 g/dL    HCT 35.4 35.0 - 47.0 %    MCV 93.2 80.0 - 99.0 FL    MCH 29.2 26.0 - 34.0 PG    MCHC 31.4 30.0 - 36.5 g/dL    RDW 18.2 (H) 11.5 - 14.5 %    PLATELET 773 516 - 143 K/uL    MPV 10.7 8.9 - 12.9 FL    NRBC 0.3 (H) 0.0  WBC    ABSOLUTE NRBC 0.03 (H) 0.00 - 0.01 K/uL    NEUTROPHILS 76 (H) 32 - 75 %    LYMPHOCYTES 12 12 - 49 %    MONOCYTES 6 5 - 13 %    EOSINOPHILS 5 0 - 7 %    BASOPHILS 0 0 - 1 %    IMMATURE GRANULOCYTES 1 (H) 0 - 0.5 %    ABS. NEUTROPHILS 7.4 1.8 - 8.0 K/UL    ABS. LYMPHOCYTES 1.2 0.8 - 3.5 K/UL    ABS. MONOCYTES 0.5 0.0 - 1.0 K/UL    ABS. EOSINOPHILS 0.5 (H) 0.0 - 0.4 K/UL    ABS. BASOPHILS 0.0 0.0 - 0.1 K/UL    ABS. IMM. GRANS. 0.1 (H) 0.00 - 0.04 K/UL    DF AUTOMATED         XR CHEST PORT   Final Result      Single portable AP view compared to December 25, 2021. Intact left cardiac   pacemaker with one electrode unchanged. Stable moderate cardiomegaly. Calcified   aorta. No mediastinal widening or shift. Improved aeration at the bases. Small   pleural reactions. Negative for pulmonary edema or pneumothorax. No new   consolidation. Assessment:  Pressure ulcer of the sacrum, stage II with associated cellulitis. Previous wound culture grew MRSA and Enterococcus. Prelim wcs here with prob heavy staph aureus, gnr/gram variable. ID and gen surgery following. Appears stage 2, not anticipating surgical debridement and will continue local care + Vanco/Zosyn for now. Sepsis/ hypothermia, present on admission, improved. Wound appears source. Dehydration with hypernatremia, poor oral intake reported. History of CVA with left hemiparesis and chronic bedridden status    Chronic atrial fibrillation, on anticoagulation which is presently on hold and will resume as surgical debridement not anticipated. Coronary artery disease    Status post recent permanent pacemaker for bradycardia    Recent hospitalization for aspiration pneumonia      Plan:  Continue vancomycin and Zosyn  Follow wound and blood cultures. ID and gen surgery appreciated, continue to follow recommendations. Local wound care  Continue D5w and followw/ replete lytes as necessary. Follow pending labs. her son has requested full CODE STATUS  Son: Dr Chen Richter [phone]    Care Plan discussed with: Patient/Family    Total time spent with patient: 30 minutes.     Annika Zamora MD

## 2022-01-02 NOTE — PROGRESS NOTES
Day #3 of Vancomycin     Indication for Antimicrobials: SSTI      Consult placed by: Dr. Anuradha Arcos    Goal level: AUC/MARCY 400-500 and trough 10-15 mg/L    Vancomycin level resulted 14.4 mg/L. Impression/Plan:   Worsening renal clearance. Will monitor closely and dose vancomycin accordingly. Projected trough ~11 mg/L. WBC at the upper end of normal range  Will hold dose today  --500 mg IV q48h would be subtherapeutic  Will get random level for tomorrow  Antimicrobial stop date 1/3 (per original consult)    Pharmacy will follow daily and adjust medications as appropriate for renal function and/or serum levels.

## 2022-01-03 NOTE — PROGRESS NOTES
Chart reviewed. HAZEL was notified patient will be getting a PEG tube placed. HAZEL reached out to the patient's son, Antonio Nevarez, at [phone] to discuss dc plans. He confirmed the patient resides with him and will return home with him on discharge. He indicated patient has all DME needed including a hospital bed. We also discussed having a new PEG tube and CM arranging [facility] and tube feeding supplies. He stated he prefers to not have [facility] if possible and be educated how administer the tube feeding at the hospital. He is agreeable for CM to arrange the tube feeding supplies with an infusion [facility]. Referral has been sent to [address] HAZEL will continue to follow.

## 2022-01-03 NOTE — PROGRESS NOTES
Hospitalist Progress Note               Daily Progress Note: 1/3/2022      Subjective: The patient is seen for follow up. She has a 55-year-old female with history of atrial fibrillation, CVA with left hemiparesis, chronically bedridden, CAD, permanent pacemaker who was brought in by family after they noticed she was having fever at home. She has a known sacral pressure ulcer which according to the son has been getting worse. During her recent admission about 2 weeks ago the wound culture grew Enterococcus and MRSA. She was treated for aspiration pneumonia during that admission and discharged on Augmentin    In the ED patient was hypothermic with an initial temperature of 93. She was started on bear hugger. She was not started on antibiotics. ID and surgery were consulted    Gen surgery: sacral ulcer looks stage 2, continue local care/offloading, santyl/medihoney. ID: recent mrsa, wound cs prelim= heavy prob staph aureus + gnr/gram variable rods. Rec continue zosyn/vanco for now. Gen surgery flollow up 1/3 reports sacral wound worse and feels severe malnuorishment will impede healing and states she will need feeding tube. D/w Son Dr Bereket Ag, he has been pursuing g--tube and would be agreeable. Wound cs grew mrsa and enterobacter. Enterobacter(resistance to zosyn). ID following. Seen at bedside today 1/3,  arouseable though not following commands or interacting. She does not appear distressed.          Problem List:  Problem List as of 1/3/2022 Date Reviewed: 12/30/2021          Codes Class Noted - Resolved    Pressure injury of sacral region, unstageable [facility]) ICD-10-CM: L89.150  ICD-9-CM: 707.03, 707.25  12/30/2021 - Present        Pressure ulcer of sacral region, unstageable [facility]) ICD-10-CM: L89.150  ICD-9-CM: 707.03, 707.25  12/30/2021 - Present        Aspiration pneumonia ([facility] [address] ICD-10-CM: J69.0  ICD-9-CM: 507.0  12/16/2021 - Present        Acute CHF (congestive heart failure) [facility]) ICD-10-CM: I50.9  ICD-9-CM: 428.0  3/19/2019 - Present        RESOLVED: SOB (shortness of breath) ICD-10-CM: R06.02  ICD-9-CM: 786.05  9/10/2014 - 2014        RESOLVED: Elevated troponin ICD-10-CM: R77.8  ICD-9-CM: 790.6  9/10/2014 - 2014              Medications reviewed  Current Facility-Administered Medications   Medication Dose Route Frequency    collagenase (SANTYL) 250 unit/gram ointment   Topical DAILY    dextrose 5% infusion  75 mL/hr IntraVENous CONTINUOUS    metoprolol tartrate (LOPRESSOR) tablet 25 mg  25 mg Oral BID    sodium chloride (NS) flush 5-40 mL  5-40 mL IntraVENous Q8H    sodium chloride (NS) flush 5-40 mL  5-40 mL IntraVENous PRN    acetaminophen (TYLENOL) tablet 650 mg  650 mg Oral Q6H PRN    Or    acetaminophen (TYLENOL) suppository 650 mg  650 mg Rectal Q6H PRN    ondansetron (ZOFRAN ODT) tablet 4 mg  4 mg Oral Q6H PRN    Or    ondansetron (ZOFRAN) injection 4 mg  4 mg IntraVENous Q6H PRN    piperacillin-tazobactam (ZOSYN) 3.375 g in 0.9% sodium chloride (MBP/ADV) 100 mL MBP  3.375 g IntraVENous Q8H    VANCOMYCIN INFORMATION NOTE   Other PRN       Review of Systems:   Patient unable to provide review of systems    Objective:   Physical Exam:     Visit Vitals  BP (!) 107/48 (BP 1 Location: Left leg, BP Patient Position: At rest;Lying right side)   Pulse 72   Temp 96.8 °F (36 °C)   Resp 20   Ht 4' 10\" (1.473 m)   Wt 43.1 kg (95 lb)   SpO2 93%   BMI 19.86 kg/m²      O2 Device: None (Room air)    Temp (24hrs), Av.4 °F (36.3 °C), Min:96.8 °F (36 °C), Max:97.8 °F (36.6 °C)    No intake/output data recorded.  1901 -  0700  In: -   Out: 12 [Urine:260]    General:   Lethargic, nonverbal.  Appears chronically ill, cachectic, NAD   Lungs:   Clear to auscultation bilaterally, diminished, not labored. Chest wall:  No tenderness or deformity. Heart:  Regular rate and rhythm, S1, S2 normal, no murmur, click, rub or gallop. Abdomen:   Soft, non-tender.  Bowel sounds normal. No masses,  No organomegaly. Extremities: Extremities normal, atraumatic, no cyanosis or edema. Poor muscle mass. Pulses: 1+ and symmetric all extremities. Skin: Warn/dry, poor turgor   Neurologic: CNII-XII intact. left hemiparesis, not verbal, not following commands. Data Review:       Recent Days:  Recent Labs     01/02/22  0503   WBC 9.8   HGB 11.1*   HCT 35.4        Recent Labs     01/02/22  0503   *   K 4.0   *   CO2 23   GLU 98   BUN 51*   CREA 1.77*   CA 8.3*     No results for input(s): PH, PCO2, PO2, HCO3, FIO2 in the last 72 hours. 24 Hour Results:  Recent Results (from the past 24 hour(s))   GLUCOSE, POC    Collection Time: 01/02/22  3:51 PM   Result Value Ref Range    Glucose (POC) 87 65 - 117 mg/dL    Performed by [facility], POC    Collection Time: 01/02/22  7:27 PM   Result Value Ref Range    Glucose (POC) 78 65 - 117 mg/dL    Performed by [address],     Collection Time: 01/03/22  6:21 AM   Result Value Ref Range    C-Reactive protein 3.14 (H) 0.00 - 0.60 mg/dL   Isaias Rosalva    Collection Time: 01/03/22  6:21 AM   Result Value Ref Range    Vancomycin,trough 11.4 (H) 5.0 - 10.0 ug/mL    Reported dose date Not provided      Reported dose: Not provided Units       XR CHEST PORT   Final Result      Single portable AP view compared to December 25, 2021. Intact left cardiac   pacemaker with one electrode unchanged. Stable moderate cardiomegaly. Calcified   aorta. No mediastinal widening or shift. Improved aeration at the bases. Small   pleural reactions. Negative for pulmonary edema or pneumothorax. No new   consolidation. Assessment:  Pressure ulcer of the sacrum, stage II with associated cellulitis. Previous wound culture grew MRSA and Enterococcus. WCS here now with mrsa and enterobacter cloacae(resistant to zosyn and ceftriaxine, sensitive merrem). ID and gen surgery following.  Appears stage 2, not anticipating surgical debridement, seen by Dr Jessica Terrell today 1/3 again, reports wound looks worse and feels severe malnutrition impeding improvement and recommends feeding tube. Pt was previously declined for this but d/w son today and he would prefer g tube placement and will discuss with Dr Jessica Terrell. Continue local care + dc zosyn start merrem. Sepsis/ hypothermia, present on admission, improved. Wound appears source. Severely malnourished, impeding wound healing and surgery recommends feeding tube. D/w son 1/3 and he has been pursuing g tube and is agreeable if offered. Dehydration with hypernatremia, poor oral intake reported. Na remains up at 150 despitee d5w, will increase rate and follow. History of CVA with left hemiparesis and chronic bedridden status    Chronic atrial fibrillation, on anticoagulation which is presently on hold, pending decision on surgical intervention/peg placement. Coronary artery disease    Status post recent permanent pacemaker for bradycardia    Recent hospitalization for aspiration pneumonia      Plan:  Continue vancomycin   Dc zosyn  Start merrem  ID following as well as general surgery. Follow wound and blood cultures. Local wound care  Continue D5w and follow/ replete lytes as necessary. Will d/w gen surgery re possibility of feeding tube as son, Dr Rob Renteriant, is in favor of and previously had requested at another facility though placement denied son says. her son has requested full CODE STATUS  Son: Dr Rob Renteriant [phone], discussed and updated today. Care Plan discussed with: Patient/Family    Total time spent with patient: 30 minutes.     Dedrick Hagan MD

## 2022-01-03 NOTE — PROGRESS NOTES
PROGRESS NOTE      Chief Complaints:  Patient examined this morning. HPI and  Objective:  Patient is nonverbal.  No fever chills overnight patient was comfortable. I examined the patient sacral wounds stage II sacral wound has more darkened this morning. Patient had wound care done with a Santyl ointment. And it looks like is not helping. Review of Systems unable to assess. EXAM:  Visit Vitals  BP (!) 107/48 (BP 1 Location: Left leg, BP Patient Position: At rest;Lying right side)   Pulse 72   Temp 96.8 °F (36 °C)   Resp 20   Ht 4' 10\" (1.473 m)   Wt 95 lb (43.1 kg)   SpO2 93%   BMI 19.86 kg/m²       Patient is awake   Head and neck atraumatic, normocephalic. ENT: No hoarse voice  Cardiac system regular rate rhythm. Pulmonary no audible wheeze  Chest wall excursion normal with respiration cycle  Abdomen is soft not particularly distended. Neurologically nonfocal.  Skin is warm and moist.  Psychosocial: Cooperative. Vascular examination as previously noted no changes. Recent Results (from the past 24 hour(s))   GLUCOSE, POC    Collection Time: 01/02/22  3:51 PM   Result Value Ref Range    Glucose (POC) 87 65 - 117 mg/dL    Performed by Britney Kearney, POC    Collection Time: 01/02/22  7:27 PM   Result Value Ref Range    Glucose (POC) 78 65 - 117 mg/dL    Performed by Rachel ELLIOTT    C REACTIVE PROTEIN, QT    Collection Time: 01/03/22  6:21 AM   Result Value Ref Range    C-Reactive protein 3.14 (H) 0.00 - 0.60 mg/dL   Mei Burgso    Collection Time: 01/03/22  6:21 AM   Result Value Ref Range    Vancomycin,trough 11.4 (H) 5.0 - 10.0 ug/mL    Reported dose date Not provided      Reported dose: Not provided Units       ASSESSMENT:   Patient is 80 y.o. with diagnosis of :  Active Problems:    Pressure injury of sacral region, unstageable ([address] (12/30/2021)      Pressure ulcer of sacral region, unstageable ([address] (12/30/2021)        PLAN:                 Looks like patient is a sacral wound looks worse. The problem is the patient is severely malnourished. Patient will need feeding tube insertion. And I am not sure how much patient's family will address this problem aggressively. Please continue conservative management of the pressures in the sacral area. We will try Medihoney ointments instead of Santyl ointment. Will follow.

## 2022-01-03 NOTE — PROGRESS NOTES
Comprehensive Nutrition Assessment    Type and Reason for Visit: Initial (?TF)    Nutrition Recommendations/Plan:   Continue Puree/Moderately thick liquids  Add Magic Cup BID (580kcals, 18g protein)  Add Prosource Gelatein daily (90kcals, 20g protein)    If PEG is placed:  Initiate TF of Osmolite 1.2 at 15ml/hr, advance by 10ml q4hr to goal of 45ml/hr  Flush 100ml water q4hrs  Provides 1296kcals, 60g protein, and 1486ml water. Mets >/=100% needs. Nutrition Assessment:  Admitted for sepsis, sacral wound likely source. Pt bedridden, non-verbal, RD unable to interview however pt known to have poor PO intakes pta and at last admit ~2 wks ago. Wt loss of 5.5kg in <1 month (11%, severe). Emigdio suggested EN and son appears agreeable to PEG. RD ? ethics considering pt advanced age and quality of life at this time. Please allow PO for comfort, currently consuming <25% meals. RD to leave TF recs if PEG placed. Labs:Na 150, BUN 51, Cr 1.77. Meds: Vit C, D5 at 100ml/hr (408kcals/d), zosyn, vancomycin. Malnutrition Assessment:  Malnutrition Status:  Severe malnutrition    Context:  Chronic illness     Findings of the 6 clinical characteristics of malnutrition:   Energy Intake:  7 - 75% or less est energy requirements for 1 month or longer  Weight Loss:  7.00 - Greater than 10% over 6 months     Body Fat Loss:  Unable to assess,     Muscle Mass Loss:  Unable to assess,    Fluid Accumulation:  No significant fluid accumulation,      Estimated Daily Nutrient Needs:  Energy (kcal): 1300kcals (30kcals/kg); Weight Used for Energy Requirements: Current  Protein (g): 52g (1.2g/kg); Weight Used for Protein Requirements: Current  Fluid (ml/day): 1500 (adult minimum); Method Used for Fluid Requirements: Other (comment)      Nutrition Related Findings:  Pt contracted. Unable to complete NFPE at this time, will f/u as able. Hx dysphagia with LDR puree/moderately thick. No N/V/DC at this time, last BM 1/2. No edema.       Wounds: Stage II (sacrum)       Current Nutrition Therapies:  ADULT DIET Dysphagia - Pureed; Moderately Thick (Honey)    Anthropometric Measures:  · Height:  4' 9.99\" (147.3 cm)  · Current Body Wt:  43.1 kg (95 lb 0.3 oz) (1/2)   · Admission Body Wt:       · Usual Body Wt:   (rashid)     · Ideal Body Wt:  90 lbs:  105.6 %    · BMI Category:  Underweight (BMI less than 22) age over 72      Wt Readings from Last 5 Encounters:   01/02/22 43.1 kg (95 lb)   12/25/21 61.2 kg (135 lb)   12/17/21 48.6 kg (107 lb 2.3 oz)   03/21/19 50.5 kg (111 lb 5.3 oz)   09/12/14 55.1 kg (121 lb 7.6 oz)       Nutrition Diagnosis:   · Severe malnutrition related to inadequate protein-energy intake,cognitive or neurological impairment as evidenced by intake 0-25%,weight loss greater than or equal to 5% in 1 month      Nutrition Interventions:   Food and/or Nutrient Delivery: Continue current diet,Start oral nutrition supplement  Nutrition Education and Counseling: Education not appropriate  Coordination of Nutrition Care: No recommendation at this time,Speech therapy    Goals:  Meet >/=50% of EENs in >5 days, Wt maintenance within +/-0.5kg in >5 days, Signs of wound healing per nsg assessment in >5 days       Nutrition Monitoring and Evaluation:   Behavioral-Environmental Outcomes: None identified  Food/Nutrient Intake Outcomes: Food and nutrient intake,Supplement intake  Physical Signs/Symptoms Outcomes: Weight,Skin,Meal time behavior    Discharge Planning:     Too soon to determine     Electronically signed by [employer] on 1/3/2022 at 11:24 AM    Contact: Ext [phone], or via [employer]

## 2022-01-03 NOTE — PROGRESS NOTES
Consult for Vancomycin Dosing by Pharmacy by Dr. Leanne Solis provided for this 80y.o. year old female , for indication of SSTI. Day of Therapy: 5  Goal of Level(s): 10-15mcg/dL     Other Current Antibiotics: Merrem    Significant Cultures: All Micro Results       Procedure Component Value Units Date/Time    CULTURE, BLOOD, PAIRED [[id number]] Collected: 12/30/21 1132    Order Status: Completed Specimen: Blood Updated: 01/03/22 1003     Special Requests: No Special Requests        Culture result: No growth 3 days       CULTURE, Darlene [place] STAIN [[id number]]  (Susceptibility) Collected: 12/30/21 1132    Order Status: Completed Specimen: Misc. Wound Updated: 01/01/22 1557     Special Requests: No Special Requests        GRAM STAIN Rare WBCs seen         2+ Gram variable rods        Culture result: Moderate * methicillin resistant staphylococcus aureus *                  Moderate Enterobacter cloacae complex            (NOTE) MRSA CALLED TO JAY JAY RAMÍREZ [facility] ON 1/1/22 AT 0325.             CALLED TO AND READ BACK BY  AFTAB SAHU R.N. 1600 01/01/2022 BY GEMINI      COVID-19 RAPID TEST [[id number]] Collected: 12/30/21 0888    Order Status: Completed Specimen: Nasopharyngeal Updated: 12/30/21 3244     Specimen source       Please find results under separate order           COVID-19 rapid test Not Detected        Comment: Rapid Abbott ID Now   Rapid NAAT:  The specimen is NEGATIVE for SARS-CoV-2, the novel coronavirus associated with COVID-19. Negative results should be treated as presumptive and, if inconsistent with clinical signs and symptoms or necessary for patient management, should be tested with an alternative molecular assay. Negative results do not preclude SARS-CoV-2 infection and should not be used as the sole basis for patient management decisions. This test has been authorized by the FDA under   an Emergency Use Authorization (EUA) for use by authorized laboratories.  Fact sheet for Healthcare Providers: Radha.co.nz Fact sheet for Patients: ConventionUpdate.co.nz   Methodology: Isothermal Nucleic Acid Amplification                 Serum Creatinine Creatinine   Date/Time Value Ref Range Status   01/02/2022 05:03 AM 1.77 (H) 0.55 - 1.02 mg/dL Final   12/30/2021 12:28 AM 1.07 (H) 0.55 - 1.02 mg/dL Final   12/16/2021 02:09 PM 0.86 0.55 - 1.02 mg/dL Final      Creatinine Clearance Estimated Creatinine Clearance: 11.8 mL/min (A) (based on SCr of 1.77 mg/dL (H)). BUN Lab Results   Component Value Date/Time    BUN 51 (H) 01/02/2022 05:03 AM      WBC Lab Results   Component Value Date/Time    WBC 9.8 01/02/2022 05:03 AM      Temp Temp Readings from Last 1 Encounters:   01/03/22 96.8 °F (36 °C)      C-Reactive Protein C-Reactive protein   Date Value Ref Range Status   01/03/2022 3.14 (H) 0.00 - 0.60 mg/dL Final      Procalcitonin No results found for: PCT     Last Level:   Vancomycin,trough   Date/Time Value Ref Range Status   01/03/2022 06:21 AM 11.4 (H) 5.0 - 10.0 ug/mL Final     Comment:        Trough levels of 15-20 ug/mL should be targeted for patients with coagulase negative Staphylococcus and MRSA pneumonia, endocarditis,osteomyelitis, meningitis, and bacteremia, as well as patients not responding to lower levels. Trough levels of 10-15 ug/mL for infections from other sources (e.g. urinary tract, cellulitis) are appropriate. All patients receiving concomitant nephrotoxic therapies should have their function closely monitored regardless of peak or trough levels. Ht Readings from Last 1 Encounters:   12/29/21 147.3 cm (58\")        Wt Readings from Last 1 Encounters:   01/02/22 43.1 kg (95 lb)     Ideal body weight: 47.1 kg (103 lb 14.5 oz)     Previous Regimen: 500mg IV x1 (12/31)    New Regimen:   Patient has CARRIE. Give Vancomycin 500mg IV x1 today. Pharmacy will order a random level tomorrow morning.      Pharmacy to follow daily and will make changes to dose and/or frequency based on clinical status.      _________________________________     Pharmacist Aurelia Johnson

## 2022-01-04 NOTE — PROGRESS NOTES
CM was notified patient will be needing IV abx at home in addition to the possible new PEG tube. CM notified patient's son, Neil Diallo, at [phone] of this and the need for home health to manage the PICC line and obtain any labs needed. He inquired about hospice and SNF. HAZEL explained both in detail. He has requested a SNF referral to [facility] at this time to see if they have a bed available and could accept her for IV abx and new PEG tube (if placed). Choice obtained and referral has been sent.

## 2022-01-04 NOTE — PROGRESS NOTES
Progress Note    Patient: Satnam Andersen MRN: [id number]  SSN: [id number]    YOB: 1922  Age: 80 y.o. Sex: female      Admit Date: 12/29/2021    LOS: 5 days     Subjective:   Patient followed for sepsis with sacral wound infection which grew MRSA and Enterobacter. She is afebrile with normal WBC but CRP increasing, possibly due to Zosyn resistance. She is now on Vancomycin and Meropenem. Patient currently being evaluated by GI Service. General Surgery evaluation indicates worsening sacral wound. Possible feeding tube being considered. Objective:     Vitals:    01/03/22 1138 01/03/22 1542 01/03/22 1912 01/03/22 2357   BP:  114/75 103/68 124/88   Pulse:  (!) 57 69 75   Resp:  16 20 20   Temp:  96.8 °F (36 °C) 97.6 °F (36.4 °C)    SpO2:   95% 90%   Weight:       Height: 4' 9.99\" (1.473 m)           Intake and Output:  Current Shift: No intake/output data recorded. Last three shifts: No intake/output data recorded. Physical Exam:    Vitals and nursing note reviewed. Patient not examined today  Constitutional:  Room Air     Appearance: She is chronically ill appearing      Comments: Cachectic   HENT: eyes open, poor dentition  Cardiovascular:      Rate and Rhythm: Regular rhythm. Heart sounds: No murmur heard.   Pulmonary:      Effort: Pulmonary effort is normal.      Breath sounds: Normal breath sounds. Abdominal:      General: Bowel sounds are normal. There is no distension. Palpations: Abdomen is soft. Genitourinary:     Comments: No Govea  Musculoskeletal:      Right lower leg: No edema. Left lower leg: superficial wound posterior calf      Comments: Stage 2 right heel wound   Skin:     Findings: No rash.       Comments: Stage 3 sacral wound (see mobile media photo)  Stage 2-3 left buttock wound   Neurological:      Comments: Unable to assess  Psychiatric:      Comments: Unable to assess     Lab/Data Review:     WBC 9,800    CRP 3.42 <3.14   Blood cultures (12/30) No growth 3 days  Sacral wound culture (12/30) MRSA and Enterobacter cloacae resistant to Zosyn    Assessment:     Active Problems:    Pressure injury of sacral region, unstageable ([address] (12/30/2021)      Pressure ulcer of sacral region, unstageable ([address] (12/30/2021)    1. Sacral wound infection, secondary to MRSA and Enterobacter cloacae resistant to Zosyn, Day #6 IV Vancomycin and Day #2 IV Meropenem  2. Left buttock wound  3. Right heel wound  4. Sepsis with fever and elevated lactic acid/CRP  5. Failure to thrive/cachexia/malnutrition    Comment:  Increasing CRP may be related to Zosyn resistant Enterobacter. Plan:   1. Continue IV Vancomycin and Meropenem for 3 more weeks (via PICC line)  2. Follow-up blood cultures  3.  In am, repeat  CRP       Signed By: Faye Cuadra MD     January 4, 2022

## 2022-01-04 NOTE — PROGRESS NOTES
Progress Note    Patient: Ana Vogel MRN: [id number]  SSN: [id number]    YOB: 1922  Age: 80 y.o. Sex: female      Admit Date: 12/29/2021    LOS: 4 days     Subjective:   Patient followed for sepsis with suspected sacral wound infection which recently grew MRSA. Current wound culture grew MRSA again and Enterobacter. She is afebrile with normal WBC but elevated CRP. Currently on Vancomycin and Zosyn. Objective:     Vitals:    01/03/22 0726 01/03/22 1138 01/03/22 1542 01/03/22 1912   BP: (!) 107/48  114/75 103/68   Pulse: 72  (!) 57 69   Resp: 20  16 20   Temp: 96.8 °F (36 °C)  96.8 °F (36 °C) 97.6 °F (36.4 °C)   SpO2: 93%   95%   Weight:       Height:  4' 9.99\" (1.473 m)          Intake and Output:  Current Shift: No intake/output data recorded. Last three shifts: 01/02 0701 - 01/03 1900  In: -   Out: 10 [Urine:10]    Physical Exam:    Vitals and nursing note reviewed. Constitutional:  Room Air     Appearance: She is chronically ill appearing      Comments: Cachectic   HENT: eyes open, poor dentition  Cardiovascular:      Rate and Rhythm: Regular rhythm. Heart sounds: No murmur heard.   Pulmonary:      Effort: Pulmonary effort is normal.      Breath sounds: Normal breath sounds. Abdominal:      General: Bowel sounds are normal. There is no distension. Palpations: Abdomen is soft. Genitourinary:     Comments: No Govea  Musculoskeletal:      Right lower leg: No edema. Left lower leg: superficial wound posterior calf      Comments: Stage 2 right heel wound   Skin:     Findings: No rash.       Comments: Stage 3 sacral wound (see mobile media photo)  Stage 2-3 left buttock wound   Neurological:      Comments: Unable to assess  Psychiatric:      Comments: Unable to assess     Lab/Data Review:     WBC 9,800    CRP 3.14   Blood cultures (12/30) No growth 3 days  Sacral wound culture (12/30) MRSA and Enterobacter cloacae resistant to Zosyn    Assessment:     Active Problems: Pressure injury of sacral region, unstageable ([facility] [address] [facility] (12/30/2021)      Pressure ulcer of sacral region, unstageable ([facility] [address] [facility] (12/30/2021)    1. Sacral wound infection, secondary to MRSA and Enterobacter cloacae resistant to Zosyn, Day #5 IV Vancomycin and Zosyn  2. Left buttock wound  3. Right heel wound  4. Sepsis with fever and elevated lactic acid  5. Failure to thrive/cachexia    Comment:  Would switch to Meropenem given Zosyn resistance. Plan:   1. Continue IV Vancomycin  2. Discontinue Zosyn  3. Start Meropenem IV   4. Continue antibiotics for 3 weeks (via PICC line)  5. Follow-up blood culture  6.  In am, repeat  CRP       Signed By: Clari Mitchell MD     January 3, 2022

## 2022-01-04 NOTE — PROGRESS NOTES
Day #6 of Vancomycin    Indication for Antimicrobials: SSTI; sacral wound unstageable     Consult placed by: Dr. Vazquez Prudent    Significant Cultures:    blood: ngtd - prelim   wound: moderate MRSA, moderate Enterobacter    Labs:  Recent Labs     22  0654 22  0503   CREA 1.03* 1.77*   BUN 30* 51*   WBC  --  9.8     Temp (24hrs), Av.2 °F (36.2 °C), Min:96.8 °F (36 °C), Max:97.6 °F (36.4 °C)      Is the Patient on Dialysis? No    Creatinine Clearance (mL/min):   CrCl (Actual Body Weight): 20.2  CrCl (Adjusted Body Weight): 20.9  CrCl (Ideal Body Weight): 21.4    Goal level: AUC/MARCY 400-600     Impression/Plan:   Will give 500 mg IV x 1 dose ~36 hours from previous dose  --a regimen of 500 mg IV q36h has projected AUC ~414 with trough 13  Will get level on 22  Please consider and re-evaluate patient's need for aggressive long-term antibiotics; as per attending's note, patient's malnutrition impedes chances of recovery  Antimicrobial duration: 3 more weeks     Pharmacy will follow daily and adjust medications as appropriate for renal function and/or serum levels.     Thank you,  [facility]

## 2022-01-04 NOTE — PROGRESS NOTES
Hospitalist Progress Note               Daily Progress Note: 1/4/2022      Subjective: The patient is seen for follow up. She has a 42-year-old female with history of atrial fibrillation, CVA with left hemiparesis, chronically bedridden, CAD, permanent pacemaker who was brought in by family after they noticed she was having fever at home. She has a known sacral pressure ulcer which according to the son has been getting worse. During her recent admission about 2 weeks ago the wound culture grew Enterococcus and MRSA. She was treated for aspiration pneumonia during that admission and discharged on Augmentin    In the ED patient was hypothermic with an initial temperature of 93. She was started on bear hugger. She was not started on antibiotics. ID and surgery were consulted    Gen surgery: sacral ulcer looks stage 2, continue local care/offloading, santyl/medihoney. ID: recent mrsa, wound cs prelim= heavy prob staph aureus + gnr/gram variable rods. Rec continue zosyn/vanco for now. Gen surgery flollow up 1/3 reports sacral wound worse and feels severe malnuorishment will impede healing and states she will need feeding tube. D/w Son Dr Catrina Belcher, he has been pursuing g--tube and would be agreeable. Wound cs grew mrsa and enterobacter. Enterobacter(resistance to zosyn). ID following.      Subjectivepatient seen and evaluated at bedside, overnight events reviewed, patient remains nonverbal, discussed with RN        Problem List:  Problem List as of 1/4/2022 Date Reviewed: 12/30/2021          Codes Class Noted - Resolved    [facility] ICD-10-CM: L89.150  ICD-9-CM: 707.03, 707.25  12/30/2021 - Present        Pressure ulcer of sacral region, [facility] ICD-10-CM: L89.150  ICD-9-CM: 707.03, 707.25  12/30/2021 - Present        Aspiration pneumonia ([facility] [address] ICD-10-CM: J69.0  ICD-9-CM: 507.0  12/16/2021 - Present        Acute CHF (congestive heart failure) ([facility] [address] ICD-10-CM: I50.9  ICD-9-CM: 428.0  3/19/2019 - Present        RESOLVED: SOB (shortness of breath) ICD-10-CM: R06.02  ICD-9-CM: 786.05  9/10/2014 - 2014        RESOLVED: Elevated troponin ICD-10-CM: R77.8  ICD-9-CM: 790.6  9/10/2014 - 2014              Medications reviewed  Current Facility-Administered Medications   Medication Dose Route Frequency    potassium chloride (K-DUR, KLOR-CON M20) SR tablet 60 mEq  60 mEq Oral NOW    potassium chloride 10 mEq in 100 ml IVPB  10 mEq IntraVENous Q1H    zinc sulfate (ZINCATE) 50 mg zinc (220 mg) capsule 1 Capsule  1 Capsule Oral DAILY    ascorbic acid (vitamin C) (VITAMIN C) tablet 500 mg  500 mg Oral BID    meropenem (MERREM) 500 mg in 0.9% sodium chloride 10 mL IV syringe  0.5 g IntraVENous Q12H    collagenase (SANTYL) 250 unit/gram ointment   Topical DAILY    dextrose 5% infusion  100 mL/hr IntraVENous CONTINUOUS    metoprolol tartrate (LOPRESSOR) tablet 25 mg  25 mg Oral BID    sodium chloride (NS) flush 5-40 mL  5-40 mL IntraVENous Q8H    sodium chloride (NS) flush 5-40 mL  5-40 mL IntraVENous PRN    acetaminophen (TYLENOL) tablet 650 mg  650 mg Oral Q6H PRN    Or    acetaminophen (TYLENOL) suppository 650 mg  650 mg Rectal Q6H PRN    ondansetron (ZOFRAN ODT) tablet 4 mg  4 mg Oral Q6H PRN    Or    ondansetron (ZOFRAN) injection 4 mg  4 mg IntraVENous Q6H PRN    VANCOMYCIN INFORMATION NOTE   Other PRN       Review of Systems:   Patient unable to provide review of systems    Objective:   Physical Exam:     Visit Vitals  /88   Pulse 75   Temp 97.6 °F (36.4 °C)   Resp 20   Ht 4' 9.99\" (1.473 m)   Wt 43.1 kg (95 lb)   SpO2 90%   BMI 19.86 kg/m²      O2 Device: None (Room air)    Temp (24hrs), Av.2 °F (36.2 °C), Min:96.8 °F (36 °C), Max:97.6 °F (36.4 °C)    No intake/output data recorded. No intake/output data recorded.     General:   Lethargic, nonverbal.  Appears chronically ill, cachectic, NAD   Lungs:   Clear to auscultation bilaterally, diminished, not labored. Chest wall:  No tenderness or deformity. Heart:  Regular rate and rhythm, S1, S2 normal, no murmur, click, rub or gallop. Abdomen:   Soft, non-tender. Bowel sounds normal. No masses,  No organomegaly. Extremities: Extremities normal, atraumatic, no cyanosis or edema. Poor muscle mass. Pulses: 1+ and symmetric all extremities. Skin: Warn/dry, poor turgor   Neurologic: CNII-XII intact. left hemiparesis, not verbal, not following commands. Data Review:       Recent Days:  Recent Labs     01/02/22  0503   WBC 9.8   HGB 11.1*   HCT 35.4        Recent Labs     01/04/22  0654 01/02/22  0503   * 150*   K 2.9* 4.0   * 117*   CO2 26 23   * 98   BUN 30* 51*   CREA 1.03* 1.77*   CA 8.2* 8.3*     No results for input(s): PH, PCO2, PO2, HCO3, FIO2 in the last 72 hours.     24 Hour Results:  Recent Results (from the past 24 hour(s))   GLUCOSE, POC    Collection Time: 01/03/22  4:49 PM   Result Value Ref Range    Glucose (POC) 92 65 - 117 mg/dL    Performed by Katerin Liang    TSH 3RD GENERATION    Collection Time: 01/04/22  6:54 AM   Result Value Ref Range    TSH 6.15 (H) 0.36 - 8.74 uIU/mL   METABOLIC PANEL, BASIC    Collection Time: 01/04/22  6:54 AM   Result Value Ref Range    Sodium 147 (H) 136 - 145 mmol/L    Potassium 2.9 (L) 3.5 - 5.1 mmol/L    Chloride 114 (H) 97 - 108 mmol/L    CO2 26 21 - 32 mmol/L    Anion gap 7 5 - 15 mmol/L    Glucose 125 (H) 65 - 100 mg/dL    BUN 30 (H) 6 - 20 mg/dL    Creatinine 1.03 (H) 0.55 - 1.02 mg/dL    BUN/Creatinine ratio 29 (H) 12 - 20      GFR est AA 60 (L) >60 ml/min/1.73m2    GFR est non-AA 49 (L) >60 ml/min/1.73m2    Calcium 8.2 (L) 8.5 - 10.1 mg/dL   VANCOMYCIN, RANDOM    Collection Time: 01/04/22  6:54 AM   Result Value Ref Range    Vancomycin, random 14.3 ug/mL    Reported dose date Not provided      Reported dose: Not provided Units   C REACTIVE PROTEIN, QT    Collection Time: 01/04/22 6:54 AM   Result Value Ref Range    C-Reactive protein 3.42 (H) 0.00 - 0.60 mg/dL   GLUCOSE, POC    Collection Time: 01/04/22  7:29 AM   Result Value Ref Range    Glucose (POC) 101 65 - 117 mg/dL    Performed by BOB CALDERON        XR CHEST PORT   Final Result      Single portable AP view compared to December 25, 2021. Intact left cardiac   pacemaker with one electrode unchanged. Stable moderate cardiomegaly. Calcified   aorta. No mediastinal widening or shift. Improved aeration at the bases. Small   pleural reactions. Negative for pulmonary edema or pneumothorax. No new   consolidation. Assessment:  Pressure ulcer of the sacrum, stage II with associated cellulitis. Previous wound culture grew MRSA and Enterococcus. WCS here now with mrsa and enterobacter cloacae(resistant to zosyn and ceftriaxine, sensitive merrem). ID and gen surgery following. Appears stage 2, not anticipating surgical debridement, seen by Dr Sharon Marti today 1/3 again, reports wound looks worse and feels severe malnutrition impeding improvement and recommends feeding tube. Pt was previously declined for this but d/w son today and he would prefer g tube placement and will discuss with Dr Sharon Marti. Continue local care + dc zosyn start merrem. Sepsis/ hypothermia, present on admission, improved. Wound appears source. Severely malnourished, impeding wound healing and surgery recommends feeding tube. D/w son 1/3 and he has been pursuing g tube and is agreeable if offered. Dehydration with hypernatremia, poor oral intake reported. Na remains up at 150 despitee d5w, will increase rate and follow. History of CVA with left hemiparesis and chronic bedridden status    Chronic atrial fibrillation, on anticoagulation which is presently on hold, pending decision on surgical intervention/peg placement.      Coronary artery disease    Status post recent permanent pacemaker for bradycardia    Recent hospitalization for aspiration pneumonia      Plan:    Stage II sacral decubitus ulcer with surrounding cellulitispatient currently remains hemodynamically stable, does not meet sepsis criteria, of note patient's decubitus ulcer looks worse, likely secondary to poor nutrition status, patient does not meet sepsis criteria at this time, wound cultures reviewed, consistent with MRSA as well as Enterobacter  Sensitivities reviewed  Continue vancomycin and meropenem for broad-spectrum antibiotic coverage, patient will need 3 weeks of antibiotic coverage via PICC line as an outpatient  General surgery consult appreciated, wound care recommendations appreciated  Patient will need G-tube placement for improved nutrition for chances of recovery from sacral decubitus ulcer, obtain gastroenterology consult further evaluation  Infectious disease consult appreciated, continue to follow recommendations    Hypokalemiaaggressively replete potassium and recheck potassium    Atrial fibrillationcontinue home medications    History of cerebrovascular accidentcomplicates overall care  Continue home medications  Continue supportive care    Severe malnutritioncomplicating problem #1  Obtain gastroenterology consult for enteral tube placement as patient is having no nutritional intake at this time    ProphylaxisSCDs  FEN-dysphagia diet, maintenance IV fluids, replete potassium and magnesium  Full code, patient surrogate decision-maker is her son [phone]  Dispositionpending clinical improvement/G-tube placement, skilled nursing facility versus home health, 24 - 48 hours    Total non critical care time spent 35 mins    Adrianne Dumas MD

## 2022-01-04 NOTE — CONSULTS
Gastroenterology Consult     Referring Physician: Justin Stuart MD     Consult Date: 1/4/2022     Subjective:     Chief Complaint: fever    History of Present Illness: Pura Ponce is a 80 y.o. female who is seen in consultation for peg tube insertion. Ms. Butterfield presented on 12/29/2021 with fever. Ms. Butterfield is a poor historian and majority of history obtained from Medical Records. She does reside with her son who states she was seen on [place] Day with a fever and started on Keflex for suspected infection secondary to sacral decubitus ulcer. She was on the Keflex for 4 days without improved symptoms and she began to be mor lethargic with poor oral intake. Her son then brought her back to the ED for further evaluation and management. Per patient's son she is at her baseline mentation and denies vomiting, or diarrhea. She has a past medical history significant for stroke with left hemiparesis, Afib,  and pacemaker that was placed about two weeks ago. ID has been consulted for sacral wound infection growing MRSA which was present prior to admission. . She is on IV Vancomycin and IV Zosyn. She is cachexic and non-verbal.  General surgery following patient and reports the sacral wound is worse and feels severe malnourishment will impede healing and recommend feeding tube. She did have the Dobhoff placed will consult with nutritionist for feeding. Patient is a poor candidate for anesthesia. Recommend feeding via Dobhoff and possibly peg tube placement when patient is stronger. Discussed with patient's son Dr. Butterfield who states he is a surgeon and understands the risks of anesthesia and the procedure and wishes to proceed with the peg tube placement.     Past Medical History:   Diagnosis Date    Chronic kidney disease     acute tubual necrosis    Clostridium difficile infection     Elevated troponin 9/10/2014    Hypertension     Skin cancer     Stroke [facility])     2001 left residual     Past Surgical History: Procedure Laterality Date    HX APPENDECTOMY  2/2008    HX HEENT      cataract    HX ORTHOPAEDIC  04/2010    left total hip - hip fx, left knee surgery      Family History   Family history unknown: Yes     Social History     Tobacco Use    Smoking status: Never Smoker    Smokeless tobacco: Never Used   Substance Use Topics    Alcohol use: Never      Allergies   Allergen Reactions    Neosporin [Benzalkonium Chloride] Rash     Current Facility-Administered Medications   Medication Dose Route Frequency    [START ON 1/5/2022] vancomycin (VANCOCIN) 500 mg in 0.9% sodium chloride (MBP/ADV) 100 mL MBP  500 mg IntraVENous ONCE    zinc sulfate (ZINCATE) 50 mg zinc (220 mg) capsule 1 Capsule  1 Capsule Oral DAILY    ascorbic acid (vitamin C) (VITAMIN C) tablet 500 mg  500 mg Oral BID    meropenem (MERREM) 500 mg in 0.9% sodium chloride 10 mL IV syringe  0.5 g IntraVENous Q12H    collagenase (SANTYL) 250 unit/gram ointment   Topical DAILY    dextrose 5% infusion  100 mL/hr IntraVENous CONTINUOUS    metoprolol tartrate (LOPRESSOR) tablet 25 mg  25 mg Oral BID    sodium chloride (NS) flush 5-40 mL  5-40 mL IntraVENous Q8H    sodium chloride (NS) flush 5-40 mL  5-40 mL IntraVENous PRN    acetaminophen (TYLENOL) tablet 650 mg  650 mg Oral Q6H PRN    Or    acetaminophen (TYLENOL) suppository 650 mg  650 mg Rectal Q6H PRN    ondansetron (ZOFRAN ODT) tablet 4 mg  4 mg Oral Q6H PRN    Or    ondansetron (ZOFRAN) injection 4 mg  4 mg IntraVENous Q6H PRN    VANCOMYCIN INFORMATION NOTE   Other PRN        Review of Systems:  A detailed 10 organ review of systems is obtained with pertinent positives as listed in the History of Present Illness and Past Medical History. All others are negative.     Objective:     Physical Exam:  Visit Vitals  /85 (BP 1 Location: Right upper arm)   Pulse 75   Temp 97.5 °F (36.4 °C)   Resp 15   Ht 4' 9.99\" (1.473 m)   Wt 43.1 kg (95 lb)   SpO2 97%   BMI 19.86 kg/m²        Skin: Extremities and face reveal no rashes. No evans erythema. No telangiectasias on the chest wall. HEENT: Sclerae anicteric. Extra-occular muscles are intact. No oral ulcers. No abnormal pigmentation of the lips. The neck is supple. Cardiovascular: Regular rate and rhythm. Respiratory:  Comfortable breathing with no accessory muscle use. GI:  Abdomen nondistended, soft, and nontender. Normal active bowel sounds. No enlargement of the liver or spleen. No masses palpable. Rectal:  Deferred  Musculoskeletal: generalized weakness, cachexic  Neurological:  Non-verbal  Psychiatric:  Not anxious  Lymphatic:  No cervical or supraclavicular adenopathy. Lab/Data Review:  BMP:   Lab Results   Component Value Date/Time     (H) 01/04/2022 06:54 AM    K 2.9 (L) 01/04/2022 06:54 AM     (H) 01/04/2022 06:54 AM    CO2 26 01/04/2022 06:54 AM    AGAP 7 01/04/2022 06:54 AM     (H) 01/04/2022 06:54 AM    BUN 30 (H) 01/04/2022 06:54 AM    CREA 1.03 (H) 01/04/2022 06:54 AM    GFRAA 60 (L) 01/04/2022 06:54 AM    GFRNA 49 (L) 01/04/2022 06:54 AM     CMP:   Lab Results   Component Value Date/Time     (H) 01/04/2022 06:54 AM    K 2.9 (L) 01/04/2022 06:54 AM     (H) 01/04/2022 06:54 AM    CO2 26 01/04/2022 06:54 AM    AGAP 7 01/04/2022 06:54 AM     (H) 01/04/2022 06:54 AM    BUN 30 (H) 01/04/2022 06:54 AM    CREA 1.03 (H) 01/04/2022 06:54 AM    GFRAA 60 (L) 01/04/2022 06:54 AM    GFRNA 49 (L) 01/04/2022 06:54 AM    CA 8.2 (L) 01/04/2022 06:54 AM    MG 2.0 01/04/2022 06:54 AM     CBC: No results found for: WBC, HGB, HGBEXT, HCT, HCTEXT, PLT, PLTEXT, HGBEXT, HCTEXT, PLTEXT      Assessment/Plan: 1. Severe Malnutrition 2/2 to Failure to Thrive      S/P Dobhoff placement   Nutritionist consult for feeding    Poor candidate for anesthesia recommend waiting on peg tube placement until patient is stronger.     Discussed with Dr. Ale Mohr patient's son who wishes to proceed with peg tube placement     EGD with peg tube placement Wednesday 1/4/2022     NPO after midnight  2. Sacral Wound infection     ID input appreciated     Continue IV antibiotics  3. Hypokalemia     Repleted with potassium per Primary team     CMP in the am    Thank you for allowing me to participate in this patients care. Plan discussed with Dr. Berenice Leone and he approves.

## 2022-01-05 NOTE — PROGRESS NOTES
Hospitalist Progress Note               Daily Progress Note: 1/5/2022      Subjective: The patient is seen for follow up. She has a 70-year-old female with history of atrial fibrillation, CVA with left hemiparesis, chronically bedridden, CAD, permanent pacemaker who was brought in by family after they noticed she was having fever at home. She has a known sacral pressure ulcer which according to the son has been getting worse. During her recent admission about 2 weeks ago the wound culture grew Enterococcus and MRSA. She was treated for aspiration pneumonia during that admission and discharged on Augmentin    In the ED patient was hypothermic with an initial temperature of 93. She was started on bear hugger. She was not started on antibiotics. ID and surgery were consulted    Gen surgery: sacral ulcer looks stage 2, continue local care/offloading, santyl/medihoney. ID: recent mrsa, wound cs prelim= heavy prob staph aureus + gnr/gram variable rods. Rec continue zosyn/vanco for now. Gen surgery flollow up 1/3 reports sacral wound worse and feels severe malnuorishment will impede healing and states she will need feeding tube. D/w Son Dr Marco Correa, he has been pursuing g--tube and would be agreeable. Wound cs grew mrsa and enterobacter. Enterobacter(resistance to zosyn). ID following.      Subjectivepatient seen and evaluated at bedside, overnight events reviewed, patient remains nonverbal, currently doing well s/p PEG placement, discussed with RN        Problem List:  Problem List as of 1/5/2022 Date Reviewed: 1/5/2022          Codes Class Noted - Resolved    Pressure injury of sacral region, unstageable [facility]) ICD-10-CM: L89.150  ICD-9-CM: 707.03, 707.25  12/30/2021 - Present        Pressure ulcer of sacral region, unstageable [facility]) ICD-10-CM: L89.150  ICD-9-CM: 707.03, 707.25  12/30/2021 - Present        Aspiration pneumonia ([facility] [address] ICD-10-CM: J69.0  ICD-9-CM: 507.0  12/16/2021 - Present Acute CHF (congestive heart failure) (HCC) ICD-10-CM: I50.9  ICD-9-CM: 428.0  3/19/2019 - Present        RESOLVED: SOB (shortness of breath) ICD-10-CM: R06.02  ICD-9-CM: 786.05  9/10/2014 - 2014        RESOLVED: Elevated troponin ICD-10-CM: R77.8  ICD-9-CM: 790.6  9/10/2014 - 2014              Medications reviewed  Current Facility-Administered Medications   Medication Dose Route Frequency    ePHEDrine sulfate (AKOVAZ) 50 mg/mL injection        potassium chloride 10 mEq in 100 ml IVPB  10 mEq IntraVENous Q1H    zinc sulfate (ZINCATE) 50 mg zinc (220 mg) capsule 1 Capsule  1 Capsule Oral DAILY    ascorbic acid (vitamin C) (VITAMIN C) tablet 500 mg  500 mg Oral BID    meropenem (MERREM) 500 mg in 0.9% sodium chloride 10 mL IV syringe  0.5 g IntraVENous Q12H    collagenase (SANTYL) 250 unit/gram ointment   Topical DAILY    dextrose 5% infusion  100 mL/hr IntraVENous CONTINUOUS    metoprolol tartrate (LOPRESSOR) tablet 25 mg  25 mg Oral BID    sodium chloride (NS) flush 5-40 mL  5-40 mL IntraVENous Q8H    sodium chloride (NS) flush 5-40 mL  5-40 mL IntraVENous PRN    acetaminophen (TYLENOL) tablet 650 mg  650 mg Oral Q6H PRN    Or    acetaminophen (TYLENOL) suppository 650 mg  650 mg Rectal Q6H PRN    ondansetron (ZOFRAN ODT) tablet 4 mg  4 mg Oral Q6H PRN    Or    ondansetron (ZOFRAN) injection 4 mg  4 mg IntraVENous Q6H PRN    VANCOMYCIN INFORMATION NOTE   Other PRN       Review of Systems:   Patient unable to provide review of systems    Objective:   Physical Exam:     Visit Vitals  BP 94/69 (BP 1 Location: Right upper arm, BP Patient Position: At rest;Supine)   Pulse (!) 108   Temp 97.2 °F (36.2 °C)   Resp 22   Ht 4' 9.99\" (1.473 m)   Wt 43.1 kg (95 lb)   SpO2 97%   BMI 19.86 kg/m²      O2 Device: None (Room air)    Temp (24hrs), Av.3 °F (36.3 °C), Min:97.2 °F (36.2 °C), Max:97.5 °F (36.4 °C)    701 -  1900  In: 150 [I.V.:150]  Out: -    No intake/output data recorded. General:   Lethargic, nonverbal.  Appears chronically ill, cachectic, NAD   Lungs:   Clear to auscultation bilaterally, diminished, not labored. Chest wall:  No tenderness or deformity. Heart:  Regular rate and rhythm, S1, S2 normal, no murmur, click, rub or gallop. Abdomen:   Soft, non-tender. Bowel sounds normal. No masses,  No organomegaly. Extremities: Extremities normal, atraumatic, no cyanosis or edema. Poor muscle mass. Pulses: 1+ and symmetric all extremities. Skin: Warn/dry, poor turgor   Neurologic: CNII-XII intact. left hemiparesis, not verbal, not following commands. Data Review:       Recent Days:  Recent Labs     01/05/22  0658   WBC 9.2   HGB 11.7   HCT 36.2        Recent Labs     01/05/22  0658 01/04/22  1815 01/04/22  0654    138 147*   K 3.1* 3.7 2.9*   * 110* 114*   CO2 24 22 26   * 174* 125*   BUN 19 24* 30*   CREA 0.85 1.07* 1.03*   CA 8.0* 8.1* 8.2*   MG 1.9 1.8 2.0     No results for input(s): PH, PCO2, PO2, HCO3, FIO2 in the last 72 hours.     24 Hour Results:  Recent Results (from the past 24 hour(s))   GLUCOSE, POC    Collection Time: 01/04/22  4:35 PM   Result Value Ref Range    Glucose (POC) 200 (H) 65 - 117 mg/dL    Performed by [facility]    METABOLIC PANEL, BASIC    Collection Time: 01/04/22  6:15 PM   Result Value Ref Range    Sodium 138 136 - 145 mmol/L    Potassium 3.7 3.5 - 5.1 mmol/L    Chloride 110 (H) 97 - 108 mmol/L    CO2 22 21 - 32 mmol/L    Anion gap 6 5 - 15 mmol/L    Glucose 174 (H) 65 - 100 mg/dL    BUN 24 (H) 6 - 20 mg/dL    Creatinine 1.07 (H) 0.55 - 1.02 mg/dL    BUN/Creatinine ratio 22 (H) 12 - 20      GFR est AA 57 (L) >60 ml/min/1.73m2    GFR est non-AA 47 (L) >60 ml/min/1.73m2    Calcium 8.1 (L) 8.5 - 10.1 mg/dL   MAGNESIUM    Collection Time: 01/04/22  6:15 PM   Result Value Ref Range    Magnesium 1.8 1.6 - 2.4 mg/dL   CBC W/O DIFF    Collection Time: 01/05/22  6:58 AM   Result Value Ref Range    WBC 9.2 3.6 - 11.0 K/uL    RBC 4.02 3.80 - 5.20 M/uL    HGB 11.7 11.5 - 16.0 g/dL    HCT 36.2 35.0 - 47.0 %    MCV 90.0 80.0 - 99.0 FL    MCH 29.1 26.0 - 34.0 PG    MCHC 32.3 30.0 - 36.5 g/dL    RDW 18.3 (H) 11.5 - 14.5 %    PLATELET 338 600 - 947 K/uL    MPV 10.3 8.9 - 12.9 FL    NRBC 0.2 (H) 0.0  WBC    ABSOLUTE NRBC 0.02 (H) 0.00 - 0.94 K/uL   METABOLIC PANEL, BASIC    Collection Time: 01/05/22  6:58 AM   Result Value Ref Range    Sodium 143 136 - 145 mmol/L    Potassium 3.1 (L) 3.5 - 5.1 mmol/L    Chloride 111 (H) 97 - 108 mmol/L    CO2 24 21 - 32 mmol/L    Anion gap 8 5 - 15 mmol/L    Glucose 113 (H) 65 - 100 mg/dL    BUN 19 6 - 20 mg/dL    Creatinine 0.85 0.55 - 1.02 mg/dL    BUN/Creatinine ratio 22 (H) 12 - 20      GFR est AA >60 >60 ml/min/1.73m2    GFR est non-AA >60 >60 ml/min/1.73m2    Calcium 8.0 (L) 8.5 - 10.1 mg/dL   MAGNESIUM    Collection Time: 01/05/22  6:58 AM   Result Value Ref Range    Magnesium 1.9 1.6 - 2.4 mg/dL   C REACTIVE PROTEIN, QT    Collection Time: 01/05/22  6:58 AM   Result Value Ref Range    C-Reactive protein 3.09 (H) 0.00 - 0.60 mg/dL       XR ABD (KUB)   Final Result      XR CHEST PORT   Final Result      Single portable AP view compared to December 25, 2021. Intact left cardiac   pacemaker with one electrode unchanged. Stable moderate cardiomegaly. Calcified   aorta. No mediastinal widening or shift. Improved aeration at the bases. Small   pleural reactions. Negative for pulmonary edema or pneumothorax. No new   consolidation. Assessment:  Pressure ulcer of the sacrum, stage II with associated cellulitis. Previous wound culture grew MRSA and Enterococcus. WCS here now with mrsa and enterobacter cloacae(resistant to zosyn and ceftriaxine, sensitive merrem). ID and gen surgery following.  Appears stage 2, not anticipating surgical debridement, seen by Dr Scottie Chavez today 1/3 again, reports wound looks worse and feels severe malnutrition impeding improvement and recommends feeding tube. Pt was previously declined for this but d/w son today and he would prefer g tube placement and will discuss with Dr Edison Beckham. Continue local care + dc zosyn start merrem. Sepsis/ hypothermia, present on admission, improved. Wound appears source. Severely malnourished, impeding wound healing and surgery recommends feeding tube. D/w son 1/3 and he has been pursuing g tube and is agreeable if offered. Dehydration with hypernatremia, poor oral intake reported. Na remains up at 150 despitee d5w, will increase rate and follow. History of CVA with left hemiparesis and chronic bedridden status    Chronic atrial fibrillation, on anticoagulation which is presently on hold, pending decision on surgical intervention/peg placement.      Coronary artery disease    Status post recent permanent pacemaker for bradycardia    Recent hospitalization for aspiration pneumonia      Plan:    Stage II sacral decubitus ulcer with surrounding cellulitispatient currently remains hemodynamically stable, does not meet sepsis criteria, of note patient's decubitus ulcer looks worse, likely secondary to poor nutrition status, patient does not meet sepsis criteria at this time, wound cultures reviewed, consistent with MRSA as well as Enterobacter  Sensitivities reviewed  Continue vancomycin and meropenem for broad-spectrum antibiotic coverage, patient will need 3 weeks of antibiotic coverage via PICC line as an outpatient  General surgery consult appreciated, wound care recommendations appreciated  Currently patient s/p PEG placement   Infectious disease consult appreciated, continue to follow recommendations    Hypokalemiaaggressively replete potassium and recheck potassium    Atrial fibrillationcontinue home medications    History of cerebrovascular accidentcomplicates overall care  Continue home medications  Continue supportive care    Severe malnutritioncomplicating problem #1  Start tube feeding as per Gastroenterology recommendations    ProphylaxisSCDs  FEN-dysphagia diet, maintenance IV fluids, replete potassium and magnesium  Full code, patient surrogate decision-maker is her son [phone]  DispositionPlan for discharge to SNF v IRF in 24 hours    Total non critical care time spent 35 mins    Stacy Corrales MD

## 2022-01-05 NOTE — PROGRESS NOTES
Progress Note    Patient: Chichi Traylor MRN: [id number]  SSN: [id number]    YOB: 1922  Age: 80 y.o. Sex: female      Admit Date: 12/29/2021    LOS: 6 days     Subjective:   Patient followed for sepsis with sacral wound infection which grew MRSA and Enterobacter. She is afebrile with normal WBC with decreasing CRP after switching to Meropenem. Patient lying in bed, somnolent but responsive. States she feels \"bad\" but offered no specific complaints. PEG tube was placed yesterday. Objective:     Vitals:    01/03/22 2357 01/04/22 1100 01/04/22 1625 01/04/22 1940   BP: 124/88 114/85 (!) 143/85 129/85   Pulse: 75 75 87 92   Resp: 20 15 18 18   Temp:  97.5 °F (36.4 °C) 97.3 °F (36.3 °C) 97.3 °F (36.3 °C)   SpO2: 90% 97% 93% 93%   Weight:       Height:            Intake and Output:  Current Shift: No intake/output data recorded. Last three shifts: No intake/output data recorded. Physical Exam:    Vitals and nursing note reviewed. Constitutional:  Room Air     Appearance: She is chronically ill appearing      Comments: Cachectic   HENT: eyes open, poor dentition  Cardiovascular:      Rate and Rhythm: Regular rhythm. Heart sounds: No murmur heard.   Pulmonary:      Effort: Pulmonary effort is normal.      Breath sounds: Normal breath sounds. Abdominal:  PEG tube in situ     General: Bowel sounds are normal. There is no distension. Palpations: Abdomen is soft. Genitourinary: External urinary device  Musculoskeletal:      Right lower leg: No edema. Left lower leg: superficial wound posterior calf      Comments: Stage 2 right heel wound   Skin:     Findings: No rash.       Comments: Stage 3 sacral wound (see mobile media photo)  Stage 2-3 left buttock wound   Neurological:      Comments: Unable to assess  Psychiatric:      Comments: Unable to assess     Lab/Data Review:     WBC 9,200    CRP 3.09 < 3.42 <3.14     Blood cultures (12/30) No growth 5 days  Sacral wound culture (12/30) MRSA and Enterobacter cloacae resistant to Zosyn    Assessment:     Active Problems:    Pressure injury of sacral region, unstageable ([facility] [address] (12/30/2021)      Pressure ulcer of sacral region, unstageable ([address] (12/30/2021)    1. Sacral wound infection, secondary to MRSA and Enterobacter cloacae resistant to Zosyn, Day #7 IV Vancomycin and Day #3 IV Meropenem  2. Left buttock wound  3. Right heel wound  4. Sepsis with fever and elevated lactic acid/CRP, resolving  5. Failure to thrive/cachexia/malnutrition  6. PEG tube in situ    Comment:  CRP now decreasing after switching to Meropenem. Plan:   1. Continue IV Vancomycin and Meropenem for 3 more weeks (via PICC line)  2. Follow-up blood cultures  3. In am, repeat  CRP  4.  Cleared for discharge from ID standpoint    Signed By: Kalyn Aleman MD     January 5, 2022

## 2022-01-05 NOTE — PROGRESS NOTES
Skin assessment done by FRANSISCA Gallagher and David Vivas RN. Patient skin is dry and has a sacral wound, wound to the right heel, wound to the right lower arm, wound to the right foot.

## 2022-01-05 NOTE — PROGRESS NOTES
Attempted picc placement Able to access vein pt with no rom rt arm unable to thread catheter at all. Procedure stopped will need IR for powerline . Pacemaker lt side with contracted lt arm.

## 2022-01-05 NOTE — ANESTHESIA PREPROCEDURE EVALUATION
Relevant Problems   No relevant active problems       Anesthetic History   No history of anesthetic complications            Review of Systems / Medical History  Patient summary reviewed, nursing notes reviewed and pertinent labs reviewed    Pulmonary  Within defined limits                 Neuro/Psych       CVA  TIA     Cardiovascular    Hypertension  Valvular problems/murmurs: tricuspid insufficiency and mitral insufficiency      Dysrhythmias : atrial fibrillation  Pacemaker and hyperlipidemia      Comments: Echo:  · Estimated left ventricular ejection fraction is 56 - 60%. Left ventricular cavity size is decreased. Left ventricular moderate concentric hypertrophy. · Left atrial cavity size is moderately dilated. · Right ventricular global systolic function is mildly reduced. · Right atrial cavity size is mildly dilated. · Moderate aortic valve sclerosis with no significant stenosis. Aortic valve leaflet calcification present. · Mitral valve thickening. Severe mitral annular calcification. Mild to moderate mitral valve regurgitation. · Severe tricuspid valve regurgitation is present. · Abnormal bubble study consistent with a R to L shunt at the atrial level consistent with a PFO.    GI/Hepatic/Renal  Within defined limits              Endo/Other  Within defined limits           Other Findings          Past Medical History:   Diagnosis Date    Chronic kidney disease     acute tubual necrosis    Clostridium difficile infection     Elevated troponin 9/10/2014    Hypertension     Skin cancer     Stroke [facility])     2001 left residual       Past Surgical History:   Procedure Laterality Date    HX APPENDECTOMY  2/2008    HX HEENT      cataract    HX ORTHOPAEDIC  04/2010    left total hip - hip fx, left knee surgery       Current Outpatient Medications   Medication Instructions    apixaban (ELIQUIS) 2.5 mg, Oral, 2 TIMES DAILY    lovastatin (MEVACOR) 20 mg, DAILY       Current Facility-Administered Medications   Medication Dose Route Frequency    zinc sulfate (ZINCATE) 50 mg zinc (220 mg) capsule 1 Capsule  1 Capsule Oral DAILY    ascorbic acid (vitamin C) (VITAMIN C) tablet 500 mg  500 mg Oral BID    meropenem (MERREM) 500 mg in 0.9% sodium chloride 10 mL IV syringe  0.5 g IntraVENous Q12H    collagenase (SANTYL) 250 unit/gram ointment   Topical DAILY    dextrose 5% infusion  100 mL/hr IntraVENous CONTINUOUS    metoprolol tartrate (LOPRESSOR) tablet 25 mg  25 mg Oral BID    sodium chloride (NS) flush 5-40 mL  5-40 mL IntraVENous Q8H    sodium chloride (NS) flush 5-40 mL  5-40 mL IntraVENous PRN    acetaminophen (TYLENOL) tablet 650 mg  650 mg Oral Q6H PRN    Or    acetaminophen (TYLENOL) suppository 650 mg  650 mg Rectal Q6H PRN    ondansetron (ZOFRAN ODT) tablet 4 mg  4 mg Oral Q6H PRN    Or    ondansetron (ZOFRAN) injection 4 mg  4 mg IntraVENous Q6H PRN    VANCOMYCIN INFORMATION NOTE   Other PRN       Patient Vitals for the past 24 hrs:   Temp Pulse Resp BP SpO2   01/04/22 1940 36.3 °C (97.3 °F) 92 18 129/85 93 %   01/04/22 1625 36.3 °C (97.3 °F) 87 18 (!) 143/85 93 %   01/04/22 1100 36.4 °C (97.5 °F) 75 15 114/85 97 %       Lab Results   Component Value Date/Time    WBC 9.2 01/05/2022 06:58 AM    HGB 11.7 01/05/2022 06:58 AM    HCT 36.2 01/05/2022 06:58 AM    PLATELET 555 95/46/3348 06:58 AM    MCV 90.0 01/05/2022 06:58 AM     Lab Results   Component Value Date/Time    Sodium 143 01/05/2022 06:58 AM    Potassium 3.1 (L) 01/05/2022 06:58 AM    Chloride 111 (H) 01/05/2022 06:58 AM    CO2 24 01/05/2022 06:58 AM    Anion gap 8 01/05/2022 06:58 AM    Glucose 113 (H) 01/05/2022 06:58 AM    BUN 19 01/05/2022 06:58 AM    Creatinine 0.85 01/05/2022 06:58 AM    BUN/Creatinine ratio 22 (H) 01/05/2022 06:58 AM    GFR est AA >60 01/05/2022 06:58 AM    GFR est non-AA >60 01/05/2022 06:58 AM    Calcium 8.0 (L) 01/05/2022 06:58 AM     No results found for: APTT, PTP, INR, INREXT  Lab Results   Component Value Date/Time    Glucose 113 (H) 01/05/2022 06:58 AM    Glucose (POC) 200 (H) 01/04/2022 04:35 PM     Physical Exam    Airway    TM Distance: 4 - 6 cm  Neck ROM: normal range of motion   Mouth opening: Normal     Cardiovascular    Rhythm: regular  Rate: normal         Dental    Dentition: Poor dentition     Pulmonary  Breath sounds clear to auscultation               Abdominal  GI exam deferred       Other Findings            Anesthetic Plan    ASA: 3  Anesthesia type: total IV anesthesia and MAC          Induction: Intravenous  Anesthetic plan and risks discussed with: Patient and Family

## 2022-01-05 NOTE — PROGRESS NOTES
Physician Progress Note      PATIENT:               Meri Barrios  CSN #:                  [id number]  :                       3/25/1922  ADMIT DATE:       2021 10:39 PM  [address] [place] [place] DATE:  RESPONDING  PROVIDER #:        Gregg Eden MD          QUERY TEXT:    Pt admitted with SEPSIS. Pt noted to have >0.3MG/DL INCREASE IN IN Cr LEVEL IN 48HRS. . If possible, please document in the progress notes and discharge summary if you are evaluating and/or treating any of the following: The medical record reflects the following:  Risk Factors: SEPSIS, PACCEMAKER, CELLULITIS, MALNUTRITION, AFIB  Clinical Indicators:  Cr 1.07--->  Cr 1.77  Treatment: RD CONSULTED, NS IV, I&O MONITORING, LAB MONITORING. Thank you,  FLO EastonN, RN, [place], [facility] Specialist  [phone] or [email or web address]      Defined by Kidney Disease Improving Global Outcomes (KDIGO) clinical practice guideline for acute kidney injury:  -Increase in SCr by greater than or equal to 0.3 mg/dl within 48?hours; or  -Increase or decrease in SCr to greater than or equal to 1.5 times baseline, which is known or presumed to have occurred within the prior 7 days; or  -Urine volume < 0.5ml/kg/h for 6 hours  Options provided:  -- Acute kidney failure  -- Acute kidney failure with acute tubular necrosis  -- Acute kidney injury  -- Other - I will add my own diagnosis  -- Disagree - Not applicable / Not valid  -- Disagree - Clinically unable to determine / Unknown  -- Refer to Clinical Documentation Reviewer    PROVIDER RESPONSE TEXT:    This patient has an Acute kidney injury.     Query created by: Lay Newman on 1/3/2022 5:17 PM      Electronically signed by:  Gregg Eden MD 2022 8:11 AM

## 2022-01-05 NOTE — PROGRESS NOTES
Progress Note    Patient: Kris Rose MRN: [id number]  SSN: [id number]    YOB: 1922  Age: 80 y.o. Sex: female      Admit Date: 12/29/2021    LOS: 6 days     Subjective:   GI in consultation for peg tube insertion    Patient seen resting at this time. She is status post EGD with peg tube placement today. Nutritionist input appreciated. May use PEG tube today in 6 hours. Bolster mild compression to skin wall, clean wound wth peroxide  and water and apply tripple antibiotic cream twice a day. Diet per speech Pathology. Dietary consult for feed formula and volume. EGD with peg tube placement 1/5/2021    History of Present Illness: Kris Rose is a 80 y.o. female who is seen in consultation for peg tube insertion. Ms. Dejuan Leyva presented on 12/29/2021 with fever. Ms. Dejuan Leyva is a poor historian and majority of history obtained from Medical Records. She does reside with her son who states she was seen on [place] with a fever and started on Keflex for suspected infection secondary to sacral decubitus ulcer. She was on the Keflex for 4 days without improved symptoms and she began to be mor lethargic with poor oral intake. Her son then brought her back to the ED for further evaluation and management. Per patient's son she is at her baseline mentation and denies vomiting, or diarrhea. She has a past medical history significant for stroke with left hemiparesis, Afib,  and pacemaker that was placed about two weeks ago. ID has been consulted for sacral wound infection growing MRSA which was present prior to admission. . She is on IV Vancomycin and IV Zosyn. She is cachexic and non-verbal.  General surgery following patient and reports the sacral wound is worse and feels severe malnourishment will impede healing and recommend feeding tube. She did have the Dobhoff placed will consult with nutritionist for feeding. Patient is a poor candidate for anesthesia.  Recommend feeding via Dobhoff and possibly peg tube placement when patient is stronger. Discussed with patient's son  Debbie Shipleyvicenta who states he is a surgeon and understands the risks of anesthesia and the procedure and wishes to proceed with the peg tube placement. Objective:     Vitals:    01/05/22 0921 01/05/22 0940 01/05/22 1009 01/05/22 1423   BP: 104/88 92/69 94/69 96/76   Pulse: (!) 107 (!) 114 (!) 108 88   Resp: 22 27 22 20   Temp:  97.2 °F (36.2 °C)     SpO2: 98% 98% 97% 96%   Weight:       Height:            Intake and Output:  Current Shift: 01/05 0701 - 01/05 1900  In: 150 [I.V.:150]  Out: -   Last three shifts: No intake/output data recorded. Physical Exam:   Skin:  Extremities and face reveal no rashes. No evans erythema. HEENT: Sclerae anicteric. Extra-occular muscles are intact. No abnormal pigmentation of the lips. The neck is supple. Cardiovascular: Regular rate and rhythm. Respiratory:  Comfortable breathing with no accessory muscle use. GI:  Abdomen nondistended, soft, and nontender.  Abdominal binder in place  Rectal:  Deferred  Musculoskeletal: Generalized weakness  Neurological:  Non-verbal  Psychiatric:  No anxiety noted  Lymphatic:  No visible adenopathy      Lab/Data Review:  Recent Results (from the past 24 hour(s))   METABOLIC PANEL, BASIC    Collection Time: 01/04/22  6:15 PM   Result Value Ref Range    Sodium 138 136 - 145 mmol/L    Potassium 3.7 3.5 - 5.1 mmol/L    Chloride 110 (H) 97 - 108 mmol/L    CO2 22 21 - 32 mmol/L    Anion gap 6 5 - 15 mmol/L    Glucose 174 (H) 65 - 100 mg/dL    BUN 24 (H) 6 - 20 mg/dL    Creatinine 1.07 (H) 0.55 - 1.02 mg/dL    BUN/Creatinine ratio 22 (H) 12 - 20      GFR est AA 57 (L) >60 ml/min/1.73m2    GFR est non-AA 47 (L) >60 ml/min/1.73m2    Calcium 8.1 (L) 8.5 - 10.1 mg/dL   MAGNESIUM    Collection Time: 01/04/22  6:15 PM   Result Value Ref Range    Magnesium 1.8 1.6 - 2.4 mg/dL   CBC W/O DIFF    Collection Time: 01/05/22  6:58 AM   Result Value Ref Range    WBC 9.2 3.6 - 11.0 K/uL    RBC 4. 02 3.80 - 5.20 M/uL    HGB 11.7 11.5 - 16.0 g/dL    HCT 36.2 35.0 - 47.0 %    MCV 90.0 80.0 - 99.0 FL    MCH 29.1 26.0 - 34.0 PG    MCHC 32.3 30.0 - 36.5 g/dL    RDW 18.3 (H) 11.5 - 14.5 %    PLATELET 003 948 - 071 K/uL    MPV 10.3 8.9 - 12.9 FL    NRBC 0.2 (H) 0.0  WBC    ABSOLUTE NRBC 0.02 (H) 0.00 - 2.77 K/uL   METABOLIC PANEL, BASIC    Collection Time: 01/05/22  6:58 AM   Result Value Ref Range    Sodium 143 136 - 145 mmol/L    Potassium 3.1 (L) 3.5 - 5.1 mmol/L    Chloride 111 (H) 97 - 108 mmol/L    CO2 24 21 - 32 mmol/L    Anion gap 8 5 - 15 mmol/L    Glucose 113 (H) 65 - 100 mg/dL    BUN 19 6 - 20 mg/dL    Creatinine 0.85 0.55 - 1.02 mg/dL    BUN/Creatinine ratio 22 (H) 12 - 20      GFR est AA >60 >60 ml/min/1.73m2    GFR est non-AA >60 >60 ml/min/1.73m2    Calcium 8.0 (L) 8.5 - 10.1 mg/dL   MAGNESIUM    Collection Time: 01/05/22  6:58 AM   Result Value Ref Range    Magnesium 1.9 1.6 - 2.4 mg/dL   C REACTIVE PROTEIN, QT    Collection Time: 01/05/22  6:58 AM   Result Value Ref Range    C-Reactive protein 3.09 (H) 0.00 - 0.60 mg/dL              XR ABD (KUB)   Final Result      XR CHEST PORT   Final Result      Single portable AP view compared to December 25, 2021. Intact left cardiac   pacemaker with one electrode unchanged. Stable moderate cardiomegaly. Calcified   aorta. No mediastinal widening or shift. Improved aeration at the bases. Small   pleural reactions. Negative for pulmonary edema or pneumothorax. No new   consolidation. Assessment:     Active Problems:    Pressure injury of sacral region, unstageable ([address] (12/30/2021)      Pressure ulcer of sacral region, unstageable ([address] (12/30/2021)        Plan: 1. Severe Malnutrition 2/2 to Failure to Thrive       Nutritionist input appreciated      S/P  EGD with peg tube placement 1/5/2022       May use PEG tube today in 6 hours.         Bolster mild compression to skin wall,        clean wound wth peroxide and water and         apply tripple antibiotic cream twice a day. 2. Sacral Wound infection     ID input appreciated     Continue IV antibiotics  3. Hypokalemia     Repleted with potassium per Primary team     CMP in the am        Thank you for allowing me to participate in this patients care. Plan discussed with Dr. Isiah Giang and he approves.     Signed By: Rob Bryant NP     January 5, 2022

## 2022-01-06 NOTE — PROGRESS NOTES
Progress Note    Patient: Jayy Griffith MRN: [id number]  SSN: [id number]    YOB: 1922  Age: 80 y.o. Sex: female      Admit Date: 12/29/2021    LOS: 7 days     Subjective:   GI in consultation for peg tube placement    Patient seen resting at this time. She is status post EGD with peg tube placement on 1/5/22. Per RN patient is tolerating peg tube feeding without difficulty and is pending discharge to [address] [place] [address]. EGD with peg tube placement 1/5/2021     History of Present Illness: Acacia Larios is a 80 y.o. female who is seen in consultation for peg tube insertion.  Ms. Ry Estevez presented on 12/29/2021 with fever. Ms. Ry Estevez is a poor historian and majority of history obtained from Medical Records. She does reside with her son who states she was seen on [place] Day with a fever and started on Keflex for suspected infection secondary to sacral decubitus ulcer. She was on the Keflex for 4 days without improved symptoms and she began to be mor lethargic with poor oral intake. Her son then brought her back to the ED for further evaluation and management.  Per patient's son she is at her baseline mentation and denies vomiting, or diarrhea. She has a past medical history significant for stroke with left hemiparesis, Afib,  and pacemaker that was placed about two weeks ago. ID has been consulted for sacral wound infection growing MRSA which was present prior to admission. . She is on IV Vancomycin and IV Zosyn. She is cachexic and non-verbal.  General surgery following patient and reports the sacral wound is worse and feels severe malnourishment will impede healing and recommend feeding tube. She did have the Dobhoff placed will consult with nutritionist for feeding. Patient is a poor candidate for anesthesia. Recommend feeding via Dobhoff and possibly peg tube placement when patient is stronger.  Discussed with patient's son Dr. Raza Herb who states he is a surgeon and understands the risks of anesthesia and the procedure and wishes to proceed with the peg tube placement. Objective:     Vitals:    01/05/22 2333 01/06/22 0100 01/06/22 0623 01/06/22 1449   BP:   93/71 107/89   Pulse:   (!) 104 76   Resp:   18 16   Temp: (!) 96.5 °F (35.8 °C) 98.5 °F (36.9 °C) 99 °F (37.2 °C) 97.5 °F (36.4 °C)   SpO2:   94% 98%   Weight:       Height:            Intake and Output:  Current Shift: No intake/output data recorded. Last three shifts: 01/04 1901 - 01/06 0700  In: 150 [I.V.:150]  Out: 900 [Urine:900]    Physical Exam:   Skin:  Extremities and face reveal no rashes. No evans erythema. HEENT: Sclerae anicteric. Extra-occular muscles are intact. No abnormal pigmentation of the lips. The neck is supple. Cardiovascular: Regular rate and rhythm. Respiratory:  Comfortable breathing with no accessory muscle use. GI:  Abdomen nondistended, soft, and nontender. Peg tube with pressure dressing, no drainage noted.   Rectal:  Deferred  Musculoskeletal: generalize weakness, contracted upper/lower extremities  Neurological:  Dementia  Psychiatric:  Not anxious  Lymphatic:  No visible adenopathy      Lab/Data Review:  Recent Results (from the past 24 hour(s))   METABOLIC PANEL, BASIC    Collection Time: 01/06/22  8:32 AM   Result Value Ref Range    Sodium 136 136 - 145 mmol/L    Potassium 4.1 3.5 - 5.1 mmol/L    Chloride 106 97 - 108 mmol/L    CO2 21 21 - 32 mmol/L    Anion gap 9 5 - 15 mmol/L    Glucose 139 (H) 65 - 100 mg/dL    BUN 16 6 - 20 mg/dL    Creatinine 0.83 0.55 - 1.02 mg/dL    BUN/Creatinine ratio 19 12 - 20      GFR est AA >60 >60 ml/min/1.73m2    GFR est non-AA >60 >60 ml/min/1.73m2    Calcium 8.1 (L) 8.5 - 10.1 mg/dL   C REACTIVE PROTEIN, QT    Collection Time: 01/06/22  8:32 AM   Result Value Ref Range    C-Reactive protein 3.21 (H) 0.00 - 0.60 mg/dL   CBC W/O DIFF    Collection Time: 01/06/22  8:32 AM   Result Value Ref Range    WBC 7.9 3.6 - 11.0 K/uL    RBC 3.88 3.80 - 5.20 M/uL    HGB 11.1 (L) 11.5 - 16.0 g/dL    HCT 34.7 (L) 35.0 - 47.0 %    MCV 89.4 80.0 - 99.0 FL    MCH 28.6 26.0 - 34.0 PG    MCHC 32.0 30.0 - 36.5 g/dL    RDW 18.5 (H) 11.5 - 14.5 %    PLATELET 503 538 - 652 K/uL    MPV 10.7 8.9 - 12.9 FL    NRBC 0.3 (H) 0.0  WBC    ABSOLUTE NRBC 0.02 (H) 0.00 - 0.01 K/uL   VANCOMYCIN, TROUGH    Collection Time: 01/06/22 11:52 AM   Result Value Ref Range    Vancomycin,trough 12.4 (H) 5.0 - 10.0 ug/mL    Reported dose date Not provided      Reported dose: Not provided Units   METABOLIC PANEL, BASIC    Collection Time: 01/06/22 11:52 AM   Result Value Ref Range    Sodium 135 (L) 136 - 145 mmol/L    Potassium 3.9 3.5 - 5.1 mmol/L    Chloride 105 97 - 108 mmol/L    CO2 23 21 - 32 mmol/L    Anion gap 7 5 - 15 mmol/L    Glucose 135 (H) 65 - 100 mg/dL    BUN 17 6 - 20 mg/dL    Creatinine 0.84 0.55 - 1.02 mg/dL    BUN/Creatinine ratio 20 12 - 20      GFR est AA >60 >60 ml/min/1.73m2    GFR est non-AA >60 >60 ml/min/1.73m2    Calcium 7.9 (L) 8.5 - 10.1 mg/dL   MAGNESIUM    Collection Time: 01/06/22 11:52 AM   Result Value Ref Range    Magnesium 1.7 1.6 - 2.4 mg/dL              IR GUIDE INSERT PICC OVER 5 YRS   Final Result   Successful placement of a double-lumen power injectable PICC in the right arm. The catheter is ready for use. IR FLUORO GUIDE PLC CVAD   Final Result   Successful placement of a double-lumen power injectable PICC in the right arm. The catheter is ready for use. IR US GUIDED VASCULAR ACCESS   Final Result   Successful placement of a double-lumen power injectable PICC in the right arm. The catheter is ready for use. XR ABD (KUB)   Final Result      XR CHEST PORT   Final Result      Single portable AP view compared to December 25, 2021. Intact left cardiac   pacemaker with one electrode unchanged. Stable moderate cardiomegaly. Calcified   aorta. No mediastinal widening or shift. Improved aeration at the bases. Small   pleural reactions.  Negative for pulmonary edema or pneumothorax. No new   consolidation. Assessment:     Active Problems:    Pressure injury of sacral region, unstageable ([address] (12/30/2021)      Pressure ulcer of sacral region, unstageable ([address] (12/30/2021)        Plan: 1. Severe Malnutrition 2/2 to Failure to Thrive       Nutritionist input appreciated      S/P  EGD with peg tube placement 1/5/2022       Bolster mild compression to skin wall,        clean wound wth peroxide and water and         apply tripple antibiotic cream twice a day. Per RN tolerating tube feeding w/o difficulty  2. Sacral Wound infection     ID input appreciated     Continue IV antibiotics  3. Hypokalemia (Improved)     Repleted with potassium per Primary team     CMP in the am    GI signing off at this time. Please re-consult if needed. Thank you for allowing me to participate in this patients care. Plan discussed with Dr. Wayne Gage and he approves.     Signed By: Mateus Olivarez NP     January 6, 2022

## 2022-01-06 NOTE — PROGRESS NOTES
PROGRESS NOTE      Chief Complaints: He is examined this morning. HPI and  Objective:    Patient is nonverbal.  There is no fever. Patient did get a PEG tube yesterday. Sacral wound seems to be still dark. But they are very superficial.  Review of Systems:  Unable to assess due to mental status. EXAM:  Visit Vitals  BP 93/71   Pulse (!) 104   Temp 99 °F (37.2 °C)   Resp 18   Ht 4' 9.99\" (1.473 m)   Wt 95 lb (43.1 kg)   SpO2 94%   BMI 19.86 kg/m²       Patient is awake   Head and neck atraumatic, normocephalic. ENT: No hoarse voice  Cardiac system regular rate rhythm. Pulmonary no audible wheeze  Chest wall excursion normal with respiration cycle  Abdomen is soft not particularly distended. Neurologically nonfocal.  Skin is warm and moist.  Psychosocial: COVID-19 CDC guideline data points:    The patient presents with. COVID-19 with associated symptoms of [fever, dry cough, SOB, anorexia, or diarrhea], complicated by this/these comorbidities: [none]. Patient educated to notify a healthcare professional if they develop further symptoms that include chest pain, arrhythmias/palpitations, prolonged SOB or fever, or other concerning symptoms. ASSESSMENT:  [DICTATE THE MOST RELEVANT DIAGNOSIS(ES) listed below:    \"COVID Actual Exposure\"  \"COVID Acute Bronchitis\"  \"COVID Acute Respiratory Failure\"  \"COVID ARDS\"  \"COVID Bronchitis NOS\"  \"COVID Lower Respiratory Infection\"  \"COVID Other Respiratory Infection\"  \"COVID Pneumonia\"  \"COVID Possible Exposure Ruled Out\"  \"COVID Sepsis\"  \"COVID Symptoms\"]  Vascular examination as previously noted no changes. No results found for this or any previous visit (from the past 24 hour(s)). ASSESSMENT:   Patient is 80 y.o. with diagnosis of :  Active Problems:    Pressure injury of sacral region, unstageable ([address] (12/30/2021)      Pressure ulcer of sacral region, unstageable ([address] (12/30/2021)        PLAN:               We will try Medihoney ointment for wound care and discontinue Santyl. Continue conservative management for sacral wound.

## 2022-01-06 NOTE — DISCHARGE SUMMARY
Hospitalist Discharge Summary     Patient ID:  rAuna Melgar  [id number]  37 y.o.  3/25/1922  12/29/2021    PCP on record: Fide Francois MD    Admit date: 12/29/2021  Discharge date and time: 1/6/2022    DISCHARGE DIAGNOSIS:    Cellulitis/stage II sacral decubitus ulcer/hypokalemia/atrial fibrillation/history of cerebrovascular accident/severe malnutrition with failure to thrive requiring PEG tube placement/sepsis/hypothermia/dehydration with hypernatremia    CONSULTATIONS:  IP CONSULT TO HOSPITALIST  IP CONSULT TO GENERAL SURGERY  IP CONSULT TO INFECTIOUS DISEASES  IP CONSULT TO GASTROENTEROLOGY  IP CONSULT TO ANESTHESIOLOGY  IP CONSULT TO INTERVENTIONAL RADIOLOGY    Excerpted HPI from H&P of Isidra Burt MD:  80 y.o. female with history of atrial fibrillation on anticoagulation, CVA with left hemiparesis with bedridden status, coronary artery disease, recently had a pacemaker presents to the emergency room by the son as she started having temperature. As per information she recently had pacemaker done, after that she was admitted at this facility for shortness of breath that was evaluated. She does have the pressure ulcer by the time that was documented, according to the son it started when she had the pacemaker done. He is taking care of her, but wound is not healing admits getting worse more extensive. It seems tunneled, as it needed further care admitted for management. She was given Keflex antibiotic before.      Wound cultures that was done last admission on December 16 is growing Enterococcus and methicillin-resistant staph aureus. ______________________________________________________________________  DISCHARGE SUMMARY/HOSPITAL COURSE:  for full details see H&P, daily progress notes, labs, consult notes.      Patient was subsequently admitted to [facility] for further evaluation as well as management, patient was found to meet sepsis criteria upon presentation, secondary to cellulitis from infective decubitus ulcer, patient was evaluated by infectious disease as well as general surgery, started on IV antibiotics, patient's wound culture showed MRSA and Enterobacter, wound care was provided by general surgery, of note patient had poor nutrition and severe malnutrition resulting in failure to thrive and poor wound healing, patient subsequently had PEG tube placed, started tolerating tube feeding, after clearance by infectious disease patient was deemed stable for discharge to skilled nursing facility for 3 weeks of IV antibiotics via PICC line with close outpatient follow-up with general surgery for wound care as well as with infectious disease.        _______________________________________________________________________  Patient seen and examined by me on discharge day. Pertinent Findings:  General:   Lethargic, nonverbal.  Appears chronically ill, cachectic, NAD   Lungs:   Clear to auscultation bilaterally, diminished, not labored. Chest wall:  No tenderness or deformity. Heart:  Regular rate and rhythm, S1, S2 normal, no murmur, click, rub or gallop. Abdomen:   Soft, non-tender. Bowel sounds normal. No masses,  No organomegaly. Extremities: Extremities normal, atraumatic, no cyanosis or edema. Poor muscle mass. Pulses: 1+ and symmetric all extremities. Skin: Warn/dry, poor turgor   Neurologic: CNII-XII intact. left hemiparesis, not verbal, not following commands. _______________________________________________________________________  DISCHARGE MEDICATIONS:   Current Discharge Medication List      START taking these medications    Details   acetaminophen (TYLENOL) 325 mg tablet Take 2 Tablets by mouth every six (6) hours as needed for Pain or Fever. Qty: 60 Tablet, Refills: 0  Start date: 1/6/2022      ascorbic acid, vitamin C, (VITAMIN C) 500 mg tablet Take 1 Tablet by mouth two (2) times a day.   Qty: 60 Tablet, Refills: 0  Start date: 1/6/2022 collagenase (SANTYL) 250 unit/gram ointment Apply  to affected area daily for 14 doses. Qty: 15 g, Refills: 0  Start date: 1/7/2022, End date: 1/21/2022      meropenem 500 mg IV syringe 500 mg by IntraVENous route every twelve (12) hours every twelve (12) hours. Qty: 42 Dose, Refills: 0  Start date: 1/6/2022      zinc sulfate (ZINCATE) 50 mg zinc (220 mg) capsule Take 1 Capsule by mouth daily. Qty: 30 Capsule, Refills: 0  Start date: 1/7/2022         STOP taking these medications       cephALEXin (Keflex) 500 mg capsule Comments:   Reason for Stopping:         apixaban (ELIQUIS) 2.5 mg tablet Comments:   Reason for Stopping:         metoprolol tartrate (LOPRESSOR) 25 mg tablet Comments:   Reason for Stopping:         lovastatin (MEVACOR) 20 mg tablet Comments:   Reason for Stopping:                 Patient Follow Up Instructions: Activity: PT/OT Eval and Treat  Diet: PEG feeding at 45ml/hr (Osmolite 1.2)  Wound Care: We will try Medihoney ointment for wound care and discontinue Santyl      Follow-up with PCP/Infectious Disease/Gastroenterology/general surgery in 2 weeks.   Follow-up tests/labs As per above physicians  Follow-up Information     Follow up With Specialties Details Why Adalgisa Bennett MD Family Medicine In 1 week  [facility]  [address]      Zee Clark MD Infectious Disease In 1 week  [address]      Nguyễn Judd MD Gastroenterology In 1 week  [address] [address]      Berenice Jiang MD Surgery, General and Vascular Surgery In 1 week  [address] [facility]  [phone]          ________________________________________________________________    Risk of deterioration: Low    Condition at Discharge: Stable  __________________________________________________________________    Disposition  SNF/LTC    ____________________________________________________________________    Code Status: Full Code  ___________________________________________________________________      Total time in minutes spent coordinating this discharge (includes going over instructions, follow-up, prescriptions, and preparing report for sign off to her PCP) :  45 minutes    Signed:   Anel Lazaro MD

## 2022-01-06 NOTE — DISCHARGE INSTRUCTIONS
PCP on record: Brittany Graves MD     Admit date: 12/29/2021  Discharge date and time: 1/6/2022     DISCHARGE DIAGNOSIS:     Cellulitis/stage II sacral decubitus ulcer/hypokalemia/atrial fibrillation/history of cerebrovascular accident/severe malnutrition with failure to thrive requiring PEG tube placement/sepsis/hypothermia/dehydration with hypernatremia     CONSULTATIONS:  IP CONSULT TO HOSPITALIST  IP CONSULT TO GENERAL SURGERY  IP CONSULT TO INFECTIOUS DISEASES  IP CONSULT TO GASTROENTEROLOGY  IP CONSULT TO ANESTHESIOLOGY  IP CONSULT TO INTERVENTIONAL RADIOLOGY     Excerpted HPI from H&P of Sarahi Tucker MD:  80 y.o. female with history of atrial fibrillation on anticoagulation, CVA with left hemiparesis with bedridden status, coronary artery disease, recently had a pacemaker presents to the emergency room by the son as she started having temperature.  As per information she recently had pacemaker done, after that she was admitted at this facility for shortness of breath that was evaluated. Isela Yoder does have the pressure ulcer by the time that was documented, according to the son it started when she had the pacemaker done. Jessica Dick is taking care of her, but wound is not healing admits getting worse more extensive.  It seems tunneled, as it needed further care admitted for management.  She was given Keflex antibiotic before.      Wound cultures that was done last admission on December 16 is growing Enterococcus and methicillin-resistant staph aureus.     ______________________________________________________________________  DISCHARGE SUMMARY/HOSPITAL COURSE:  for full details see H&P, daily progress notes, labs, consult notes.      Patient was subsequently admitted to [facility] for further evaluation as well as management, patient was found to meet sepsis criteria upon presentation, secondary to cellulitis from infective decubitus ulcer, patient was evaluated by infectious disease as well as general surgery, started on IV antibiotics, patient's wound culture showed MRSA and Enterobacter, wound care was provided by general surgery, of note patient had poor nutrition and severe malnutrition resulting in failure to thrive and poor wound healing, patient subsequently had PEG tube placed, started tolerating tube feeding, after clearance by infectious disease patient was deemed stable for discharge to skilled nursing facility for 3 weeks of IV antibiotics via PICC line with close outpatient follow-up with general surgery for wound care as well as with infectious disease.           _______________________________________________________________________  Patient seen and examined by me on discharge day. Pertinent Findings:  General:   Lethargic, nonverbal.  Appears chronically ill, cachectic, NAD   Lungs:   Clear to auscultation bilaterally, diminished, not labored. Chest wall:  No tenderness or deformity. Heart:  Regular rate and rhythm, S1, S2 normal, no murmur, click, rub or gallop. Abdomen:   Soft, non-tender. Bowel sounds normal. No masses,  No organomegaly. Extremities: Extremities normal, atraumatic, no cyanosis or edema. Poor muscle mass. Pulses: 1+ and symmetric all extremities. Skin: Warn/dry, poor turgor   Neurologic: CNII-XII intact.   left hemiparesis, not verbal, not following commands.         _______________________________________________________________________  DISCHARGE MEDICATIONS:         Current Discharge Medication List             START taking these medications     Details   acetaminophen (TYLENOL) 325 mg tablet Take 2 Tablets by mouth every six (6) hours as needed for Pain or Fever. Qty: 60 Tablet, Refills: 0  Start date: 1/6/2022       ascorbic acid, vitamin C, (VITAMIN C) 500 mg tablet Take 1 Tablet by mouth two (2) times a day.   Qty: 60 Tablet, Refills: 0  Start date: 1/6/2022       collagenase (SANTYL) 250 unit/gram ointment Apply  to affected area daily for 14 doses. Qty: 15 g, Refills: 0  Start date: 1/7/2022, End date: 1/21/2022       meropenem 500 mg IV syringe 500 mg by IntraVENous route every twelve (12) hours every twelve (12) hours. Qty: 42 Dose, Refills: 0  Start date: 1/6/2022       zinc sulfate (ZINCATE) 50 mg zinc (220 mg) capsule Take 1 Capsule by mouth daily. Qty: 30 Capsule, Refills: 0  Start date: 1/7/2022                STOP taking these medications         cephALEXin (Keflex) 500 mg capsule Comments:   Reason for Stopping:            apixaban (ELIQUIS) 2.5 mg tablet Comments:   Reason for Stopping:            metoprolol tartrate (LOPRESSOR) 25 mg tablet Comments:   Reason for Stopping:            lovastatin (MEVACOR) 20 mg tablet Comments:   Reason for Stopping:                       Patient Follow Up Instructions: Activity: PT/OT Eval and Treat  Diet: PEG feeding at 45ml/hr (Osmolite 1.2)  Wound Care: We will try Medihoney ointment for wound care and discontinue Santyl        Follow-up with PCP/Infectious Disease/Gastroenterology/general surgery in 2 weeks.   Follow-up tests/labs As per above physicians           Follow-up Information      Follow up With Specialties Details Why Conrad Wilson MD Family Medicine In 1 week   [address] [facility]  [address]  [address]  [phone]        Richard Danielson MD Infectious Disease In 1 week   [address]  [phone]  Amadeo Baeza MD Gastroenterology In 1 week   [address]  [phone]        Emigdio, Nickie Escobedo MD Surgery, General and Vascular Surgery In 1 week   [address]  [phone]             ________________________________________________________________     Risk of deterioration: Low     Condition at Discharge: Stable  __________________________________________________________________     Disposition  SNF/LTC     ____________________________________________________________________     Code Status: Full Code  ___________________________________________________________________        Total time in minutes spent coordinating this discharge (includes going over instructions, follow-up, prescriptions, and preparing report for sign off to her PCP) :  45 minutes

## 2022-01-06 NOTE — PROGRESS NOTES
Progress Note    Patient: Macarena Molina MRN: [id number]  SSN: [id number]    YOB: 1922  Age: 80 y.o. Sex: female      Admit Date: 12/29/2021    LOS: 7 days     Subjective:   Patient followed for sepsis with sacral wound infection which grew MRSA and Enterobacter. She is afebrile with normal WBC with decreasing CRP after switching to Meropenem. PICC team unable to place line yesterday and recommended Powerline. Patient remains somnolent but appears to be resting comfortably. Objective:     Vitals:    01/05/22 2205 01/05/22 2333 01/06/22 0100 01/06/22 0623   BP:    93/71   Pulse:    (!) 104   Resp:    18   Temp: (!) 94.2 °F (34.6 °C) (!) 96.5 °F (35.8 °C) 98.5 °F (36.9 °C) 99 °F (37.2 °C)   SpO2:    94%   Weight:       Height:            Intake and Output:  Current Shift: No intake/output data recorded. Last three shifts: 01/04 1901 - 01/06 0700  In: 150 [I.V.:150]  Out: 900 [Urine:900]    Physical Exam:    Vitals and nursing note reviewed. Constitutional:  Room Air     Appearance: She is chronically ill appearing      Comments: Cachectic   HENT: eyes open, poor dentition  Cardiovascular:      Rate and Rhythm: Regular rhythm. Heart sounds: No murmur heard.   Pulmonary:      Effort: Pulmonary effort is normal.      Breath sounds: Normal breath sounds. Abdominal:  PEG tube in situ     General: Bowel sounds are normal. There is no distension. Palpations: Abdomen is soft. Genitourinary: External urinary device  Musculoskeletal:      Right lower leg: No edema. Left lower leg: superficial wound posterior calf      Comments: Stage 2 right heel wound   Skin:     Findings: No rash.       Comments: Stage 3 sacral wound (see mobile media photo)  Stage 2-3 left buttock wound   Neurological:      Comments: Unable to assess  Psychiatric:      Comments: Unable to assess     Lab/Data Review:     WBC 9,200    CRP 3.21 <3.09 < 3.42 <3.14     Blood cultures (12/30) No growth FINAL  Sacral wound culture (12/30) MRSA and Enterobacter cloacae resistant to Zosyn    Assessment:     Active Problems:    Pressure injury of sacral region, unstageable ([address] (12/30/2021)      Pressure ulcer of sacral region, unstageable ([address] (12/30/2021)    1. Sacral wound infection, secondary to MRSA and Enterobacter cloacae resistant to Zosyn, Day #8 IV Vancomycin and Day #4 IV Meropenem  2. Left buttock wound  3. Right heel wound  4. Sepsis with fever and elevated lactic acid/CRP, resolving  5. Failure to thrive/cachexia/malnutrition  6. PEG tube in situ    Comment:  CRP increased again today. Plan:   1. Continue IV Vancomycin and Meropenem for 3 more weeks (via Powerline)  2. Powerline as ordered   3. In am, repeat  CRP  4.  Cleared for discharge from ID standpoint    Signed By: Nixon Lee MD     January 6, 2022

## 2022-01-06 NOTE — PROGRESS NOTES
CM received call back from patient's son, Catrina Belcher, at [phone]. He stated he has spoken with [facility] and would like for her to go there. Once patient has powerline placed she can discharge there today. She can go to Room 111A and nursing report can be called to [phone].

## 2022-01-06 NOTE — PROGRESS NOTES
HAZEL reached out to the patient's son, Trish Trammell, at [phone] to notify him of patient being cleared for discharge today. He stated he did not know yet if he wants SNF or for her to come home. HAZEL explained we would need decision as we would need to arrange the home IV abx and tube feeds as soon as possible. He requested CM have as representative from [facility] call him to answer questions. CM provided him with number to return call and let know final disposition decision.

## 2022-01-07 NOTE — PROGRESS NOTES
Discharge instructions completed and report called to nurse at [facility]. Copy of discharge papers sent with pt. PICC placed in left arm for long term abx therapy. Site WNL. Belongings sent with pt angie Morocho and he is aware pt will be discharged to SNF this evening. Pt discharged to SNF via [id number] [facility] ambulance stretcher. Pt stable upon discharge.

## 2022-01-23 NOTE — ED TRIAGE NOTES
Pt to ed from nursing home via ems. Ems was called for hypoxia. Upon ems arrival pt o2 96% RA. Pt recently positive for covid.

## 2022-01-24 PROBLEM — R09.02 HYPOXIA: Status: ACTIVE | Noted: 2022-01-01

## 2022-01-24 PROBLEM — U07.1 COVID-19 VIRUS INFECTION: Status: ACTIVE | Noted: 2022-01-01

## 2022-01-24 NOTE — PROGRESS NOTES
1/24/22. Pt admitted under OBS. D/C Plan is for pt to return to [facility] upon discharge via transportation. Pt's son Mirna Reid @ [phone] informed/consented. Pt is total care.

## 2022-01-24 NOTE — ED NOTES
Reposition and cleaned moderate amount liquid stool. Patient feels warm, rectal temp 99.2. No acute changes noted. Mouth care provided.

## 2022-01-24 NOTE — ED NOTES
Patient cleaned of a large amount of loose stool and urine. Large open area to sacrum noted with large amount slough in wound. Mepilex replaced. Clean, dry linen placed. Patient noted in no acute distress at this time.

## 2022-01-24 NOTE — PROGRESS NOTES
Nutrition Assessment     Type and Reason for Visit: Consult,Initial (TF Recs)    Nutrition Recommendations/Plan:     Rec' puree diet w/ moderately thick liquid as comfort/pleasure eats vs alternative means of nutrition due to advance stage dementia and age. TF Rec:   Jevity 1.5, continuous, 38ml w/ water flushes 135ml q4hrs   Providinkcals (100%), 58gpro (98%), 1503ml (100%)     Monitor and Record wts, TF rates, water flushes, residuals, bm in I/Os     Nutrition Assessment:  80 y.o. female from NH admitted for Covid 19 and decubitus ulcer. PMH: CVA, atrial fibrillation on Eliquis, CHF status post AICD. Recently admitted w/ FTT and PEG tube was placed during that admission, although RD ? ethics considering pt advanced age and quality of life at this time, and rec pleasure/comfort eats. Will provide TF regimens, however, rec pleasure eats w/ puree diet, moderately thick vs alternative means of nutrition. Labs: BUN (34), protein (6.0), alb (1.7), hbg (9.4), HCT (29.9). Meds: zinc.    Malnutrition Assessment:  Malnutrition Status: Insufficient data (unable to be compeleted at this time)     Estimated Daily Nutrient Needs:  Energy (kcal):  1,362kcals (30kcals/kg)  Protein (g):  59g (1.3g/kg)       Fluid (ml/day):  1500ml    Nutrition Related Findings:  alert, awake, nonverbal. NFPE unable to be complete at this will. no reported n/v,c/d nor edema. hx dyspagia w/ puree/moderately thick. No BM since adm.       Current Nutrition Therapies:  DIET NPO Sips of Water with Meds    Anthropometric Measures:  · Height:  4' 10\" (147.3 cm)  · Current Body Wt:  45.4 kg (100 lb)  · BMI: 20.9    Nutrition Diagnosis:   · Swallowing difficulty related to cognitive or neurological impairment,inadequate protein-energy intake as evidenced by nutrition support-enteral nutrition      Nutrition Intervention:  Food and/or Nutrient Delivery: Start oral diet,Start tube feeding  Nutrition Education and Counseling: Education not indicated  Coordination of Nutrition Care: Continue to monitor while inpatient    Goals:  >50% of EENs x2-3days, wt. stability +/-.5kg, improve wound healing, lytes wnl x 7days       Nutrition Monitoring and Evaluation:   Behavioral-Environmental Outcomes: None identified  Food/Nutrient Intake Outcomes: Enteral nutrition intake/tolerance,Diet advancement/tolerance  Physical Signs/Symptoms Outcomes: GI status,Weight,Meal time behavior,Chewing or swallowing    Discharge Planning:    Continue current diet,Enteral nutrition     Electronically signed by Myesha Brown on 1/24/2022 at 4:17 PM    Contact Number: [employer]

## 2022-01-24 NOTE — ED NOTES
Turned the patient and got her all washed up. Also did mouth care and got pts rectal temp and it was 98.2.

## 2022-01-24 NOTE — H&P
GENERAL GENERIC H&P/CONSULT    Subjective:  80-year-old female with history of hypertension, history of CVA, atrial fibrillation on Eliquis, CHF status post AICD. Patient recently admitted to the hospital for decubitus ulcer, discharged on 6 weeks of IV meropenem which she will finish on 1/27/2022. She had failure to thrive, PEG tube was also placed. Patient is coming from a nursing home brought to the emergency department for hypoxia. Upon arrival to the ER patient is saturating above 95% on room air. She was recently diagnosed with COVID-19 infection. Repeat Covid test is positive. Patient is contracted, chronically sick looking, does not participate in history. Patient does not appear to be in acute cardiorespiratory distress. Chest x-ray shows bilateral pleural effusion with bibasilar volume loss/consolidation. Son  Andrea Griffiths was updated by ER team  Past Medical History:   Diagnosis Date    Chronic kidney disease     acute tubual necrosis    Clostridium difficile infection     Elevated troponin 9/10/2014    Hypertension     Skin cancer     Stroke [facility])     2001 left residual      Past Surgical History:   Procedure Laterality Date    HX APPENDECTOMY  2/2008    HX HEENT      cataract    HX ORTHOPAEDIC  04/2010    left total hip - hip fx, left knee surgery    IR FLUORO GUIDE PLC CVAD  1/6/2022      Prior to Admission medications    Medication Sig Start Date End Date Taking? Authorizing Provider   acetaminophen (TYLENOL) 325 mg tablet Take 2 Tablets by mouth every six (6) hours as needed for Pain or Fever. 1/6/22   Noni Hendricks MD   ascorbic acid, vitamin C, (VITAMIN C) 500 mg tablet Take 1 Tablet by mouth two (2) times a day. 1/6/22   Noni Hendricks MD   meropenem 500 mg IV syringe 500 mg by IntraVENous route every twelve (12) hours every twelve (12) hours.  1/6/22   Noni Hendricks MD   zinc sulfate (ZINCATE) 50 mg zinc (220 mg) capsule Take 1 Capsule by mouth daily. 1/7/22   Annika Bocanegra MD     Allergies   Allergen Reactions    Neosporin [Benzalkonium Chloride] Rash      Social History     Tobacco Use    Smoking status: Never Smoker    Smokeless tobacco: Never Used   Substance Use Topics    Alcohol use: Never      Family History   Family history unknown: Yes      Review of Systems   Unable to perform ROS: Dementia       Objective:    No intake/output data recorded. No intake/output data recorded. Patient Vitals for the past 8 hrs:   BP Temp Pulse Resp SpO2   01/23/22 2349  99.2 °F (37.3 °C)      01/23/22 2128 134/74 98.4 °F (36.9 °C) 60 24 97 %     Physical Exam  Constitutional:       General: She is not in acute distress. Appearance: She is ill-appearing. Comments: Chronically sick looking. Not in acute distress. HENT:      Head: Normocephalic and atraumatic. Mouth/Throat:      Pharynx: Oropharynx is clear. Eyes:      Extraocular Movements: Extraocular movements intact. Conjunctiva/sclera: Conjunctivae normal.      Pupils: Pupils are equal, round, and reactive to light. Cardiovascular:      Rate and Rhythm: Normal rate. Pulses: Normal pulses. Heart sounds: Normal heart sounds. No murmur heard. No friction rub. No gallop. Pulmonary:      Effort: Pulmonary effort is normal.      Breath sounds: Normal breath sounds. Abdominal:      General: Bowel sounds are normal.      Palpations: Abdomen is soft. Comments: PEG tube in situ   Musculoskeletal:      Cervical back: Neck supple. Comments: Contracted   Skin:     General: Skin is warm and dry. Neurological:      General: No focal deficit present. Mental Status: She is alert. Cranial Nerves: No cranial nerve deficit. Motor: No weakness.       Comments: Alert, nonverbal.          Labs:    Recent Results (from the past 24 hour(s))   LACTIC ACID    Collection Time: 01/23/22  2:18 PM   Result Value Ref Range    Lactic acid 1.4 0.4 - 2.0 mmol/L   CBC WITH AUTOMATED DIFF    Collection Time: 01/23/22  2:18 PM   Result Value Ref Range    WBC 8.9 3.6 - 11.0 K/uL    RBC 3.29 (L) 3.80 - 5.20 M/uL    HGB 9.6 (L) 11.5 - 16.0 g/dL    HCT 30.6 (L) 35.0 - 47.0 %    MCV 93.0 80.0 - 99.0 FL    MCH 29.2 26.0 - 34.0 PG    MCHC 31.4 30.0 - 36.5 g/dL    RDW 19.6 (H) 11.5 - 14.5 %    PLATELET 696 731 - 846 K/uL    MPV 9.5 8.9 - 12.9 FL    NRBC 0.0 0.0  WBC    ABSOLUTE NRBC 0.00 0.00 - 0.01 K/uL    NEUTROPHILS 60 32 - 75 %    LYMPHOCYTES 23 12 - 49 %    MONOCYTES 11 5 - 13 %    EOSINOPHILS 5 0 - 7 %    BASOPHILS 0 0 - 1 %    IMMATURE GRANULOCYTES 1 (H) 0 - 0.5 %    ABS. NEUTROPHILS 5.3 1.8 - 8.0 K/UL    ABS. LYMPHOCYTES 2.0 0.8 - 3.5 K/UL    ABS. MONOCYTES 1.0 0.0 - 1.0 K/UL    ABS. EOSINOPHILS 0.5 (H) 0.0 - 0.4 K/UL    ABS. BASOPHILS 0.0 0.0 - 0.1 K/UL    ABS. IMM. GRANS. 0.1 (H) 0.00 - 0.04 K/UL    DF AUTOMATED     METABOLIC PANEL, COMPREHENSIVE    Collection Time: 01/23/22  2:18 PM   Result Value Ref Range    Sodium 136 136 - 145 mmol/L    Potassium 4.6 3.5 - 5.1 mmol/L    Chloride 105 97 - 108 mmol/L    CO2 28 21 - 32 mmol/L    Anion gap 3 (L) 5 - 15 mmol/L    Glucose 101 (H) 65 - 100 mg/dL    BUN 36 (H) 6 - 20 mg/dL    Creatinine 0.51 (L) 0.55 - 1.02 mg/dL    BUN/Creatinine ratio 71 (H) 12 - 20      GFR est AA >60 >60 ml/min/1.73m2    GFR est non-AA >60 >60 ml/min/1.73m2    Calcium 8.4 (L) 8.5 - 10.1 mg/dL    Bilirubin, total 0.5 0.2 - 1.0 mg/dL    AST (SGOT) 43 (H) 15 - 37 U/L    ALT (SGPT) 20 12 - 78 U/L    Alk.  phosphatase 205 (H) 45 - 117 U/L    Protein, total 6.3 (L) 6.4 - 8.2 g/dL    Albumin 1.7 (L) 3.5 - 5.0 g/dL    Globulin 4.6 (H) 2.0 - 4.0 g/dL    A-G Ratio 0.4 (L) 1.1 - 2.2     NT-PRO BNP    Collection Time: 01/23/22  2:18 PM   Result Value Ref Range    NT pro-BNP 6,969 (H) <450 pg/mL   TROPONIN-HIGH SENSITIVITY    Collection Time: 01/23/22  2:18 PM   Result Value Ref Range    Troponin-High Sensitivity 69 (HH) 0 - 51 ng/L   MAGNESIUM Collection Time: 01/23/22  2:18 PM   Result Value Ref Range    Magnesium 2.3 1.6 - 2.4 mg/dL   PHOSPHORUS    Collection Time: 01/23/22  2:18 PM   Result Value Ref Range    Phosphorus 3.0 2.6 - 4.7 mg/dL   COVID-19 RAPID TEST    Collection Time: 01/23/22  2:18 PM   Result Value Ref Range    Specimen source Please find results under separate order      COVID-19 rapid test DETECTED (A) Not Detected     INFLUENZA A & B AG (RAPID TEST)    Collection Time: 01/23/22  2:18 PM   Result Value Ref Range    Influenza A Antigen Negative Negative      Influenza B Antigen Negative Negative             Assessment:  Active Problems:    Hypoxia (1/24/2022)      COVID-19 virus infection (1/24/2022)    Hypoxia  -Reported by nursing home. Patient is maintaining SPO2> 95 while breathing ambient air  -Will continue to monitor    COVID-19 infection  -Not requiring oxygen, typical Covid-like infiltrate not seen on x-ray  -Supportive care    Systolic CHF with probable mild acute decompensation  -Reported hypoxia, effusion, no significant shortness of breath or distress. Elevated proBNP.  -Placed on small dose of IV Lasix  -Intake, output monitoring    Decubitus ulcer  -Sacral, trochanteric ulcers, unstageable  -Debridement in early January, discharged on 6 weeks of meropenem which she will finish on 1/27/2022  -Wound care consult    Atrial fibrillation  -Cardizem, Eliquis    Failure to thrive, cachexia  -PEG tube in situ.   Nutrition consult for tube feeding management    History of CVA    DVT prophylaxis: On Eliquis    Full code      Signed:  Xenia Rankin MD 1/24/2022

## 2022-01-24 NOTE — DISCHARGE SUMMARY
Hospitalist Discharge Summary     Patient ID:  Sohan Cornelius  [id number]  89 y.o.  3/25/1922  1/23/2022    PCP on record: Bouchra Persaud MD    Admit date: 1/23/2022  Discharge date and time: 1/24/2022    DISCHARGE DIAGNOSIS:  [id number]  Systolic CHF  Decubitus ulcer  Atrial fibrillation  Failure to thrive    CONSULTATIONS:  IP CONSULT TO HOSPITALIST    Excerpted HPI from H&P of Deuce Green MD:  28-year-old female with history of hypertension, history of CVA, atrial fibrillation on Eliquis, CHF status post AICD. Patient recently admitted to the hospital for decubitus ulcer, discharged on 6 weeks of IV meropenem which she will finish on 1/27/2022. She had failure to thrive, PEG tube was also placed. Patient is coming from a nursing home brought to the emergency department for hypoxia. Upon arrival to the ER patient is saturating above 95% on room air. She was recently diagnosed with COVID-19 infection. Repeat Covid test is positive. Patient is contracted, chronically sick looking, does not participate in history. Patient does not appear to be in acute cardiorespiratory distress    ______________________________________________________________________  DISCHARGE SUMMARY/HOSPITAL COURSE:  for full details see H&P, daily progress notes, labs, consult notes. Patient was monitored for 24 hours. For last 24 hours patient's oxygen saturation is more than 92% on RA. Patient can be discharged back to SNF and continue meropenem until 1/27/2022.         _______________________________________________________________________  Patient seen and examined by me on discharge day. Pertinent Findings:  Gen:    Not in distress  Chest: Clear lungs  CVS:   Regular rhythm. No edema  Abd:  Soft, not distended, not tender  Neuro:  Awake and alert, not oriented, confused. ________________________________________________________  DISCHARGE MEDICATIONS:   Current Discharge Medication List      CONTINUE these medications which have NOT CHANGED    Details   Eliquis 2.5 mg tablet Take 2.5 mg by mouth two (2) times a day. DILT- mg capsule Take 120 mg by mouth two (2) times a day. furosemide (LASIX) 40 mg tablet 40 mg daily. acetaminophen (TYLENOL) 325 mg tablet Take 2 Tablets by mouth every six (6) hours as needed for Pain or Fever. Qty: 60 Tablet, Refills: 0      ascorbic acid, vitamin C, (VITAMIN C) 500 mg tablet Take 1 Tablet by mouth two (2) times a day. Qty: 60 Tablet, Refills: 0      meropenem 500 mg IV syringe 500 mg by IntraVENous route every twelve (12) hours every twelve (12) hours. Qty: 42 Dose, Refills: 0      zinc sulfate (ZINCATE) 50 mg zinc (220 mg) capsule Take 1 Capsule by mouth daily. Qty: 30 Capsule, Refills: 0               Patient Follow Up Instructions: Activity: Patient is chronically bedridden  Diet: PEG feeding   Wound Care: As directed    Follow-up with PCP in 1 week.   Follow-up tests/labs oxygen saturation  Follow-up Information     Follow up With Specialties Details Why [address] [place] [address]  [phone]          ________________________________________________________________    Risk of deterioration: High    Condition at Discharge:  Stable  __________________________________________________________________    Disposition  SNF/LTC    ____________________________________________________________________    Code Status: Full Code  ___________________________________________________________________      Total time in minutes spent coordinating this discharge (includes going over instructions, follow-up, prescriptions, and preparing report for sign off to her PCP) :  35 minutes    Signed:  Elvie Pimentel MD

## 2022-01-24 NOTE — PROGRESS NOTES
Medicare Outpatient Observation Notice (MOON) provided to patient/representative with verbal explanation of the notice. Time allotted for questions regarding the notice. Patient /representative provided a completed copy of the MOON notice. Copy placed on bedside chart. Pierre Manpreet Luna @ [phone]) Informed verbally via phone & consented.

## 2022-01-24 NOTE — ED NOTES
Have tried to [facility] now and no one is answering the phone. I get in touch with the  and then they do not transfer me to anyone.

## 2022-01-30 NOTE — ED PROVIDER NOTES
EMERGENCY DEPARTMENT HISTORY AND PHYSICAL EXAM        Date: 1/23/2022  Patient Name: Dmitry Epperson    History of Presenting Illness     Chief Complaint   Patient presents with    Positive For Covid-19       5:26 PM    History Provided By: Patient and EMS    HPI: Dmitry Epperson, 80 y.o. female presents from her nursing home facility with a past medical history significant for hypertension, CVA with left-sided weakness, decubitus ulcer to the sacrum, and congestive heart failure was recently diagnosed with COVID-19 at her facility and was noted to be more hypoxic today as well as concerned that she might have a fever so she was sent to the emergency department. History is otherwise limited as patient well alert is unresponsive. PCP: Joanie Brittle, MD    Current Outpatient Medications   Medication Sig Dispense Refill    Eliquis 2.5 mg tablet Take 2.5 mg by mouth two (2) times a day.  DILT- mg capsule Take 120 mg by mouth two (2) times a day.  furosemide (LASIX) 40 mg tablet 40 mg daily.  acetaminophen (TYLENOL) 325 mg tablet Take 2 Tablets by mouth every six (6) hours as needed for Pain or Fever. 60 Tablet 0    ascorbic acid, vitamin C, (VITAMIN C) 500 mg tablet Take 1 Tablet by mouth two (2) times a day. 60 Tablet 0    meropenem 500 mg IV syringe 500 mg by IntraVENous route every twelve (12) hours every twelve (12) hours. 42 Dose 0    zinc sulfate (ZINCATE) 50 mg zinc (220 mg) capsule Take 1 Capsule by mouth daily.  30 Capsule 0       Past History     Past Medical History:  Past Medical History:   Diagnosis Date    Chronic kidney disease     acute tubual necrosis    Clostridium difficile infection     Elevated troponin 9/10/2014    Hypertension     Skin cancer     Stroke [facility])     2001 left residual       Past Surgical History:  Past Surgical History:   Procedure Laterality Date    HX APPENDECTOMY  2/2008    HX HEENT      cataract    HX ORTHOPAEDIC  04/2010    left total hip - hip fx, left knee surgery    IR FLUORO GUIDE PLC CVAD  1/6/2022       Family History:  Family History   Family history unknown: Yes       Social History:  Social History     Tobacco Use    Smoking status: Never Smoker    Smokeless tobacco: Never Used   Substance Use Topics    Alcohol use: Never    Drug use: Never       Allergies: Allergies   Allergen Reactions    Neosporin [Benzalkonium Chloride] Rash       Review of Systems   Review of Systems   Unable to perform ROS: Patient unresponsive       Physical Exam   Physical Exam  Constitutional:       General: She is not in acute distress. Appearance: She is well-developed. She is ill-appearing. Comments: Cachectic frail elderly female intermittently moans pale   HENT:      Head: Normocephalic and atraumatic. Nose: Rhinorrhea present. Mouth/Throat:      Mouth: Mucous membranes are dry. Pharynx: No posterior oropharyngeal erythema. Eyes:      General: Vision grossly intact. Extraocular Movements: Extraocular movements intact. Conjunctiva/sclera: Conjunctivae normal.      Pupils: Pupils are equal, round, and reactive to light. Neck:      Vascular: No JVD. Trachea: No tracheal deviation. Comments: No JVD  Cardiovascular:      Rate and Rhythm: Normal rate and regular rhythm. Pulses: Normal pulses. Carotid pulses are 2+ on the right side and 2+ on the left side. Radial pulses are 2+ on the right side and 2+ on the left side. Femoral pulses are 2+ on the right side and 2+ on the left side. Popliteal pulses are 2+ on the right side and 2+ on the left side. Dorsalis pedis pulses are 2+ on the right side and 2+ on the left side. Posterior tibial pulses are 2+ on the right side and 2+ on the left side. Heart sounds: Normal heart sounds. Pulmonary:      Effort: Pulmonary effort is normal.      Breath sounds: Normal breath sounds and air entry. No wheezing or rhonchi. Abdominal:      General: Abdomen is flat. Bowel sounds are normal.      Palpations: Abdomen is soft. Tenderness: There is no abdominal tenderness. There is no guarding or rebound. Musculoskeletal:         General: No swelling or deformity. Right shoulder: No swelling. Normal range of motion. Cervical back: Normal range of motion. Right lower leg: No edema. Left lower leg: No edema. Comments: Contractures with scattered healing bruises to extremities   Skin:     General: Skin is warm and dry. Capillary Refill: Capillary refill takes less than 2 seconds. Coloration: Skin is pale. Findings: No signs of injury or rash. Comments: Very large cubitus ulcer partially removed with Emigdio/bandage foul odor but no drainage approximately 2 cm area of necrosis at approximately 2 right lower edge. No surrounding erythema   Neurological:      Mental Status: She is alert. Psychiatric:         Behavior: Behavior is cooperative.          Diagnostic Study Results     Labs -  01/24/22 0501  URINALYSIS W/ RFLX MICROSCOPIC   Collected: 01/24/22 0117  Final result  Specimen: Urine     Color Yellow/Straw      Appearance Clear     Specific gravity 1.011     pH (UA) 5.0     Protein Negative mg/dL   Glucose Negative mg/dL   Ketone Negative mg/dL   Bilirubin Negative     Blood Negative     Urobilinogen 0.1 EU/dL   Nitrites Negative     Leukocyte Esterase Negative            01/23/22 1457  TROPONIN-HIGH SENSITIVITY   Collected: 01/23/22 1418  Final result  Specimen: Plasma     Troponin-High Sensitivity 69 High Panic  ng/L           01/23/22 1454  PHOSPHORUS   Collected: 01/23/22 1418  Final result  Specimen: Serum or Plasma     Phosphorus 3.0 mg/dL          01/23/22 1454  MAGNESIUM   Collected: 01/23/22 1418  Final result  Specimen: Serum or Plasma     Magnesium 2.3 mg/dL          01/23/22 1454  NT-PRO BNP   Collected: 01/23/22 1418  Final result  Specimen: Serum or Plasma     NT pro-BNP 6,969 High  pg/mL           01/23/22 1451  COVID-19 RAPID TEST   Collected: 01/23/22 1418  Final result  Specimen: Nasopharyngeal     Specimen source Please find results under separate order     COVID-19 rapid test DETECTED Abnormal              01/23/22 1449  LACTIC ACID   Collected: 01/23/22 1418  Final result  Specimen: Whole Blood from Plasma     Lactic acid 1.4 mmol/L          01/23/22 1442  INFLUENZA A & B AG (RAPID TEST)   Collected: 01/23/22 1418  Final result  Specimen: Nasopharyngeal from Nasal washing     Influenza A Antigen Negative              34/47/15 1375  METABOLIC PANEL, COMPREHENSIVE   Collected: 01/24/22 0648  Final result  Specimen: Serum or Plasma     Sodium 139 mmol/L   Potassium 4.1 mmol/L   Chloride 107 mmol/L   CO2 26 mmol/L   Anion gap 6 mmol/L   Glucose 74 mg/dL   BUN 34 High  mg/dL   Creatinine 0.56 mg/dL   BUN/Creatinine ratio 61 High      GFR est AA >60 ml/min/1.73m2   GFR est non-AA >60 ml/min/1.73m2   Calcium 8.6 mg/dL   Bilirubin, total 0.9 mg/dL   AST (SGOT) 40 High  U/L   ALT (SGPT) 19 U/L   Alk. phosphatase 134 High  U/L   Protein, total 6.0 Low  g/dL   Albumin 1.7 Low  g/dL   Globulin 4.3 High  g/dL   A-G Ratio 0.4 Low             01/24/22 0753  CBC WITH AUTOMATED DIFF   Collected: 01/24/22 1617  Final result  Specimen: Whole Blood     WBC 9.0 K/uL   RBC 3.23 Low  M/uL   HGB 9.4 Low  g/dL   HCT 29.9 Low  %   MCV 92.6 FL   MCH 29.1 PG   MCHC 31.4 g/dL   RDW 19.7 High  %   PLATELET 868 K/uL   MPV 9.5 FL   NRBC 0.0  WBC   ABSOLUTE NRBC 0.00 K/uL   NEUTROPHILS 68 %   LYMPHOCYTES 18 %   MONOCYTES 13 %   EOSINOPHILS 1 %   BASOPHILS 0 %   IMMATURE GRANULOCYTES 0 %   ABS. NEUTROPHILS 6.1 K/UL   ABS. LYMPHOCYTES 1.6 K/UL   ABS. MONOCYTES 1.1 High  K/UL   ABS. EOSINOPHILS 0.1 K/UL   ABS. BASOPHILS 0.0 K/UL   ABS. IMM.  GRANS. 0.0 K/UL   DF AUTOMATED              Radiologic Studies -   XR CHEST PORT   Final Result   Layering bilateral pleural effusions with associated bibasilar volume   loss/consolidation. Medical Decision Making and ED Course     I have also reviewed the vital signs, available nursing notes, past medical history, past surgical history, family history and social history. Vital Signs - Reviewed the patient's vital signs. No data found. EKG interpretation: (Preliminary): Performed at 0822   Rhythm: paced; and regular . Rate (approx.): 60;     Records Reviewed: Nursing Notes, Old medical records, Ambulance Run Sheet, Previous Radiology Studies and Previous Laboratory Studies as available. Medical Decision Making/Diff Dx:   [facility] from her nursing home presents to the ED with the history of recent COVID-19 diagnoses and history per EMS of her presentation secondary to evidence of hypoxia while there. We will do screening labs chest x-ray and disposition per results    Differential diagnoses: COVID-19 infection COVID-19 pneumonia, dehydration, acute on chronic CHF, pneumonia, UTI, infected decubiti, STEMI/NSTEMI electrolyte abnormalities    ED course/Re-evaluation/Consultations/Other     Case discussed with hospitalist service who agreed with observation. To assess patient's potential oxygen requirement and for assistance with management of her decubitus ulcer. Procedures     PROCEDURES:  Procedures  Zeeshan Alamo MD        Disposition     Admitted        Diagnosis     Clinical impression:   1. COVID-19 virus infection    2. Hypoxia    3. Pressure injury of sacral region, unstageable ([address]    4. Prerenal azotemia    5. Anemia, unspecified type    6.  Elevated brain natriuretic peptide (BNP) level

## 2022-02-09 PROBLEM — J12.82 PNEUMONIA DUE TO COVID-19 VIRUS: Status: ACTIVE | Noted: 2022-01-01

## 2022-02-09 PROBLEM — U07.1 PNEUMONIA DUE TO COVID-19 VIRUS: Status: ACTIVE | Noted: 2022-01-01

## 2022-02-09 NOTE — H&P
Hospitalist Admission Note    NAME: Darin Lewis   :  3/25/1922   MRN:  [id number]     Date/Time:  2022 3:48 PM    Patient PCP: Jordy Lockhart MD  ______________________________________________________________________  Given the patient's current clinical presentation, I have a high level of concern for decompensation if discharged from the emergency department. Complex decision making was performed, which includes reviewing the patient's available past medical records, laboratory results, and x-ray films. Subjective:   CHIEF COMPLAINT: altered mental status and fever    HISTORY OF PRESENT ILLNESS:     Welton Burkitt is a 80 y.o.   female with hx of CKD, hypertension, Stroke with residual L sided deficit, bradycardia s/p AICD 2 months ago, recent sacral ulcer on meropenem through (22) presented to ED with c/o altered mental status and fever. Patient's son Dr. Niyah Daigle physician) was by the bedside who brought patient to the hospital. Patient had fever last night. Patient also has tachypnea, so was given lasix and amoxicillin by son for possible pneumonia as he suspected that patient might have aspirated secretion. As per son, patient's oxygen saturation was 90% on RA this am. Son reports that patient has PEG feeding with no residuals. We were asked to admit for work up and evaluation of the above problems.      Past Medical History:   Diagnosis Date    Chronic kidney disease     acute tubual necrosis    Clostridium difficile infection     Elevated troponin 9/10/2014    Hypertension     Skin cancer     Stroke [facility])      left residual        Past Surgical History:   Procedure Laterality Date    HX APPENDECTOMY  2008    HX HEENT      cataract    HX ORTHOPAEDIC  2010    left total hip - hip fx, left knee surgery    IR FLUORO GUIDE PLC CVAD  2022       Social History     Tobacco Use    Smoking status: Never Smoker    Smokeless tobacco: Never Used   Substance Use Topics    Alcohol use: Never        Family History   Family history unknown: Yes   Family history reviewed and not pertinent to patient's presentation. Allergies   Allergen Reactions    Neosporin [Benzalkonium Chloride] Rash        Prior to Admission medications    Medication Sig Start Date End Date Taking? Authorizing Provider   Eliquis 2.5 mg tablet Take 2.5 mg by mouth two (2) times a day. 1/11/22   Provider, Historical   DILT- mg capsule Take 120 mg by mouth two (2) times a day. 12/20/21   Provider, Historical   furosemide (LASIX) 40 mg tablet 40 mg daily. 11/20/21   Provider, Historical   acetaminophen (TYLENOL) 325 mg tablet Take 2 Tablets by mouth every six (6) hours as needed for Pain or Fever. 1/6/22   Rashel Funez MD   ascorbic acid, vitamin C, (VITAMIN C) 500 mg tablet Take 1 Tablet by mouth two (2) times a day. 1/6/22   Rashel Funez MD   meropenem 500 mg IV syringe 500 mg by IntraVENous route every twelve (12) hours every twelve (12) hours. 1/6/22   Rashel Funez MD   zinc sulfate (ZINCATE) 50 mg zinc (220 mg) capsule Take 1 Capsule by mouth daily. 1/7/22   Rashel Funez MD       REVIEW OF SYSTEMS:     I am not able to complete the review of systems because:    The patient is intubated and sedated   x The patient has altered mental status due to his acute medical problems    The patient has baseline aphasia from prior stroke(s)    The patient has baseline dementia and is not reliable historian    The patient is in acute medical distress and unable to provide information           Total of 12 systems reviewed as follows:       POSITIVE= underlined text  Negative = text not underlined  General:  fever, chills, sweats, generalized weakness, weight loss/gain,      loss of appetite   Eyes:    blurred vision, eye pain, loss of vision, double vision  ENT:    rhinorrhea, pharyngitis   Respiratory:   cough, sputum production, SOB, ANN, wheezing, pleuritic pain   Cardiology:   chest pain, palpitations, orthopnea, PND, edema, syncope   Gastrointestinal:  abdominal pain , N/V, diarrhea, dysphagia, constipation, bleeding   Genitourinary:  frequency, urgency, dysuria, hematuria, incontinence   Muskuloskeletal :  arthralgia, myalgia, back pain  Hematology:  easy bruising, nose or gum bleeding, lymphadenopathy   Dermatological: rash, ulceration, pruritis, color change / jaundice  Endocrine:   hot flashes or polydipsia   Neurological:  headache, dizziness, confusion, focal weakness, paresthesia,     Speech difficulties, memory loss, gait difficulty  Psychological: Feelings of anxiety, depression, agitation    Objective:   VITALS:    Visit Vitals  /69 (BP 1 Location: Right upper arm, BP Patient Position: At rest)   Pulse 60   Temp 99.5 °F (37.5 °C)   Resp 30   Ht 4' 10\" (1.473 m)   Wt 45.4 kg (100 lb)   SpO2 100%   BMI 20.90 kg/m²       PHYSICAL EXAM:    General:    Lethargic, cachectic  HEENT: Erythema on L cheek  Neck:  Supple, symmetrical,  thyroid: non tender  Lungs:   Bibasilar crackles+  Chest wall:  No tenderness  No Accessory muscle use. Heart:   Regular  rhythm,  No  murmur   No edema  Abdomen:   Soft, non-tender. Not distended. Bowel sounds normal  Extremities: No cyanosis. No clubbing,      Skin turgor normal, Capillary refill normal, Radial dial pulse 2+  Skin:     Large sacral ulcer to muscle with surrounding erythema. Laceration on B/L lower extremities. Skin avulsion on dorsum of right feet. Psych:  lethargic  Neurologic: Lethargic.    _______________________________________________________________________      My assessment of this patient's clinical condition and my plan of care is as follows. Assessment / Plan:  Sepsis:   Likely s/s decubitus ulcer  S/p vanco, zosyn and doxycycline in ED  Continue antibiotics  Will consult ID for proper guidance and duration on antibiotics.     Acute hypoxic respiratory failure  S/s COVID-19 and possible fluid overload  Continue supplemental oxygen and titrate as needed  Start dexamethasone  Doesn't meet criteria for remdesivir  Start lasix 20 mg iv BID and monitor I/Os    Acute metabolic encephalopathy  Likely s/s sepsis vs COVID-19  Will monitor mental status. COVID-19  As above      Sacral decubitus ulcer  Wound care consult  ID consult    Atrial fibrillation  Continue eliquis  Continue diltiazem, hold for hypotension. Code Status: Full code  Surrogate Decision Maker:    DVT Prophylaxis: on apixaban  GI Prophylaxis: not indicated        Care Plan discussed with:    Comments   Patient     Family  x    RN     Care Manager                    Consultant:      _______________________________________________________________________  Expected  Disposition:   Home with Family    HH/PT/OT/RN    SNF/LTC x   [place]    ________________________________________________________________________    I personally spent   35  minutes in patient's care. This is time spent at  patient's bedside actively involved in patient care as well as the coordination of care and discussions with the patient's family. This does not include any procedural time which has been billed separately. ________________________________________________________________________  Signed: Hailey Prince MD    Procedures: see electronic medical records for all procedures/Xrays and details which were not copied into this note but were reviewed prior to creation of Plan.     LAB DATA REVIEWED:    Recent Results (from the past 24 hour(s))   CBC WITH AUTOMATED DIFF    Collection Time: 02/09/22  1:06 PM   Result Value Ref Range    WBC 25.5 (H) 3.6 - 11.0 K/uL    RBC 3.20 (L) 3.80 - 5.20 M/uL    HGB 9.4 (L) 11.5 - 16.0 g/dL    HCT 29.6 (L) 35.0 - 47.0 %    MCV 92.5 80.0 - 99.0 FL    MCH 29.4 26.0 - 34.0 PG    MCHC 31.8 30.0 - 36.5 g/dL    RDW 20.8 (H) 11.5 - 14.5 %    PLATELET 695 342 - 858 K/uL    MPV 9.7 8.9 - 12.9 FL    NRBC 0.7 (H) 0.0  WBC    ABSOLUTE NRBC 0.19 (H) 0.00 - 0.01 K/uL    NEUTROPHILS 87 (H) 32 - 75 %    LYMPHOCYTES 6 (L) 12 - 49 %    MONOCYTES 5 5 - 13 %    EOSINOPHILS 0 0 - 7 %    BASOPHILS 0 0 - 1 %    IMMATURE GRANULOCYTES 2 (H) 0 - 0.5 %    ABS. NEUTROPHILS 22.1 (H) 1.8 - 8.0 K/UL    ABS. LYMPHOCYTES 1.6 0.8 - 3.5 K/UL    ABS. MONOCYTES 1.2 (H) 0.0 - 1.0 K/UL    ABS. EOSINOPHILS 0.0 0.0 - 0.4 K/UL    ABS. BASOPHILS 0.0 0.0 - 0.1 K/UL    ABS. IMM. GRANS. 0.5 (H) 0.00 - 0.04 K/UL    DF AUTOMATED     METABOLIC PANEL, COMPREHENSIVE    Collection Time: 02/09/22  1:06 PM   Result Value Ref Range    Sodium 140 136 - 145 mmol/L    Potassium 4.4 3.5 - 5.1 mmol/L    Chloride 104 97 - 108 mmol/L    CO2 32 21 - 32 mmol/L    Anion gap 4 (L) 5 - 15 mmol/L    Glucose 188 (H) 65 - 100 mg/dL    BUN 60 (H) 6 - 20 mg/dL    Creatinine 0.93 0.55 - 1.02 mg/dL    BUN/Creatinine ratio 65 (H) 12 - 20      GFR est AA >60 >60 ml/min/1.73m2    GFR est non-AA 56 (L) >60 ml/min/1.73m2    Calcium 8.4 (L) 8.5 - 10.1 mg/dL    Bilirubin, total 0.8 0.2 - 1.0 mg/dL    AST (SGOT) 27 15 - 37 U/L    ALT (SGPT) 29 12 - 78 U/L    Alk.  phosphatase 259 (H) 45 - 117 U/L    Protein, total 6.6 6.4 - 8.2 g/dL    Albumin 2.0 (L) 3.5 - 5.0 g/dL    Globulin 4.6 (H) 2.0 - 4.0 g/dL    A-G Ratio 0.4 (L) 1.1 - 2.2     NT-PRO BNP    Collection Time: 02/09/22  1:06 PM   Result Value Ref Range    NT pro-BNP 15,430 (H) <450 pg/mL   COVID-19 RAPID TEST    Collection Time: 02/09/22  1:06 PM   Result Value Ref Range    Specimen source Nasopharyngeal      COVID-19 rapid test DETECTED (A) Not Detected     URINALYSIS W/ REFLEX CULTURE    Collection Time: 02/09/22  2:18 PM    Specimen: Urine   Result Value Ref Range    Color Dark Yellow      Appearance Hazy (A) Clear      Specific gravity 1.018 1.003 - 1.030      pH (UA) 6.0 5.0 - 8.0      Protein 30 (A) Negative mg/dL    Glucose Negative Negative mg/dL    Ketone Negative Negative mg/dL    Bilirubin Negative Negative      Blood Negative Negative Urobilinogen 4.0 (H) 0.1 - 1.0 EU/dL    Nitrites Negative Negative      Leukocyte Esterase Negative Negative      UA:UC IF INDICATED Culture not indicated by UA result Culture not indicated by UA result      WBC 0-4 0 - 4 /hpf    RBC 0-5 0 - 5 /hpf    Bacteria Negative Negative /hpf

## 2022-02-09 NOTE — ED TRIAGE NOTES
[facility] 10 Pt's son stated that pt was at a hospital for a month and sustained a huge ulcer; was evaluated here and d/c to [facility] about a month ago; pt came home Monday; has AMS these last few days, edema noted to ABD area, fever of 102 today, cough noted and rapid breathing present per son

## 2022-02-09 NOTE — ED PROVIDER NOTES
EMERGENCY DEPARTMENT HISTORY AND PHYSICAL EXAM      Date: 2/9/2022  Patient Name: Vinay Mederos      History of Presenting Illness     Chief Complaint   Patient presents with    Altered mental status    Fever       History Provided By: Patient's Son    HPI: Vinay Mederos, 80 y.o. 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 Pt taken home by son Dr. Sherron Interiano (ER [facility]ia) 2d ago, was altered at that time and had been since SNF admission. No change in MS but last night had fever to 102 and began breathing faster. Similar sx this AM. No N/V/D or cough. Has PEG tube for feeds and no residuals. Pulse ox below 90% this AM. Unclear if had COVID at SNF, tested negative this weekend. Given lasix and amoxicillin by son for possible PNA and swelling of legs after SNF w/resolution of edema. There are no other complaints, changes, or physical findings at this time. PCP: Eris Tiwari MD    Current Facility-Administered Medications   Medication Dose Route Frequency Provider Last Rate Last Admin    vancomycin (VANCOCIN) 1,000 mg in 0.9% sodium chloride 250 mL (VIAL-MATE)  1,000 mg IntraVENous ONCE Gaurang Wells MD        acetaminophen (TYLENOL) suppository 650 mg  650 mg Rectal NOW Gaurang Wells MD        piperacillin-tazobactam (ZOSYN) 3.375 g in 0.9% sodium chloride (MBP/ADV) 100 mL MBP  3.375 g IntraVENous Q8H Gaurang Wells MD        doxycycline (VIBRAMYCIN) 100 mg in 0.9% sodium chloride (MBP/ADV) 100 mL MBP  100 mg IntraVENous Q12H Gaurang Wells MD         Current Outpatient Medications   Medication Sig Dispense Refill    Eliquis 2.5 mg tablet Take 2.5 mg by mouth two (2) times a day.  DILT- mg capsule Take 120 mg by mouth two (2) times a day.  furosemide (LASIX) 40 mg tablet 40 mg daily.       acetaminophen (TYLENOL) 325 mg tablet Take 2 Tablets by mouth every six (6) hours as needed for Pain or Fever. 60 Tablet 0    ascorbic acid, vitamin C, (VITAMIN C) 500 mg tablet Take 1 Tablet by mouth two (2) times a day. 60 Tablet 0    meropenem 500 mg IV syringe 500 mg by IntraVENous route every twelve (12) hours every twelve (12) hours. 42 Dose 0    zinc sulfate (ZINCATE) 50 mg zinc (220 mg) capsule Take 1 Capsule by mouth daily. 30 Capsule 0       Past History     Past Medical History:  Past Medical History:   Diagnosis Date    Chronic kidney disease     acute tubual necrosis    Clostridium difficile infection     Elevated troponin 9/10/2014    Hypertension     Skin cancer     Stroke [facility])     2001 left residual       Past Surgical History:  Past Surgical History:   Procedure Laterality Date    HX APPENDECTOMY  2/2008    HX HEENT      cataract    HX ORTHOPAEDIC  04/2010    left total hip - hip fx, left knee surgery    IR FLUORO GUIDE PLC CVAD  1/6/2022       Family History:  Family History   Family history unknown: Yes       Social History:  Social History     Tobacco Use    Smoking status: Never Smoker    Smokeless tobacco: Never Used   Vaping Use    Vaping Use: Never used   Substance Use Topics    Alcohol use: Never    Drug use: Never       Allergies: Allergies   Allergen Reactions    Neosporin [Benzalkonium Chloride] Rash         Review of Systems   Constitutional: Negative except as in HPI. Eyes: Negative except as in HPI.  ENT: Negative except as in HPI. Cardiovascular: Negative except as in HPI. Respiratory: Negative except as in HPI. Gastrointestinal: Negative except as in HPI. Genitourinary: Negative except as in HPI. Musculoskeletal: Negative except as in HPI. Integumentary: Negative except as in HPI. Neurological: Negative except as in HPI. Psychiatric: Negative except as in HPI. Endocrine: Negative except as in HPI. Hematologic/Lymphatic: Negative except as in HPI.   Allergic/Immunologic: Negative except as in HPI. DANIEL from patient 2/2 condition. Physical Exam   Constitutional: somnolent, responds only to pain, intermittently moaning. Eyes: PERRL, no injection or scleral icterus, no discharge  HEENT: NCAT, neck supple, dry MM, no oropharyngeal exudates  CV: RRR, no m/r/g  Respiratory: Bibasilar crackles, tachypnea  GI: Abd soft, nondistended, nontender, G-tube site c/d/i  : Large Sacral ulcer to muscle ~7x7cm w/some purulence but no surrounding erythema  MSK: No joint effusions or edema  Skin: Skin avulsions of BLE, L cheek petechiae  Neuro: Somnolent, responds only to pain - baseline for past week per son    Lab and Diagnostic Study Results     Labs -   No results found for this or any previous visit (from the past 12 hour(s)). Radiologic Studies -   [unfilled]  CT Results  (Last 48 hours)    None        CXR Results  (Last 48 hours)    None          Medical Decision Making and ED Course   - I am the first and primary provider for this patient AND AM THE PRIMARY PROVIDER OF RECORD. - I reviewed the vital signs, available nursing notes, past medical history, past surgical history, family history and social history. - Initial assessment performed. The patients presenting problems have been discussed, and the staff are in agreement with the care plan formulated and outlined with them. I have encouraged them to ask questions as they arise throughout their visit. Vital Signs-Reviewed the patient's vital signs. Patient Vitals for the past 12 hrs:   Temp Pulse Resp BP SpO2   02/09/22 1211 97.8 °F (36.6 °C) 60 16 139/78 92 %       EKG interpretation:         Provider Notes (Medical Decision Making):   89F w/AMS, fever. Recent sacral ulcer infx and possible COVID. Will treat empirically w/V/Zosyn/doxy, suspect aspiration PNA. Possibly COVID, will defer steroids unless continued hypoxia and COVID+. Deferring 30mL/kg bolus given hx of CHF and patient's son's request. Dispo admit.     ED Course:       ED Course as of 02/09/22 1410   Wed Feb 09, 2022   1407 COVID-19 rapid test(!): DETECTED [YA]   1407 HGB(!): 9.4 [YA]   1407 NT pro-BNP(!): 15,430  Up from 1 week [YA]   1409 Now hypoxic, giving decadron. Admitted to Hospitalist Dr. Julio Arreola. [YA]      ED Course User Index  [YA] Tennille Mayen MD         Consultations:     Hospitalist Dr. Julio Arreola      Disposition     Disposition: Admitted to Floor Medical Floor the case was discussed with the admitting physician Dr. Julio Arreola. Admitted      Diagnosis     Clinical Impression:   1. Sepsis, due to unspecified organism, unspecified whether acute organ dysfunction present [facility])        Attestations:     Jorge Patel MD

## 2022-02-09 NOTE — PROGRESS NOTES
Consult for Vancomycin Dosing by Pharmacy by Dr. Lia Rodriguez provided for this 80y.o. year old female , for indication of SSTI. Day of Therapy: 1  Goal of Level(s):  10-15mcg/dL     Other Current Antibiotics: Zosyn, Doxycycline    Significant Cultures: All Micro Results       Procedure Component Value Units Date/Time    COVID-19 RAPID TEST [[id number]]  (Abnormal) Collected: 02/09/22 1306    Order Status: Completed Specimen: Nasopharyngeal Updated: 02/09/22 1344     Specimen source Nasopharyngeal        COVID-19 rapid test DETECTED        Comment: Rapid Abbott ID Now   The specimen is POSITIVE for SARS-CoV-2, the novel coronavirus associated with COVID-19. This test has been authorized by the FDA under an Emergency Use Authorization (EUA) for use by authorized laboratories. Fact sheet for Healthcare Providers: ConventionUpdate.co.nz Fact sheet for Patients: ConventionUpdate.co.nz   Methodology: Isothermal Nucleic Acid Amplification Results verified, phoned to and read back by [facility] RN   AT 1344                 Serum Creatinine Creatinine   Date/Time Value Ref Range Status   02/09/2022 01:06 PM 0.93 0.55 - 1.02 mg/dL Final   01/24/2022 06:48 AM 0.56 0.55 - 1.02 mg/dL Final   01/23/2022 02:18 PM 0.51 (L) 0.55 - 1.02 mg/dL Final      Creatinine Clearance Estimated Creatinine Clearance: 23.6 mL/min (based on SCr of 0.93 mg/dL).    BUN Lab Results   Component Value Date/Time    BUN 60 (H) 02/09/2022 01:06 PM      WBC Lab Results   Component Value Date/Time    WBC 25.5 (H) 02/09/2022 01:06 PM      Temp Temp Readings from Last 1 Encounters:   02/09/22 99.5 °F (37.5 °C)      C-Reactive Protein C-Reactive protein   Date Value Ref Range Status   01/06/2022 3.21 (H) 0.00 - 0.60 mg/dL Final   01/05/2022 3.09 (H) 0.00 - 0.60 mg/dL Final   01/04/2022 3.42 (H) 0.00 - 0.60 mg/dL Final      Procalcitonin Lab Results   Component Value Date/Time    Procalcitonin 0.15 (H) 01/24/2022 06:48 AM          Ht Readings from Last 1 Encounters:   02/09/22 147.3 cm (58\")        Wt Readings from Last 1 Encounters:   02/09/22 45.4 kg (100 lb)     Ideal body weight: 47.1 kg (103 lb 14.5 oz)       New Regimen:   Patient has renal impairment. Give Vancomycin 750mg IV x1 today. Pharmacy will order a random level to redose. Pharmacy to follow daily and will make changes to dose and/or frequency based on clinical status.      _________________________________     Pharmacist Aurelia Zuniga

## 2022-02-09 NOTE — PROGRESS NOTES
Nutrition Note      Pt recently readmitted with consult placed for TF orders/management while in ED. Pt prev. had PEG placed for nutritional needs x1 mth. Recommendations/Plans:  Continue diet- NPO, advance as medically appropriate. Initiate Standard with Fiber (Jevity 1.5 Chacorta) @ 40 mL/hr continuous + 130 mL water flushes Q4H via PEG, when medically appropriate. TF provides: 1440 kcal (105.7%), 61.2 gm PRO(104%), 1509 mL H2O(101%). Will monitor when Inpatient; please consult nutrition as needed. Electronically signed by Adrian Lucas RD on 2/9/2022 at 4:16 PM    Contact: ext. [phone] or [employer].

## 2022-02-10 NOTE — CONSULTS
Consult Date: 2/10/2022    Consults  Sacral decubitus ulcer    Subjective   This is a 80year old female known to me from last month when followed for sepsis with sacral wound infection secondary to MRSA and Enterobacter resistant to Zosyn. She was discharged on Vancomycin and Meropenem for 3 weeks, which should have been completed. She also had wounds on left buttock and right heel. She was discharged to [facility]. It is now noted that her discharge summary did not include Vancomycin. She hospitalized briefly at the end of January for SOB and had been diagnosed with Covid-19. She was observed for 24 hours then discharged back to SNF. She was apparently brought back to the ED, this time from home, because of fever and rapid breathing. She was afebrile but her WBC was 25,500. On exam she was described as having a large sacral ulcer with purulent drainage. UA was unremarkable. Repeat Covid-19 test was positive. CXR showed basilar atelectasis and bilateral pleural effusions. She was admitted for sepsis. No blood or wound cultures identified. She was started on IV Vancomycin and Zosyn. She is on nasal cannula.   Past Medical History:   Diagnosis Date    Chronic kidney disease     acute tubual necrosis    Clostridium difficile infection     Elevated troponin 9/10/2014    Hypertension     Skin cancer     Stroke [facility])     2001 left residual      Past Surgical History:   Procedure Laterality Date    HX APPENDECTOMY  2/2008    HX HEENT      cataract    HX ORTHOPAEDIC  04/2010    left total hip - hip fx, left knee surgery    IR FLUORO GUIDE PLC CVAD  1/6/2022     Family History   Family history unknown: Yes      Social History     Tobacco Use    Smoking status: Never Smoker    Smokeless tobacco: Never Used   Substance Use Topics    Alcohol use: Never       Current Facility-Administered Medications   Medication Dose Route Frequency Provider Last Rate Last Admin    perflutren lipid microspheres (DEFINITY) 1.1 mg/mL contrast injection             piperacillin-tazobactam (ZOSYN) 3.375 g in 0.9% sodium chloride (MBP/ADV) 100 mL MBP  3.375 g IntraVENous Q8H Gabriella Gibbs MD 25 mL/hr at 02/09/22 2055 3.375 g at 02/09/22 2055    doxycycline (VIBRAMYCIN) 100 mg in 0.9% sodium chloride (MBP/ADV) 100 mL MBP  100 mg IntraVENous Q12H Gabriella Gibbs  mL/hr at 02/10/22 0347 100 mg at 02/10/22 0347    sodium chloride (NS) flush 5-40 mL  5-40 mL IntraVENous Q8H Gabriella Gibbs MD   10 mL at 02/09/22 2200    sodium chloride (NS) flush 5-40 mL  5-40 mL IntraVENous PRN Gabriella Gibbs MD        acetaminophen (TYLENOL) tablet 650 mg  650 mg Oral Q6H PRN Gabriella Gibbs MD        Or   Pearce acetaminophen (TYLENOL) suppository 650 mg  650 mg Rectal Q6H PRN Gabriella Gibbs MD        polyethylene glycol (MIRALAX) packet 17 g  17 g Oral DAILY PRN Gabriella Gibbs MD        ondansetron (ZOFRAN ODT) tablet 4 mg  4 mg Oral Q8H PRN Gabriella Gibbs MD        Or    ondansetron (ZOFRAN) injection 4 mg  4 mg IntraVENous Q6H PRN Gabriella Gibbs MD        ascorbic acid (vitamin C) (VITAMIN C) tablet 500 mg  500 mg Oral BID Gabriella Gibbs MD        apixaban (ELIQUIS) tablet 2.5 mg  2.5 mg Oral BID Gabriella Gibbs MD        zinc sulfate (ZINCATE) 50 mg zinc (220 mg) capsule 1 Capsule  1 Capsule Oral DAILY Gabriella Gibbs MD        dilTIAZem IR (CARDIZEM) tablet 30 mg  30 mg Oral Q8H Day Gonzalez MD   30 mg at 02/09/22 1546    furosemide (LASIX) injection 20 mg  20 mg IntraVENous Q12H Gabriella Gibbs MD   20 mg at 02/09/22 2049    dexamethasone (DECADRON) 4 mg/mL injection 6 mg  6 mg IntraVENous Q24H Gabriella Gibbs MD   6 mg at 02/09/22 1800    VANCOMYCIN INFORMATION NOTE   Other Rx Dosing/Monitoring Gabriella Gibbs MD            Review of Systems   Unable to perform ROS: Dementia       Objective     Vital signs for last 24 hours:  Visit Vitals  /75 (BP 1 Location: Left upper arm, BP Patient Position: Lying left side)   Pulse 60   Temp 96.9 °F (36.1 °C)   Resp 16   Ht 4' 10\" (1.473 m)   Wt 100 lb (45.4 kg)   SpO2 95%   BMI 20.90 kg/m²       Intake/Output this shift:  Current Shift: No intake/output data recorded. Last 3 Shifts: No intake/output data recorded. Data Review:   Recent Results (from the past 24 hour(s))   CBC WITH AUTOMATED DIFF    Collection Time: 02/09/22  1:06 PM   Result Value Ref Range    WBC 25.5 (H) 3.6 - 11.0 K/uL    RBC 3.20 (L) 3.80 - 5.20 M/uL    HGB 9.4 (L) 11.5 - 16.0 g/dL    HCT 29.6 (L) 35.0 - 47.0 %    MCV 92.5 80.0 - 99.0 FL    MCH 29.4 26.0 - 34.0 PG    MCHC 31.8 30.0 - 36.5 g/dL    RDW 20.8 (H) 11.5 - 14.5 %    PLATELET 552 248 - 404 K/uL    MPV 9.7 8.9 - 12.9 FL    NRBC 0.7 (H) 0.0  WBC    ABSOLUTE NRBC 0.19 (H) 0.00 - 0.01 K/uL    NEUTROPHILS 87 (H) 32 - 75 %    LYMPHOCYTES 6 (L) 12 - 49 %    MONOCYTES 5 5 - 13 %    EOSINOPHILS 0 0 - 7 %    BASOPHILS 0 0 - 1 %    IMMATURE GRANULOCYTES 2 (H) 0 - 0.5 %    ABS. NEUTROPHILS 22.1 (H) 1.8 - 8.0 K/UL    ABS. LYMPHOCYTES 1.6 0.8 - 3.5 K/UL    ABS. MONOCYTES 1.2 (H) 0.0 - 1.0 K/UL    ABS. EOSINOPHILS 0.0 0.0 - 0.4 K/UL    ABS. BASOPHILS 0.0 0.0 - 0.1 K/UL    ABS. IMM. GRANS. 0.5 (H) 0.00 - 0.04 K/UL    DF AUTOMATED     METABOLIC PANEL, COMPREHENSIVE    Collection Time: 02/09/22  1:06 PM   Result Value Ref Range    Sodium 140 136 - 145 mmol/L    Potassium 4.4 3.5 - 5.1 mmol/L    Chloride 104 97 - 108 mmol/L    CO2 32 21 - 32 mmol/L    Anion gap 4 (L) 5 - 15 mmol/L    Glucose 188 (H) 65 - 100 mg/dL    BUN 60 (H) 6 - 20 mg/dL    Creatinine 0.93 0.55 - 1.02 mg/dL    BUN/Creatinine ratio 65 (H) 12 - 20      GFR est AA >60 >60 ml/min/1.73m2    GFR est non-AA 56 (L) >60 ml/min/1.73m2    Calcium 8.4 (L) 8.5 - 10.1 mg/dL    Bilirubin, total 0.8 0.2 - 1.0 mg/dL    AST (SGOT) 27 15 - 37 U/L    ALT (SGPT) 29 12 - 78 U/L    Alk.  phosphatase 259 (H) 45 - 117 U/L    Protein, total 6.6 6.4 - 8.2 g/dL    Albumin 2.0 (L) 3.5 - 5.0 g/dL    Globulin 4.6 (H) 2.0 - 4.0 g/dL    A-G Ratio 0.4 (L) 1.1 - 2.2     NT-PRO BNP    Collection Time: 02/09/22  1:06 PM   Result Value Ref Range    NT pro-BNP 15,430 (H) <450 pg/mL   COVID-19 RAPID TEST    Collection Time: 02/09/22  1:06 PM   Result Value Ref Range    Specimen source Nasopharyngeal      COVID-19 rapid test DETECTED (A) Not Detected     URINALYSIS W/ REFLEX CULTURE    Collection Time: 02/09/22  2:18 PM    Specimen: Urine   Result Value Ref Range    Color Dark Yellow      Appearance Hazy (A) Clear      Specific gravity 1.018 1.003 - 1.030      pH (UA) 6.0 5.0 - 8.0      Protein 30 (A) Negative mg/dL    Glucose Negative Negative mg/dL    Ketone Negative Negative mg/dL    Bilirubin Negative Negative      Blood Negative Negative      Urobilinogen 4.0 (H) 0.1 - 1.0 EU/dL    Nitrites Negative Negative      Leukocyte Esterase Negative Negative      UA:UC IF INDICATED Culture not indicated by UA result Culture not indicated by UA result      WBC 0-4 0 - 4 /hpf    RBC 0-5 0 - 5 /hpf    Bacteria Negative Negative /hpf   VANCOMYCIN, RANDOM    Collection Time: 02/10/22  5:01 AM   Result Value Ref Range    Vancomycin, random 14.9 ug/mL   CREATININE    Collection Time: 02/10/22  5:01 AM   Result Value Ref Range    Creatinine 0.92 0.55 - 1.02 mg/dL     CXR (2/9)         Physical Exam  Vitals and nursing note reviewed. Constitutional:       General: She is not in acute distress. Appearance: She is ill-appearing. HENT:      Head: Normocephalic and atraumatic. Right Ear: External ear normal.      Left Ear: External ear normal.      Nose: Nose normal.      Comments: Nasal O2 cannula  Eyes:      Pupils: Pupils are equal, round, and reactive to light. Cardiovascular:      Rate and Rhythm: Normal rate and regular rhythm. Heart sounds: No murmur heard. Pulmonary:      Breath sounds: Rhonchi and rales present.    Abdominal:      General: Bowel sounds are normal.      Palpations: Abdomen is soft.      Tenderness: There is no abdominal tenderness. Genitourinary:     Comments: No Govea  Musculoskeletal:      Cervical back: Neck supple. Right lower leg: No edema. Left lower leg: No edema. Skin:     Findings: No rash. Comments: Large open sacral wound Stage 3    Open wound right medial foot   Neurological:      Comments: Unable to assess   Psychiatric:      Comments: Unable to assess       ASSESSMENT/PLAN    1. Possible sacral wound infection  2. Stage 3 sacral wound  3. Possible right foot wound infection  4. Covid-19 infection with no clear evidence of pneumonitis  5. Sepsis with leukocytosis  6. Altered mental status    1. Continue IV Vancomycin and Zosyn  2. Cultures taken from sacral wound and right foot wound  3. Check procal and CRP  4. In am, repeat CBC, procal and CRP  5. Follow-up blood cultures  6. No specific treatment for Covid-19 at this time    Seb Barahona MD

## 2022-02-10 NOTE — PROGRESS NOTES
Hospitalist Progress Note    Subjective:   Daily Progress Note: 2/10/2022 9:20 AM    Hospital Course: Patient is a 61-year-old female with a history of CKD, hypertension, stroke with left residual defects, bradycardia status post AICD 2 months ago, dysphagia status post PEG tube the presented to the emergency room on 2/9/2022 for altered mental status and fever. Patient son who is a physician was trying to treat patient for fluid overload and possible aspiration pneumonia with Lasix and amoxicillin. However she became tachypneic and hypoxic. In the ED patient was found to be Covid positive. Laboratory data was significant for a WBC of 25,000. D-dimer 8.87, BNP 15,430. Patient was started on IV vancomycin, Zosyn, Decadron, vitamin C, zinc.  Infectious disease consulted. 2D echo pending      Subjective: Patient is acutely ill. She is awake but minimally responsive.     Current Facility-Administered Medications   Medication Dose Route Frequency    perflutren lipid microspheres (DEFINITY) 1.1 mg/mL contrast injection        dexamethasone (DECADRON) 4 mg/mL injection 6 mg  6 mg IntraVENous Q8H    piperacillin-tazobactam (ZOSYN) 3.375 g in 0.9% sodium chloride (MBP/ADV) 100 mL MBP  3.375 g IntraVENous Q8H    doxycycline (VIBRAMYCIN) 100 mg in 0.9% sodium chloride (MBP/ADV) 100 mL MBP  100 mg IntraVENous Q12H    sodium chloride (NS) flush 5-40 mL  5-40 mL IntraVENous Q8H    sodium chloride (NS) flush 5-40 mL  5-40 mL IntraVENous PRN    acetaminophen (TYLENOL) tablet 650 mg  650 mg Oral Q6H PRN    Or    acetaminophen (TYLENOL) suppository 650 mg  650 mg Rectal Q6H PRN    polyethylene glycol (MIRALAX) packet 17 g  17 g Oral DAILY PRN    ondansetron (ZOFRAN ODT) tablet 4 mg  4 mg Oral Q8H PRN    Or    ondansetron (ZOFRAN) injection 4 mg  4 mg IntraVENous Q6H PRN    ascorbic acid (vitamin C) (VITAMIN C) tablet 500 mg  500 mg Oral BID    apixaban (ELIQUIS) tablet 2.5 mg  2.5 mg Oral BID    zinc sulfate (ZINCATE) 50 mg zinc (220 mg) capsule 1 Capsule  1 Capsule Oral DAILY    dilTIAZem IR (CARDIZEM) tablet 30 mg  30 mg Oral Q8H    furosemide (LASIX) injection 20 mg  20 mg IntraVENous Q12H    VANCOMYCIN INFORMATION NOTE   Other Rx Dosing/Monitoring        Review of Systems  Constitutional: No fevers, No chills, No sweats, + fatigue, + Weakness  Eyes: No redness  Ears, nose, mouth, throat, and face: No nasal congestion, No sore throat, No voice change  Respiratory: + Shortness of Breath, No cough, No wheezing  Cardiovascular: No chest pain, No palpitations, No extremity edema  Gastrointestinal: No nausea, No vomiting, No diarrhea, No abdominal pain  Genitourinary: No frequency, No dysuria, No hematuria  Integument/breast: No skin lesion(s)   Neurological: + Confusion, No headaches, No dizziness      Objective:     Visit Vitals  /75 (BP 1 Location: Left upper arm, BP Patient Position: Lying left side)   Pulse 60   Temp 96.9 °F (36.1 °C)   Resp 16   Ht 4' 10\" (1.473 m)   Wt 45.4 kg (100 lb)   SpO2 95%   BMI 20.90 kg/m²    O2 Flow Rate (L/min): 1 l/min O2 Device: Nasal cannula    Temp (24hrs), Av.1 °F (36.7 °C), Min:96.9 °F (36.1 °C), Max:99.5 °F (37.5 °C)      No intake/output data recorded. No intake/output data recorded. PHYSICAL EXAM:  Constitutional: ill appearing, No acute distress  Skin: Extremities and face reveal no rashes. HEENT: Sclerae anicteric. Extra-occular muscles are intact. No oral ulcers. The neck is supple and no masses. Cardiovascular: Regular rate and rhythm. Respiratory:  diminished  GI: Abdomen nondistended, soft, and nontender. Normal active bowel sounds. Musculoskeletal: No pitting edema of the lower legs. Able to move all ext  Neurological:  Patient is alert and oriented.  Cranial nerves II-XII grossly intact  Psychiatric: flat affect       Data Review    Recent Results (from the past 24 hour(s))   CBC WITH AUTOMATED DIFF    Collection Time: 22  1:06 PM   Result Value Ref Range    WBC 25.5 (H) 3.6 - 11.0 K/uL    RBC 3.20 (L) 3.80 - 5.20 M/uL    HGB 9.4 (L) 11.5 - 16.0 g/dL    HCT 29.6 (L) 35.0 - 47.0 %    MCV 92.5 80.0 - 99.0 FL    MCH 29.4 26.0 - 34.0 PG    MCHC 31.8 30.0 - 36.5 g/dL    RDW 20.8 (H) 11.5 - 14.5 %    PLATELET 791 989 - 270 K/uL    MPV 9.7 8.9 - 12.9 FL    NRBC 0.7 (H) 0.0  WBC    ABSOLUTE NRBC 0.19 (H) 0.00 - 0.01 K/uL    NEUTROPHILS 87 (H) 32 - 75 %    LYMPHOCYTES 6 (L) 12 - 49 %    MONOCYTES 5 5 - 13 %    EOSINOPHILS 0 0 - 7 %    BASOPHILS 0 0 - 1 %    IMMATURE GRANULOCYTES 2 (H) 0 - 0.5 %    ABS. NEUTROPHILS 22.1 (H) 1.8 - 8.0 K/UL    ABS. LYMPHOCYTES 1.6 0.8 - 3.5 K/UL    ABS. MONOCYTES 1.2 (H) 0.0 - 1.0 K/UL    ABS. EOSINOPHILS 0.0 0.0 - 0.4 K/UL    ABS. BASOPHILS 0.0 0.0 - 0.1 K/UL    ABS. IMM. GRANS. 0.5 (H) 0.00 - 0.04 K/UL    DF AUTOMATED     METABOLIC PANEL, COMPREHENSIVE    Collection Time: 02/09/22  1:06 PM   Result Value Ref Range    Sodium 140 136 - 145 mmol/L    Potassium 4.4 3.5 - 5.1 mmol/L    Chloride 104 97 - 108 mmol/L    CO2 32 21 - 32 mmol/L    Anion gap 4 (L) 5 - 15 mmol/L    Glucose 188 (H) 65 - 100 mg/dL    BUN 60 (H) 6 - 20 mg/dL    Creatinine 0.93 0.55 - 1.02 mg/dL    BUN/Creatinine ratio 65 (H) 12 - 20      GFR est AA >60 >60 ml/min/1.73m2    GFR est non-AA 56 (L) >60 ml/min/1.73m2    Calcium 8.4 (L) 8.5 - 10.1 mg/dL    Bilirubin, total 0.8 0.2 - 1.0 mg/dL    AST (SGOT) 27 15 - 37 U/L    ALT (SGPT) 29 12 - 78 U/L    Alk.  phosphatase 259 (H) 45 - 117 U/L    Protein, total 6.6 6.4 - 8.2 g/dL    Albumin 2.0 (L) 3.5 - 5.0 g/dL    Globulin 4.6 (H) 2.0 - 4.0 g/dL    A-G Ratio 0.4 (L) 1.1 - 2.2     NT-PRO BNP    Collection Time: 02/09/22  1:06 PM   Result Value Ref Range    NT pro-BNP 15,430 (H) <450 pg/mL   COVID-19 RAPID TEST    Collection Time: 02/09/22  1:06 PM   Result Value Ref Range    Specimen source Nasopharyngeal      COVID-19 rapid test DETECTED (A) Not Detected     URINALYSIS W/ REFLEX CULTURE    Collection Time: 02/09/22  2:18 PM    Specimen: Urine   Result Value Ref Range    Color Dark Yellow      Appearance Hazy (A) Clear      Specific gravity 1.018 1.003 - 1.030      pH (UA) 6.0 5.0 - 8.0      Protein 30 (A) Negative mg/dL    Glucose Negative Negative mg/dL    Ketone Negative Negative mg/dL    Bilirubin Negative Negative      Blood Negative Negative      Urobilinogen 4.0 (H) 0.1 - 1.0 EU/dL    Nitrites Negative Negative      Leukocyte Esterase Negative Negative      UA:UC IF INDICATED Culture not indicated by UA result Culture not indicated by UA result      WBC 0-4 0 - 4 /hpf    RBC 0-5 0 - 5 /hpf    Bacteria Negative Negative /hpf   VANCOMYCIN, RANDOM    Collection Time: 02/10/22  5:01 AM   Result Value Ref Range    Vancomycin, random 14.9 ug/mL   CREATININE    Collection Time: 02/10/22  5:01 AM   Result Value Ref Range    Creatinine 0.92 0.55 - 1.02 mg/dL       Radiology review: Chest xray    Assessment:   1. Acute hypoxic respiratory failure secondary to Covid pneumonia  2. Sepsis possibly secondary from decubitus ulcer  3. Acute metabolic encephalopathy secondary to #1 and 2  4. Acute systolic CHF with fluid overload/status post AICD 2 months ago  5. CKD stage II  6. Hypertension  7. History of stroke with left-sided residual for    Plan:    1. Covid positive. Chest x-ray shows moderate volume bilateral pleural effusions. WBC elevated. Inflammatory marker elevated. Continue to wean oxygen. Patient is on IV Zosyn and vancomycin. Will increase Decadron to every 8 hours. Continue with zinc and vitamin C.  2.  Wound care nurse consulted. Possibly septic to decubitus ulcer. Infectious disease consulted. 3.  Altered mental status most likely secondary to sepsis. Continue to monitor. 4. BNP elevated. On IV Lasix 20 mg twice daily. 2D echo ordered. 5.  Renal function is stable. We will hold nephrotoxic medications at this time. 6.  Blood pressure stable. Continue to monitor per unit protocol.   On Cardizem and Lasix  7. Patient has a history of stroke. On Eliquis. PT and OT evaluation. Continue with PEG tube feedings  8. CBC, BMP, inflammatory markers in a.m. Dispo: >48 hours. Barriers include ID evaluation, overall improvement in Covid pneumonia, weaning of oxygen, PT OT. Suspect patient may require minimum of home health      POA is son Danna Kylie (MD)    CODE STATUS Full     DVT prophylaxis: Eliquis  Ulcer prophylaxis: Tolerating PEG tube feedings    Care Plan discussed with: Patient/Family, Nurse and     Total time spent with patient: 34 minutes.

## 2022-02-10 NOTE — PROGRESS NOTES
Unable to admnistered IV medication, no IV access. Picc consult placed by night shift. Picc team reports busy schedule today. Still awaiting picc team to place IV ([id number]). Provider notified.

## 2022-02-10 NOTE — PROGRESS NOTES
Reason for Admission: Pneumonia due to COVID-19                   RUR Score:           PCP: First and Last name:  Alfonzo Arvizu MD     Name of Practice:   Are you a current patient: Yes/No: Yes   Approximate date of last visit: Six months ago   Can you do a virtual visit with your PCP:  Yes with family assistance             Resources/supports as identified by patient/family: Son lives in the home with patient and home health aides have been hired by son. Top Challenges facing patient (as identified by patient/family and CM): Finances/Medication cost?                    Transportation? Support system or lack thereof? Living arrangements? Self-care/ADL's/Cognition? Patient suffered a stroke several months ago and is unable to care for herself. She requires assistance with completing ADL's and uses a wheel chair to ambulate. Current Advanced Directive/Advance Care Plan:  Full Code      Healthcare Decision Maker:   Click here to complete HealthCare Decision Makers including selection of the Healthcare Decision Maker Relationship (ie \"Primary\")      Primary Decision Maker: Paulino Jay - [phone]    Payor Source Payor: Homa Lewis / Plan: Ramila Mistry / Product Type: Medicare /                             Plan for utilizing home health: Patient's son request [address] for [facility] services. Transition of Care Plan: CM spoke with patients son Ji Mcarthur via phone to  verify demographic information. Son stated that patient has been totally dependant upon [facility] services for her care and a home health aide and family members to assist with her ADL's and ambulation since her last stroke 6 months ago. Patients son request that she be sent home with O2 because her saturation rate drops drastically at night He reports that she has only been prescribed O2 on PRN basis.      CM signed Choice list consent form as authorized by patients son and he submitted the referral and the sign consent form was placed in patients record. Son will transport patient home when she is medically cleared for discharge. CM will continue to follow up with referral sent to [facility] and any recommendations for patients level of care prior to being discharged.

## 2022-02-10 NOTE — PROGRESS NOTES
Consult for Vancomycin Dosing by Pharmacy by Dr. Baca Books provided for this 80y.o. year old female , for indication of SSTI. Day of Therapy: 2  Goal of Level(s):  10-15mcg/dL     Other Current Antibiotics: Zosyn, Doxycycline    Significant Cultures: All Micro Results       Procedure Component Value Units Date/Time    CULTURE, Katerine Painter STAIN [[id number]] Collected: 02/10/22 0915    Order Status: Completed Specimen: Sacral Wound Updated: 02/10/22 1311    CULTURE, Katerine Middleton STAIN [[id number]] Collected: 02/10/22 1230    Order Status: Completed Specimen: Wound from Foot Updated: 02/10/22 1238    CULTURE, Katerine Middleton STAIN [[id number]] Collected: 02/10/22 1230    Order Status: Completed Specimen: Sacral Wound Updated: 02/10/22 1237    CULTURE, BLOOD [[id number]] Collected: 02/10/22 1207    Order Status: Completed Specimen: Blood Updated: 02/10/22 1220    COVID-19 RAPID TEST [[id number]]  (Abnormal) Collected: 02/09/22 1306    Order Status: Completed Specimen: Nasopharyngeal Updated: 02/09/22 1344     Specimen source Nasopharyngeal        COVID-19 rapid test DETECTED        Comment: Rapid Abbott ID Now   The specimen is POSITIVE for SARS-CoV-2, the novel coronavirus associated with COVID-19. This test has been authorized by the FDA under an Emergency Use Authorization (EUA) for use by authorized laboratories.    Fact sheet for Healthcare Providers: ConventionUpdate.co.nz Fact sheet for Patients: ConventionUpdate.co.nz   Methodology: Isothermal Nucleic Acid Amplification Results verified, phoned to and read back by [facility] RN   AT 1344                 Serum Creatinine Creatinine   Date/Time Value Ref Range Status   02/10/2022 05:01 AM 0.92 0.55 - 1.02 mg/dL Final   02/09/2022 01:06 PM 0.93 0.55 - 1.02 mg/dL Final   01/24/2022 06:48 AM 0.56 0.55 - 1.02 mg/dL Final      Creatinine Clearance Estimated Creatinine Clearance: 23.9 mL/min (based on SCr of 0.92 mg/dL). BUN Lab Results   Component Value Date/Time    BUN 60 (H) 02/09/2022 01:06 PM      WBC Lab Results   Component Value Date/Time    WBC 22.1 (H) 02/10/2022 12:08 PM      Temp Temp Readings from Last 1 Encounters:   02/10/22 97.1 °F (36.2 °C)      C-Reactive Protein C-Reactive protein   Date Value Ref Range Status   02/10/2022 11.20 (H) 0.00 - 0.60 mg/dL Final   01/06/2022 3.21 (H) 0.00 - 0.60 mg/dL Final   01/05/2022 3.09 (H) 0.00 - 0.60 mg/dL Final      Procalcitonin Lab Results   Component Value Date/Time    Procalcitonin 0.15 (H) 01/24/2022 06:48 AM          Ht Readings from Last 1 Encounters:   02/09/22 147.3 cm (58\")        Wt Readings from Last 1 Encounters:   02/09/22 45.4 kg (100 lb)     Ideal body weight: 47.1 kg (103 lb 14.5 oz)       New Regimen:   Patient serum creatinine continues stable but elevated from baseline. Will order vancomycin 500mg IV x 1 at 15:00. Pharmacy will order a random level on 2/11 at 14:00 to redose. Pharmacy to follow daily and will make changes to dose and/or frequency based on clinical status.      _________________________________     Pharmacist Trudy [facility], [address] [facility]

## 2022-02-11 NOTE — PROGRESS NOTES
OT/PT eval order received and acknowledged, attempted at 8:55 AM, however pt receiving wound care. Will continue to follow pt and attempt OT/PT eval at a later time as medically appropriate. Thank you.

## 2022-02-11 NOTE — WOUND CARE
IP WOUND CONSULT    Angelita Tabares  MEDICAL RECORD NUMBER:  [id number]  AGE: 80 y.o. GENDER: female  : 3/25/1922  TODAY'S DATE:  2022    GENERAL     [] Follow-up   [x] New Consult    Angelita Tabares is a 80 y.o. female referred by:   [x] Physician  [] Nursing  [] Other:         PAST MEDICAL HISTORY    Past Medical History:   Diagnosis Date    Chronic kidney disease     acute tubual necrosis    Clostridium difficile infection     Elevated troponin 9/10/2014    Hypertension     Skin cancer     Stroke [facility])     2001 left residual        PAST SURGICAL HISTORY    Past Surgical History:   Procedure Laterality Date    HX APPENDECTOMY  2008    HX HEENT      cataract    HX ORTHOPAEDIC  2010    left total hip - hip fx, left knee surgery    IR [facility] CVAD  2022       FAMILY HISTORY    Family History   Family history unknown: Yes         ALLERGIES    Allergies   Allergen Reactions    Neosporin [Benz[place]onium Chloride] Rash       MEDICATIONS    No current facility-administered medications on file prior to encounter. Current Outpatient Medications on File Prior to Encounter   Medication Sig Dispense Refill    Eliquis 2.5 mg tablet Take 2.5 mg by mouth two (2) times a day.  DILT- mg capsule Take 120 mg by mouth two (2) times a day.  furosemide (LASIX) 40 mg tablet 40 mg daily.  acetaminophen (TYLENOL) 325 mg tablet Take 2 Tablets by mouth every six (6) hours as needed for Pain or Fever. 60 Tablet 0    ascorbic acid, vitamin C, (VITAMIN C) 500 mg tablet Take 1 Tablet by mouth two (2) times a day. 60 Tablet 0    meropenem 500 mg IV syringe 500 mg by IntraVENous route every twelve (12) hours every twelve (12) hours. 42 Dose 0    zinc sulfate (ZINCATE) 50 mg zinc (220 mg) capsule Take 1 Capsule by mouth daily.  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 to right lateral heel; laceration to right foot medial distal; blanchable erythema to left heel; multiple skin tears to right lateral elbow, LLA, and 2 x LLE  Dressing change: Yes  Verbal consent for picture: Non-verbal    Contributing Factors: anticoagulation therapy, edema, poor glucose control, chronic pressure, decreased mobility, shear force, incontinence of stool, incontinence of urine and Contractures    Wound Sacrum (Active)   Wound Image   02/11/22 1740   Wound Etiology Pressure Stage 4 02/11/22 1740   Dressing Status New dressing applied 02/11/22 1740   Cleansed Irrigated with saline 02/11/22 1740   Dressing/Treatment Alginate with Ag; Foam 02/11/22 1740   Dressing Change Due 02/12/22 02/11/22 1740   Wound Length (cm) 7.7 cm 02/11/22 1740   Wound Width (cm) 9.3 cm 02/11/22 1740   Wound Depth (cm) 1.2 cm 02/11/22 1740   Wound Surface Area (cm^2) 71.61 cm^2 02/11/22 1740   Change in Wound Size % 0.54 02/11/22 1740   Wound Volume (cm^3) 85.932 cm^3 02/11/22 1740   Wound Healing % -298 02/11/22 1740   Wound Assessment Exposed Structure Bone;Slough;Pink/red 02/11/22 1740   Drainage Amount None 02/11/22 1740   Drainage Description Serosanguinous 02/11/22 1722   Wound Odor None 02/11/22 1740   Shanon-Wound/Incision Assessment Denuded;Fragile 02/11/22 1740   Edges Defined edges; Unattached edges 02/11/22 1740   Wound Thickness Description Full thickness 02/11/22 1740   Number of days: 48       Wound Heel Right Non-blanchable Erythema 01/01/22 (Active)   Dressing Status Clean;Dry; Intact 02/11/22 1725   Dressing/Treatment ABD pad 02/11/22 1725   Drainage Description Yellow 02/11/22 1725   Wound Odor None 02/11/22 1725   Number of days: 41       Wound Heel Right;Lateral Dry w/eschar 01/01/22 (Active)   Wound Image   02/11/22 1742   Wound Etiology Pressure Stage 3 02/11/22 1742   Dressing Status New dressing applied 02/11/22 1742   Cleansed Cleansed with saline 02/11/22 1742   Dressing/Treatment ABD pad;Honey gel/honey paste; Roll gauze 02/11/22 1742   Dressing Change Due 02/12/22 02/11/22 1742   Wound Length (cm) 1.8 cm 02/11/22 1742   Wound Width (cm) 2.1 cm 02/11/22 1742   Wound Depth (cm) 0.1 cm 02/11/22 1742   Wound Surface Area (cm^2) 3.78 cm^2 02/11/22 1742   Change in Wound Size % -278 02/11/22 1742   Wound Volume (cm^3) 0.378 cm^3 02/11/22 1742   Wound Healing % -278 02/11/22 1742   Wound Assessment Subcutaneous;Pink/red;Dry 02/11/22 1742   Drainage Amount None 02/11/22 1742   Drainage Description Yellow 02/11/22 1725   Wound Odor None 02/11/22 1742   Shanon-Wound/Incision Assessment Blanchable erythema;Fragile 02/11/22 1742   Edges Defined edges; Unattached edges 02/11/22 1742   Wound Thickness Description Full thickness 02/11/22 1742   Number of days: 41       Wound Foot Left;Lateral Non-blanchable Erythema, Purple/Maroon 01/01/22 (Active)   Number of days: 41       Wound Leg lower Left;Lateral Partial Thickness 01/01/22 (Active)   Wound Image   02/11/22 1749   Wound Etiology Skin Tear 02/11/22 1749   Dressing Status New dressing applied 02/11/22 1749   Cleansed Irrigated with saline 02/11/22 1749   Dressing/Treatment ABD pad;Honey gel/honey paste; Roll gauze 02/11/22 1749   Dressing Change Due 02/13/22 02/11/22 1749   Wound Assessment Bleeding;Pink/red 02/11/22 1749   Drainage Amount Scant 02/11/22 1749   Drainage Description Sanguineous 02/11/22 1749   Wound Odor None 02/11/22 1749   Shanon-Wound/Incision Assessment Fragile 02/11/22 1749   Edges Unattached edges 02/11/22 1749   Wound Thickness Description Partial thickness 02/11/22 1749   Number of days: 41       Wound Foot Right;Medial Partial Thickness 01/01/22 (Active)   Wound Image   02/11/22 1748   Wound Etiology Other (Comment) 02/11/22 1748   Cleansed Cleansed with saline 02/11/22 1748   Dressing/Treatment Foam;Honey gel/honey paste 02/11/22 1748   Dressing Change Due 02/13/22 02/11/22 1748   Wound Assessment Dry;Pink/red 02/11/22 1748   Drainage Amount None 02/11/22 1748   Wound Odor None 02/11/22 1748   Shanon-Wound/Incision Assessment Fragile 02/11/22 1748   Edges Defined edges; Unattached edges 02/11/22 1748   Wound Thickness Description Partial thickness 02/11/22 1748   Number of days: 41       Wound Elbow Right;Lateral Partial Thickness 02/11/22 (Active)   Wound Image   02/11/22 1753   Wound Etiology Skin Tear 02/11/22 1753   Dressing Status New dressing applied 02/11/22 1753   Cleansed Irrigated with saline 02/11/22 1753   Dressing/Treatment Foam;Honey gel/honey paste 02/11/22 1753   Dressing Change Due 02/13/22 02/11/22 1753   Wound Assessment Dry;Pink/red 02/11/22 1753   Drainage Amount None 02/11/22 1753   Wound Odor None 02/11/22 1753   Shanon-Wound/Incision Assessment Ecchymosis;Fragile 02/11/22 1753   Edges Unattached edges 02/11/22 1753   Wound Thickness Description Partial thickness 02/11/22 1753   Number of days: 2       Wound Arm lower Anterior; Left skin tear (Active)   Wound Image   02/11/22 1750   Wound Etiology Skin Tear 02/11/22 1750   Dressing Status New dressing applied 02/11/22 1750   Cleansed Irrigated with saline 02/11/22 1750   Dressing/Treatment Foam;Honey gel/honey paste 02/11/22 1750   Dressing Change Due 02/13/22 02/11/22 1750   Wound Assessment Pink/red 02/11/22 1750   Drainage Amount Scant 02/11/22 1750   Drainage Description Serosanguinous 02/11/22 1750   Wound Odor None 02/11/22 1750   Shanon-Wound/Incision Assessment Ecchymosis;Fragile 02/11/22 1750   Edges Unattached edges 02/11/22 1750   Wound Thickness Description Partial thickness 02/11/22 1750   Number of days: 2       Wound Calf Left Partial Thickness 02/11/22 (Active)   Wound Image   02/11/22 1745   Wound Etiology Skin Tear 02/11/22 1745   Dressing Status New dressing applied 02/11/22 1745   Cleansed Irrigated with saline 02/11/22 1745   Dressing/Treatment Honey gel/honey paste;ABD pad;Roll gauze 02/11/22 1745   Dressing Change Due 02/13/22 02/11/22 1745   Wound Assessment Bleeding;Pink/red 02/11/22 1745   Drainage Amount Scant 02/11/22 1745   Drainage Description Sanguineous 02/11/22 1745   Wound Odor None 02/11/22 1745   Shanon-Wound/Incision Assessment Fragile;Ecchymosis 02/11/22 1745   Edges Unattached edges 02/11/22 1745   Wound Thickness Description Partial thickness 02/11/22 1745   Number of days: 0       Wound Heel Left Blanchable Erythema 02/11/22 (Active)   Wound Image   02/11/22 1747   Wound Etiology Other (Comment) 02/11/22 1747   Dressing/Treatment ABD pad;Roll gauze 02/11/22 1747   Wound Assessment Pink/red;Erythema 02/11/22 1747   Drainage Amount None 02/11/22 1747   Wound Odor None 02/11/22 1747   Shanon-Wound/Incision Assessment Blanchable erythema;Fragile 02/11/22 1747   Edges Attached edges 02/11/22 1747   Number of days: 0       [REMOVED] Wound (Removed)   Number of days: 1003       [REMOVED] Wound Arm lower Right (Removed)   Dressing Status Clean;Dry; Intact 02/11/22 1726   Dressing/Treatment ABD pad 02/11/22 1726   Drainage Amount None 02/11/22 1726   Wound Odor None 02/11/22 1726   Edges Attached edges 02/11/22 1726   Number of days:        [REMOVED] Wound Back Right Non-blanchable Erythema 01/01/22 (Removed)   Number of days: 41       [REMOVED] Wound Coccyx (Removed)   Dressing Status Clean 02/11/22 1724   Dressing/Treatment Hydrating gel with Ag 02/11/22 1724   Drainage Description Serosanguinous 02/11/22 1724   Wound Odor None 02/11/22 1724   Edges Defined edges 02/11/22 1724   Number of days: 2          PLAN     Skin Care & Pressure Relief Recommendations  Speciality bed Versacare P500 Low Air Loss  Minimize layers of linen  Turn/reposition approximately every 2 hours  Pillow wedges  Manage incontinence   Promote continence; Skin Protective lotion/cream to buttocks and sacrum daily and as needed with incontinence care  Offload heels pillows    Charles 13  Blood Glucose: 334 on 2/11/22 (patient receiving steroid therapy and PEG feedings)                              Albumin: 2.0 on 2/9/22  WBCs: 21.2 on 2/11/22    Support Surface: Versacare P500 Low Air for increased pressure reduction and climate control. Physician/Provider notified:   Recommendations: Open area to stage 4 PI to sacrum is larger in diameter than previous admission assessment as most of eschar is now gone. Bone exposure still evident. Pack lightly with Opticell Ag and cover with Optifoam Border Sacral foam dressing daily and prn for soiling, see  Dressing order. Stage 3 PI noted to right lateral heel. Apply Therahoney gel and cover with Optifoam Gentle Lite every other day, see dressing order. Left heel is barely blanchable. Ensure heels are floated with pillows at all times. Due to patient small stature and cachexia, heel boots may cause too much twisting to joints. Skin tears to right lateral elbow, LLA, and LLE. Apply Therahoney gel and cover with Optifoam Gentle Lite every other day and prn for soiling, see dressing order. Irrigate all dressings with NS to help with removal as skin in fragile and tears easily. Avoid adhesives and band-aids to skin if possible. Maintain a pillow between BLEs to prevent skin-to-skin pressure related skin injury. Small scab note to left hip, dry and stable, no dressing needed. Patient needs to be turned hourly due to fragility of skin and risk for pressure injury development. Maintain the PureWick to manage  incontinence. Maintain HOB at 30 degrees or less, if not contraindicated, to reduce pressure to buttocks and sacrum. Raise foot of bed to help prevent friction and shear injury from sliding down in the bed. Wound healing will be a challenge and patient is high risk for new injuries due to several factors including advanced age, immobility,  and GI incontinence, cachectic, uncontrolled glucose, and fragility of skin. Will continue to follow.        Discharge Wound Care Needs: TBD. Patient would benefit with routine wound management at the [address] [place] [address]. Patient would benefit from having a low air loss or air-fluidized bed at home or SNF.       Teaching completed with:   [] Patient           [] Family member       [] Caregiver          [] Nursing  [] Other    Patient/Caregiver Teaching:  Level of patient/caregiver understanding able to:   [] Indicates understanding       [] Needs reinforcement  [] Unsuccessful      [] Verbal Understanding  [] Demonstrated understanding       [] No evidence of learning  [] Refused teaching         [] N/A       Electronically signed by Wally Abernathy RN on 2/11/2022 at 6:06 PM

## 2022-02-11 NOTE — PROGRESS NOTES
Progress Note    Patient: Fern Javier MRN: [id number]  SSN: [id number]    YOB: 1922  Age: 80 y.o. Sex: female      Admit Date: 2/9/2022    LOS: 2 days     Subjective:   Patient followed for sepsis with suspected sacral wound infection and possible right foot infection. Also diagnosed with Covid-19 but no hypoxia. She appears to be resting comfortably but remains nonverbal.     Objective:     Vitals:    02/10/22 0824 02/10/22 1143 02/10/22 2002 02/11/22 0909   BP:  137/68 138/76    Pulse:  66 60 (!) 50   Resp:  18 16    Temp:  97.1 °F (36.2 °C) 97.4 °F (36.3 °C) 98.8 °F (37.1 °C)   SpO2: 95%  95% 93%   Weight:       Height:            Intake and Output:  Current Shift: No intake/output data recorded. Last three shifts: 02/09 1901 - 02/11 0700  In: -   Out: 500 [Urine:500]    Physical Exam:   Vitals and nursing note reviewed. Constitutional:       General: She is not in acute distress. Appearance: She is ill-appearing. HENT:      Comments: Nasal O2 cannula 3L/min  Eyes:      Pupils: Pupils are equal, round, and reactive to light. Cardiovascular:      Rate and Rhythm: Normal rate and regular rhythm. Heart sounds: No murmur heard. Pulmonary:      Breath sounds: Rhonchi and rales present. Abdominal:      General: Bowel sounds are normal.      Palpations: Abdomen is soft. Tenderness: There is no abdominal tenderness. Genitourinary:     Comments: No Govea  Musculoskeletal:      Right lower leg: No edema. Left lower leg: No edema. Skin:     Findings: No rash.       Comments: Large open sacral wound Stage 3  Open wound right medial foot drying up  Neurological:      Comments: Unable to assess   Psychiatric:      Comments: Unable to assess      Lab/Data Review:     WBC 21,200      Ferritin 229    Procal 0.22  CRP 8.24 <11.20    Blood cultures (2/10) Pending  Sacral wound culture (2/10) Pending  Sacral wound culture (2/10) GPC in clusters  Right foot wound culture (2/10) GPC in pairs  Assessment:     Active Problems:    Pneumonia due to COVID-19 virus (2/9/2022)    1. Possible sacral wound infection, culture pending  2. Stage 3 sacral wound  3. Possible right foot wound infection, culture pending  4. Covid-19 infection with no clear evidence of pneumonitis  5. Sepsis with leukocytosis, elevated procal and CRP, resolving  6. Altered mental status      Comments: WBC and CRP decreasing on current antibiotics. Plan:   1. Continue IV Vancomycin and Zosyn pending blood and wound cultures  2. Follow-up blood and wound cultures   3. In am, repeat CBC, procal and CRP  4. Follow-up blood cultures  5.  No specific treatment for Covid-19 at this time       Signed By: Sahra Herbert MD     February 11, 2022

## 2022-02-11 NOTE — PROGRESS NOTES
Consult for Vancomycin Dosing by Pharmacy by Dr. Linette Vaca provided for this 80y.o. year old female , for indication of SSTI. Day of Therapy: 3  Goal of Level(s):  10-15mcg/dL     Other Current Antibiotics: Zosyn, Doxycycline    Significant Cultures: All Micro Results       Procedure Component Value Units Date/Time    CULTURE, Ofilia Challenger STAIN [[id number]] Collected: 02/10/22 1230    Order Status: Completed Specimen: Wound Updated: 02/11/22 1437     Special Requests: No Special Requests        GRAM STAIN Occasional WBCs seen               Rare Gram Positive Cocci in clusters           Culture result: Heavy Gram Negative Rods               Heavy POSSIBLE Staphylococcus species          CULTURE, Ofilia Challenger STAIN [[id number]] Collected: 02/10/22 1230    Order Status: Completed Specimen: Wound Updated: 02/11/22 1433     Special Requests: No Special Requests        GRAM STAIN Rare WBCs seen               Rare Gram Positive Cocci in pairs           Culture result:       Heavy POSSIBLE Staphylococcus species                  Moderate Gram Negative Rods          CULTURE, BLOOD [[id number]] Collected: 02/10/22 1207    Order Status: Completed Specimen: Blood Updated: 02/10/22 1220    CULTURE, Ofilia Challenger STAIN [[id number]] Collected: 02/10/22 0915    Order Status: Canceled Specimen: Sacral Wound     COVID-19 RAPID TEST [[id number]]  (Abnormal) Collected: 02/09/22 1306    Order Status: Completed Specimen: Nasopharyngeal Updated: 02/09/22 1344     Specimen source Nasopharyngeal        COVID-19 rapid test DETECTED        Comment: Rapid Abbott ID Now   The specimen is POSITIVE for SARS-CoV-2, the novel coronavirus associated with COVID-19. This test has been authorized by the FDA under an Emergency Use Authorization (EUA) for use by authorized laboratories.    Fact sheet for Healthcare Providers: ConventionUpdate.co.nz Fact sheet for Patients: ConventionUpdate.co.nz Methodology: Isothermal Nucleic Acid Amplification Results verified, phoned to and read back by [facility] RN   AT 1344                 Serum Creatinine Creatinine   Date/Time Value Ref Range Status   02/11/2022 07:32 AM 0.80 0.55 - 1.02 mg/dL Final   02/10/2022 05:01 AM 0.92 0.55 - 1.02 mg/dL Final   02/09/2022 01:06 PM 0.93 0.55 - 1.02 mg/dL Final      Creatinine Clearance Estimated Creatinine Clearance: 27.5 mL/min (based on SCr of 0.8 mg/dL). BUN Lab Results   Component Value Date/Time    BUN 60 (H) 02/11/2022 07:32 AM      WBC Lab Results   Component Value Date/Time    WBC 21.2 (H) 02/11/2022 07:32 AM      Temp Temp Readings from Last 1 Encounters:   02/11/22 98.8 °F (37.1 °C)      C-Reactive Protein C-Reactive protein   Date Value Ref Range Status   02/11/2022 8.24 (H) 0.00 - 0.60 mg/dL Final   02/10/2022 11.20 (H) 0.00 - 0.60 mg/dL Final   01/06/2022 3.21 (H) 0.00 - 0.60 mg/dL Final      Procalcitonin Lab Results   Component Value Date/Time    Procalcitonin 0.15 (H) 02/11/2022 07:33 AM          Ht Readings from Last 1 Encounters:   02/09/22 147.3 cm (58\")        Wt Readings from Last 1 Encounters:   02/09/22 45.4 kg (100 lb)     Ideal body weight: 47.1 kg (103 lb 14.5 oz)       New Regimen:   Vancomycin 500mg IV x 1 not hung yesterday. Today vancomycin level 10.7 @  15:01. Will order vancomycin 500 mg IV x 1 at 17:00 and a random level on 2/12 at 15:00 to redose. Pharmacy to follow daily and will make changes to dose and/or frequency based on clinical status.      _________________________________     Pharmacist Ernestina Pratt, [address] [facility]

## 2022-02-11 NOTE — PROGRESS NOTES
02/11/22 0942   Midline Catheter 73/27/42 Left;Basilic   Placement Date/Time: 02/11/22 0941   Description (optional): 18G 8cm  Inserted By: Germán Charles  Number of Attempts: 1  Length (cm): 10 centimeters  Location: Left;Basilic  CVC Insertion Practices: Chlorohexadine skin antisepsis; Optimal catheter site aleksandr. .. Criteria for Appropriate Use Other (comment)  (forearm wounds bilateral)   Site Assessment Clean, dry, & intact   Phlebitis Assessment 0   Infiltration Assessment 0   Date of Last Dressing Change 02/11/22   External Catheter Length (cm) 0 centimeters   Dressing Type Disk with Chlorhexadine gluconate (CHG); Stabilization/securement device;Tape;Transparent   Hub Color/Line Status Green;Flushed   Positive Blood Return (Site #1) Yes   Alcohol Cap Used Yes

## 2022-02-11 NOTE — PROGRESS NOTES
Spoke with patient's son regarding medications. Son stated that he seen on \"my chart\" that patient is taking Cardizem. He states that he does not want his mother taking this medication because she was not taking it at home. Dr Payton Alford is the patient's primary decision maker and  can be reached at [phone]. Will pass on to oncoming shift.

## 2022-02-11 NOTE — PROGRESS NOTES
Hospitalist Progress Note    Subjective:   Daily Progress Note: 2/11/2022 9:20 AM    Hospital Course: Patient is a 41-year-old female with a history of CKD, hypertension, stroke with left residual defects, bradycardia status post AICD 2 months ago, dysphagia status post PEG tube the presented to the emergency room on 2/9/2022 for altered mental status and fever. Patient son who is a physician was trying to treat patient for fluid overload and possible aspiration pneumonia with Lasix and amoxicillin. However she became tachypneic and hypoxic. In the ED patient was found to be Covid positive. Laboratory data was significant for a WBC of 25,000. D-dimer 8.87, BNP 15,430. Patient was started on IV vancomycin, Zosyn, Decadron, vitamin C, zinc.  Infectious disease consulted. 2D echo pending shows normal left ventricular size and systolic function with an EF of 55 to 29%, normal diastolic function, severe mitral and tricuspid regurgitation, moderate pulmonic regurgitation. Patient is on IV Lasix      Subjective: Patient is lethargic but awake.     Current Facility-Administered Medications   Medication Dose Route Frequency    dexamethasone (DECADRON) 4 mg/mL injection 6 mg  6 mg IntraVENous Q8H    Vancomycin random level 2/11 at 14:00   Other ONCE    piperacillin-tazobactam (ZOSYN) 3.375 g in 0.9% sodium chloride (MBP/ADV) 100 mL MBP  3.375 g IntraVENous Q8H    doxycycline (VIBRAMYCIN) 100 mg in 0.9% sodium chloride (MBP/ADV) 100 mL MBP  100 mg IntraVENous Q12H    sodium chloride (NS) flush 5-40 mL  5-40 mL IntraVENous Q8H    sodium chloride (NS) flush 5-40 mL  5-40 mL IntraVENous PRN    acetaminophen (TYLENOL) tablet 650 mg  650 mg Oral Q6H PRN    Or    acetaminophen (TYLENOL) suppository 650 mg  650 mg Rectal Q6H PRN    polyethylene glycol (MIRALAX) packet 17 g  17 g Oral DAILY PRN    ondansetron (ZOFRAN ODT) tablet 4 mg  4 mg Oral Q8H PRN    Or    ondansetron (ZOFRAN) injection 4 mg  4 mg IntraVENous Q6H PRN    ascorbic acid (vitamin C) (VITAMIN C) tablet 500 mg  500 mg Oral BID    apixaban (ELIQUIS) tablet 2.5 mg  2.5 mg Oral BID    zinc sulfate (ZINCATE) 50 mg zinc (220 mg) capsule 1 Capsule  1 Capsule Oral DAILY    dilTIAZem IR (CARDIZEM) tablet 30 mg  30 mg Oral Q8H    furosemide (LASIX) injection 20 mg  20 mg IntraVENous Q12H    VANCOMYCIN INFORMATION NOTE   Other Rx Dosing/Monitoring        Review of Systems  Constitutional: No fevers, No chills, No sweats, + fatigue, + Weakness  Eyes: No redness  Ears, nose, mouth, throat, and face: No nasal congestion, No sore throat, No voice change  Respiratory: + Shortness of Breath, No cough, No wheezing  Cardiovascular: No chest pain, No palpitations, No extremity edema  Gastrointestinal: No nausea, No vomiting, No diarrhea, No abdominal pain  Genitourinary: No frequency, No dysuria, No hematuria  Integument/breast: No skin lesion(s)   Neurological: + Confusion, No headaches, No dizziness      Objective:     Visit Vitals  /76 (BP 1 Location: Left upper arm, BP Patient Position: At rest)   Pulse (!) 50   Temp 98.8 °F (37.1 °C)   Resp 16   Ht 4' 10\" (1.473 m)   Wt 45.4 kg (100 lb)   SpO2 93%   BMI 20.90 kg/m²    O2 Flow Rate (L/min): 3 l/min O2 Device: Nasal cannula    Temp (24hrs), Av.8 °F (36.6 °C), Min:97.1 °F (36.2 °C), Max:98.8 °F (37.1 °C)      No intake/output data recorded.  1901 -  0700  In: -   Out: 500 [Urine:500]    PHYSICAL EXAM:  Constitutional: ill appearing, No acute distress  Skin: Extremities and face reveal no rashes. HEENT: Sclerae anicteric. Extra-occular muscles are intact. Cardiovascular: Regular rate and rhythm. Respiratory:  diminished  GI: Abdomen nondistended, soft, and nontender. Normal active bowel sounds. Musculoskeletal: No pitting edema of the lower legs. Able to move all ext  Neurological:  Patient is alert and oriented.  Cranial nerves II-XII grossly intact  Psychiatric: flat affect       Data Review    Recent Results (from the past 24 hour(s))   ECHO ADULT COMPLETE    Collection Time: 02/10/22 11:17 AM   Result Value Ref Range    LA Major [place] 5.7 cm    LA Area 4C 21.3 cm2    LA Diameter 4.2 cm    AR .0 ms    AR Max Velocity PISA 3.1 m/s    AV Mean Gradient 13 mmHg    AV Mean Velocity 1.7 m/s    AV Area by Peak Velocity 1.8 cm2    Aortic Root 3.1 cm    Ascending Aorta 3.0 cm    Descending Aorta 2.2 cm    IVSd 1.3 (A) 0.6 - 0.9 cm    LVIDd 3.0 (A) 3.9 - 5.3 cm    LVIDs 1.9 cm    LVOT Diameter 1.6 cm    LVOT Mean Gradient 1 mmHg    LVOT VTI 16.7 cm    LVPWd 0.9 0.6 - 0.9 cm    LV E' Lateral Velocity 8 cm/s    LV E' Septal Velocity 4 cm/s    LVOT Area 2.0 cm2    LVOT SV 33.6 ml    MV Mean Gradient 2 mmHg    MV VTI 23.1 cm    MV Mean Velocity 0.5 m/s    MV Max Velocity 1.2 m/s    MV Peak Gradient 6 mmHg    MV E Wave Deceleration Time 169.0 ms    MV A Velocity 0.33 m/s    MV E Velocity 1.04 m/s    MV Area by VTI 1.5 cm2    KS Max Velocity 1.1 m/s    Pulmonary Artery EDP 4 mmHg    Est. RA Pressure 15 mmHg    TR Max Velocity 3.35 m/s    TR Peak Gradient 45 mmHg    TAPSE 1.3 (A) 1.5 - 2.0 cm    Fractional Shortening 2D 37 28 - 44 %    LVIDd Index 2.21 cm/m2    LVIDs Index 1.40 cm/m2    LV RWT Ratio 0.60     LV Mass 2D 95.1 67 - 162 g    LV Mass 2D Index 69.9 43 - 95 g/m2    MV E/A 3.15     E/E' Ratio (Averaged) 19.50     E/E' Lateral 13.00     E/E' Septal 26.00     LVOT Stroke Volume Index 24.7 mL/m2    LA Size Index 3.09 cm/m2    LA/AO Root Ratio 1.35     Ao Root Index 2.28 cm/m2    Ascending Aorta Index 2.21 cm/m2    AV Velocity Ratio 0.90     [place]/BSA Peak Velocity 1.3 cm2/m2    MV:LVOT VTI Index 1.38     RVSP 60 mmHg    Descending Aorta Index 1.62 cm/m2    AV Peak Velocity 1.0 m/s    AV Peak Gradient 4 mmHg    LVOT Peak Velocity 0.9 m/s    LVOT Peak Gradient 3 mmHg    MR .0 cm    MR Peak Velocity 4.5 m/s    MR Peak Gradient 81 mmHg   C REACTIVE PROTEIN, QT    Collection Time: 02/10/22 12:08 PM Result Value Ref Range    C-Reactive protein 11.20 (H) 0.00 - 0.60 mg/dL   CBC WITH AUTOMATED DIFF    Collection Time: 02/10/22 12:08 PM   Result Value Ref Range    WBC 22.1 (H) 3.6 - 11.0 K/uL    RBC 2.99 (L) 3.80 - 5.20 M/uL    HGB 8.7 (L) 11.5 - 16.0 g/dL    HCT 28.3 (L) 35.0 - 47.0 %    MCV 94.6 80.0 - 99.0 FL    MCH 29.1 26.0 - 34.0 PG    MCHC 30.7 30.0 - 36.5 g/dL    RDW 21.2 (H) 11.5 - 14.5 %    PLATELET 521 162 - 952 K/uL    MPV 10.0 8.9 - 12.9 FL    NRBC 0.1 (H) 0.0  WBC    ABSOLUTE NRBC 0.03 (H) 0.00 - 0.01 K/uL    NEUTROPHILS 95 (H) 32 - 75 %    LYMPHOCYTES 2 (L) 12 - 49 %    MONOCYTES 2 (L) 5 - 13 %    EOSINOPHILS 0 0 - 7 %    BASOPHILS 0 0 - 1 %    IMMATURE GRANULOCYTES 1 (H) 0 - 0.5 %    ABS. NEUTROPHILS 20.9 (H) 1.8 - 8.0 K/UL    ABS. LYMPHOCYTES 0.5 (L) 0.8 - 3.5 K/UL    ABS. MONOCYTES 0.4 0.0 - 1.0 K/UL    ABS. EOSINOPHILS 0.0 0.0 - 0.4 K/UL    ABS. BASOPHILS 0.0 0.0 - 0.1 K/UL    ABS. IMM.  GRANS. 0.2 (H) 0.00 - 0.04 K/UL    DF AUTOMATED     PROCALCITONIN    Collection Time: 02/10/22 12:08 PM   Result Value Ref Range    Procalcitonin 0.22 (H) 0 ng/mL   CULTURE, WOUND W GRAM STAIN    Collection Time: 02/10/22 12:30 PM    Specimen: Wound    sacrum   Result Value Ref Range    Special Requests: No Special Requests      GRAM STAIN Occasional WBCs seen      GRAM STAIN Rare Gram Positive Cocci in clusters      Culture result: PENDING    CULTURE, WOUND W GRAM STAIN    Collection Time: 02/10/22 12:30 PM    Specimen: Wound    right foot   Result Value Ref Range    Special Requests: No Special Requests      GRAM STAIN Rare WBCs seen      GRAM STAIN Rare Gram Positive Cocci in pairs      Culture result: PENDING    METABOLIC PANEL, BASIC    Collection Time: 02/11/22  7:32 AM   Result Value Ref Range    Sodium 144 136 - 145 mmol/L    Potassium 4.4 3.5 - 5.1 mmol/L    Chloride 107 97 - 108 mmol/L    CO2 31 21 - 32 mmol/L    Anion gap 6 5 - 15 mmol/L    Glucose 334 (H) 65 - 100 mg/dL    BUN 60 (H) 6 - 20 mg/dL    Creatinine 0.80 0.55 - 1.02 mg/dL    BUN/Creatinine ratio 75 (H) 12 - 20      GFR est AA >60 >60 ml/min/1.73m2    GFR est non-AA >60 >60 ml/min/1.73m2    Calcium 8.5 8.5 - 10.1 mg/dL   CBC WITH AUTOMATED DIFF    Collection Time: 02/11/22  7:32 AM   Result Value Ref Range    WBC 21.2 (H) 3.6 - 11.0 K/uL    RBC 3.01 (L) 3.80 - 5.20 M/uL    HGB 8.8 (L) 11.5 - 16.0 g/dL    HCT 28.7 (L) 35.0 - 47.0 %    MCV 95.3 80.0 - 99.0 FL    MCH 29.2 26.0 - 34.0 PG    MCHC 30.7 30.0 - 36.5 g/dL    RDW 21.2 (H) 11.5 - 14.5 %    PLATELET 535 957 - 431 K/uL    MPV 10.6 8.9 - 12.9 FL    NRBC 0.2 (H) 0.0  WBC    ABSOLUTE NRBC 0.04 (H) 0.00 - 0.01 K/uL    NEUTROPHILS 93 (H) 32 - 75 %    LYMPHOCYTES 3 (L) 12 - 49 %    MONOCYTES 3 (L) 5 - 13 %    EOSINOPHILS 0 0 - 7 %    BASOPHILS 0 0 - 1 %    IMMATURE GRANULOCYTES 1 (H) 0 - 0.5 %    ABS. NEUTROPHILS 19.9 (H) 1.8 - 8.0 K/UL    ABS. LYMPHOCYTES 0.6 (L) 0.8 - 3.5 K/UL    ABS. MONOCYTES 0.6 0.0 - 1.0 K/UL    ABS. EOSINOPHILS 0.0 0.0 - 0.4 K/UL    ABS. BASOPHILS 0.0 0.0 - 0.1 K/UL    ABS. IMM. GRANS. 0.2 (H) 0.00 - 0.04 K/UL    DF AUTOMATED     C REACTIVE PROTEIN, QT    Collection Time: 02/11/22  7:32 AM   Result Value Ref Range    C-Reactive protein 8.24 (H) 0.00 - 0.60 mg/dL   LD    Collection Time: 02/11/22  7:32 AM   Result Value Ref Range     (H) 81 - 246 U/L       Radiology review: Chest xray    Assessment:   1. Acute hypoxic respiratory failure secondary to Covid pneumonia  2. Sepsis possibly secondary from decubitus ulcer  3. Acute metabolic encephalopathy secondary to #1 and 2  4. Acute systolic CHF with fluid overload/status post AICD 2 months ago  5. CKD stage II  6. Hypertension  7. History of stroke with left-sided residual  8. Hyperglycemia    Plan:    1. Covid positive. Chest x-ray shows moderate volume bilateral pleural effusions. Inflammatory markers trending down. Patient is on IV Zosyn and vancomycin. On Decadron to every 8 hours.   Continue with zinc and vitamin C. Will have to do a over night pulse ox study the night prior to discharge. 2.  Wound care nurse consulted. Possibly septic to decubitus ulcer. Infectious disease consulted. 3.  Altered mental status most likely secondary to sepsis. Continue to monitor. 4. BNP elevated. On IV Lasix 20 mg twice daily. 2D echo shows normal EF with severe mitral and tricuspid regurgitation, moderate pulmonary regurgitation. .  When reviewing results from echo from 3/20/2019 showed mild to moderate mitral valve vegetation, severe tricuspid valve regurgitation. 5.  Renal function is stable. We will hold nephrotoxic medications at this time. 6.  Blood pressure stable. Continue to monitor per unit protocol. On Cardizem and Lasix  7. Patient has a history of stroke. On Eliquis. PT and OT evaluation. Continue with PEG tube feedings  8. We will check a hemoglobin A1c. Start insulin sliding scale. Most likely elevated due to steroid  9. CBC, BMP, inflammatory markers in a.m. Dispo: >48 hours. Barriers include ID evaluation, overall improvement in Covid pneumonia, weaning of oxygen, PT OT. Suspect patient may require minimum of home health      POA is angie PATEL) - Spoke and updated on tests and studies. Answered all questions    CODE STATUS Full     DVT prophylaxis: Eliquis  Ulcer prophylaxis: Tolerating PEG tube feedings    Care Plan discussed with: Patient/Family, Nurse and     Total time spent with patient: 33 minutes.

## 2022-02-12 NOTE — PROGRESS NOTES
PHYSICAL THERAPY EVALUATION  Patient: Juliette Alcala (80 y.o. female)  Date: 2/12/2022  Primary Diagnosis: Pneumonia due to COVID-19 virus [U07.1, J12.82]        Precautions: fall  ASSESSMENT  Patient is a 70-year-old female with a history of CKD, hypertension, stroke with left residual defects, bradycardia status post AICD 2 months ago, dysphagia status post PEG tube the presented to the emergency room on 2/9/2022 for altered mental status and fever. Patient son who is a physician was trying to treat patient for fluid overload and possible aspiration pneumonia with Lasix and amoxicillin. However she became tachypneic and hypoxic. In the ED patient was found to be Covid positive. Laboratory data was significant for a WBC of 25,000. D-dimer 8.87, BNP 15,430. Patient was started on IV vancomycin, Zosyn, Decadron, vitamin C, zinc.  Infectious disease consulted. Wound cultures shows possible Staphylococcus species 2D echo pending shows normal left ventricular size and systolic function with an EF of 55 to 51%, normal diastolic function, severe mitral and tricuspid regurgitation, moderate pulmonic regurgitation. Patient is on IV Lasix. Patient glucose have been labile. Hemoglobin A1c is pending. Patient found in bed slid down in the bed. PT/OT performed bed eval. Patient very lethargic and unable to move self. AROM -unable to perform. PROM limited jonny les-gentle stretches performed in available rom. Required max assist for turning in bed side to side. was scooted up in the bed for proper positioning. Patient was on PEG tube held feeding for mobility and HOB up >30 degrees after PT/OT worked with therapy. Re started feeding . RN reported patient's blood sugar was off.51  Based on the objective data described below, the patient presents with markedly decreased AROM, strength and abilities to participate in activities. Patient resides at home with family and receiving 24x 7 care at home. Patient with low functional level. Not a candidate for rehab at this point of time. At baseline she is a total care assist at home. Not further intervention required unless otherwise family needs differently. PLAN :  Recommendations and Planned Interventions: DC PT after eval  Frequency/Duration: Patient will be followed by physical therapy: Eval only  Recommendation for discharge: (in order for the patient to meet his/her long term goals)  Home with Family Care and 24 hr care    This discharge recommendation:  Has been made in collaboration with the attending provider and/or case management    IF patient discharges home will need the following DME: hospital bed and to be determined (TBD)         SUBJECTIVE:   Patient stated  patient unable to communicate.     OBJECTIVE DATA SUMMARY:   HISTORY:    Past Medical History:   Diagnosis Date    Chronic kidney disease     acute tubual necrosis    Clostridium difficile infection     Elevated troponin 9/10/2014    Hypertension     Skin cancer     Stroke [facility])     2001 left residual     Past Surgical History:   Procedure Laterality Date    HX APPENDECTOMY  2/2008    HX HEENT      cataract    HX ORTHOPAEDIC  04/2010    left total hip - hip fx, left knee surgery    IR FLUORO GUIDE [facility] CVAD  1/6/2022       Personal factors and/or comorbidities impacting plan of care:     Home Situation  Home Environment: Private residence (Simultaneous filing. User may not have seen previous data.)  Living Alone: No (Simultaneous filing. User may not have seen previous data.)  Support Systems: Child(reid),Caregiver/Home Care Staff (Simultaneous filing. User may not have seen previous data.)  Patient Expects to be Discharged to[de-identified] Home with home health    EXAMINATION/PRESENTATION/DECISION MAKING:   Critical Behavior:  Neurologic State: Alert (Simultaneous filing. User may not have seen previous data.)  Orientation Level: Unable to verbalize (Simultaneous filing.  User may not have seen previous data.)  Cognition: No command following     Hearing:     Range Of Motion:  AROM: Grossly decreased, non-functional           PROM: Generally decreased, functional           Strength:    Strength: Grossly decreased, non-functional                    Functional Mobility:  Bed Mobility:  Rolling: Maximum assistance; Total assistance;Assist x2        Scooting: Maximum assistance; Total assistance;Assist x2                                   Therapeutic Exercises:   Carolin Rome    Functional Measure:  [address] [place] [id number] AM-PAC 6 Clicks         Basic Mobility Inpatient Short Form  How much difficulty does the patient currently have. .. Unable A Lot A Little None   1. Turning over in bed (including adjusting bedclothes, sheets and blankets)? [x] 1   [] 2   [] 3   [] 4   2. Sitting down on and standing up from a chair with arms ( e.g., wheelchair, bedside commode, etc.)   [x] 1   [] 2   [] 3   [] 4   3. Moving from lying on back to sitting on the side of the bed? [x] 1   [] 2   [] 3   [] 4          How much help from another person does the patient currently need. .. Total A Lot A Little None   4. Moving to and from a bed to a chair (including a wheelchair)? [x] 1   [] 2   [] 3   [] 4   5. Need to walk in hospital room? [x] 1   [] 2   [] 3   [] 4   6. Climbing 3-5 steps with a railing? [x] 1   [] 2   [] 3   [] 4   © 2007, Trustees of [address] [place] [id number], under license to [employer]. All rights reserved     Score:  Initial: 6/24 Most Recent: X (Date:02/12/2022 )   Interpretation of Tool:  Represents activities that are increasingly more difficult (i.e. Bed mobility, Transfers, Gait).   Score 24 23 22-20 19-15 14-10 9-7 6   Modifier CH CI CJ CK CL CM CN           Physical Therapy Evaluation Charge Determination   History Examination Presentation Decision-Making   LOW Complexity : Zero comorbidities / personal factors that will impact the outcome / POC LOW Complexity : 1-2 Standardized tests and measures addressing body structure, function, activity limitation and / or participation in recreation  LOW Complexity : Stable, uncomplicated  LOW Complexity : FOTO score of       Based on the above components, the patient evaluation is determined to be of the following complexity level: LOW     Pain Rating:  10/10 faces    Activity Tolerance:   Good and Poor  Please refer to the flowsheet for vital signs taken during this treatment. After treatment patient left in no apparent distress:   Patient positioned in rt sidelying for pressure relief and pillow support for neck and in between her knee    COMMUNICATION/EDUCATION:   The patients plan of care was discussed with: Occupational therapist, Registered nurse, and Case management. Patient is unable to participate in goal setting and plan of care.     Thank you for this referral.  Cathi Elizondo PT   Time Calculation: 10 mins

## 2022-02-12 NOTE — PROGRESS NOTES
Hospitalist Progress Note    Subjective:   Daily Progress Note: 2/12/2022 9:20 AM    Hospital Course: Patient is a 44-year-old female with a history of CKD, hypertension, stroke with left residual defects, bradycardia status post AICD 2 months ago, dysphagia status post PEG tube the presented to the emergency room on 2/9/2022 for altered mental status and fever. Patient son who is a physician was trying to treat patient for fluid overload and possible aspiration pneumonia with Lasix and amoxicillin. However she became tachypneic and hypoxic. In the ED patient was found to be Covid positive. Laboratory data was significant for a WBC of 25,000. D-dimer 8.87, BNP 15,430. Patient was started on IV vancomycin, Zosyn, Decadron, vitamin C, zinc.  Infectious disease consulted. Wound cultures shows possible Staphylococcus species 2D echo pending shows normal left ventricular size and systolic function with an EF of 55 to 60%, normal diastolic function, severe mitral and tricuspid regurgitation, moderate pulmonic regurgitation. Patient is on IV Lasix. Patient glucose have been labile. Hemoglobin A1c is pending. Subjective: Patient is very lethargic.     Current Facility-Administered Medications   Medication Dose Route Frequency    predniSONE (DELTASONE) tablet 20 mg  20 mg Oral DAILY WITH BREAKFAST    glucose chewable tablet 16 g  4 Tablet Oral PRN    dextrose (D50W) injection syrg 12.5-25 g  25-50 mL IntraVENous PRN    glucagon (GLUCAGEN) injection 1 mg  1 mg IntraMUSCular PRN    insulin lispro (HUMALOG) injection   SubCUTAneous AC&HS    dextrose 10% infusion 125-250 mL  125-250 mL IntraVENous PRN    albumin human 25% (BUMINATE) solution 12.5 g  12.5 g IntraVENous Q6H    Vancomycin random level 2/12 at 15:00   Other ONCE    piperacillin-tazobactam (ZOSYN) 3.375 g in 0.9% sodium chloride (MBP/ADV) 100 mL MBP  3.375 g IntraVENous Q8H    doxycycline (VIBRAMYCIN) 100 mg in 0.9% sodium chloride (MBP/ADV) 100 mL MBP  100 mg IntraVENous Q12H    sodium chloride (NS) flush 5-40 mL  5-40 mL IntraVENous Q8H    sodium chloride (NS) flush 5-40 mL  5-40 mL IntraVENous PRN    acetaminophen (TYLENOL) tablet 650 mg  650 mg Oral Q6H PRN    Or    acetaminophen (TYLENOL) suppository 650 mg  650 mg Rectal Q6H PRN    polyethylene glycol (MIRALAX) packet 17 g  17 g Oral DAILY PRN    ondansetron (ZOFRAN ODT) tablet 4 mg  4 mg Oral Q8H PRN    Or    ondansetron (ZOFRAN) injection 4 mg  4 mg IntraVENous Q6H PRN    ascorbic acid (vitamin C) (VITAMIN C) tablet 500 mg  500 mg Oral BID    apixaban (ELIQUIS) tablet 2.5 mg  2.5 mg Oral BID    zinc sulfate (ZINCATE) 50 mg zinc (220 mg) capsule 1 Capsule  1 Capsule Oral DAILY    dilTIAZem IR (CARDIZEM) tablet 30 mg  30 mg Oral Q8H    furosemide (LASIX) injection 20 mg  20 mg IntraVENous Q12H    VANCOMYCIN INFORMATION NOTE   Other Rx Dosing/Monitoring        Review of Systems  Constitutional: No fevers, No chills, No sweats, + fatigue, + Weakness  Eyes: No redness  Ears, nose, mouth, throat, and face: No nasal congestion, No sore throat, No voice change  Respiratory: + Shortness of Breath, No cough, No wheezing  Cardiovascular: No chest pain, No palpitations, No extremity edema  Gastrointestinal: No nausea, No vomiting, No diarrhea, No abdominal pain  Genitourinary: No frequency, No dysuria, No hematuria  Integument/breast: No skin lesion(s)   Neurological: + Confusion, No headaches, No dizziness      Objective:     Visit Vitals  BP (!) 147/87 (BP 1 Location: Right upper arm, BP Patient Position: Lying)   Pulse 62   Temp 97.6 °F (36.4 °C)   Resp 18   Ht 4' 10\" (1.473 m)   Wt 45.4 kg (100 lb)   SpO2 95%   BMI 20.90 kg/m²    O2 Flow Rate (L/min): 6 l/min O2 Device: Nasal cannula    Temp (24hrs), Av.1 °F (36.7 °C), Min:97.6 °F (36.4 °C), Max:98.8 °F (37.1 °C)      No intake/output data recorded.   02/10 1901 -  0700  In: -   Out: 1000 [Urine:1000]    PHYSICAL EXAM:  Constitutional: ill appearing, No acute distress  Skin: Extremities and face reveal no rashes. HEENT: Sclerae anicteric. Extra-occular muscles are intact. Cardiovascular: RRR  Respiratory:  diminished  GI: Abdomen nondistended, soft, and nontender. Normal active bowel sounds. Musculoskeletal: No pitting edema of the lower legs. Able to move all ext  Neurological:  Patient is alert and oriented. Cranial nerves II-XII grossly intact  Psychiatric: flat affect       Data Review    Recent Results (from the past 24 hour(s))   GLUCOSE, POC    Collection Time: 02/11/22  2:51 PM   Result Value Ref Range    Glucose (POC) 316 (H) 65 - 117 mg/dL    Performed by Nehemias Chatman (Trvlr)    VANCOMYCIN, RANDOM    Collection Time: 02/11/22  3:01 PM   Result Value Ref Range    Vancomycin, random 10.7 ug/mL    Reported dose date Not provided      Reported dose: Not provided Units   GLUCOSE, POC    Collection Time: 02/11/22  5:25 PM   Result Value Ref Range    Glucose (POC) 361 (H) 65 - 117 mg/dL    Performed by Hussein Morillo, POC    Collection Time: 02/11/22  8:31 PM   Result Value Ref Range    Glucose (POC) 238 (H) 65 - 117 mg/dL    Performed by [address] [place] [address], POC    Collection Time: 02/12/22  8:15 AM   Result Value Ref Range    Glucose (POC) 51 (LL) 65 - 117 mg/dL    Performed by Natalio Narvaez        Radiology review: Chest xray    Assessment:   1. Acute hypoxic respiratory failure secondary to Covid pneumonia  2. Sepsis possibly secondary from decubitus ulcer  3. Acute metabolic encephalopathy secondary to #1 and 2  4. Acute systolic CHF with fluid overload/status post AICD 2 months ago  5. CKD stage II  6. Hypertension  7. History of stroke with left-sided residual  8. Hyperglycemia    Plan:    1. Covid positive. Chest x-ray shows moderate volume bilateral pleural effusions. Inflammatory markers trending down. On 6L. will get ABG. Patient is on IV Zosyn and vancomycin.   Switch to oral prednisone from decadron due to hyperglycemia per families request.  Continue with zinc and vitamin C. Will have to do a over night pulse ox study the night prior to discharge. 2.  Wound care nurse consulted. Possibly septic to decubitus ulcer. Infectious disease consulted. 3.  Altered mental status most likely secondary to sepsis. Continue to monitor. 4. BNP elevated. On IV Lasix 20 mg twice daily. 2D echo shows normal EF with severe mitral and tricuspid regurgitation, moderate pulmonary regurgitation. .  When reviewing results from echo from 3/20/2019 showed mild to moderate mitral valve vegetation, severe tricuspid valve regurgitation. 5.  Renal function is stable. We will hold nephrotoxic medications at this time. 6.  Blood pressure stable. Continue to monitor per unit protocol. On Cardizem and Lasix  7. Patient has a history of stroke. On Eliquis. PT and OT evaluation. Continue with PEG tube feedings  8.  hemoglobin A1c. Start insulin sliding scale. Most likely elevated due to steroid  9. CBC, BMP, inflammatory markers in a.m. Dispo: >48 hours. Barriers include ID evaluation, overall improvement in Covid pneumonia, weaning of oxygen, PT OT. Suspect patient may require minimum of home health      POA is angie PATEL)  CODE STATUS Full     DVT prophylaxis: Eliquis  Ulcer prophylaxis: Tolerating PEG tube feedings    Care Plan discussed with: Patient/Family, Nurse and     Total time spent with patient: 34 minutes.

## 2022-02-12 NOTE — PROGRESS NOTES
0048-Spoke with patient's son, Dr. Katerien Faith regarding patient BG levels. Privacy code verified. He is concerned that the steroids are causing insulin dependence. He asked if a message will be passed to attending PA, Rhona Rosales. Spoke with primary nurse Miri Sol RN to pass message to day primary nurse. Message sent to Rhona Rosales per family request. He has no other requests at this time.

## 2022-02-12 NOTE — PROGRESS NOTES
Day #4 of Vancomycin  Consult provided for this 80 y.o. female for indication of possible sacral wound and right foot wound infections, sepsis.   Antibiotic regimen:  Vancomycin + Zosyn + doxycycline  Concomitant nephrotoxic drugs: Furosemide  Frequency of BMP: Not scheduled    Recent Labs     22  0731 22  0732 02/10/22  1208 02/10/22  0501   WBC 16.7* 21.2* 22.1*  --    CREA 0.80 0.80  --  0.92   BUN 59* 60*  --   --      Est CrCl: ~25-30 ml/min; UO: 0.5 ml/kg/hr  Temp (24hrs), Av.5 °F (36.4 °C), Min:97.1 °F (36.2 °C), Max:97.7 °F (36.5 °C)    Cultures:   2/10 Blood: NGTD, preliminary  2/10 Sacral wound: Heavy GNRs, heavy probable Staphylococcus aureus  2/10 Right foot wound: Heavy GNRs, moderate probable Staphylococcus aureus    MRSA Swab: N/A    Target range: Trough 10-15 mcg/mL    Recent level history:  Date/Time Dose & Interval Measured Level (mcg/mL)    @ 1456 500 mg x 1 8.3        Assessment/Plan:   · Afebrile, leukocytosis improving, procal modestly elevated, CRP trending down  · SCr elevated but stable, will continue to pulse dose for now  · Level marginally subtherapeutic, will give 750 mg today and check a random level  @ 1700  · Antimicrobial stop date TBD

## 2022-02-12 NOTE — PROGRESS NOTES
OCCUPATIONAL THERAPY EVALUATION/DISCHARGE  Patient: Janina Nichols (80 y.o. female)  Date: 2/12/2022  Primary Diagnosis: Pneumonia due to COVID-19 virus [U07.1, J12.82]       Precautions: fall risk       ASSESSMENT  Pt is a 81 y/o F with PMH of CKD, clostridium difficile infection, elevated troponin, HTN, skin cancer, and stroke presenting to [facility] with c/o AMS and fever, admitted 02/09/22 and being treated for sepsis, acute hypoxic respiratory failure secondary to [id number], acute metabolic encephalopathy, atrial fibrillation, and DVT prophylaxis on apixaban. Pt received semi-supine in bed upon arrival, alert and unable to verbalize orientation. Per chart review, pt lives with her son, who is an MD and was totally dependent on [facility] services and family members for ADLs and functional mobility/transfers at [employer]. Based on current observations, pt presents at baseline for functional mobility/transfers and completion of self care and ADLs. Pt currently requires max-total A x2 for rolling side to side in bed and scooting toward HOB. Pt repositioned by PT and OT (placed pillow between knees and under knees) to reduce risk of pressure injuries and contractures. Further activity deferred this session due to pt's activity tolerance at this time. Overall, pt tolerates session fair. Pt has no skilled acute OT needs at this time as noted by OT, will DC skilled OT following evaluation; pt verbalized understanding and agreement. Current Level of Function (ADLs/self-care): max-total A x2    Other factors to consider for discharge: home support, PLOF     PLAN :    Recommendation for discharge: (in order for the patient to meet his/her long term goals)  No skilled occupational therapy/ follow up rehabilitation needs identified at this time.     This discharge recommendation:  Has been made in collaboration with the attending provider and/or case management    IF patient discharges home will need the following DME: patient owns DME required for discharge       SUBJECTIVE:   Patient unable to verbalize during today's session. Pt appeared confused. OBJECTIVE DATA SUMMARY:   HISTORY:   Past Medical History:   Diagnosis Date    Chronic kidney disease     acute tubual necrosis    Clostridium difficile infection     Elevated troponin 9/10/2014    Hypertension     Skin cancer     Stroke [facility])     2001 left residual     Past Surgical History:   Procedure Laterality Date    HX APPENDECTOMY  2/2008    HX HEENT      cataract    HX ORTHOPAEDIC  04/2010    left total hip - hip fx, left knee surgery    IR FLUORO GUIDE [employer] CVAD  1/6/2022       Prior Level of Function/Environment/Context: Dependent upon [facility] services and family members for self care and functional mobility/transfers. Expanded or extensive additional review of patient history:   Home Situation  Home Environment: Private residence (Simultaneous filing. User may not have seen previous data.)  Living Alone: No (Simultaneous filing. User may not have seen previous data.)  Support Systems: Child(reid),Caregiver/Home Care Staff (Simultaneous filing. User may not have seen previous data.)  Patient Expects to be Discharged to[de-identified] Home with home health    Hand dominance: Right    EXAMINATION OF PERFORMANCE DEFICITS:  Cognitive/Behavioral Status:  Neurologic State: Alert (Simultaneous filing. User may not have seen previous data.)  Orientation Level: Unable to verbalize (Simultaneous filing. User may not have seen previous data.)  Cognition: No command following             Range of Motion:  AROM: Grossly decreased, non-functional  PROM: Generally decreased, functional                      Strength:  Strength: Grossly decreased, non-functional                Coordination:     Fine Motor Skills-Upper: Left Impaired;Right Impaired    Gross Motor Skills-Upper: Left Impaired;Right Impaired         Functional Mobility and Transfers for ADLs:  Bed Mobility:  Rolling: Maximum assistance; Total assistance;Assist x2  Scooting: Maximum assistance; Total assistance;Assist x2       Functional Measure:    [address] [place] [address] AM-PACTM \"6 Clicks\"                                                       Daily Activity Inpatient Short Form  How much help from another person does the patient currently need. .. Total; A Lot A Little None   1. Putting on and taking off regular lower body clothing? [x]  1 []  2 []  3 []  4   2. Bathing (including washing, rinsing, drying)? [x]  1 []  2 []  3 []  4   3. Toileting, which includes using toilet, bedpan or urinal? [x] 1 []  2 []  3 []  4   4. Putting on and taking off regular upper body clothing? [x]  1 []  2 []  3 []  4   5. Taking care of personal grooming such as brushing teeth? [x]  1 []  2 []  3 []  4   6. Eating meals? [x]  1 []  2 []  3 []  4   © , Trustees of [address] [place] [address], under license to [employer]. All rights reserved     Score:      Interpretation of Tool:  Represents clinically-significant functional categories (i.e. Activities of daily living). Percentage of Impairment CH    0%   CI    1-19% CJ    20-39% CK    40-59% CL    60-79% CM    80-99% CN     100%   [facility]  Score 6-24 24 23 20-22 15-19 10-14 7-9 6       Occupational Therapy Evaluation Charge Determination   History Examination Decision-Making   LOW Complexity : Brief history review  LOW Complexity : 1-3 performance deficits relating to physical, cognitive , or psychosocial skils that result in activity limitations and / or participation restrictions  LOW Complexity : No comorbidities that affect functional and no verbal or physical assistance needed to complete eval tasks       Based on the above components, the patient evaluation is determined to be of the following complexity level: LOW   Pain Ratin/10    Activity Tolerance:   Fair  Please refer to the flowsheet for vital signs taken during this treatment.     After treatment patient left in no apparent distress:    Supine in bed, Call bell within reach and side rails x4    COMMUNICATION/EDUCATION:   The patients plan of care was discussed with: Physical therapist and Registered nurse. PT/OT sessions occurred together for increased safety of pt and clinician.       Thank you for this referral.  Allyne Bumpers, OT  Time Calculation: 16 mins

## 2022-02-12 NOTE — PROGRESS NOTES
Problem: Airway Clearance - Ineffective  Goal: Achieve or maintain patent airway  Outcome: Progressing Towards Goal     Problem: Isolation Precautions - Risk of Spread of Infection  Goal: Prevent transmission of infectious organism to others  Outcome: Progressing Towards Goal     Problem: Nutrition Deficits  Goal: Optimize nutrtional status  Outcome: Progressing Towards Goal     Problem: Pressure Injury - Risk of  Goal: *Prevention of pressure injury  Description: Document Charles Scale and appropriate interventions in the flowsheet.   Outcome: Progressing Towards Goal  Note: Pressure Injury Interventions:  Sensory Interventions: Keep linens dry and wrinkle-free,Minimize linen layers    Moisture Interventions: Apply protective barrier, creams and emollients,Limit adult briefs,Internal/External fecal devices    Activity Interventions: Pressure redistribution bed/mattress(bed type)    Mobility Interventions: HOB 30 degrees or less         Friction and Shear Interventions: Minimize layers,HOB 30 degrees or less

## 2022-02-12 NOTE — PROGRESS NOTES
OT orders received. OT evaluation attempted at 1040 AM; however, per nursing, pt is rigid and contracted, agitated, refusing assistance for mobility and is not following directions at this time. Will attempt evaluation again in near future.

## 2022-02-13 NOTE — PROGRESS NOTES
Problem: Nutrition Deficits  Goal: Optimize nutrtional status  Outcome: Progressing Towards Goal     Problem:  Body Temperature -  Risk of, Imbalanced  Goal: Ability to maintain a body temperature within defined limits  Outcome: Progressing Towards Goal     Problem: Breathing Pattern - Ineffective  Goal: Ability to achieve and maintain a regular respiratory rate  Outcome: Progressing Towards Goal

## 2022-02-13 NOTE — PROGRESS NOTES
Hospitalist Progress Note    Subjective:   Daily Progress Note: 2/13/2022 9:20 AM    Hospital Course: Patient is a 27-year-old female with a history of CKD, hypertension, stroke with left residual defects, bradycardia status post AICD 2 months ago, dysphagia status post PEG tube the presented to the emergency room on 2/9/2022 for altered mental status and fever. Patient son who is a physician was trying to treat patient for fluid overload and possible aspiration pneumonia with Lasix and amoxicillin. However she became tachypneic and hypoxic. In the ED patient was found to be Covid positive. Laboratory data was significant for a WBC of 25,000. D-dimer 8.87, BNP 15,430. Patient was started on IV vancomycin, Zosyn, Decadron, vitamin C, zinc.  Infectious disease consulted. Wound cultures shows possible Staphylococcus species 2D echo pending shows normal left ventricular size and systolic function with an EF of 55 to 95%, normal diastolic function, severe mitral and tricuspid regurgitation, moderate pulmonic regurgitation. Patient is on IV Lasix. Patient glucose have been labile. Hemoglobin A1c is 6.7. Repeat chest x-ray shows moderate to large volume right pleural effusion and moderate volume left pleural effusion. Consult interventional radiology for possible thoracentesis      Subjective: Patient is minimally responsive this morning.   No acute issues overnight    Current Facility-Administered Medications   Medication Dose Route Frequency    predniSONE (DELTASONE) tablet 20 mg  20 mg Oral DAILY WITH BREAKFAST    dextrose 10% infusion 125-250 mL  125-250 mL IntraVENous PRN    furosemide (LASIX) injection 40 mg  40 mg IntraVENous Q12H    piperacillin-tazobactam (ZOSYN) 3.375 g in 0.9% sodium chloride (MBP/ADV) 100 mL MBP  3.375 g IntraVENous Q8H    Vancomycin - Random level to be drawn 2/13 @ 1700   Other ONCE    glucose chewable tablet 16 g  4 Tablet Oral PRN    glucagon (GLUCAGEN) injection 1 mg  1 mg IntraMUSCular PRN    insulin lispro (HUMALOG) injection   SubCUTAneous AC&HS    dextrose 10% infusion 125-250 mL  125-250 mL IntraVENous PRN    albumin human 25% (BUMINATE) solution 12.5 g  12.5 g IntraVENous Q6H    doxycycline (VIBRAMYCIN) 100 mg in 0.9% sodium chloride (MBP/ADV) 100 mL MBP  100 mg IntraVENous Q12H    sodium chloride (NS) flush 5-40 mL  5-40 mL IntraVENous Q8H    sodium chloride (NS) flush 5-40 mL  5-40 mL IntraVENous PRN    acetaminophen (TYLENOL) tablet 650 mg  650 mg Oral Q6H PRN    Or    acetaminophen (TYLENOL) suppository 650 mg  650 mg Rectal Q6H PRN    polyethylene glycol (MIRALAX) packet 17 g  17 g Oral DAILY PRN    ondansetron (ZOFRAN ODT) tablet 4 mg  4 mg Oral Q8H PRN    Or    ondansetron (ZOFRAN) injection 4 mg  4 mg IntraVENous Q6H PRN    ascorbic acid (vitamin C) (VITAMIN C) tablet 500 mg  500 mg Oral BID    apixaban (ELIQUIS) tablet 2.5 mg  2.5 mg Oral BID    zinc sulfate (ZINCATE) 50 mg zinc (220 mg) capsule 1 Capsule  1 Capsule Oral DAILY    dilTIAZem IR (CARDIZEM) tablet 30 mg  30 mg Oral Q8H    VANCOMYCIN INFORMATION NOTE   Other Rx Dosing/Monitoring        Review of Systems  Constitutional: No fevers, No chills, No sweats, + fatigue, + Weakness  Eyes: No redness  Ears, nose, mouth, throat, and face: No nasal congestion, No sore throat, No voice change  Respiratory: + Shortness of Breath, No cough, No wheezing  Cardiovascular: No chest pain, No palpitations, No extremity edema  Gastrointestinal: No nausea, No vomiting, No diarrhea, No abdominal pain  Genitourinary: No frequency, No dysuria, No hematuria  Integument/breast: No skin lesion(s)   Neurological: + Confusion, No headaches, No dizziness      Objective:     Visit Vitals  /77 (BP 1 Location: Right upper arm, BP Patient Position: Lying)   Pulse 60   Temp (!) 96.7 °F (35.9 °C)   Resp 18   Ht 4' 10\" (1.473 m)   Wt 45.4 kg (100 lb)   SpO2 94%   BMI 20.90 kg/m²    O2 Flow Rate (L/min): 6 l/min O2 Device: Nasal cannula    Temp (24hrs), Av.6 °F (35.9 °C), Min:96.2 °F (35.7 °C), Max:97.1 °F (36.2 °C)      No intake/output data recorded.  1901 -  0700  In: -   Out:  [Urine:]    PHYSICAL EXAM:  Constitutional: ill appearing, No acute distress  Skin: Extremities and face reveal no rashes. HEENT: Sclerae anicteric. Extra-occular muscles are intact. Cardiovascular: RRR  Respiratory:  diminished  GI: Abdomen nondistended, soft, and nontender. Musculoskeletal: No pitting edema of the lower legs. Able to move all ext  Neurological:  Patient is alert and oriented.  Cranial nerves II-XII grossly intact  Psychiatric: flat affect       Data Review    Recent Results (from the past 24 hour(s))   BLOOD GAS, ARTERIAL    Collection Time: 22  9:15 AM   Result Value Ref Range    pH 7.55 (H) 7.35 - 7.45      PCO2 37 35 - 45 mmHg    PO2 126 (H) 75 - 100 mmHg    O2 SAT >100 >95 %    BICARBONATE 32 (H) 22 - 26 mmol/L    BASE EXCESS 8.9 (H) 0 - 2 mmol/L    O2 METHOD Nasal Cannula      O2 FLOW RATE 6 L/min    SITE Right Radial      LAURIE'S TEST PASS     GLUCOSE, POC    Collection Time: 22 10:14 AM   Result Value Ref Range    Glucose (POC) 82 65 - 117 mg/dL    Performed by [facility], POC    Collection Time: 22 11:49 AM   Result Value Ref Range    Glucose (POC) 147 (H) 65 - 117 mg/dL    Performed by Beatriz Sullivna, RANDOM    Collection Time: 22  2:56 PM   Result Value Ref Range    Vancomycin, random 8.3 ug/mL   GLUCOSE, POC    Collection Time: 22  4:43 PM   Result Value Ref Range    Glucose (POC) 199 (H) 65 - 117 mg/dL    Performed by [facility], POC    Collection Time: 22 11:01 PM   Result Value Ref Range    Glucose (POC) 200 (H) 65 - 117 mg/dL    Performed by [facility]    METABOLIC PANEL, BASIC    Collection Time: 22  7:22 AM   Result Value Ref Range    Sodium 145 136 - 145 mmol/L    Potassium 4.2 3.5 - 5.1 mmol/L    Chloride 109 (H) 97 - 108 mmol/L    CO2 35 (H) 21 - 32 mmol/L    Anion gap 1 (L) 5 - 15 mmol/L    Glucose 177 (H) 65 - 100 mg/dL    BUN 57 (H) 6 - 20 mg/dL    Creatinine 0.73 0.55 - 1.02 mg/dL    BUN/Creatinine ratio 78 (H) 12 - 20      GFR est AA >60 >60 ml/min/1.73m2    GFR est non-AA >60 >60 ml/min/1.73m2    Calcium 8.8 8.5 - 10.1 mg/dL   CBC WITH AUTOMATED DIFF    Collection Time: 02/13/22  7:22 AM   Result Value Ref Range    WBC 14.0 (H) 3.6 - 11.0 K/uL    RBC 2.89 (L) 3.80 - 5.20 M/uL    HGB 8.4 (L) 11.5 - 16.0 g/dL    HCT 27.9 (L) 35.0 - 47.0 %    MCV 96.5 80.0 - 99.0 FL    MCH 29.1 26.0 - 34.0 PG    MCHC 30.1 30.0 - 36.5 g/dL    RDW 20.9 (H) 11.5 - 14.5 %    PLATELET 021 008 - 039 K/uL    MPV 9.9 8.9 - 12.9 FL    NRBC 0.1 (H) 0.0  WBC    ABSOLUTE NRBC 0.02 (H) 0.00 - 0.01 K/uL    NEUTROPHILS 90 (H) 32 - 75 %    LYMPHOCYTES 5 (L) 12 - 49 %    MONOCYTES 4 (L) 5 - 13 %    EOSINOPHILS 0 0 - 7 %    BASOPHILS 0 0 - 1 %    IMMATURE GRANULOCYTES 1 (H) 0 - 0.5 %    ABS. NEUTROPHILS 12.5 (H) 1.8 - 8.0 K/UL    ABS. LYMPHOCYTES 0.7 (L) 0.8 - 3.5 K/UL    ABS. MONOCYTES 0.6 0.0 - 1.0 K/UL    ABS. EOSINOPHILS 0.0 0.0 - 0.4 K/UL    ABS. BASOPHILS 0.0 0.0 - 0.1 K/UL    ABS. IMM. GRANS. 0.2 (H) 0.00 - 0.04 K/UL    DF AUTOMATED     C REACTIVE PROTEIN, QT    Collection Time: 02/13/22  7:22 AM   Result Value Ref Range    C-Reactive protein 2.45 (H) 0.00 - 0.60 mg/dL   LD    Collection Time: 02/13/22  7:22 AM   Result Value Ref Range     (H) 81 - 246 U/L   GLUCOSE, POC    Collection Time: 02/13/22  7:50 AM   Result Value Ref Range    Glucose (POC) 144 (H) 65 - 117 mg/dL    Performed by Alvaro Barbour        Radiology review: Chest xray    Assessment:   1. Acute hypoxic respiratory failure secondary to Covid pneumonia  2. Sepsis possibly secondary from decubitus ulcer  3. Acute metabolic encephalopathy secondary to #1 and 2  4. Acute systolic CHF with fluid overload/status post AICD 2 months ago  5. CKD stage II  6. Hypertension  7. History of stroke with left-sided residual  8. Hyperglycemia    Plan:    1. Covid positive. Chest x-ray shows moderate volume bilateral pleural effusions. Inflammatory markers trending down. On 6L. ABG reviewed. . Patient is on IV Zosyn and vancomycin. Switch to oral prednisone from decadron due to hyperglycemia per families request.  Continue with zinc and vitamin C. Will have to do a over night pulse ox study the night prior to discharge. Repeat chest x-ray shows moderate bilateral pleural effusions. Consult interventional radiology for possible thoracentesis  2. Wound care nurse consulted. Possibly septic to decubitus ulcer. Infectious disease consulted. 3.  Altered mental status most likely secondary to sepsis. Continue to monitor. 4. BNP elevated. On IV Lasix 20 mg twice daily. 2D echo shows normal EF with severe mitral and tricuspid regurgitation, moderate pulmonary regurgitation. .  When reviewing results from echo from 3/20/2019 showed mild to moderate mitral valve vegetation, severe tricuspid valve regurgitation. 5.  Renal function is stable. We will hold nephrotoxic medications at this time. 6.  Blood pressure stable. Continue to monitor per unit protocol. On Cardizem and Lasix  7. Patient has a history of stroke. On Eliquis. PT and OT evaluation. Continue with PEG tube feedings  8.  hemoglobin A1c 6.7. Start insulin sliding scale. Most likely elevated due to steroid  9. CBC, BMP, inflammatory markers in a.m. Dispo: >48 hours. Barriers include ID evaluation, overall improvement in Covid pneumonia, weaning of oxygen, PT OT. Suspect patient may require minimum of home health      POA is son Adam PATEL)    CODE STATUS Full     DVT prophylaxis: Eliquis  Ulcer prophylaxis: Tolerating PEG tube feedings    Care Plan discussed with: Patient/Family, Nurse and     Total time spent with patient: 33 minutes.

## 2022-02-14 NOTE — PROGRESS NOTES
CM reviewed patients chart. Patients son request that patient be sent home with O2 when discharged because her SAT levels decrease during the night. Current PT recommendation is \"Not a candidate for rehab at this point of time. At baseline she is a total care assist at home. No further intervention required unless otherwise family needs differently. \" CM has referral pending from [facility] agency. They request HH order for discharge. CM will follow up with medical staff and patients family today to determines patients step down level of care for discharge.

## 2022-02-14 NOTE — PROGRESS NOTES
Hospitalist Progress Note    Subjective:   Daily Progress Note: 2/14/2022 9:20 AM    Hospital Course: Patient is a 51-year-old female with a history of CKD, hypertension, stroke with left residual defects, bradycardia status post AICD 2 months ago, dysphagia status post PEG tube the presented to the emergency room on 2/9/2022 for altered mental status and fever. Patient son who is a physician was trying to treat patient for fluid overload and possible aspiration pneumonia with Lasix and amoxicillin. However she became tachypneic and hypoxic. In the ED patient was found to be Covid positive. Laboratory data was significant for a WBC of 25,000. D-dimer 8.87, BNP 15,430. Patient was started on IV vancomycin, Zosyn, Decadron, vitamin C, zinc.  Infectious disease consulted. Wound cultures shows multi resistant Acinetobacter baumannii and MRSA. IV Zosyn was discontinued on 2/13/2022. Started on Avycaz per ID and continue with Vancomycin. 2D echo pending shows normal left ventricular size and systolic function with an EF of 55 to 77%, normal diastolic function, severe mitral and tricuspid regurgitation, moderate pulmonic regurgitation. Patient is on IV Lasix. Patient glucose have been labile. Hemoglobin A1c is 6.7. Repeat chest x-ray shows moderate to large volume right pleural effusion and moderate volume left pleural effusion. Consult interventional radiology for possible thoracentesis      Subjective: patient resting comfortably.  SOB on 6L oxygen NC    Current Facility-Administered Medications   Medication Dose Route Frequency    cefTAZidime-avibactam (AVYCAZ) 1.25 g in 0.9% sodium chloride 100 mL IVPB  1.25 g IntraVENous Q8H    Vancomycin - Random level to be drawn 2/14 @ 1800   Other ONCE    predniSONE (DELTASONE) tablet 20 mg  20 mg Oral DAILY WITH BREAKFAST    dextrose 10% infusion 125-250 mL  125-250 mL IntraVENous PRN    furosemide (LASIX) injection 40 mg  40 mg IntraVENous Q12H    glucose chewable tablet 16 g  4 Tablet Oral PRN    glucagon (GLUCAGEN) injection 1 mg  1 mg IntraMUSCular PRN    insulin lispro (HUMALOG) injection   SubCUTAneous AC&HS    dextrose 10% infusion 125-250 mL  125-250 mL IntraVENous PRN    albumin human 25% (BUMINATE) solution 12.5 g  12.5 g IntraVENous Q6H    sodium chloride (NS) flush 5-40 mL  5-40 mL IntraVENous Q8H    sodium chloride (NS) flush 5-40 mL  5-40 mL IntraVENous PRN    acetaminophen (TYLENOL) tablet 650 mg  650 mg Oral Q6H PRN    Or    acetaminophen (TYLENOL) suppository 650 mg  650 mg Rectal Q6H PRN    polyethylene glycol (MIRALAX) packet 17 g  17 g Oral DAILY PRN    ondansetron (ZOFRAN ODT) tablet 4 mg  4 mg Oral Q8H PRN    Or    ondansetron (ZOFRAN) injection 4 mg  4 mg IntraVENous Q6H PRN    ascorbic acid (vitamin C) (VITAMIN C) tablet 500 mg  500 mg Oral BID    apixaban (ELIQUIS) tablet 2.5 mg  2.5 mg Oral BID    zinc sulfate (ZINCATE) 50 mg zinc (220 mg) capsule 1 Capsule  1 Capsule Oral DAILY    dilTIAZem IR (CARDIZEM) tablet 30 mg  30 mg Oral Q8H    VANCOMYCIN INFORMATION NOTE   Other Rx Dosing/Monitoring        Review of Systems  Constitutional: No fevers, No chills, No sweats, + fatigue, + Weakness  Eyes: No redness  Ears, nose, mouth, throat, and face: No nasal congestion, No sore throat, No voice change  Respiratory: + Shortness of Breath, No cough, No wheezing  Cardiovascular: No chest pain, No palpitations, No extremity edema  Gastrointestinal: No nausea, No vomiting, No diarrhea, No abdominal pain  Genitourinary: No frequency, No dysuria, No hematuria  Integument/breast: No skin lesion(s)   Neurological: + Confusion, No headaches, No dizziness      Objective:     Visit Vitals  /76   Pulse 60   Temp 97.1 °F (36.2 °C)   Resp 18   Ht 4' 10\" (1.473 m)   Wt 45.4 kg (100 lb)   SpO2 93%   BMI 20.90 kg/m²    O2 Flow Rate (L/min): 6 l/min O2 Device: Nasal cannula    Temp (24hrs), Av.4 °F (36.3 °C), Min:96.9 °F (36.1 °C), Max:98.2 °F (36.8 °C)      No intake/output data recorded. 02/12 1901 - 02/14 0700  In: -   Out: 2600 [Urine:2600]    PHYSICAL EXAM:  Constitutional: ill appearing, frail, No acute distress  Skin: Extremities and face reveal no rashes. HEENT: Sclerae anicteric. Extra-occular muscles are intact. Cardiovascular: RRR  Respiratory:  Diminished, crackles  GI: Abdomen nondistended, soft, and nontender. Musculoskeletal: No pitting edema of the lower legs. Able to move all ext  Neurological:  Patient is alert and oriented. Cranial nerves II-XII grossly intact  Psychiatric: flat affect       Data Review    Recent Results (from the past 24 hour(s))   GLUCOSE, POC    Collection Time: 02/13/22 11:18 AM   Result Value Ref Range    Glucose (POC) 90 65 - 117 mg/dL    Performed by [facility], POC    Collection Time: 02/13/22  4:46 PM   Result Value Ref Range    Glucose (POC) 97 65 - 117 mg/dL    Performed by Dione Shearer, RANDOM    Collection Time: 02/13/22  5:32 PM   Result Value Ref Range    Vancomycin, random 12.5 ug/mL    Reported dose date Not provided      Reported dose: Not provided Units   GLUCOSE, POC    Collection Time: 02/13/22  8:09 PM   Result Value Ref Range    Glucose (POC) 290 (H) 65 - 117 mg/dL    Performed by Hamzah Valdes ([place])    CBC WITH AUTOMATED DIFF    Collection Time: 02/14/22  8:24 AM   Result Value Ref Range    WBC 16.1 (H) 3.6 - 11.0 K/uL    RBC 2.71 (L) 3.80 - 5.20 M/uL    HGB 7.8 (L) 11.5 - 16.0 g/dL    HCT 26.2 (L) 35.0 - 47.0 %    MCV 96.7 80.0 - 99.0 FL    MCH 28.8 26.0 - 34.0 PG    MCHC 29.8 (L) 30.0 - 36.5 g/dL    RDW 20.7 (H) 11.5 - 14.5 %    PLATELET 409 522 - 332 K/uL    MPV 10.5 8.9 - 12.9 FL    NRBC 0.2 (H) 0.0  WBC    ABSOLUTE NRBC 0.03 (H) 0.00 - 0.01 K/uL    NEUTROPHILS 91 (H) 32 - 75 %    LYMPHOCYTES 4 (L) 12 - 49 %    MONOCYTES 4 (L) 5 - 13 %    EOSINOPHILS 0 0 - 7 %    BASOPHILS 0 0 - 1 %    IMMATURE GRANULOCYTES 1 (H) 0 - 0.5 %    ABS. NEUTROPHILS 14.7 (H) 1.8 - 8.0 K/UL    ABS. LYMPHOCYTES 0.6 (L) 0.8 - 3.5 K/UL    ABS. MONOCYTES 0.6 0.0 - 1.0 K/UL    ABS. EOSINOPHILS 0.0 0.0 - 0.4 K/UL    ABS. BASOPHILS 0.0 0.0 - 0.1 K/UL    ABS. IMM. GRANS. 0.2 (H) 0.00 - 0.04 K/UL    DF AUTOMATED         Radiology review: Chest xray    Assessment:   1. Acute hypoxic respiratory failure secondary to Covid pneumonia  2. Sepsis possibly secondary from decubitus ulcer  3. Acute metabolic encephalopathy secondary to #1 and 2  4. Acute systolic CHF with fluid overload/status post AICD 2 months ago  5. CKD stage II  6. Hypertension  7. History of stroke with left-sided residual  8. Hyperglycemia    Plan:    1. Covid positive. Chest x-ray shows moderate volume bilateral pleural effusions. Inflammatory markers trending down. On 6L. ABG reviewed. .  Switch to oral prednisone from decadron due to hyperglycemia per families request.  Continue with zinc and vitamin C. Will have to do a over night pulse ox study the night prior to discharge. Repeat chest x-ray shows moderate bilateral pleural effusions. Consult interventional radiology for possible thoracentesis  2. Wound care nurse consulted. Possibly septic to decubitus ulcer. Infectious disease consulted. Wound culture shows multiresistant Enterobacter and MRSA. Infectious disease consulted. On vancomycin and Avycaz  3. Altered mental status most likely secondary to sepsis. Continue to monitor. 4. BNP elevated. On IV Lasix 20 mg twice daily. 2D echo shows normal EF with severe mitral and tricuspid regurgitation, moderate pulmonary regurgitation. .  When reviewing results from echo from 3/20/2019 showed mild to moderate mitral valve vegetation, severe tricuspid valve regurgitation. 5.  Renal function is stable. We will hold nephrotoxic medications at this time. 6.  Blood pressure stable. Continue to monitor per unit protocol. On Cardizem and Lasix  7. Patient has a history of stroke.   On Eliquis. PT and OT evaluation. Continue with PEG tube feedings  8.  hemoglobin A1c 6.7. Start insulin sliding scale. Most likely elevated due to steroid  9. CBC, BMP, inflammatory markers in a.m. Dispo: >48 hours. Barriers include ID evaluation, overall improvement in Covid pneumonia, weaning of oxygen, possible thora, PT OT. Suspect patient may require minimum of home health      POA is son Bradley PATEL) --> called and updated him on further results and plan. Answered all questions     CODE STATUS Full     DVT prophylaxis: Eliquis  Ulcer prophylaxis: Tolerating PEG tube feedings    Care Plan discussed with: Patient/Family, Nurse and     Total time spent with patient: 34 minutes.

## 2022-02-14 NOTE — PROGRESS NOTES
Day #5 of Vancomycin  Consult provided for this 80 y.o. female for indication of possible sacral wound and right foot wound infections, sepsis.   Antibiotic regimen:  Vancomycin + Avycaz  Concomitant nephrotoxic drugs: Furosemide  Frequency of BMP: Not scheduled    Recent Labs     22  0722 22  0731 22  0732   WBC 14.0* 16.7* 21.2*   CREA 0.73 0.80 0.80   BUN 57* 59* 60*     Est CrCl: ~30 ml/min; UO: 1.4 ml/kg/hr  Temp (24hrs), Av.8 °F (36 °C), Min:96.2 °F (35.7 °C), Max:98.2 °F (36.8 °C)    Cultures:   2/10 Blood: NGTD, preliminary  2/10 Sacral wound: Heavy Acinetobacter baumannii, heavy MRSA, preliminary  2/10 Right foot wound: Heavy MDR Acinetobacter baumannii, moderate MRSA    MRSA Swab: N/A    Target range: Trough 10-15 mcg/mL    Recent level history:  Date/Time Dose & Interval Measured Level (mcg/mL)    @ 1456 1000 mg x 1 given  8.3    @ 1732 750 mg x 1 12.5        Assessment/Plan:   Afebrile, leukocytosis improving, procal modestly elevated, CRP trending down  MRSA cultured from sacral and foot wounds  SCr trending down, given slow vancomycin clearance will continue pulse dosing for now  Level within therapeutic range today, will give 500 mg x 1 and check a level tomorrow at 1800  Antimicrobial stop date TBD

## 2022-02-14 NOTE — PROCEDURES
Interventional Radiology Brief Procedure Note    Patient: Zaynab Christina MRN: [id number]  SSN: [id number]    YOB: 1922  Age: 80 y.o. Sex: female      Date of Procedure: 2/14/2022    Pre-Procedure Diagnosis: Right pleural effusion    Post-Procedure Diagnosis: SAME    Procedure(s): Thoracentesis    Brief Description of Procedure: US guided right thoracentesis    Performed By: Rufino Joseph PA-C     Assistants: None    Anesthesia: Lidocaine    Estimated Blood Loss: Less than 10ml    Specimens: None    Implants: None    Findings: 810 ml Thin, yellow fluid drained. Collected for requested labs.     Complications: None    Recommendations: None    Follow Up: None

## 2022-02-14 NOTE — PROGRESS NOTES
810 clear serous drained from right posterior chest. Sample to lab.  Verbal report called to Brenna Dee

## 2022-02-14 NOTE — PROGRESS NOTES
Progress Note    Patient: Alireza Barrett MRN: [id number]  SSN: [id number]    YOB: 1922  Age: 80 y.o. Sex: female      Admit Date: 2/9/2022    LOS: 5 days     Subjective:   Patient followed for sepsis with suspected sacral wound infection and possible right foot infection. Also diagnosed with Covid-19 but no hypoxia. She appears to be resting comfortably but remains nonverbal.     Objective:     Vitals:    02/14/22 0633 02/14/22 0753 02/14/22 1002 02/14/22 1133   BP: 128/76  130/71    Pulse: 60  60    Resp:   18    Temp: 97.1 °F (36.2 °C)  97.6 °F (36.4 °C)    SpO2: 96% 93% 99% 100%   Weight:       Height:            Intake and Output:  Current Shift: No intake/output data recorded. Last three shifts: 02/12 1901 - 02/14 0700  In: -   Out: 2600 [Urine:2600]    Physical Exam:   Vitals and nursing note reviewed. Constitutional:       General: She is not in acute distress. Appearance: She is ill-appearing. HENT:      Comments: Nasal O2 cannula 5L/min  Eyes:      Pupils: Pupils are equal, round, and reactive to light. Cardiovascular:      Rate and Rhythm: Normal rate and regular rhythm. Heart sounds: No murmur heard. Pulmonary:      Breath sounds: Rhonchi and rales present. Abdominal:      General: Bowel sounds are normal.      Palpations: Abdomen is soft. Tenderness: There is no abdominal tenderness. Genitourinary:     Comments: No Govea  Musculoskeletal:      Right lower leg: No edema. Left lower leg: No edema. Skin:     Findings: No rash.       Comments: Large open sacral wound Stage 3  Right foot with bulky dressing not visualized today  Neurological:      Comments: Unable to assess   Psychiatric:      Comments: Unable to assess      Lab/Data Review:     WBC 16,100     < 310  Ferritin 205 <229    Procal 0.15 <0.22  CRP 2.45 <8.24 <11.20    Blood cultures (2/10) No growth 3 days  Sacral wound culture (2/10) Moderate MRSA and heavy MDR Acinetobacter baumannii  Right foot wound culture (2/10) MRSA and  MDR Acinetobacter baumanii  Assessment:     Active Problems:    Pneumonia due to COVID-19 virus (2/9/2022)    1. Sacral wound infection, secondary to MRSA and ? Acinetobacter baumannii, Day #6 IV Vancomycin and Day #2 IV Avycaz  2. Stage 3 sacral wound  3. Right foot wound infection, secondary to MRSA and MDR Acinetobacter baumannii, on IV antibiotics above  4. Covid-19 infection with no clear evidence of pneumonitis  5. Sepsis with leukocytosis, elevated procal and CRP, resolving  6. Altered mental status      Comments:   Significance of MDR Acinetobacter unclear since WBC and CRP were decreasing on Vancomycin and Zosyn. CRP continues to decrease but WBC increased today. LDH and Ferritin normal.     Plan:   1. Continue IV Vancomycin and Avycaz pending susceptibility results  2. Follow-up blood and wound cultures   3. In am, repeat CBC, procal and CRP  4. Follow-up blood cultures  5.  No specific treatment for Covid-19 at this time       Signed By: Aniya Rowland MD     February 14, 2022

## 2022-02-14 NOTE — PROGRESS NOTES
Problem: Airway Clearance - Ineffective  Goal: Achieve or maintain patent airway  Outcome: Progressing Towards Goal    Problem: Breathing Pattern - Ineffective  Goal: Ability to achieve and maintain a regular respiratory rate  Outcome: Progressing Towards Goal        Problem: Nutrition Deficits  Goal: Optimize nutrtional status  Outcome: Progressing Towards Goal       Problem: Isolation Precautions - Risk of Spread of Infection  Goal: Prevent transmission of infectious organism to others  Outcome: Progressing Towards Goal

## 2022-02-15 NOTE — PROGRESS NOTES
CM reviewed patients chart. At baseline patient is a total care assist at home. No further intervention required unless family deems patients has other needs.  CM sent referral to [facility] agency, currently pending for discharge order from hospital. CM will follow up with medical staff and patients family today to determines patients step down level of care for discharge

## 2022-02-15 NOTE — PROGRESS NOTES
Hospitalist Progress Note    Subjective:   Daily Progress Note: 2/15/2022 9:20 AM    Hospital Course: Patient is a 51-year-old female with a history of CKD, hypertension, stroke with left residual defects, bradycardia status post AICD 2 months ago, dysphagia status post PEG tube the presented to the emergency room on 2/9/2022 for altered mental status and fever. Patient son who is a physician was trying to treat patient for fluid overload and possible aspiration pneumonia with Lasix and amoxicillin. However she became tachypneic and hypoxic. In the ED patient was found to be Covid positive. Laboratory data was significant for a WBC of 25,000. D-dimer 8.87, BNP 15,430. Patient was started on IV vancomycin, Zosyn, Decadron, vitamin C, zinc.  Infectious disease consulted. Wound cultures shows multi resistant Acinetobacter baumannii and MRSA. IV Zosyn was discontinued on 2/13/2022. Started on Avycaz per ID and continue with Vancomycin. 2D echo pending shows normal left ventricular size and systolic function with an EF of 55 to 79%, normal diastolic function, severe mitral and tricuspid regurgitation, moderate pulmonic regurgitation. Patient is on IV Lasix. Patient glucose have been labile. Hemoglobin A1c is 6.7. Repeat chest x-ray shows moderate to large volume right pleural effusion and moderate volume left pleural effusion. Consult interventional radiology for possible thoracentesis. Patient had a thoracentesis on 2/14/2022.  810 mL thin yellow fluid removed. Pleural fluid studies are pending    Subjective: Patient lethargic and lying in bed comfortably.   No acute issues overnight    Current Facility-Administered Medications   Medication Dose Route Frequency    [START ON 2/16/2022] Draw a vancomycin random level on Wednesday 2/16 @ 0400   Other ONCE    cefTAZidime-avibactam (AVYCAZ) 1.25 g in 0.9% sodium chloride 100 mL IVPB  1.25 g IntraVENous Q8H    dextrose 10% infusion 125-250 mL  125-250 mL IntraVENous PRN    furosemide (LASIX) injection 40 mg  40 mg IntraVENous Q12H    glucose chewable tablet 16 g  4 Tablet Oral PRN    glucagon (GLUCAGEN) injection 1 mg  1 mg IntraMUSCular PRN    insulin lispro (HUMALOG) injection   SubCUTAneous AC&HS    dextrose 10% infusion 125-250 mL  125-250 mL IntraVENous PRN    albumin human 25% (BUMINATE) solution 12.5 g  12.5 g IntraVENous Q6H    sodium chloride (NS) flush 5-40 mL  5-40 mL IntraVENous Q8H    sodium chloride (NS) flush 5-40 mL  5-40 mL IntraVENous PRN    acetaminophen (TYLENOL) tablet 650 mg  650 mg Oral Q6H PRN    Or    acetaminophen (TYLENOL) suppository 650 mg  650 mg Rectal Q6H PRN    polyethylene glycol (MIRALAX) packet 17 g  17 g Oral DAILY PRN    ondansetron (ZOFRAN ODT) tablet 4 mg  4 mg Oral Q8H PRN    Or    ondansetron (ZOFRAN) injection 4 mg  4 mg IntraVENous Q6H PRN    ascorbic acid (vitamin C) (VITAMIN C) tablet 500 mg  500 mg Oral BID    apixaban (ELIQUIS) tablet 2.5 mg  2.5 mg Oral BID    zinc sulfate (ZINCATE) 50 mg zinc (220 mg) capsule 1 Capsule  1 Capsule Oral DAILY    VANCOMYCIN INFORMATION NOTE   Other Rx Dosing/Monitoring        Review of Systems  Constitutional: No fevers, No chills, No sweats, + fatigue, + Weakness  Eyes: No redness  Ears, nose, mouth, throat, and face: No nasal congestion, No sore throat, No voice change  Respiratory: + Shortness of Breath, No cough, No wheezing  Cardiovascular: No chest pain, No palpitations, No extremity edema  Gastrointestinal: No nausea, No vomiting, No diarrhea, No abdominal pain  Genitourinary: No frequency, No dysuria, No hematuria  Integument/breast: No skin lesion(s)   Neurological: + Confusion, No headaches, No dizziness      Objective:     Visit Vitals  BP (!) 143/71 (BP 1 Location: Right upper arm, BP Patient Position: At rest;Lying left side)   Pulse 68   Temp 97.9 °F (36.6 °C)   Resp 18   Ht 4' 10\" (1.473 m)   Wt 45.4 kg (100 lb)   SpO2 95%   BMI 20.90 kg/m²    O2 Flow Rate (L/min): (P) 5 l/min O2 Device: (P) Nasal cannula    Temp (24hrs), Av.8 °F (36.6 °C), Min:97.6 °F (36.4 °C), Max:97.9 °F (36.6 °C)      No intake/output data recorded.  1901 - 02/15 0700  In: 3448   Out: 2650 [Urine:2650]    PHYSICAL EXAM:  Constitutional: ill appearing, frail, No acute distress  Skin: Extremities and face reveal no rashes. HEENT: Sclerae anicteric. Extra-occular muscles are intact. Cardiovascular: RRR  Respiratory:  Diminished,  GI: Abdomen nondistended, soft, and nontender. Musculoskeletal: No pitting edema of the lower legs. Able to move all ext  Neurological:  Patient is alert and oriented.  Cranial nerves II-XII grossly intact  Psychiatric: flat affect       Data Review    Recent Results (from the past 24 hour(s))   GLUCOSE, POC    Collection Time: 22 10:00 AM   Result Value Ref Range    Glucose (POC) 217 (H) 65 - 117 mg/dL    Performed by Gisselle Sutton    GLUCOSE, POC    Collection Time: 22 11:00 AM   Result Value Ref Range    Glucose (POC) 207 (H) 65 - 117 mg/dL    Performed by [employer] [facility], BODY FLUID    Collection Time: 22  1:00 PM   Result Value Ref Range    BODY FLUID TYPE Pleural fluid      FLUID COLOR Yellow      FLUID APPEARANCE Hazy      FLUID RBC CT. 1000 0 /cu mm    FLUID WBC COUNT 66 0 - 5 /cu mm    FLUID NUCLEATED CELLS 66 /cu mm    FLD NEUTROPHILS 31 (A) No reference range has been established. %    FLD LYMPHS 17 (A) No reference range has been established. %    FLD MONOCYTES 5 %    FLUID MESOTHELIAL 47 (A) No reference range has been established. %   VANCOMYCIN, RANDOM    Collection Time: 22  7:43 PM   Result Value Ref Range    Vancomycin, random 14.7 ug/mL    Reported dose date Dose Dependent      Reported dose: Dose Dependent Units   GLUCOSE, POC    Collection Time: 02/15/22 12:51 AM   Result Value Ref Range    Glucose (POC) 249 (H) 65 - 117 mg/dL    Performed by Geovanna Kearney ([place])    METABOLIC PANEL, BASIC Collection Time: 02/15/22  6:41 AM   Result Value Ref Range    Sodium 147 (H) 136 - 145 mmol/L    Potassium 3.7 3.5 - 5.1 mmol/L    Chloride 107 97 - 108 mmol/L    CO2 33 (H) 21 - 32 mmol/L    Anion gap 7 5 - 15 mmol/L    Glucose 210 (H) 65 - 100 mg/dL    BUN 53 (H) 6 - 20 mg/dL    Creatinine 0.74 0.55 - 1.02 mg/dL    BUN/Creatinine ratio 72 (H) 12 - 20      GFR est AA >60 >60 ml/min/1.73m2    GFR est non-AA >60 >60 ml/min/1.73m2    Calcium 9.1 8.5 - 10.1 mg/dL   CBC WITH AUTOMATED DIFF    Collection Time: 02/15/22  6:41 AM   Result Value Ref Range    WBC 17.6 (H) 3.6 - 11.0 K/uL    RBC 2.89 (L) 3.80 - 5.20 M/uL    HGB 8.4 (L) 11.5 - 16.0 g/dL    HCT 28.0 (L) 35.0 - 47.0 %    MCV 96.9 80.0 - 99.0 FL    MCH 29.1 26.0 - 34.0 PG    MCHC 30.0 30.0 - 36.5 g/dL    RDW 21.2 (H) 11.5 - 14.5 %    PLATELET 211 377 - 583 K/uL    MPV 10.5 8.9 - 12.9 FL    NRBC 0.3 (H) 0.0  WBC    ABSOLUTE NRBC 0.05 (H) 0.00 - 0.01 K/uL    NEUTROPHILS 92 (H) 32 - 75 %    LYMPHOCYTES 5 (L) 12 - 49 %    MONOCYTES 2 (L) 5 - 13 %    EOSINOPHILS 0 0 - 7 %    BASOPHILS 0 0 - 1 %    IMMATURE GRANULOCYTES 1 (H) 0 - 0.5 %    ABS. NEUTROPHILS 16.0 (H) 1.8 - 8.0 K/UL    ABS. LYMPHOCYTES 0.9 0.8 - 3.5 K/UL    ABS. MONOCYTES 0.4 0.0 - 1.0 K/UL    ABS. EOSINOPHILS 0.0 0.0 - 0.4 K/UL    ABS. BASOPHILS 0.0 0.0 - 0.1 K/UL    ABS. IMM. GRANS. 0.2 (H) 0.00 - 0.04 K/UL    DF AUTOMATED     C REACTIVE PROTEIN, QT    Collection Time: 02/15/22  6:41 AM   Result Value Ref Range    C-Reactive protein 3.79 (H) 0.00 - 0.60 mg/dL   PROCALCITONIN    Collection Time: 02/15/22  6:41 AM   Result Value Ref Range    Procalcitonin 0.44 (H) 0 ng/mL   GLUCOSE, POC    Collection Time: 02/15/22  7:58 AM   Result Value Ref Range    Glucose (POC) 232 (H) 65 - 117 mg/dL    Performed by Thomas Taylor        Radiology review: Chest xray    Assessment:   1. Acute hypoxic respiratory failure secondary to Covid pneumonia  2. Sepsis possibly secondary from decubitus ulcer  3. Acute metabolic encephalopathy secondary to #1 and 2  4. Acute systolic CHF with fluid overload/status post AICD 2 months ago  5. CKD stage II  6. Hypertension  7. History of stroke with left-sided residual  8. Hyperglycemia    Plan:    1. Covid positive. Chest x-ray shows moderate volume bilateral pleural effusions. Inflammatory markers trending down. On 5L. ABG reviewed. .  Switch to oral prednisone from decadron due to hyperglycemia per families request.  Have weaned off of prednisone on 2/14/2022. continue with zinc and vitamin C. Will have to do a over night pulse ox study the night prior to discharge. Repeat chest x-ray shows moderate bilateral pleural effusions. IR consulted and did a thoracentesis on 2/14/2022 with 850 mL of thin yellow fluid drained. Pleural fluid analysis  2. Wound care nurse consulted. Possibly septic to decubitus ulcer. Infectious disease consulted. Wound culture shows multiresistant Enterobacter and MRSA. Infectious disease consulted. On vancomycin and Avycaz  3. Altered mental status most likely secondary to sepsis. Continue to monitor. 4. BNP elevated. On IV Lasix 40 mg twice daily. 2D echo shows normal EF with severe mitral and tricuspid regurgitation, moderate pulmonary regurgitation. .  When reviewing results from echo from 3/20/2019 showed mild to moderate mitral valve vegetation, severe tricuspid valve regurgitation. 5.  Renal function is stable. We will hold nephrotoxic medications at this time. 6.  Blood pressure stable. Continue to monitor per unit protocol. On Lasix  7. Patient has a history of stroke. On Eliquis. PT and OT evaluation. Continue with PEG tube feedings  8.  hemoglobin A1c 6.7. Start insulin sliding scale. Most likely elevated due to steroid  9. CBC, BMP, inflammatory markers in a.m. Dispo: >48 hours. Barriers include ID evaluation, overall improvement in Covid pneumonia, weaning of oxygen, possible thora, PT OT.   Suspect patient may require minimum of home health      POA is son Bren PATEL)     CODE STATUS Full     DVT prophylaxis: Eliquis  Ulcer prophylaxis: Tolerating PEG tube feedings    Care Plan discussed with: Patient/Family, Nurse and     Total time spent with patient: 33 minutes.

## 2022-02-15 NOTE — PROGRESS NOTES
Consult for Vancomycin Dosing by Pharmacy by Dr. Carr Ing provided for this 80y.o. year old female , for indication of Sacral wound infection/Sepsis/ R foot wound infection    Day of Therapy: 7    Goal of Level(s): (trough = 10-15 mcg/dL)     Other Current Antibiotics: Avycaz    Significant Cultures: All Micro Results       Procedure Component Value Units Date/Time    CULTURE, BODY FLUID [[id number]] Collected: 02/14/22 1300    Order Status: Completed Specimen: Body Fluid from Pleural Fluid Updated: 02/14/22 1452    CULTURE, BLOOD [[id number]] Collected: 02/10/22 1207    Order Status: Completed Specimen: Blood Updated: 02/14/22 0819     Special Requests: --        No Special Requests  Right  Hand       Culture result: No growth 3 days       CULTURE, Melanee Skeens STAIN [[id number]]  (Susceptibility) Collected: 02/10/22 1230    Order Status: Completed Specimen: Wound Updated: 02/14/22 0735     Special Requests: No Special Requests        GRAM STAIN Occasional WBCs seen               Rare Gram Positive Cocci in clusters           Culture result:       Heavy Acinetobacter baumannii complex ** multi-drug resistant organism **                  Heavy * methicillin resistant staphylococcus aureus *                  checking for possible 2nd gram negative hipolito            Moderate  Mixed skin josé miguel isolated       CULTURE, Melanee Skeens STAIN [[id number]]  (Susceptibility) Collected: 02/10/22 1230    Order Status: Completed Specimen: Wound Updated: 02/14/22 0658     Special Requests: No Special Requests        GRAM STAIN Rare WBCs seen               Rare Gram Positive Cocci in pairs           Culture result:       Heavy Acinetobacter baumannii complex ** multi-drug resistant organism **                  Moderate * methicillin resistant staphylococcus aureus *             REQUEST AVYCAZ AND ZERBAXA SENSITIVITIES  AT 1550 ON 2.13.22. [facility]      (NOTE) MDRO AND MRSA CALLED TO JAY JAY GANDHI MT,AT 1150 ON 2/13/22.  [facility] STAIN [[id number]] Collected: 02/10/22 0915    Order Status: Canceled Specimen: Sacral Wound     COVID-19 RAPID TEST [[id number]]  (Abnormal) Collected: 02/09/22 1306    Order Status: Completed Specimen: Nasopharyngeal Updated: 02/09/22 1344     Specimen source Nasopharyngeal        COVID-19 rapid test DETECTED        Comment: Rapid Abbott ID Now   The specimen is POSITIVE for SARS-CoV-2, the novel coronavirus associated with COVID-19. This test has been authorized by the FDA under an Emergency Use Authorization (EUA) for use by authorized laboratories. Fact sheet for Healthcare Providers: ConventionPrimorigen Biosciencesdate.co.nz Fact sheet for Patients: M Lite Solution.co.nz   Methodology: Isothermal Nucleic Acid Amplification Results verified, phoned to and read back by [facility] RN   AT 1344                 Serum Creatinine Creatinine   Date/Time Value Ref Range Status   02/14/2022 08:24 AM 0.71 0.55 - 1.02 mg/dL Final   02/13/2022 07:22 AM 0.73 0.55 - 1.02 mg/dL Final   02/12/2022 07:31 AM 0.80 0.55 - 1.02 mg/dL Final      Creatinine Clearance Estimated Creatinine Clearance: 31 mL/min (based on SCr of 0.71 mg/dL). BUN Lab Results   Component Value Date/Time    BUN 53 (H) 02/14/2022 08:24 AM      WBC Lab Results   Component Value Date/Time    WBC 16.1 (H) 02/14/2022 08:24 AM      Temp Temp Readings from Last 1 Encounters:   02/15/22 97.9 °F (36.6 °C)      C-Reactive Protein C-Reactive protein   Date Value Ref Range Status   02/13/2022 2.45 (H) 0.00 - 0.60 mg/dL Final   02/12/2022 8.24 (H) 0.00 - 0.60 mg/dL Final     Comment:     CRP is a nonspecific acute phase reactant that shows rapid, marked increases with inflammation, infection, trauma, tissue necrosis, malignancies and autoimmune diseases. Sequential CRP levels are useful in monitoring response to antibacterial therapy.  This assay is not equivalent to the hsCRP test since the presence of one or more of the foregoing disease processes obviates the risk stratification information available from hsCRP testing.   02/11/2022 8.24 (H) 0.00 - 0.60 mg/dL Final      Procalcitonin Lab Results   Component Value Date/Time    Procalcitonin 0.15 (H) 02/11/2022 07:33 AM          Vancomycin, random   Date/Time Value Ref Range Status   02/14/2022 07:43 PM 14.7 ug/mL Final     Comment:     No reference range has been established. Ht Readings from Last 1 Encounters:   02/09/22 147.3 cm (58\")        Wt Readings from Last 1 Encounters:   02/09/22 45.4 kg (100 lb)     Ideal body weight: 47.1 kg (103 lb 14.5 oz)     Previous Regimen: Vancomycin 500 mg iv x 1 on 2/13/2 @ 2130    New Regimen:   Vancomycin random level drawn last night was therapeutic at 14.7 mcg/ml. Will continue pulse dosing and give 500 mg iv x 1 dose this morning. Another random level has been ordered for tomorrow (2/16) with AM labs. Pharmacy to follow daily and will make changes to dose and/or frequency based on clinical status.      _________________________________     Pharmacist [address] [place] [address], PHARMD

## 2022-02-15 NOTE — ROUTINE PROCESS
Bedside shift report given to Kenneth Jack RN  (oncoming nurse) by Shirley Rodriguez RN (off going nurse). Report to include SBAR, plan of care, recent results, discharge planning, and patient's questions and concerns.

## 2022-02-15 NOTE — PROGRESS NOTES
Spiritual Care Assessment/Progress Note  [facility]      NAME: Demetrio Alvarenga      MRN: [id number]  AGE: 80 y.o. SEX: female  Evangelical Affiliation: Christianity   Language: English     2/15/2022     Total Time (in minutes): 10     Spiritual Assessment begun in [facility] through conversation with:         [x]Patient        [] Family    [] Friend(s)        Reason for Consult: Other (comment),Initial visit ([facility])     Spiritual beliefs: (Please include comment if needed)     [] Identifies with a remington tradition:         [] Supported by a remington community:            [] Claims no spiritual orientation:           [] Seeking spiritual identity:                [] Adheres to an individual form of spirituality:           [x] Not able to assess:                           Identified resources for coping:      [] Prayer                               [] Music                  [] Guided Imagery     [] Family/friends                 [] Pet visits     [] Devotional reading                         [x] Unknown     [] Other:                                              Interventions offered during this visit: (See comments for more details)    Patient Interventions: Initial visit,Other (comment) (Silent prayer)           Plan of Care:     [] Support spiritual and/or cultural needs    [] Support AMD and/or advance care planning process      [] Support grieving process   [] Coordinate Rites and/or Rituals    [] Coordination with community clergy   [] No spiritual needs identified at this time   [] Detailed Plan of Care below (See Comments)  [] Make referral to Music Therapy  [] Make referral to Pet Therapy     [] Make referral to Addiction services  [] Make referral to [facility]  [] Make referral to Spiritual Care Partner  [] No future visits requested        [x] Contact Spiritual Care for further referrals     Comments:  Visit attempted for patient in [address] [facility] to inform her of today's [employer] .   Per current COVID-19 isolation protocol in effect, provided telechaplaincy but the patient did not answer the phone call. Provided prayer per patient's listed remington tradition. Contact chaplains for further spiritual care and support. enriqueta Campoverde M.Div.    can be reached by calling the  at [facility]  [phone]

## 2022-02-15 NOTE — PROGRESS NOTES
Progress Note    Patient: Michelle Toledo MRN: [id number]  SSN: [id number]    YOB: 1922  Age: 80 y.o. Sex: female      Admit Date: 2/9/2022    LOS: 6 days     Subjective:   Patient followed for sepsis with sacral wound infection and right foot infection involving MRSA and MDR Acinetobacter. She is currently on Vancomycin and Avycaz, however, her WBC, procal and CRP are increasing. Acinetobacter today reported as resistant to [address] and Zerbaxa. Also diagnosed with Covid-19 for which she is on no specific treatment. Objective:     Vitals:    02/15/22 0014 02/15/22 0321 02/15/22 0848 02/15/22 0852   BP: (!) 143/71      Pulse: 68      Resp: 18      Temp: 97.9 °F (36.6 °C)      SpO2: 95%  100% 97%   Weight:  100 lb (45.4 kg)     Height:            Intake and Output:  Current Shift: No intake/output data recorded. Last three shifts: 02/13 1901 - 02/15 0700  In: 3788 [I.V.:250]  Out: 2650 [Urine:2650]    Physical Exam:   Vitals and nursing note reviewed. Constitutional:       General: She is not in acute distress. Appearance: She is ill-appearing. HENT:      Comments: Nasal O2 cannula 3 L/min  Eyes:      Pupils: Pupils are equal, round, and reactive to light. Cardiovascular:      Rate and Rhythm: Normal rate and regular rhythm. Heart sounds: No murmur heard. Pulmonary:      Breath sounds: Rhonchi and rales present. Abdominal:      General: Bowel sounds are normal.      Palpations: Abdomen is soft. Tenderness: There is no abdominal tenderness. Genitourinary:     Comments: No Govea  Musculoskeletal:      Right lower leg: No edema. Left lower leg: No edema. Skin:     Findings: No rash.       Comments: Large open sacral wound Stage 3  Right foot with bulky dressing not visualized today  Neurological:      Comments: Unable to assess   Psychiatric:      Comments: Unable to assess      Lab/Data Review:     WBC 17,600     < 310  Ferritin 205 <229    Procal 0.44 <0.15 <0.22  CRP 3.79 <2.45 <8.24 <11.20    Pleural fluid with 66 WBC with 41% mesothelial cells  Pleural fluid LDH (2/14) Pending    Blood cultures (2/10) No growth 3 days  Sacral wound culture (2/10) Moderate MRSA and heavy MDR Acinetobacter baumannii RESISTANT to Avycaz and Zerbaxa, intermediate to Cefepime  Right foot wound culture (2/10) MRSA and  MDR Acinetobacter baumanii  Pleural fluid (2/14) Pending  Assessment:     Active Problems:    Pneumonia due to COVID-19 virus (2/9/2022)    1. Sacral wound infection, secondary to MRSA and MDR Acinetobacter baumannii, Day #7 IV Vancomycin and Day #3 IV Avycaz  2. Stage 3 sacral wound  3. Right foot wound infection, secondary to MRSA and MDR Acinetobacter baumannii, on IV antibiotics above  4. Covid-19 infection with no clear evidence of pneumonitis  5. Sepsis with leukocytosis, elevated procal and CRP, worsening  6. Altered mental status      Comments:   WBC, procal and CRP increasing suggesting that the MDR Acinetobacter is clinically significant and not responding to Avycaz, to which it was found to be resistant, as well as to Zerbaxa. Will try Cefepime which showed intermediate resistance. Plan:   1. Continue IV Vancomycin  2. Discontinue Avycaz given resistant Acinetobacter  3. Start IV Cefepime; if no response, consider Cefideracol  4. Follow-up blood cultures   5. In am, repeat CBC, procal and CRP  6.  No specific treatment for Covid-19 at this time       Signed By: Dennison Aschoff, MD     February 15, 2022

## 2022-02-16 PROBLEM — E87.70 FLUID OVERLOAD: Status: ACTIVE | Noted: 2022-01-01

## 2022-02-16 PROBLEM — J96.91 RESPIRATORY FAILURE WITH HYPOXIA (HCC): Status: ACTIVE | Noted: 2022-01-01

## 2022-02-16 PROBLEM — I07.1 SEVERE TRICUSPID VALVE REGURGITATION: Status: ACTIVE | Noted: 2022-01-01

## 2022-02-16 PROBLEM — L89.90 DECUBITUS ULCER: Status: ACTIVE | Noted: 2022-01-01

## 2022-02-16 PROBLEM — G93.41 METABOLIC ENCEPHALOPATHY: Status: ACTIVE | Noted: 2022-01-01

## 2022-02-16 PROBLEM — A41.9 SEPSIS (HCC): Status: ACTIVE | Noted: 2022-01-01

## 2022-02-16 PROBLEM — R62.51 FAILURE TO THRIVE (CHILD): Status: ACTIVE | Noted: 2022-01-01

## 2022-02-16 PROBLEM — R79.89 ELEVATED BRAIN NATRIURETIC PEPTIDE (BNP) LEVEL: Status: ACTIVE | Noted: 2022-01-01

## 2022-02-16 PROBLEM — I34.0 SEVERE MITRAL VALVE REGURGITATION: Status: ACTIVE | Noted: 2022-01-01

## 2022-02-16 PROBLEM — J90 CHRONIC BILATERAL PLEURAL EFFUSIONS: Status: ACTIVE | Noted: 2022-01-01

## 2022-02-16 NOTE — PROGRESS NOTES
CM reviewed patient chart. PA states that patient may require minimum of home health at time of discharge. Barriers include ID evaluation, improvement in Covid pneumonia and blood cultures. PT/OT consult recommended by PA. CM will continue to follow up with recommendations from medical staff and patients family for needs and next level of care.

## 2022-02-16 NOTE — PROGRESS NOTES
Progress Note    Patient: Serge Boeck MRN: [id number]  SSN: [id number]    YOB: 1922  Age: 80 y.o. Sex: female      Admit Date: 2/9/2022    LOS: 7 days     Subjective:   Patient followed for sepsis with sacral wound infection and right foot infection involving MRSA and MDR Acinetobacter. She is currently on Vancomycin and Cefepime (after clinical failure to [facility] with resistance demonstrated). She has low grade temperature with now normal WBC and decreasing procal.  Also diagnosed with Covid-19 for which she is on no specific treatment. Objective:     Vitals:    02/15/22 2018 02/16/22 0807 02/16/22 0836 02/16/22 0849   BP: 127/78 126/72     Pulse: 60 60     Resp: 16 14     Temp: 98.7 °F (37.1 °C) 99.9 °F (37.7 °C)     SpO2: 96% 100% 98% 96%   Weight:       Height:            Intake and Output:  Current Shift: No intake/output data recorded. Last three shifts: 02/14 1901 - 02/16 0700  In: 340 [I.V.:250]  Out: 1850 [Urine:1850]    Physical Exam:   Vitals and nursing note reviewed. Constitutional:       General: She is not in acute distress. Appearance: She is ill-appearing. HENT:      Comments: Nasal O2 cannula 2 L/min  Eyes:      Pupils: Pupils are equal, round, and reactive to light. Cardiovascular:      Rate and Rhythm: Normal rate and regular rhythm. Heart sounds: No murmur heard. Pulmonary:      Breath sounds: Rhonchi and rales present. Abdominal:      General: Bowel sounds are normal.      Palpations: Abdomen is soft. Tenderness: There is no abdominal tenderness.    Genitourinary:     Comments: No Govea  Musculoskeletal: right foot and left calf bandaged  Skin: ecchymoses on face      Comments: Large open sacral wound Stage 3  Right foot with bulky dressing not visualized today  Neurological:      Comments: Unable to assess   Psychiatric:      Comments: Unable to assess      Lab/Data Review:     WBC 10,700     <261 < 310  Ferritin 205 <229    Procal 0.35 <0.44 <0.15 <0.22  CRP 6.48 <3.79 <2.45 <8.24 <11.20    Pleural fluid with 66 WBC with 41% mesothelial cells  Pleural fluid LDH (2/14) 99  Pleural fluid protein 1.9    Blood cultures (2/10) No growth 5 days  Sacral wound culture (2/10) Moderate MRSA and heavy MDR Acinetobacter baumannii RESISTANT to Avycaz and Zerbaxa, intermediate to Cefepime  Right foot wound culture (2/10) MRSA and  MDR Acinetobacter baumanii  Pleural fluid (2/14) No growth thus far  Assessment:     Active Problems:    Pneumonia due to COVID-19 virus (2/9/2022)    1. Sacral wound infection, secondary to MRSA and MDR Acinetobacter baumannii, Day #8 IV Vancomycin and Day #2 IV Cefepime  2. Stage 3 sacral wound  3. Right foot wound infection, secondary to MRSA and MDR Acinetobacter baumannii, on IV antibiotics above  4. Covid-19 infection with no clear evidence of pneumonitis  5. Sepsis with leukocytosis, elevated procal and CRP, worsening  6. Altered mental status      Comments:   WBC today surprisingly normal with decreasing procal though CRP increased. Unclear whether this is favorable response to Cefepime. Plan:   1. Continue IV Vancomycin and Cefepime for 3 more weeks  2. Follow-up blood cultures and pleural fluid culture  3. In am, repeat CBC, procal and CRP  4.  No specific treatment for Covid-19 at this time       Signed By: Christa Khalil MD     February 16, 2022

## 2022-02-16 NOTE — PROGRESS NOTES
Consult for Vancomycin Dosing by Pharmacy by Dr. Alyssa Aviles / Satnam Alvarado provided for this 80y.o. year old female , for indication of Sacral wound infection/Sepsis/ R foot wound infection    Day of Therapy: 8    Goal of Level(s): (trough = 10-15 mcg/dL)     Other Current Antibiotics: Avycaz        Serum Creatinine Creatinine   Date/Time Value Ref Range Status   02/16/2022 03:20 AM 0.67 0.55 - 1.02 mg/dL Final   02/15/2022 06:41 AM 0.74 0.55 - 1.02 mg/dL Final   02/14/2022 08:24 AM 0.71 0.55 - 1.02 mg/dL Final      Creatinine Clearance Estimated Creatinine Clearance: 31.4 mL/min (by C-G formula based on SCr of 0.67 mg/dL). BUN Lab Results   Component Value Date/Time    BUN 52 (H) 02/16/2022 03:20 AM      WBC Lab Results   Component Value Date/Time    WBC 10.7 02/16/2022 03:20 AM      Temp Temp Readings from Last 1 Encounters:   02/16/22 99.9 °F (37.7 °C)      C-Reactive Protein C-Reactive protein   Date Value Ref Range Status   02/16/2022 6.48 (H) 0.00 - 0.60 mg/dL Final   02/15/2022 3.79 (H) 0.00 - 0.60 mg/dL Final   02/13/2022 2.45 (H) 0.00 - 0.60 mg/dL Final      Procalcitonin Lab Results   Component Value Date/Time    Procalcitonin 0.35 (H) 02/16/2022 03:20 AM          Vancomycin, random   Date/Time Value Ref Range Status   02/16/2022 03:20 AM 14.2 ug/mL Final     Comment:     No reference range has been established. Previous Regimen: Vancomycin 500 mg iv x 1 on 2/15/22      New Regimen:   Vancomycin random level drawn this am is 14.2 mcg/ ml therapeutic. Will continue pulse dosing and give 500 mg iv x 1 dose this morning. Another random level has been ordered for tomorrow (2/17 with AM  0400labs. Pharmacy to follow daily and will make changes to dose and/or frequency based on clinical status.      _________________________________     Jani Hamm PHARMD

## 2022-02-16 NOTE — PROGRESS NOTES
Hospitalist Progress Note       Daily Progress Note: 2/16/2022 12:42 PM  Hospital course:   Patient is a 55-year-old female with a history of CKD, hypertension, stroke with left residual defects, bradycardia status post AICD 2 months ago, dysphagia status post PEG tube the presented to the emergency room on 2/9/2022 for altered mental status and fever. Patient son who is a physician was trying to treat patient for fluid overload and possible aspiration pneumonia with Lasix and amoxicillin. However she became tachypneic and hypoxic. In the ED patient was found to be Covid positive. Laboratory data was significant for a WBC of 25,000. D-dimer 8.87, BNP 15,430. Patient was started on IV vancomycin, Zosyn, Decadron, vitamin C, zinc.  Infectious disease consulted. Wound cultures shows multi resistant Acinetobacter baumannii and MRSA. IV Zosyn was discontinued on 2/13/2022. Started on Avycaz per ID and continue with Vancomycin. 2D echo pending shows normal left ventricular size and systolic function with an EF of 55 to 64%, normal diastolic function, severe mitral and tricuspid regurgitation, moderate pulmonic regurgitation. Patient is on IV Lasix. Patient glucose have been labile. Hemoglobin A1c is 6.7. Repeat chest x-ray shows moderate to large volume right pleural effusion and moderate volume left pleural effusion. IR consulted and patient had a thoracentesis on 2/14/2022.  810 mL thin yellow fluid removed. Pleural fluid studies are pending      Subjective:     Examined patient at the bedside. She is lethargic and nonresponsive except to noxious stimuli i.e. turning to position or cleaning. Her only response is painful moaning.     Assessment/Plan:   Active Problems:    Pneumonia due to COVID-19 virus (2/9/2022)      Decubitus ulcer (2/16/2022)      Sepsis ([address] (0/66/8181)      Metabolic encephalopathy (6/54/0640)      Chronic bilateral pleural effusions (2/16/2022)      Respiratory failure with hypoxia ([address] (2/16/2022)      Fluid overload (2/16/2022)      Elevated brain natriuretic peptide (BNP) level (2/16/2022)      Severe mitral valve regurgitation (2/16/2022)      Severe tricuspid valve regurgitation (2/16/2022)      Failure to thrive (child) (2/16/2022)    Acute hypoxic respiratory failure  COVID-19  Bilateral pleural effusions  X-ray revealing bilateral pleural effusions  On 5 L nasal cannula  IR thoracentesis on 2/14/2022 with 850 mL thin yellow fluid drainedculture pending    Sepsis  Decubitus ulcer  Wound culture of decubitus ulcer revealing multi resistant Enterobacter and MRSA  ID following  On IV vancomycin and Avycaz    Acute metabolic encephalopathysecondary to sepsis and respiratory failure    Acute systolic CHF  Fluid overload  Status post AICD 2 months ago  Elevated BNP  Continue Lasix 40 mg twice daily  2D echo revealing normal EF with severe mitral and tricuspid regurg and moderate pulmonary regurg  History of MVR vegetation    CKD stage II    HTN    History of CVA with left-sided residual  On Eliquis, PT OT, PEG tube feedings    Failure to thrive    DVT Prophylaxis:  Code Status: Full Code  POA/NOK: Son Dr. Ambrocio Muse    Disposition and discharge barriers:    Wean oxygen   Refusing placement  Care Plan discussed with: Staff, IDR team    Current Facility-Administered Medications   Medication Dose Route Frequency    [START ON 2/17/2022] Vancomycin random level to be drawn on 2/17/22 at 0400 am.   Other ONCE    cefepime (MAXIPIME) 2 g in sterile water (preservative free) 10 mL IV syringe  2 g IntraVENous Q8H    dextrose 10% infusion 125-250 mL  125-250 mL IntraVENous PRN    furosemide (LASIX) injection 40 mg  40 mg IntraVENous Q12H    glucose chewable tablet 16 g  4 Tablet Oral PRN    glucagon (GLUCAGEN) injection 1 mg  1 mg IntraMUSCular PRN    insulin lispro (HUMALOG) injection   SubCUTAneous AC&HS    dextrose 10% infusion 125-250 mL  125-250 mL IntraVENous PRN    albumin human 25% (BUMINATE) solution 12.5 g  12.5 g IntraVENous Q6H    sodium chloride (NS) flush 5-40 mL  5-40 mL IntraVENous Q8H    sodium chloride (NS) flush 5-40 mL  5-40 mL IntraVENous PRN    acetaminophen (TYLENOL) tablet 650 mg  650 mg Oral Q6H PRN    Or    acetaminophen (TYLENOL) suppository 650 mg  650 mg Rectal Q6H PRN    polyethylene glycol (MIRALAX) packet 17 g  17 g Oral DAILY PRN    ondansetron (ZOFRAN ODT) tablet 4 mg  4 mg Oral Q8H PRN    Or    ondansetron (ZOFRAN) injection 4 mg  4 mg IntraVENous Q6H PRN    ascorbic acid (vitamin C) (VITAMIN C) tablet 500 mg  500 mg Oral BID    apixaban (ELIQUIS) tablet 2.5 mg  2.5 mg Oral BID    zinc sulfate (ZINCATE) 50 mg zinc (220 mg) capsule 1 Capsule  1 Capsule Oral DAILY    VANCOMYCIN INFORMATION NOTE   Other Rx Dosing/Monitoring        REVIEW OF SYSTEMS    Review of Systems   Unable to perform ROS: Medical condition        Objective:     Visit Vitals  /72 (BP 1 Location: Right upper arm)   Pulse 60   Temp 99.9 °F (37.7 °C)   Resp 14   Ht 4' 10\" (1.473 m)   Wt 45.4 kg (100 lb)   SpO2 96%   BMI 20.90 kg/m²    O2 Flow Rate (L/min): 2 l/min O2 Device: Nasal cannula    Temp (24hrs), Av.3 °F (37.4 °C), Min:98.7 °F (37.1 °C), Max:99.9 °F (37.7 °C)      No intake/output data recorded.  1901 -  0700  In: 340 [I.V.:250]  Out: 1850 [Urine:1850]    PHYSICAL EXAM:    Physical Exam  Constitutional:       Appearance: She is ill-appearing. Comments: Cachectic, failure to thrive   Cardiovascular:      Rate and Rhythm: Normal rate and regular rhythm. Pulmonary:      Effort: Respiratory distress present. Breath sounds: Wheezing present. Abdominal:      General: There is no distension. Comments: PEG tube   Musculoskeletal:      Comments: Bedbound   Skin:     Comments: Sacral decubitus   Neurological:      Mental Status: She is disoriented. Motor: Weakness present.       Gait: Gait abnormal.          Data Review    Recent Results (from the past 24 hour(s))   GLUCOSE, POC    Collection Time: 02/15/22  4:09 PM   Result Value Ref Range    Glucose (POC) 117 65 - 117 mg/dL    Performed by Cathy Montague, POC    Collection Time: 02/15/22  9:53 PM   Result Value Ref Range    Glucose (POC) 137 (H) 65 - 117 mg/dL    Performed by Ashok Mendez (Trvlr)    VANCOMYCIN, RANDOM    Collection Time: 02/16/22  3:20 AM   Result Value Ref Range    Vancomycin, random 11.8 ug/mL   METABOLIC PANEL, BASIC    Collection Time: 02/16/22  3:20 AM   Result Value Ref Range    Sodium 148 (H) 136 - 145 mmol/L    Potassium 3.6 3.5 - 5.1 mmol/L    Chloride 109 (H) 97 - 108 mmol/L    CO2 35 (H) 21 - 32 mmol/L    Anion gap 4 (L) 5 - 15 mmol/L    Glucose 181 (H) 65 - 100 mg/dL    BUN 52 (H) 6 - 20 mg/dL    Creatinine 0.67 0.55 - 1.02 mg/dL    BUN/Creatinine ratio 78 (H) 12 - 20      GFR est AA >60 >60 ml/min/1.73m2    GFR est non-AA >60 >60 ml/min/1.73m2    Calcium 8.8 8.5 - 10.1 mg/dL   CBC WITH AUTOMATED DIFF    Collection Time: 02/16/22  3:20 AM   Result Value Ref Range    WBC 10.7 3.6 - 11.0 K/uL    RBC 2.60 (L) 3.80 - 5.20 M/uL    HGB 7.5 (L) 11.5 - 16.0 g/dL    HCT 25.0 (L) 35.0 - 47.0 %    MCV 96.2 80.0 - 99.0 FL    MCH 28.8 26.0 - 34.0 PG    MCHC 30.0 30.0 - 36.5 g/dL    RDW 21.2 (H) 11.5 - 14.5 %    PLATELET 913 (L) 968 - 400 K/uL    MPV 10.4 8.9 - 12.9 FL    NRBC 0.3 (H) 0.0  WBC    ABSOLUTE NRBC 0.03 (H) 0.00 - 0.01 K/uL    NEUTROPHILS 88 (H) 32 - 75 %    LYMPHOCYTES 7 (L) 12 - 49 %    MONOCYTES 3 (L) 5 - 13 %    EOSINOPHILS 1 0 - 7 %    BASOPHILS 0 0 - 1 %    IMMATURE GRANULOCYTES 1 (H) 0 - 0.5 %    ABS. NEUTROPHILS 9.5 (H) 1.8 - 8.0 K/UL    ABS. LYMPHOCYTES 0.7 (L) 0.8 - 3.5 K/UL    ABS. MONOCYTES 0.3 0.0 - 1.0 K/UL    ABS. EOSINOPHILS 0.1 0.0 - 0.4 K/UL    ABS. BASOPHILS 0.0 0.0 - 0.1 K/UL    ABS. IMM.  GRANS. 0.1 (H) 0.00 - 0.04 K/UL    DF AUTOMATED     C REACTIVE PROTEIN, QT    Collection Time: 02/16/22  3:20 AM   Result Value Ref Range C-Reactive protein 6.48 (H) 0.00 - 0.60 mg/dL   LD    Collection Time: 02/16/22  3:20 AM   Result Value Ref Range     81 - 246 U/L   PROCALCITONIN    Collection Time: 02/16/22  3:20 AM   Result Value Ref Range    Procalcitonin 0.35 (H) 0 ng/mL   GLUCOSE, POC    Collection Time: 02/16/22  8:11 AM   Result Value Ref Range    Glucose (POC) 125 (H) 65 - 117 mg/dL    Performed by 1 Pj Herrera, POC    Collection Time: 02/16/22 11:28 AM   Result Value Ref Range    Glucose (POC) 200 (H) 65 - 117 mg/dL    Performed by Brittaney Doan        XR CHEST PORT   Final Result      XR CHEST PORT   Final Result      XR CHEST PORT   Final Result      IR THORACENTESIS NDL PUNC ASP W IMAGE    (Results Pending)       Active Problems:    Pneumonia due to COVID-19 virus (2/9/2022)      Decubitus ulcer (2/16/2022)      Sepsis ([address] (6/97/0436)      Metabolic encephalopathy (9/74/6564)      Chronic bilateral pleural effusions (2/16/2022)      Respiratory failure with hypoxia (HCC) (2/16/2022)      Fluid overload (2/16/2022)      Elevated brain natriuretic peptide (BNP) level (2/16/2022)      Severe mitral valve regurgitation (2/16/2022)      Severe tricuspid valve regurgitation (2/16/2022)      Failure to thrive (child) (2/16/2022)          _____________________________________________________________________________  Time spent in direct care including coordination of service, review of data and examination: > 35 minutes    ______________________________________________________________________________    Kraigne Jobs, NP    This is dictation was done by dragon, computer voice recognition software. Quite often unanticipated grammatical, syntax, homophones and other interpretive errors or inadvertently transcribed by the computer software. Please excuse errors that have escaped final proofreading. Thank you.

## 2022-02-17 NOTE — PROGRESS NOTES
Vancomycin Note-02/17/2022    Admission date 020922   Attending Theresa Blanca   Indication  SSTI       Height Ht Readings from Last 1 Encounters:   02/09/22 147.3 cm (58\")       Weight Wt Readings from Last 1 Encounters:   02/15/22 45.4 kg (100 lb)      IBW ideal body weight      SCr Creatinine   Date Value Ref Range Status   02/17/2022 0.60 0.55 - 1.02 mg/dL Final   02/16/2022 0.67 0.55 - 1.02 mg/dL Final   02/15/2022 0.74 0.55 - 1.02 mg/dL Final      CrCl (based on IBW) 31.5 ml/min       AUC goal / trough 400-600 / 15-20   Current regimen  Pulse dosing   Level Type / Date / Result random / 02/17 / 16.2   NEW regimen 500mg IV q24hr @ 1200   Level Scheduled for 02/19 @ 1000         Current Antimicrobial Therapy (168h ago, onward)       Ordered     Start Stop    02/17/22 1057  Vancomycin lab reminder - draw vancomycin level 02/19 @ 1000  Other,   ONCE        References:    Lexicomp    02/19/22 1000 02/19/22 2159    02/17/22 1057  vancomycin (VANCOCIN) 500 mg in 0.9% sodium chloride (MBP/ADV) 100 mL MBP  500 mg,   IntraVENous,   EVERY 24 HOURS        References:    Lexicomp    02/17/22 1200 --    02/15/22 0923  cefepime (MAXIPIME) 2 g in sterile water (preservative free) 10 mL IV syringe  2 g,   IntraVENous,   EVERY 8 HOURS        References:    Lexicomp    02/15/22 1000 --    02/13/22 1945  Vancomycin - Random level to be drawn 2/14 @ 1800  Other,   ONCE        References:    Lexicomp    02/14/22 1800 02/15/22 0559    02/12/22 1850  Vancomycin - Random level to be drawn 2/13 @ 1700  Other,   ONCE        References:    Lexicomp    02/13/22 1700 02/14/22 0459    02/11/22 1652  Vancomycin random level 2/12 at 15:00  Other,   ONCE        References:    Lexicomp    02/12/22 1500 02/13/22 0259    02/10/22 1409  vancomycin (VANCOCIN) 500 mg in 0.9% sodium chloride (MBP/ADV) 100 mL MBP  500 mg,   IntraVENous,   ONCE        References:    Lexicomp    02/10/22 1500 02/11/22 0259    02/09/22 1812  VANCOMYCIN INFORMATION NOTE  Other,   RX DOSING/MONITORING        References:    Lexicomp    02/09/22 1812 --                      Submitted by:  AMBER SowD

## 2022-02-17 NOTE — PROGRESS NOTES
Progress Note    Patient: Fern Javier MRN: [id number]  SSN: [id number]    YOB: 1922  Age: 80 y.o. Sex: female      Admit Date: 2/9/2022    LOS: 8 days     Subjective:   Patient followed for sepsis with sacral wound infection and right foot infection involving MRSA and MDR Acinetobacter. She is currently on Vancomycin and Cefepime (after clinical failure to [facility] with resistance demonstrated). She has low grade temperature with now normal WBC and decreasing procal.  Also diagnosed with Covid-19 for which she is on no specific treatment. Objective:     Vitals:    02/16/22 1432 02/16/22 2227 02/17/22 0803 02/17/22 0811   BP: 114/69 (!) 112/58  113/67   Pulse: 61 (!) 58  60   Resp: 15   18   Temp: 98.1 °F (36.7 °C)      SpO2: 99% 91% 97% 96%   Weight:       Height:            Intake and Output:  Current Shift: No intake/output data recorded. Last three shifts: 02/15 1901 - 02/17 0700  In: 2588   Out: [address] [Urine:1475]    Physical Exam:   Vitals and nursing note reviewed. Constitutional:       General: She is not in acute distress. Appearance: She is ill-appearing. HENT:      Comments: Nasal O2 cannula 2 L/min  Eyes:      Pupils: Pupils are equal, round, and reactive to light. Cardiovascular:      Rate and Rhythm: Normal rate and regular rhythm. Heart sounds: No murmur heard. Pulmonary:      Breath sounds: Rhonchi and rales present. Abdominal:      General: Bowel sounds are normal.      Palpations: Abdomen is soft. Tenderness: There is no abdominal tenderness.    Genitourinary:     Comments: No Govea  Musculoskeletal: right foot and left calf bandaged  Skin: ecchymoses on face      Comments: Large open sacral wound Stage 3  Right foot with bulky dressing not visualized today  Neurological:      Comments: Unable to assess   Psychiatric:      Comments: Unable to assess      Lab/Data Review:     WBC 9,700     <261 < 310  Ferritin 205 <229    Procal 0.35 <0.44 <0.15 <0.22  CRP 5.78 <6.48 <3.79 <2.45 <8.24 <11.20    Pleural fluid with 66 WBC with 41% mesothelial cells  Pleural fluid LDH (2/14) 99  Pleural fluid protein 1.9    Blood cultures (2/10) No growth 5 days  Sacral wound culture (2/10) Moderate MRSA and heavy MDR Acinetobacter baumannii RESISTANT to Avycaz and Zerbaxa, intermediate to Cefepime  Right foot wound culture (2/10) MRSA and  MDR Acinetobacter baumanii  Pleural fluid (2/14) No growth thus far  Assessment:     Active Problems:    Pneumonia due to COVID-19 virus (2/9/2022)      Decubitus ulcer (2/16/2022)      Sepsis ([address] (9/30/1522)      Metabolic encephalopathy (9/31/7337)      Chronic bilateral pleural effusions (2/16/2022)      Respiratory failure with hypoxia (HCC) (2/16/2022)      Fluid overload (2/16/2022)      Elevated brain natriuretic peptide (BNP) level (2/16/2022)      Severe mitral valve regurgitation (2/16/2022)      Severe tricuspid valve regurgitation (2/16/2022)      Failure to thrive (child) (2/16/2022)    1. Sacral wound infection, secondary to MRSA and MDR Acinetobacter baumannii, Day #9 IV Vancomycin and Day #3 IV Cefepime  2. Stage 3 sacral wound  3. Right foot wound infection, secondary to MRSA and MDR Acinetobacter baumannii, on IV antibiotics above  4. Covid-19 infection with no clear evidence of pneumonitis  5. Sepsis with leukocytosis, elevated procal and CRP, worsening  6. Altered mental status      Comments:   WBC remains normal with decreasing procal and CRP. Plan:   1. Continue IV Vancomycin and Cefepime for 2 more weeks (Rx on chart)  2. Follow-up blood cultures and pleural fluid culture  3. In am, repeat procal and CRP  4. No specific treatment for Covid-19 at this time  5.  Cleared for discharge from ID standpoint    Signed By: Emigdio Hsieh MD     February 17, 2022

## 2022-02-17 NOTE — PROGRESS NOTES
Hospitalist Progress Note       Daily Progress Note: 2/17/2022 12:42 PM  Hospital course:   Patient is a 72-year-old female with a history of CKD, hypertension, stroke with left residual defects, bradycardia status post AICD 2 months ago, dysphagia status post PEG tube the presented to the emergency room on 2/9/2022 for altered mental status and fever. Patient son who is a physician was trying to treat patient for fluid overload and possible aspiration pneumonia with Lasix and amoxicillin. However she became tachypneic and hypoxic. In the ED patient was found to be Covid positive. Laboratory data was significant for a WBC of 25,000. D-dimer 8.87, BNP 15,430. Patient was started on IV vancomycin, Zosyn, Decadron, vitamin C, zinc.  Infectious disease consulted. Wound cultures shows multi resistant Acinetobacter baumannii and MRSA. IV Zosyn was discontinued on 2/13/2022. Started on Avycaz per ID and continue with Vancomycin. Culture sensitivities for right foot wound show resistance to Avycaz, switched to cefepime. 2D echo pending shows normal left ventricular size and systolic function with an EF of 55 to 40%, normal diastolic function, severe mitral and tricuspid regurgitation, moderate pulmonic regurgitation. Patient is on IV Lasix. Patient glucose have been labile. Hemoglobin A1c is 6.7. Repeat chest x-ray shows moderate to large volume right pleural effusion and moderate volume left pleural effusion. IR consulted and patient had a thoracentesis on 2/14/2022.  810 mL thin yellow fluid removed. Pleural fluid studies are pending. Patient continues on IV vancomycin and cefepime. Midline in place will assess overnight split study for home oxygen. Plan to discharge home on IV antibiotics for total of 3 more weeks. Subjective: Follow-up examination of patient at the bedside. She is lethargic and nonresponsive except to noxious stimuli i.e. turning to position or cleaning.   Her only response is painful moaning.     Assessment/Plan:   Active Problems:    Pneumonia due to COVID-19 virus (2/9/2022)      Decubitus ulcer (2/16/2022)      Sepsis ([address] (0/15/8723)      Metabolic encephalopathy (9/45/0011)      Chronic bilateral pleural effusions (2/16/2022)      Respiratory failure with hypoxia (HCC) (2/16/2022)      Fluid overload (2/16/2022)      Elevated brain natriuretic peptide (BNP) level (2/16/2022)      Severe mitral valve regurgitation (2/16/2022)      Severe tricuspid valve regurgitation (2/16/2022)      Failure to thrive (child) (2/16/2022)    Acute hypoxic respiratory failure  COVID-19  Bilateral pleural effusions  X-ray revealing bilateral pleural effusions  On 5 L nasal cannula  IR thoracentesis on 2/14/2022 with 850 mL thin yellow fluid drainedculture pending    Sepsis  Decubitus ulcer  Right foot ulcer  Wound culture of decubitus ulcer and right foot infection revealing multi resistant MRSA and MDR Acinetobacter baumannii  ID following  On IV vancomycin and cefepime for a 3-week course    Acute metabolic encephalopathysecondary to sepsis and respiratory failure    Acute systolic CHF  Fluid overload  Status post AICD 2 months ago  Elevated BNP  Continue Lasix 40 mg twice daily  2D echo revealing normal EF with severe mitral and tricuspid regurg and moderate pulmonary regurg  History of MVR vegetation    CKD stage IIstable    HTNstable    History of CVA with left-sided residual  On Eliquis, PT OT, PEG tube feedings    Failure to thrivereceiving tube feeds via PEG    DVT Prophylaxis:  Code Status: Full Code  POA/NOK: Son Dr. Jorge Wood    Disposition and discharge barriers:    Assess home oxygen   Refusing placement   Set up home IV therapy  Care Plan discussed with: Staff, IDR team    Current Facility-Administered Medications   Medication Dose Route Frequency    vancomycin (VANCOCIN) 500 mg in 0.9% sodium chloride (MBP/ADV) 100 mL MBP  500 mg IntraVENous Q24H    [START ON 2022] Vancomycin lab reminder - draw vancomycin level  @ 1000   Other ONCE    cefepime (MAXIPIME) 2 g in sterile water (preservative free) 10 mL IV syringe  2 g IntraVENous Q8H    dextrose 10% infusion 125-250 mL  125-250 mL IntraVENous PRN    furosemide (LASIX) injection 40 mg  40 mg IntraVENous Q12H    glucose chewable tablet 16 g  4 Tablet Oral PRN    glucagon (GLUCAGEN) injection 1 mg  1 mg IntraMUSCular PRN    insulin lispro (HUMALOG) injection   SubCUTAneous AC&HS    dextrose 10% infusion 125-250 mL  125-250 mL IntraVENous PRN    sodium chloride (NS) flush 5-40 mL  5-40 mL IntraVENous Q8H    sodium chloride (NS) flush 5-40 mL  5-40 mL IntraVENous PRN    acetaminophen (TYLENOL) tablet 650 mg  650 mg Oral Q6H PRN    Or    acetaminophen (TYLENOL) suppository 650 mg  650 mg Rectal Q6H PRN    polyethylene glycol (MIRALAX) packet 17 g  17 g Oral DAILY PRN    ondansetron (ZOFRAN ODT) tablet 4 mg  4 mg Oral Q8H PRN    Or    ondansetron (ZOFRAN) injection 4 mg  4 mg IntraVENous Q6H PRN    ascorbic acid (vitamin C) (VITAMIN C) tablet 500 mg  500 mg Oral BID    apixaban (ELIQUIS) tablet 2.5 mg  2.5 mg Oral BID    zinc sulfate (ZINCATE) 50 mg zinc (220 mg) capsule 1 Capsule  1 Capsule Oral DAILY    VANCOMYCIN INFORMATION NOTE   Other Rx Dosing/Monitoring        REVIEW OF SYSTEMS    Review of Systems   Unable to perform ROS: Medical condition        Objective:     Visit Vitals  /67 (BP 1 Location: Right upper arm, BP Patient Position: At rest)   Pulse 60   Temp 98.1 °F (36.7 °C)   Resp 18   Ht 4' 10\" (1.473 m)   Wt 45.4 kg (100 lb)   SpO2 96%   BMI 20.90 kg/m²    O2 Flow Rate (L/min): 2 l/min O2 Device: Nasal cannula    Temp (24hrs), Av.1 °F (36.7 °C), Min:98.1 °F (36.7 °C), Max:98.1 °F (36.7 °C)      No intake/output data recorded. 02/15 1901 -  0700  In: 2588   Out: 1475 [Urine:1475]    PHYSICAL EXAM:    Physical Exam  Constitutional:       Appearance: She is ill-appearing. Comments: Cachectic, failure to thrive   Cardiovascular:      Rate and Rhythm: Normal rate and regular rhythm. Pulmonary:      Effort: Respiratory distress present. Breath sounds: Wheezing present. Abdominal:      General: There is no distension. Comments: PEG tube   Musculoskeletal:      Comments: Bedbound   Skin:     Comments: Sacral decubitus   Neurological:      Mental Status: She is disoriented. Motor: Weakness present.       Gait: Gait abnormal.          Data Review    Recent Results (from the past 24 hour(s))   GLUCOSE, POC    Collection Time: 02/16/22  4:02 PM   Result Value Ref Range    Glucose (POC) 150 (H) 65 - 117 mg/dL    Performed by [address], POC    Collection Time: 02/16/22 10:02 PM   Result Value Ref Range    Glucose (POC) 84 65 - 117 mg/dL    Performed by [address] Rai, BASIC    Collection Time: 02/17/22  3:41 AM   Result Value Ref Range    Sodium 148 (H) 136 - 145 mmol/L    Potassium 3.6 3.5 - 5.1 mmol/L    Chloride 110 (H) 97 - 108 mmol/L    CO2 34 (H) 21 - 32 mmol/L    Anion gap 4 (L) 5 - 15 mmol/L    Glucose 140 (H) 65 - 100 mg/dL    BUN 58 (H) 6 - 20 mg/dL    Creatinine 0.60 0.55 - 1.02 mg/dL    BUN/Creatinine ratio 97 (H) 12 - 20      GFR est AA >60 >60 ml/min/1.73m2    GFR est non-AA >60 >60 ml/min/1.73m2    Calcium 9.2 8.5 - 10.1 mg/dL   C REACTIVE PROTEIN, QT    Collection Time: 02/17/22  3:41 AM   Result Value Ref Range    C-Reactive protein 5.78 (H) 0.00 - 0.60 mg/dL   VANCOMYCIN, RANDOM    Collection Time: 02/17/22  3:42 AM   Result Value Ref Range    Vancomycin, random 16.2 ug/mL   CBC W/O DIFF    Collection Time: 02/17/22  6:08 AM   Result Value Ref Range    WBC 9.7 3.6 - 11.0 K/uL    RBC 2.57 (L) 3.80 - 5.20 M/uL    HGB 7.5 (L) 11.5 - 16.0 g/dL    HCT 24.9 (L) 35.0 - 47.0 %    MCV 96.9 80.0 - 99.0 FL    MCH 29.2 26.0 - 34.0 PG    MCHC 30.1 30.0 - 36.5 g/dL    RDW 21.4 (H) 11.5 - 14.5 %    PLATELET 899 (L) 432 - 400 K/uL    MPV 10.9 8.9 - 12.9 FL    NRBC 0.6 (H) 0.0  WBC    ABSOLUTE NRBC 0.06 (H) 0.00 - 0.01 K/uL   GLUCOSE, POC    Collection Time: 02/17/22  8:07 AM   Result Value Ref Range    Glucose (POC) 205 (H) 65 - 117 mg/dL    Performed by Gema Worthington    GLUCOSE, POC    Collection Time: 02/17/22 11:06 AM   Result Value Ref Range    Glucose (POC) 171 (H) 65 - 117 mg/dL    Performed by [address]   Final Result   Successful right thoracentesis. XR CHEST PORT   Final Result      XR CHEST PORT   Final Result      XR CHEST PORT   Final Result          Active Problems:    Pneumonia due to COVID-19 virus (2/9/2022)      Decubitus ulcer (2/16/2022)      Sepsis ([address] (1/09/1584)      Metabolic encephalopathy (8/18/6308)      Chronic bilateral pleural effusions (2/16/2022)      Respiratory failure with hypoxia (HCC) (2/16/2022)      Fluid overload (2/16/2022)      Elevated brain natriuretic peptide (BNP) level (2/16/2022)      Severe mitral valve regurgitation (2/16/2022)      Severe tricuspid valve regurgitation (2/16/2022)      Failure to thrive (child) (2/16/2022)          _____________________________________________________________________________  Time spent in direct care including coordination of service, review of data and examination: > 35 minutes    ______________________________________________________________________________    Ru Ashley NP    This is dictation was done by [employer], computer voice recognition software. Quite often unanticipated grammatical, syntax, homophones and other interpretive errors or inadvertently transcribed by the computer software. Please excuse errors that have escaped final proofreading. Thank you.

## 2022-02-17 NOTE — PROGRESS NOTES
CM spoke with patients son at length discussing the patient plan for discharge. CM contacted [facility] sent per sons request to determine if patient would be checked at home considering her Covid test might show positive. CM also confirmed with [facility] that they will care for patient as long as it has been 5 days from the first positive test. CM also assured patients son that he would contact the attending PCP Reji Esparza) to give him a call and that CM will also call him in the morning.

## 2022-02-17 NOTE — PROGRESS NOTES
Patient has been accepted with [facility]. Patient has been accepted with [facility] for Home Oxygen. Patient is awaiting acceptance to [facility] for IV abx. Plan is for discharge on 2/18/2022.

## 2022-02-17 NOTE — PROGRESS NOTES
Comprehensive Nutrition Assessment    Type and Reason for Visit: Initial,RD nutrition re-screen/LOS,Positive nutrition screen,NPO/clear liquid    Nutrition Recommendations/Plan:   Continue TF as ordered. Increase flushes to 120 mL water flushes Q4H via PEG, when medically appropriate.     TF provides: 1440 kcal (105.7%), 61.2 gm PRO(104%), 1449.6 mL H2O(106%). Monitor while Inpatient. Nutrition Assessment:   Admitted for PNA 2/2 COVID19+, FTT, ARF w/ hypoxia, sepsis, chronic bilateral pleural effusion, fluid overload w/ thoracentesis(2/14) and removal of 810 mL thin yellow fluid- results pending. Nutrition consult(2/9) for TF orders/management while in ED. NPO status current w/ PEG feeds ongoing at rec'd rate PTA to IP. Water flushes observed as lower than estimated fluid needs and likely impacting lytes. RD rec's to be provided. Labs: Na 148, Cl 110, CO2 34, BUN 58, H/H 7.5/24.9, POC BG  mg/dL. Meds: Buminate, Zn sulfate, vancomycin, lasix, Vit C, eliquis, cefepime, humalog, Glucagon. Malnutrition Assessment:  Malnutrition Status: At risk for malnutrition (specify)    Context:  Chronic illness       Estimated Daily Nutrient Needs:  Energy (kcal): 1362 kcal/d (30 kcal/kg); Weight Used for Energy Requirements: Current  Protein (g): 59gm/d (1.3gm/kg); Weight Used for Protein Requirements: Current  Fluid (ml/day): 1362 mL/d; Method Used for Fluid Requirements: 1 ml/kcal    Nutrition Related Findings:   No NFPE completed- on isolation. Per NP Mateus Prado, cachectic in appearance. No N/V/D/C reported. NPO status- h/o dysphagia. Last BM on 2/15- formed, soft. Wounds:    Skin tears       Current Nutrition Therapies:  DIET NPO  ADULT TUBE FEEDING PEG; Standard with Fiber; Delivery Method: Continuous; Continuous Initial Rate (mL/hr): 40; Continuous Advance Tube Feeding: No; Water Flush Volume (mL): 30;  Water Flush Frequency: Q 6 hours    Anthropometric Measures:  · Height:  4' 10\" (147.3 cm)  · Current Body Wt:  45.4 kg (100 lb)   · Admission Body Wt:       · Usual Body Wt:   (UTO)     · Ideal Body Wt:  90 lbs:  111.1 %   · Adjusted Body Weight:   ; Weight Adjustment for: No adjustment   · Adjusted BMI:       · BMI Category:  Underweight (BMI less than 22) age over 72       Nutrition Diagnosis:   · Inadequate oral intake related to cognitive or neurological impairment as evidenced by NPO or clear liquid status due to medical condition,nutrition support-enteral nutrition    Nutrition Interventions:   Food and/or Nutrient Delivery: Continue NPO,Continue tube feeding  Nutrition Education and Counseling: No recommendations at this time  Coordination of Nutrition Care: Continue to monitor while inpatient    Goals:  Meet >75% of EENs in 5-7 days. Maintain CBW within +/- 0.5 kg x7 days. Improve labs/lytes to WNL x7 days. Improve skin integrity. Nutrition Monitoring and Evaluation:   Behavioral-Environmental Outcomes: None identified  Food/Nutrient Intake Outcomes: Enteral nutrition intake/tolerance  Physical Signs/Symptoms Outcomes: Biochemical data,GI status,Weight,Hemodynamic status    Discharge Planning: Too soon to determine     Electronically signed by Jessica Denson RD on 2/17/2022 at 3:35 PM    Contact: ext. Javon Reynolds or Jessie.

## 2022-02-17 NOTE — PROGRESS NOTES
Difficult to get blood from patient's fingers for sugar checks. Phlebotomy could not get enough blood for morning labs from patient, CBC not sent. Patient's BP & Heart rate low, lesixs held and not given.

## 2022-02-17 NOTE — PROGRESS NOTES
Problem: Airway Clearance - Ineffective  Goal: Achieve or maintain patent airway  Outcome: Progressing Towards Goal     Problem: Gas Exchange - Impaired  Goal: Absence of hypoxia  Outcome: Progressing Towards Goal  Goal: Promote optimal lung function  Outcome: Progressing Towards Goal     Problem: Breathing Pattern - Ineffective  Goal: Ability to achieve and maintain a regular respiratory rate  Outcome: Progressing Towards Goal     Problem:  Body Temperature -  Risk of, Imbalanced  Goal: Ability to maintain a body temperature within defined limits  Outcome: Progressing Towards Goal  Goal: Will regain or maintain usual level of consciousness  Outcome: Progressing Towards Goal  Goal: Complications related to the disease process, condition or treatment will be avoided or minimized  Outcome: Progressing Towards Goal     Problem: Isolation Precautions - Risk of Spread of Infection  Goal: Prevent transmission of infectious organism to others  Outcome: Progressing Towards Goal     Problem: Nutrition Deficits  Goal: Optimize nutrtional status  Outcome: Progressing Towards Goal     Problem: Risk for Fluid Volume Deficit  Goal: Maintain normal heart rhythm  Outcome: Progressing Towards Goal  Goal: Maintain absence of muscle cramping  Outcome: Progressing Towards Goal  Goal: Maintain normal serum potassium, sodium, calcium, phosphorus, and pH  Outcome: Progressing Towards Goal     Problem: Loneliness or Risk for Loneliness  Goal: Demonstrate positive use of time alone when socialization is not possible  Outcome: Progressing Towards Goal     Problem: Fatigue  Goal: Verbalize increase energy and improved vitality  Outcome: Progressing Towards Goal     Problem: Patient Education: Go to Patient Education Activity  Goal: Patient/Family Education  Outcome: Progressing Towards Goal     Problem: Risk for Spread of Infection  Goal: Prevent transmission of infectious organism to others  Description: Prevent the transmission of infectious organisms to other patients, staff members, and visitors. Outcome: Progressing Towards Goal     Problem: Patient Education:  Go to Education Activity  Goal: Patient/Family Education  Outcome: Progressing Towards Goal     Problem: Pressure Injury - Risk of  Goal: *Prevention of pressure injury  Description: Document Charles Scale and appropriate interventions in the flowsheet. Outcome: Progressing Towards Goal  Note: Pressure Injury Interventions:  Sensory Interventions: Keep linens dry and wrinkle-free    Moisture Interventions: Absorbent underpads,Internal/External urinary devices    Activity Interventions: Pressure redistribution bed/mattress(bed type)    Mobility Interventions: Float heels    Nutrition Interventions: Document food/fluid/supplement intake    Friction and Shear Interventions: Minimize layers,Foam dressings/transparent film/skin sealants,HOB 30 degrees or less                Problem: Patient Education: Go to Patient Education Activity  Goal: Patient/Family Education  Outcome: Progressing Towards Goal     Problem: Falls - Risk of  Goal: *Absence of Falls  Description: Document Livier Fall Risk and appropriate interventions in the flowsheet.   Outcome: Progressing Towards Goal  Note: Fall Risk Interventions:       Mentation Interventions: Bed/chair exit alarm,Adequate sleep, hydration, pain control,Room close to nurse's station,Toileting rounds    Medication Interventions: Bed/chair exit alarm,Evaluate medications/consider consulting pharmacy    Elimination Interventions: Bed/chair exit alarm,Toileting schedule/hourly rounds              Problem: Patient Education: Go to Patient Education Activity  Goal: Patient/Family Education  Outcome: Progressing Towards Goal     Problem: Impaired Skin Integrity/Pressure Injury Treatment  Goal: *Improvement of Existing Pressure Injury  Outcome: Progressing Towards Goal  Goal: *Prevention of pressure injury  Description: Document Charles Scale and appropriate interventions in the flowsheet. Outcome: Progressing Towards Goal  Note: Pressure Injury Interventions:  Sensory Interventions: Keep linens dry and wrinkle-free    Moisture Interventions: Absorbent underpads,Internal/External urinary devices    Activity Interventions: Pressure redistribution bed/mattress(bed type)    Mobility Interventions: Float heels    Nutrition Interventions: Document food/fluid/supplement intake    Friction and Shear Interventions: Minimize layers,Foam dressings/transparent film/skin sealants,HOB 30 degrees or less                Problem: Patient Education: Go to Patient Education Activity  Goal: Patient/Family Education  Outcome: Progressing Towards Goal     Problem: Diabetes Self-Management  Goal: *Disease process and treatment process  Description: Define diabetes and identify own type of diabetes; list 3 options for treating diabetes. Outcome: Progressing Towards Goal  Goal: *Incorporating nutritional management into lifestyle  Description: Describe effect of type, amount and timing of food on blood glucose; list 3 methods for planning meals. Outcome: Progressing Towards Goal  Goal: *Incorporating physical activity into lifestyle  Description: State effect of exercise on blood glucose levels. Outcome: Progressing Towards Goal  Goal: *Developing strategies to promote health/change behavior  Description: Define the ABC's of diabetes; identify appropriate screenings, schedule and personal plan for screenings. Outcome: Progressing Towards Goal  Goal: *Using medications safely  Description: State effect of diabetes medications on diabetes; name diabetes medication taking, action and side effects. Outcome: Progressing Towards Goal  Goal: *Monitoring blood glucose, interpreting and using results  Description: Identify recommended blood glucose targets  and personal targets.   Outcome: Progressing Towards Goal  Goal: *Prevention, detection, treatment of acute complications  Description: List symptoms of hyper- and hypoglycemia; describe how to treat low blood sugar and actions for lowering  high blood glucose level. Outcome: Progressing Towards Goal  Goal: *Prevention, detection and treatment of chronic complications  Description: Define the natural course of diabetes and describe the relationship of blood glucose levels to long term complications of diabetes.   Outcome: Progressing Towards Goal  Goal: *Developing strategies to address psychosocial issues  Description: Describe feelings about living with diabetes; identify support needed and support network  Outcome: Progressing Towards Goal  Goal: *Insulin pump training  Outcome: Progressing Towards Goal  Goal: *Sick day guidelines  Outcome: Progressing Towards Goal  Goal: *Patient Specific Goal (EDIT GOAL, INSERT TEXT)  Outcome: Progressing Towards Goal     Problem: Patient Education: Go to Patient Education Activity  Goal: Patient/Family Education  Outcome: Progressing Towards Goal

## 2022-02-18 NOTE — PROGRESS NOTES
Problem: Airway Clearance - Ineffective  Goal: Achieve or maintain patent airway  Outcome: Progressing Towards Goal     Problem: Gas Exchange - Impaired  Goal: Absence of hypoxia  Outcome: Progressing Towards Goal  Goal: Promote optimal lung function  Outcome: Progressing Towards Goal     Problem: Breathing Pattern - Ineffective  Goal: Ability to achieve and maintain a regular respiratory rate  Outcome: Progressing Towards Goal     Problem:  Body Temperature -  Risk of, Imbalanced  Goal: Ability to maintain a body temperature within defined limits  Outcome: Progressing Towards Goal  Goal: Will regain or maintain usual level of consciousness  Outcome: Progressing Towards Goal  Goal: Complications related to the disease process, condition or treatment will be avoided or minimized  Outcome: Progressing Towards Goal     Problem: Isolation Precautions - Risk of Spread of Infection  Goal: Prevent transmission of infectious organism to others  Outcome: Progressing Towards Goal     Problem: Nutrition Deficits  Goal: Optimize nutrtional status  Outcome: Progressing Towards Goal     Problem: Risk for Fluid Volume Deficit  Goal: Maintain normal heart rhythm  Outcome: Progressing Towards Goal  Goal: Maintain absence of muscle cramping  Outcome: Progressing Towards Goal  Goal: Maintain normal serum potassium, sodium, calcium, phosphorus, and pH  Outcome: Progressing Towards Goal     Problem: Loneliness or Risk for Loneliness  Goal: Demonstrate positive use of time alone when socialization is not possible  Outcome: Progressing Towards Goal     Problem: Fatigue  Goal: Verbalize increase energy and improved vitality  Outcome: Progressing Towards Goal     Problem: Patient Education: Go to Patient Education Activity  Goal: Patient/Family Education  Outcome: Progressing Towards Goal     Problem: Risk for Spread of Infection  Goal: Prevent transmission of infectious organism to others  Description: Prevent the transmission of infectious organisms to other patients, staff members, and visitors. Outcome: Progressing Towards Goal     Problem: Patient Education:  Go to Education Activity  Goal: Patient/Family Education  Outcome: Progressing Towards Goal     Problem: Pressure Injury - Risk of  Goal: *Prevention of pressure injury  Description: Document Charles Scale and appropriate interventions in the flowsheet. Outcome: Progressing Towards Goal  Note: Pressure Injury Interventions:  Sensory Interventions: Keep linens dry and wrinkle-free    Moisture Interventions: Absorbent underpads    Activity Interventions: Pressure redistribution bed/mattress(bed type)    Mobility Interventions: HOB 30 degrees or less    Nutrition Interventions: Document food/fluid/supplement intake    Friction and Shear Interventions: Minimize layers                Problem: Patient Education: Go to Patient Education Activity  Goal: Patient/Family Education  Outcome: Progressing Towards Goal     Problem: Falls - Risk of  Goal: *Absence of Falls  Description: Document Livier Fall Risk and appropriate interventions in the flowsheet. Outcome: Progressing Towards Goal     Problem: Patient Education: Go to Patient Education Activity  Goal: Patient/Family Education  Outcome: Progressing Towards Goal     Problem: Impaired Skin Integrity/Pressure Injury Treatment  Goal: *Improvement of Existing Pressure Injury  Outcome: Progressing Towards Goal  Goal: *Prevention of pressure injury  Description: Document Charles Scale and appropriate interventions in the flowsheet. Outcome: Progressing Towards Goal     Problem: Patient Education: Go to Patient Education Activity  Goal: Patient/Family Education  Outcome: Progressing Towards Goal     Problem: Diabetes Self-Management  Goal: *Disease process and treatment process  Description: Define diabetes and identify own type of diabetes; list 3 options for treating diabetes.   Outcome: Progressing Towards Goal  Goal: *Incorporating nutritional management into lifestyle  Description: Describe effect of type, amount and timing of food on blood glucose; list 3 methods for planning meals. Outcome: Progressing Towards Goal  Goal: *Incorporating physical activity into lifestyle  Description: State effect of exercise on blood glucose levels. Outcome: Progressing Towards Goal  Goal: *Developing strategies to promote health/change behavior  Description: Define the ABC's of diabetes; identify appropriate screenings, schedule and personal plan for screenings. Outcome: Progressing Towards Goal  Goal: *Using medications safely  Description: State effect of diabetes medications on diabetes; name diabetes medication taking, action and side effects. Outcome: Progressing Towards Goal  Goal: *Monitoring blood glucose, interpreting and using results  Description: Identify recommended blood glucose targets  and personal targets. Outcome: Progressing Towards Goal  Goal: *Prevention, detection, treatment of acute complications  Description: List symptoms of hyper- and hypoglycemia; describe how to treat low blood sugar and actions for lowering  high blood glucose level. Outcome: Progressing Towards Goal  Goal: *Prevention, detection and treatment of chronic complications  Description: Define the natural course of diabetes and describe the relationship of blood glucose levels to long term complications of diabetes.   Outcome: Progressing Towards Goal  Goal: *Developing strategies to address psychosocial issues  Description: Describe feelings about living with diabetes; identify support needed and support network  Outcome: Progressing Towards Goal  Goal: *Insulin pump training  Outcome: Progressing Towards Goal  Goal: *Sick day guidelines  Outcome: Progressing Towards Goal  Goal: *Patient Specific Goal (EDIT GOAL, INSERT TEXT)  Outcome: Progressing Towards Goal     Problem: Patient Education: Go to Patient Education Activity  Goal: Patient/Family Education  Outcome: Progressing Towards Goal

## 2022-02-18 NOTE — PROGRESS NOTES
Patient being discharged home today with family/son. Will be going home with home health with [facility], oxygen with [facility], and IV infusion/Tube feed with [facility]. She will also be getting blood glucose monitor through [employer] as well. Discussed with patient son, Dr. Ale Yousif and he is in agreement with discharge plan. Transportation to be set up for patient. Discharge plan of care/case management plan validated with provider discharge order. Medicare pt has received, reviewed, and signed 2nd IM letter informing them of their right to appeal the discharge. Signed copied has been placed on pt bedside chart. Please call son Dr. Ale Yousif when patient is leaving and please have all her belongings including [facility] literature and her blanket from home.     Phone number: [phone]

## 2022-02-18 NOTE — PROGRESS NOTES
Progress Note    Patient: Ann Del Rio MRN: [id number]  SSN: [id number]    YOB: 1922  Age: 80 y.o. Sex: female      Admit Date: 2/9/2022    LOS: 9 days     Subjective:   Patient followed for sepsis with sacral wound infection and right foot infection involving MRSA and MDR Acinetobacter. She is currently on Vancomycin and Cefepime (after clinical failure to [facility] with resistance demonstrated). She is afebrile  has low grade temperature with now normal WBC and decreasing proca but CRP increased. She was scheduled for discharge yesterday but apparently will be discharged today. She is lying in bed but appears to be resting comfortably. Objective:     Vitals:    02/17/22 2027 02/17/22 2143 02/17/22 2241 02/18/22 0233   BP: 111/63      Pulse: (!) 58 63     Resp: 16      Temp: 97.6 °F (36.4 °C)      SpO2:   93% 93%   Weight:       Height:            Intake and Output:  Current Shift: No intake/output data recorded. Last three shifts: 02/16 1901 - 02/18 0700  In: 2588   Out: [address] [facility] [Urine:1175]    Physical Exam:   Vitals and nursing note reviewed. Constitutional:       General: She is not in acute distress. Appearance: She is chronically ill appearing   HENT:      Comments: Nasal O2 cannula 2 L/min  Eyes:      Pupils: Pupils are equal, round, and reactive to light. Cardiovascular:      Rate and Rhythm: Normal rate and regular rhythm. Heart sounds: No murmur heard. Pulmonary:      Breath sounds: Rhonchi and rales present. Abdominal:      General: Bowel sounds are normal.      Palpations: Abdomen is soft. Tenderness: There is no abdominal tenderness.    Genitourinary:     Comments: No Govea  Musculoskeletal: right foot and left calf bandaged  Skin: ecchymoses on face      Comments: Large open sacral wound Stage 3  Right foot with bulky dressing not visualized today  Neurological:      Comments: Unable to assess   Psychiatric:      Comments: Unable to assess      Lab/Data Review:     WBC 9,000     <261 < 310  Ferritin 205 <229    Procal 0.38 < 0.35 <0.44 <0.15 <0.22  CRP 7.40 <5.78 <6.48 <3.79 <2.45 <8.24 <11.20    Pleural fluid with 66 WBC with 41% mesothelial cells  Pleural fluid LDH (2/14) 99  Pleural fluid protein 1.9    Blood cultures (2/10) No growth FINAL  Sacral wound culture (2/10) Moderate MRSA and heavy MDR Acinetobacter baumannii RESISTANT to Avycaz and Zerbaxa, intermediate to Cefepime  Right foot wound culture (2/10) MRSA and  MDR Acinetobacter baumanii  Pleural fluid (2/14) No growth thus far  Assessment:     Active Problems:    Pneumonia due to COVID-19 virus (2/9/2022)      Decubitus ulcer (2/16/2022)      Sepsis ([address] (2/21/8301)      Metabolic encephalopathy (7/05/2403)      Chronic bilateral pleural effusions (2/16/2022)      Respiratory failure with hypoxia (HCC) (2/16/2022)      Fluid overload (2/16/2022)      Elevated brain natriuretic peptide (BNP) level (2/16/2022)      Severe mitral valve regurgitation (2/16/2022)      Severe tricuspid valve regurgitation (2/16/2022)      Failure to thrive (child) (2/16/2022)    1. Sacral wound infection, secondary to MRSA and MDR Acinetobacter baumannii, Day #10 IV Vancomycin and Day #4 IV Cefepime  2. Stage 3 sacral wound  3. Right foot wound infection, secondary to MRSA and MDR Acinetobacter baumannii, on IV antibiotics above  4. Covid-19 infection with no clear evidence of pneumonitis  5. Sepsis with leukocytosis, elevated procal and CRP, worsening  6. Altered mental status      Comments:   WBC remains normal with decreasing procal but CRP increased today. Long term prognosis appears to be fair to poor. Plan:   1. Continue IV Vancomycin and Cefepime for 2 more weeks (Rx on chart)  2. Follow-up blood cultures and pleural fluid culture  3. No specific treatment for Covid-19 at this time  4.  Cleared for discharge from ID standpoint    Signed By: Isaiah Hopper MD     February 18, 2022

## 2022-02-18 NOTE — DISCHARGE SUMMARY
Hospitalist Discharge Summary     Patient ID:    Sohan Cornelius  [id number]  22 y.o.  3/25/1922    Admit date: 2/9/2022    Discharge date : 2/18/2022    Chronic Diagnoses:    Problem List as of 2/18/2022 Date Reviewed: 1/5/2022          Codes Class Noted - Resolved    Decubitus ulcer ICD-10-CM: L89.90  ICD-9-CM: 707.00, 707.20  2/16/2022 - Present        Sepsis ([address] ICD-10-CM: A41.9  ICD-9-CM: 038.9, 995.91  2/16/2022 - Present        Metabolic encephalopathy NKE-[id number]: G93.41  ICD-9-CM: 348.31  2/16/2022 - Present        Chronic bilateral pleural effusions ICD-10-CM: J90  ICD-9-CM: 511.9  2/16/2022 - Present        Respiratory failure with hypoxia (HCC) ICD-10-CM: J96.91  ICD-9-CM: 518.81  2/16/2022 - Present        Fluid overload ICD-10-CM: E87.70  ICD-9-CM: 276.69  2/16/2022 - Present        Elevated brain natriuretic peptide (BNP) level ICD-10-CM: R79.89  ICD-9-CM: 790.99  2/16/2022 - Present        Severe mitral valve regurgitation ICD-10-CM: I34.0  ICD-9-CM: 424.0  2/16/2022 - Present        Severe tricuspid valve regurgitation ICD-10-CM: I07.1  ICD-9-CM: 397.0  2/16/2022 - Present        Failure to thrive (child) ICD-10-CM: R62.51  ICD-9-CM: 783.41  2/16/2022 - Present        Pneumonia due to COVID-19 virus ICD-10-CM: U07.1, J12.82  ICD-9-CM: 480.8, 079.89  2/9/2022 - Present        Hypoxia ICD-10-CM: R09.02  ICD-9-CM: 799.02  1/24/2022 - Present        COVID-19 virus infection ICD-10-CM: U07.1  ICD-9-CM: 079.89  1/24/2022 - Present        Pressure injury of sacral region, [facility]) ICD-10-CM: L89.150  ICD-9-CM: 707.03, 707.25  12/30/2021 - Present        Pressure ulcer of sacral region, [facility]) ICD-10-CM: L89.150  ICD-9-CM: 707.03, 707.25  12/30/2021 - Present        Aspiration pneumonia ([facility] ICD-10-CM: J69.0  ICD-9-CM: 507.0  12/16/2021 - Present        Acute CHF (congestive heart failure) ([facility] ICD-10-CM: I50.9  ICD-9-CM: 428.0  3/19/2019 - Present        RESOLVED: SOB (shortness of breath) ICD-10-CM: R06.02  ICD-9-CM: 786.05  9/10/2014 - 9/12/2014        RESOLVED: Elevated troponin ICD-10-CM: R77.8  ICD-9-CM: 790.6  9/10/2014 - 9/12/2014          22    Final Diagnoses: Active Problems:    Pneumonia due to COVID-19 virus (2/9/2022)      Decubitus ulcer (2/16/2022)      Sepsis ([address] (1/67/4657)      Metabolic encephalopathy (0/95/5556)      Chronic bilateral pleural effusions (2/16/2022)      Respiratory failure with hypoxia (HCC) (2/16/2022)      Fluid overload (2/16/2022)      Elevated brain natriuretic peptide (BNP) level (2/16/2022)      Severe mitral valve regurgitation (2/16/2022)      Severe tricuspid valve regurgitation (2/16/2022)      Failure to thrive (child) (2/16/2022)        Reason for Hospitalization/Hospital Course:   Patient is a 19-year-old female with a history of CKD, hypertension, stroke with left residual defects, bradycardia status post AICD 2 months ago, dysphagia status post PEG tube the presented to the emergency room on 2/9/2022 for altered mental status and fever. Patient son who is a physician was trying to treat patient for fluid overload and possible aspiration pneumonia with Lasix and amoxicillin. However she became tachypneic and hypoxic. In the ED patient was found to be Covid positive. Laboratory data was significant for a WBC of 25,000. D-dimer 8.87, BNP 15,430. Patient was started on IV vancomycin, Zosyn, Decadron, vitamin C, zinc.  Infectious disease consulted. Wound cultures shows multi resistant Acinetobacter baumannii and MRSA. IV Zosyn was discontinued on 2/13/2022. Started on Avycaz per ID and continue with Vancomycin. Culture sensitivities for right foot wound show resistance to Avycaz, switched to cefepime. 2D echo pending shows normal left ventricular size and systolic function with an EF of 55 to 52%, normal diastolic function, severe mitral and tricuspid regurgitation, moderate pulmonic regurgitation.   Patient is on IV Lasix. Patient glucose have been labile. Hemoglobin A1c is 6.7. Repeat chest x-ray shows moderate to large volume right pleural effusion and moderate volume left pleural effusion. IR consulted and patient had a thoracentesis on 2/14/2022.  810 mL thin yellow fluid removed. Pleural fluid studies are pending. Patient continues on IV vancomycin and cefepime. Midline in place. Patient qualifies for continuous home oxygen which will be arranged at discharge. Plan to discharge home on IV antibiotics for total of 3 more weeks. Discharge Medications:   Current Discharge Medication List      START taking these medications    Details   insulin lispro (HUMALOG) 100 unit/mL injection INITIATE CORRECTIVE INSULIN PROTOCOL (RASHEEDA):  RX RASHEEDA Normal Sensitivity (Average weight)    AC (before meals), Q6H, and Q4H CORRECTIONAL SCALE only For Blood Sugar (mg/dl) of :             140-199=2 units            200-249=3 units  250-299=5 units  300-349=7 units  350 or greater = Call MD  Give in addition to basal medications. Do Not Hold for NPO    BEDTIME CORRECTIONAL sliding scale when scheduled:  200-249=2 units  250-299=3 units   300-349=4 units  350 or greater = Call MD  Give in addition to basal medications. Do Not Hold for NPO Fast Acting - Administer Immediately - or within 15 minutes of start of meal, if mealtime coverage. Qty: 1 Each, Refills: 0  Start date: 2/18/2022      insulin pump-infus.  set-meter (Accu-Chek Combo System) kit For Accu-Cheks before meals and at bedtime  Qty: 1 Kit, Refills: 0  Start date: 2/18/2022      Insulin Syringe-Needle, Dis Un 0.5 mL 29 gauge x 1/2\" syrg 1 box of 100 syringes, for sliding scale insulin  Qty: 1 Each, Refills: 0  Start date: 2/18/2022      Lancing Device with Lancets (Accu-Chek Multiclix Lancet) kit Used to check blood sugars before meals and at bedtime  Qty: 1 Kit, Refills: 0  Start date: 2/18/2022      cefepime (MAXIPIME) 2 gram injection 2,000 mg by IntraVENous route every twelve (12) hours for 14 days. Qty: 56 g, Refills: 0  Start date: 2/17/2022, End date: 3/3/2022      vancomycin (VANCOCIN) 500 mg injection 500 mg by IntraVENous route every twenty-four (24) hours for 14 doses. Qty: 7000 mg, Refills: 0  Start date: 2/17/2022, End date: 3/3/2022         CONTINUE these medications which have NOT CHANGED    Details   Eliquis 2.5 mg tablet Take 2.5 mg by mouth two (2) times a day. furosemide (LASIX) 40 mg tablet 40 mg daily. acetaminophen (TYLENOL) 325 mg tablet Take 2 Tablets by mouth every six (6) hours as needed for Pain or Fever. Qty: 60 Tablet, Refills: 0      ascorbic acid, vitamin C, (VITAMIN C) 500 mg tablet Take 1 Tablet by mouth two (2) times a day. Qty: 60 Tablet, Refills: 0      zinc sulfate (ZINCATE) 50 mg zinc (220 mg) capsule Take 1 Capsule by mouth daily. Qty: 30 Capsule, Refills: 0         STOP taking these medications       meropenem 500 mg IV syringe Comments:   Reason for Stopping: Follow up Care:    1. Hugo Nettles MD in 1-2 weeks. Follow-up Information     Follow up With Specialties Details Why Contact Info    Hugo Nettles MD Family Medicine On 3/4/2022 Appointment Time:[phone]  [phone]              Patient Follow Up Instructions: Activity: Bedrest  Diet:  Enteral Access:PEG Formula:Standard with Fiber Delivery Method:Continuous Continuous Initial Rate (mL/hr):40 Continuous Advance Tube Feeding:No Water Flush Volume (mL):120 Water Flush Frequency:Q 4 hours Delivery Method Comments:Jevity 1.5. For cyclic feedings, if infusion not started on time, still run tube feeding for prescribed duration of time. Wound Care: For sacrum: rinse with NS, pack lightly with Opticell Ag, and cover with Optifoam Border Sacral foam dressing daily and prn for soiling.    For skin tears to Vlad, LLE, right heel lateral, and right foot medial distal: rinse with NS, apply Therahoney gel, and cover with Optifoam Gentle Lite every other day and prn for soiling. DO NOT apply adhesives or Band-Aids to skin if avoidable. Condition at Discharge:  Stable  __________________________________________________________________    Disposition  Home Health Care Svc  ____________________________________________________________________    Code Status:  Full Code  ___________________________________________________________________    Discharge Exam:  Patient seen and examined by me on discharge day. Pertinent Findings:  Gen:    Not in distress, emaciated, cachectic, failure to thrive  Chest: Clear lungs  CVS:   Regular rhythm. No edema  Abd:  Soft, not distended, not tender  Neuro: Lethargic, unresponsive, aphasic, moans with turning        CONSULTATIONS: ID    Significant Diagnostic Studies:   Recent Results (from the past 24 hour(s))   GLUCOSE, POC    Collection Time: 02/17/22  4:21 PM   Result Value Ref Range    Glucose (POC) 88 65 - 117 mg/dL    Performed by Cathy Montague, POC    Collection Time: 02/17/22  8:55 PM   Result Value Ref Range    Glucose (POC) 180 (H) 65 - 117 mg/dL    Performed by Shruthi Elizalde    CBC WITH AUTOMATED DIFF    Collection Time: 02/18/22  6:47 AM   Result Value Ref Range    WBC 9.0 3.6 - 11.0 K/uL    RBC 2.48 (L) 3.80 - 5.20 M/uL    HGB 7.2 (L) 11.5 - 16.0 g/dL    HCT 24.1 (L) 35.0 - 47.0 %    MCV 97.2 80.0 - 99.0 FL    MCH 29.0 26.0 - 34.0 PG    MCHC 29.9 (L) 30.0 - 36.5 g/dL    RDW 21.4 (H) 11.5 - 14.5 %    PLATELET 141 (L) 049 - 400 K/uL    MPV 11.3 8.9 - 12.9 FL    NRBC 0.8 (H) 0.0  WBC    ABSOLUTE NRBC 0.07 (H) 0.00 - 0.01 K/uL    NEUTROPHILS 80 (H) 32 - 75 %    LYMPHOCYTES 10 (L) 12 - 49 %    MONOCYTES 5 5 - 13 %    EOSINOPHILS 4 0 - 7 %    BASOPHILS 0 0 - 1 %    IMMATURE GRANULOCYTES 1 (H) 0 - 0.5 %    ABS. NEUTROPHILS 7.3 1.8 - 8.0 K/UL    ABS. LYMPHOCYTES 0.9 0.8 - 3.5 K/UL    ABS. MONOCYTES 0.4 0.0 - 1.0 K/UL    ABS.  EOSINOPHILS 0.4 0.0 - 0.4 K/UL ABS. BASOPHILS 0.0 0.0 - 0.1 K/UL    ABS. IMM. GRANS. 0.1 (H) 0.00 - 0.04 K/UL    DF AUTOMATED     METABOLIC PANEL, BASIC    Collection Time: 02/18/22  6:47 AM   Result Value Ref Range    Sodium 153 (H) 136 - 145 mmol/L    Potassium 3.4 (L) 3.5 - 5.1 mmol/L    Chloride 116 (H) 97 - 108 mmol/L    CO2 33 (H) 21 - 32 mmol/L    Anion gap 4 (L) 5 - 15 mmol/L    Glucose 204 (H) 65 - 100 mg/dL    BUN 73 (H) 6 - 20 mg/dL    Creatinine 0.86 0.55 - 1.02 mg/dL    BUN/Creatinine ratio 85 (H) 12 - 20      GFR est AA >60 >60 ml/min/1.73m2    GFR est non-AA >60 >60 ml/min/1.73m2    Calcium 9.4 8.5 - 10.1 mg/dL   C REACTIVE PROTEIN, QT    Collection Time: 02/18/22  6:47 AM   Result Value Ref Range    C-Reactive protein 7.40 (H) 0.00 - 0.60 mg/dL   PROCALCITONIN    Collection Time: 02/18/22  6:47 AM   Result Value Ref Range    Procalcitonin 0.38 (H) 0 ng/mL   GLUCOSE, POC    Collection Time: 02/18/22  7:52 AM   Result Value Ref Range    Glucose (POC) 207 (H) 65 - 117 mg/dL    Performed by Pebbles OSORIO    GLUCOSE, POC    Collection Time: 02/18/22 11:10 AM   Result Value Ref Range    Glucose (POC) 173 (H) 65 - 117 mg/dL    Performed by Jethro Cedillo    COVID-19 RAPID TEST    Collection Time: 02/18/22 11:30 AM   Result Value Ref Range    Specimen source Nasopharyngeal      COVID-19 rapid test DETECTED (A) Not Detected       IR THORACENTESIS NDL PUNC ASP W IMAGE   Final Result   Successful right thoracentesis.       XR CHEST PORT   Final Result      XR CHEST PORT   Final Result      XR CHEST PORT   Final Result          Time spent in direct and indirect care including coordination of services: Greater than 35 minutes    Signed:  Brant Krause NP  2/18/2022  11:33 AM

## 2022-02-18 NOTE — PROGRESS NOTES
CM reviewed patients chart. Patient scheduled for discharge today. [facility] will be providing home health services and [facility] is expecting to deliver the patients O2. [facility] will be educating the patients son on how to administer the IV abx when patient returns home. CM will call patients son after morning rounds with updates as to anticipated plan for patients discharge.

## 2022-02-18 NOTE — PROGRESS NOTES
OT eval order received and acknowledged. Per nursing and chart review, pt not following commands and not appropriate for therapy at this time. Pt discussed with NP Valeria Santos and agreeable for therapy to D/C therapy orders at this time. Will continue to follow pt and attempt OT eval at a later time. Thank you.

## 2022-02-20 PROBLEM — R65.21 SEPTIC SHOCK (HCC): Status: ACTIVE | Noted: 2022-01-01

## 2022-02-20 PROBLEM — A41.9 SEPTIC SHOCK (HCC): Status: ACTIVE | Noted: 2022-01-01

## 2022-02-20 NOTE — ED TRIAGE NOTES
Increasing respiratory distress since dx of covid pneumonia earlier this week. Pt has multiple wounds from bed ridden status. Currently on abx through PICC Left upper arm Patient states she was seen for spotting, and since she didn't have a uterus, Dr. Jimenez thought yeast infection.  She has been taking medication, and the spotting did stop, but now it is back.  She states she feels dry and itchy.

## 2022-02-20 NOTE — CONSULTS
[id number] [facility] SURGERY CONSULT          Chief Complaint: Multiple pressure ulcers    History of Present Illness:    Ms. Lori Terry is a 80y.o. year old * female brought to the emergency room due to fever, altered mental status with renal failure and multiple pressure ulcers. Patient is nonverbal noncommunicative. .      Past Medical History:   Past Medical History:   Diagnosis Date    Chronic kidney disease     acute tubual necrosis    Clostridium difficile infection     Elevated troponin 9/10/2014    Hypertension     Skin cancer     Stroke [facility])     2001 left residual       Past Surgical History:   Past Surgical History:   Procedure Laterality Date    HX APPENDECTOMY  2/2008    HX HEENT      cataract    HX ORTHOPAEDIC  04/2010    left total hip - hip fx, left knee surgery    IR FLUORO GUIDE PLC CVAD  1/6/2022    IR THORACENTESIS NDL PUNC ASP W IMAGE  2/14/2022        Allergy:  Allergies   Allergen Reactions    Neosporin [Benzalkonium Chloride] Rash       Social History:  reports that she has never smoked. She has never used smokeless tobacco. She reports that she does not drink alcohol and does not use drugs.      Family History:  Family History   Family history unknown: Yes        Current Medications:  Current Facility-Administered Medications:     sodium chloride (NS) flush 5-40 mL, 5-40 mL, IntraVENous, Q8H, Zara Stewart MD, 10 mL at 02/20/22 1840    sodium chloride (NS) flush 5-40 mL, 5-40 mL, IntraVENous, PRN, Simona Angel MD    acetaminophen (TYLENOL) tablet 650 mg, 650 mg, Oral, Q6H PRN **OR** acetaminophen (TYLENOL) suppository 650 mg, 650 mg, Rectal, Q6H PRN, Mando Stewart MD    polyethylene glycol [facility]) packet 17 g, 17 g, Oral, DAILY PRN, Mando Stewart MD    ondansetron [facility] ODT) tablet 4 mg, 4 mg, Oral, Q8H PRN **OR** ondansetron (ZOFRAN) injection 4 mg, 4 mg, IntraVENous, Q6H PRN, Simona Angel MD    0.9% sodium chloride infusion, 75 mL/hr, IntraVENous, CONTINUOUS, Mando Stewart Rodena Phoenix, MD, Last Rate: 75 mL/hr at 02/20/22 2776, 75 mL/hr at 02/20/22 5037    ascorbic acid (vitamin C) (VITAMIN C) tablet 500 mg, 500 mg, Oral, BID, Amelia Spare, Mir Rodena Phoenix, MD    cefepime (MAXIPIME) injection 2 g, 2 g, IntraVENous, Q12H, Amelia Spare, Mir Rodena Phoenix, MD    apixaban [facility]) tablet 2.5 mg, 2.5 mg, Oral, BID, Amelia Spare, Mir Rodena Phoenix, MD  Yari Gannon ON 2/21/2022] zinc sulfate (ZINCATE) 50 mg zinc (220 mg) capsule 1 Capsule, 1 Capsule, Oral, DAILY, Amelia Spare, Mir Rodena Phoenix, MD    Current Outpatient Medications:     insulin lispro (HUMALOG) 100 unit/mL injection, INITIATE CORRECTIVE INSULIN PROTOCOL (RASHEEDA): RX RASHEEDA Normal Sensitivity (Average weight) AC (before meals), Q6H, and Q4H CORRECTIONAL SCALE only For Blood Sugar (mg/dl) of :           140-199=2 units          200-249=3 units 250-299=5 units 300-349=7 units 350 or greater = Call MD Give in addition to basal medications. Do Not Hold for NPO BEDTIME CORRECTIONAL sliding scale when scheduled: 200-249=2 units 250-299=3 units 300-349=4 units 350 or greater = Call MD Give in addition to basal medications. Do Not Hold for NPO Fast Acting - Administer Immediately - or within 15 minutes of start of meal, if mealtime coverage. , Disp: 1 Each, Rfl: 0    insulin pump-infus. set-meter (Accu-Chek Combo System) kit, For Accu-Cheks before meals and at bedtime, Disp: 1 Kit, Rfl: 0    Insulin Syringe-Needle, Dis Un 0.5 mL 29 gauge x 1/2\" syrg, 1 box of 100 syringes, for sliding scale insulin, Disp: 1 Each, Rfl: 0    Lancing Device with Lancets (Accu-Chek Multiclix Lancet) kit, Used to check blood sugars before meals and at bedtime, Disp: 1 Kit, Rfl: 0    cefepime (MAXIPIME) 2 gram injection, 2,000 mg by IntraVENous route every twelve (12) hours for 14 days. , Disp: 56 g, Rfl: 0    vancomycin (VANCOCIN) 500 mg injection, 500 mg by IntraVENous route every twenty-four (24) hours for 14 doses. , Disp: 7000 mg, Rfl: 0    Eliquis 2.5 mg tablet, Take 2.5 mg by mouth two (2) times a day., Disp: , Rfl:    furosemide (LASIX) 40 mg tablet, 40 mg daily. , Disp: , Rfl:     acetaminophen (TYLENOL) 325 mg tablet, Take 2 Tablets by mouth every six (6) hours as needed for Pain or Fever., Disp: 60 Tablet, Rfl: 0    ascorbic acid, vitamin C, (VITAMIN C) 500 mg tablet, Take 1 Tablet by mouth two (2) times a day., Disp: 60 Tablet, Rfl: 0    zinc sulfate (ZINCATE) 50 mg zinc (220 mg) capsule, Take 1 Capsule by mouth daily. , Disp: 30 Capsule, Rfl: 0     Immunization History:   Most Recent Immunizations   Administered Date(s) Administered    Pneumococcal Vaccine (Unspecified Type) 01/01/2007      Complete    Review of Systems: Unable to complete    Physical Exam:     Vitals & Measurements: Wt Readings from Last 3 Encounters:   02/20/22 45.4 kg (100 lb)   02/15/22 45.4 kg (100 lb)   01/23/22 45.4 kg (100 lb)     Temp Readings from Last 3 Encounters:   02/20/22 98.2 °F (36.8 °C)   02/17/22 97.6 °F (36.4 °C)   01/24/22 98.2 °F (36.8 °C)     BP Readings from Last 3 Encounters:   02/20/22 (!) 82/55   02/17/22 111/63   01/24/22 118/61     Pulse Readings from Last 3 Encounters:   02/20/22 60   02/17/22 63   01/24/22 62      Ht Readings from Last 3 Encounters:   02/20/22 4' 11\" (1.499 m)   02/17/22 4' 10\" (1.473 m)   01/24/22 4' 10\" (1.473 m)          General: Ill appearing, emaciated  Head: Normal  Face: Nornal  HEENT: atraumatic, PERRLA, moist mucosa, normal pharynx, normal tonsils and adenoids, normal tongue, no fluid in sinuses  Neck: Trachea midline, no carotid bruit, no masses  Chest: Normal.  Respiratory: Normal chest wall expansion, CTA B, no r/r/w, no rubs  Cardiovascular: RRR, no m/r/g, Normal S1 and S2  Abdomen: Soft, non tender, non-distended, normal bowel sounds in all quadrants, no hepatosplenomegaly, no tympany. Incision scar:   Musculoskeletal: Contracted.   Integumentary: dry and scaly  Heme/Lymph: No lymphadenopathy, no bruises  Neurological: Somnolent   Psychiatric: Unable to evaluate    Local examination of the sacral: Stage IV ulcer with some exudate. Local examination of the left hip: 1 cm x 2 cm ulcer with some exudate. Laboratory Values:   Recent Results (from the past 24 hour(s))   CBC WITH AUTOMATED DIFF    Collection Time: 02/20/22  3:06 PM   Result Value Ref Range    WBC 10.9 3.6 - 11.0 K/uL    RBC 2.28 (L) 3.80 - 5.20 M/uL    HGB 6.7 (L) 11.5 - 16.0 g/dL    HCT 22.8 (L) 35.0 - 47.0 %    .0 (H) 80.0 - 99.0 FL    MCH 29.4 26.0 - 34.0 PG    MCHC 29.4 (L) 30.0 - 36.5 g/dL    RDW 22.4 (H) 11.5 - 14.5 %    PLATELET 308 (L) 351 - 400 K/uL    MPV 12.1 8.9 - 12.9 FL    NRBC 3.6 (H) 0.0  WBC    ABSOLUTE NRBC 0.39 (H) 0.00 - 0.01 K/uL    NEUTROPHILS 76 (H) 32 - 75 %    LYMPHOCYTES 11 (L) 12 - 49 %    MONOCYTES 9 5 - 13 %    EOSINOPHILS 2 0 - 7 %    BASOPHILS 0 0 - 1 %    IMMATURE GRANULOCYTES 2 (H) 0 - 0.5 %    ABS. NEUTROPHILS 8.3 (H) 1.8 - 8.0 K/UL    ABS. LYMPHOCYTES 1.2 0.8 - 3.5 K/UL    ABS. MONOCYTES 1.0 0.0 - 1.0 K/UL    ABS. EOSINOPHILS 0.2 0.0 - 0.4 K/UL    ABS. BASOPHILS 0.0 0.0 - 0.1 K/UL    ABS. IMM. GRANS. 0.2 (H) 0.00 - 0.04 K/UL    DF AUTOMATED     METABOLIC PANEL, COMPREHENSIVE    Collection Time: 02/20/22  3:06 PM   Result Value Ref Range    Sodium 151 (H) 136 - 145 mmol/L    Potassium 4.2 3.5 - 5.1 mmol/L    Chloride 116 (H) 97 - 108 mmol/L    CO2 29 21 - 32 mmol/L    Anion gap 6 5 - 15 mmol/L    Glucose 110 (H) 65 - 100 mg/dL     (H) 6 - 20 mg/dL    Creatinine 2.07 (H) 0.55 - 1.02 mg/dL    BUN/Creatinine ratio 56 (H) 12 - 20      GFR est AA 27 (L) >60 ml/min/1.73m2    GFR est non-AA 22 (L) >60 ml/min/1.73m2    Calcium 8.9 8.5 - 10.1 mg/dL    Bilirubin, total 1.3 (H) 0.2 - 1.0 mg/dL    AST (SGOT) 29 15 - 37 U/L    ALT (SGPT) 25 12 - 78 U/L    Alk.  phosphatase 210 (H) 45 - 117 U/L    Protein, total 6.5 6.4 - 8.2 g/dL    Albumin 2.5 (L) 3.5 - 5.0 g/dL    Globulin 4.0 2.0 - 4.0 g/dL    A-G Ratio 0.6 (L) 1.1 - 2.2     LACTIC ACID    Collection Time: 02/20/22  3:06 PM   Result Value Ref Range    Lactic acid 3.6 (HH) 0.4 - 2.0 mmol/L         XR CHEST PORT   Final Result   Decrease in bilateral pleural effusions. Unchanged moderate perihilar vascular congestion and interstitial edema. Assessment:  1. Stage IV sacral pressure ulcer    2. Left hip pressure ulcer    3. Acute kidney injury    4. Sepsis    Plan:    1. Admission  2. Diet: As tolerated  3. IV fluids  4. SCD  5. IS  6. Antibiotics ID  7. Nausea medication  8. Labs in am  9. Local wound care  10. Nutrition  11. Air mattress  12. Turn & reposition Q2 hours. 13. No debridement needed     Thank you for the consultation & allowing me to participate in the care of this patient.

## 2022-02-20 NOTE — CONSULTS
Consult Date: 2/20/2022    Consults  Chronic sacral wounds    Subjective  This is a 80year old female, known to me, just discharged on Friday after being followed for sacral wound infection secondary to MRSA and MDR Acinetobacter baumannii. She was discharged on Vancomycin and IV Cefepime with right foot wound infection caused by same organisms. She also tested positive for Covid-19 but required no specific treatment. Patient was brought back to the hospital reportedly because of increasing respiratory distress. Patient was on nasal O2 with normal WBC. throughout recent hospitalization but never showed signs of respiratory distress. She is febrile to 101.1 with elevated lactic acid and CRP. LDH also elevated. CXR today showed decrease in bilateral pleural effusions with perihilar congestion ad interstitial edema. Images reviwed by me. Blood cultures were sent and patient continued on Vancomycin and Cefepime. ID has been consulted for this reason. Patient seen in the ED. She appears to be in similar condition to status at discharge. She remains nonverbal and on nasal O2.     Past Medical History:   Diagnosis Date    Chronic kidney disease     acute tubual necrosis    Clostridium difficile infection     Elevated troponin 9/10/2014    Hypertension     Skin cancer     Stroke [facility])     2001 left residual      Past Surgical History:   Procedure Laterality Date    HX APPENDECTOMY  2/2008    HX HEENT      cataract    HX ORTHOPAEDIC  04/2010    left total hip - hip fx, left knee surgery    IR FLUORO GUIDE PLC CVAD  1/6/2022    IR THORACENTESIS NDL PUNC ASP W IMAGE  2/14/2022     Family History   Family history unknown: Yes      Social History     Tobacco Use    Smoking status: Never Smoker    Smokeless tobacco: Never Used   Substance Use Topics    Alcohol use: Never       Current Facility-Administered Medications   Medication Dose Route Frequency Provider Last Rate Last Admin    sodium chloride 0.9 % bolus infusion 500 mL  500 mL IntraVENous ONCE Luana Luna MD        sodium chloride (NS) flush 5-40 mL  5-40 mL IntraVENous Q8H Mando Messina MD        sodium chloride (NS) flush 5-40 mL  5-40 mL IntraVENous PRN Michele Shafer MD        acetaminophen (TYLENOL) tablet 650 mg  650 mg Oral Q6H PRN Michele Shafer MD        Or    acetaminophen (TYLENOL) suppository 650 mg  650 mg Rectal Q6H PRN Michele Shafer MD        polyethylene glycol [facility]) packet 17 g  17 g Oral DAILY PRN Michele Shafer MD        ondansetron (ZOFRAN ODT) tablet 4 mg  4 mg Oral Q8H PRN Michele Shafer MD        Or    [facility]ron [facility]) injection 4 mg  4 mg IntraVENous Q6H PRN Michele Shafer MD        0.9% sodium chloride infusion  75 mL/hr IntraVENous CONTINUOUS Michele Shafer MD         Current Outpatient Medications   Medication Sig Dispense Refill    insulin lispro (HUMALOG) 100 unit/mL injection INITIATE CORRECTIVE INSULIN PROTOCOL (RASHEEDA): RX RASHEEDA Normal Sensitivity (Average weight) AC (before meals), Q6H, and Q4H CORRECTIONAL SCALE only For Blood Sugar (mg/dl) of :           140-199=2 units          200-249=3 units 250-299=5 units 300-349=7 units 350 or greater = Call MD Give in addition to basal medications. Do Not Hold for NPO BEDTIME CORRECTIONAL sliding scale when scheduled: 200-249=2 units 250-299=3 units 300-349=4 units 350 or greater = Call MD Give in addition to basal medications. Do Not Hold for NPO Fast Acting - Administer Immediately - or within 15 minutes of start of meal, if mealtime coverage. 1 Each 0    insulin pump-infus.  set-meter (Accu-Chek Combo System) kit For Accu-Cheks before meals and at bedtime 1 Kit 0    Insulin Syringe-Needle, Dis Un 0.5 mL 29 gauge x 1/2\" syrg 1 box of 100 syringes, for sliding scale insulin 1 Each 0    Lancing Device with Lancets (Accu-Chek Multiclix Lancet) kit Used to check blood sugars before meals and at bedtime 1 Kit 0    cefepime (MAXIPIME) 2 gram injection 2,000 mg by IntraVENous route every twelve (12) hours for 14 days. 56 g 0    vancomycin (VANCOCIN) 500 mg injection 500 mg by IntraVENous route every twenty-four (24) hours for 14 doses. 7000 mg 0    Eliquis 2.5 mg tablet Take 2.5 mg by mouth two (2) times a day.  furosemide (LASIX) 40 mg tablet 40 mg daily.  acetaminophen (TYLENOL) 325 mg tablet Take 2 Tablets by mouth every six (6) hours as needed for Pain or Fever. 60 Tablet 0    ascorbic acid, vitamin C, (VITAMIN C) 500 mg tablet Take 1 Tablet by mouth two (2) times a day. 60 Tablet 0    zinc sulfate (ZINCATE) 50 mg zinc (220 mg) capsule Take 1 Capsule by mouth daily. 30 Capsule 0        Review of Systems   Unable to perform ROS: Age       Objective     Vital signs for last 24 hours:  Visit Vitals  BP 99/60 (BP 1 Location: Right arm)   Pulse 60   Temp (!) 101.1 °F (38.4 °C)   Resp 23   Ht 4' 11\" (1.499 m)   Wt 100 lb (45.4 kg)   SpO2 97%   BMI 20.20 kg/m²       Intake/Output this shift:  Current Shift: No intake/output data recorded. Last 3 Shifts: No intake/output data recorded. Data Review:   Recent Results (from the past 24 hour(s))   CBC WITH AUTOMATED DIFF    Collection Time: 02/20/22  3:06 PM   Result Value Ref Range    WBC 10.9 3.6 - 11.0 K/uL    RBC 2.28 (L) 3.80 - 5.20 M/uL    HGB 6.7 (L) 11.5 - 16.0 g/dL    HCT 22.8 (L) 35.0 - 47.0 %    .0 (H) 80.0 - 99.0 FL    MCH 29.4 26.0 - 34.0 PG    MCHC 29.4 (L) 30.0 - 36.5 g/dL    RDW 22.4 (H) 11.5 - 14.5 %    PLATELET 478 (L) 733 - 400 K/uL    MPV 12.1 8.9 - 12.9 FL    NRBC 3.6 (H) 0.0  WBC    ABSOLUTE NRBC 0.39 (H) 0.00 - 0.01 K/uL    NEUTROPHILS 76 (H) 32 - 75 %    LYMPHOCYTES 11 (L) 12 - 49 %    MONOCYTES 9 5 - 13 %    EOSINOPHILS 2 0 - 7 %    BASOPHILS 0 0 - 1 %    IMMATURE GRANULOCYTES 2 (H) 0 - 0.5 %    ABS. NEUTROPHILS 8.3 (H) 1.8 - 8.0 K/UL    ABS. LYMPHOCYTES 1.2 0.8 - 3.5 K/UL    ABS. MONOCYTES 1.0 0.0 - 1.0 K/UL    ABS. EOSINOPHILS 0.2 0.0 - 0.4 K/UL    ABS. BASOPHILS 0.0 0.0 - 0.1 K/UL    ABS. IMM. GRANS. 0.2 (H) 0.00 - 0.04 K/UL    DF AUTOMATED     METABOLIC PANEL, COMPREHENSIVE    Collection Time: 02/20/22  3:06 PM   Result Value Ref Range    Sodium 151 (H) 136 - 145 mmol/L    Potassium 4.2 3.5 - 5.1 mmol/L    Chloride 116 (H) 97 - 108 mmol/L    CO2 29 21 - 32 mmol/L    Anion gap 6 5 - 15 mmol/L    Glucose 110 (H) 65 - 100 mg/dL     (H) 6 - 20 mg/dL    Creatinine 2.07 (H) 0.55 - 1.02 mg/dL    BUN/Creatinine ratio 56 (H) 12 - 20      GFR est AA 27 (L) >60 ml/min/1.73m2    GFR est non-AA 22 (L) >60 ml/min/1.73m2    Calcium 8.9 8.5 - 10.1 mg/dL    Bilirubin, total 1.3 (H) 0.2 - 1.0 mg/dL    AST (SGOT) 29 15 - 37 U/L    ALT (SGPT) 25 12 - 78 U/L    Alk. phosphatase 210 (H) 45 - 117 U/L    Protein, total 6.5 6.4 - 8.2 g/dL    Albumin 2.5 (L) 3.5 - 5.0 g/dL    Globulin 4.0 2.0 - 4.0 g/dL    A-G Ratio 0.6 (L) 1.1 - 2.2     LACTIC ACID    Collection Time: 02/20/22  3:06 PM   Result Value Ref Range    Lactic acid 3.6 (HH) 0.4 - 2.0 mmol/L     CXR (2/20)          Physical Exam  Constitutional:       General: She is not in acute distress. Appearance: She is ill-appearing. HENT:      Head: Normocephalic and atraumatic. Right Ear: External ear normal.      Left Ear: External ear normal.      Nose: Nose normal.      Comments: Nasal O2 3 L/min     Mouth/Throat:      Mouth: Mucous membranes are dry. Eyes:      Pupils: Pupils are equal, round, and reactive to light. Cardiovascular:      Rate and Rhythm: Normal rate and regular rhythm. Heart sounds: No murmur heard. Pulmonary:      Breath sounds: No wheezing or rales. Comments: Diminished BS bilaterally L>R  Abdominal:      General: Bowel sounds are normal. There is no distension. Palpations: Abdomen is soft. Tenderness: There is no abdominal tenderness.       Comments: PEG site with mild erythema, no exudate       Genitourinary:     Comments: Govea catheter  Musculoskeletal:      Cervical back: Neck supple. Right lower leg: No edema. Left lower leg: No edema. Comments: Right foot with gauze bandage; medial foot wound exposed and has largely healed    Left hip wound with exudate      Left calf bandaged with gauze     Skin:     Findings: No rash. Comments: Large open sacral wound that is mostly dry with normal granulation tissue, Stage 3   Neurological:      Comments: Unable to assess   Psychiatric:      Comments: Unable to assess       ASSESSMENT:    1. Sacral wound infection, secondary to MRSA and MDR Acinetobacter baumannii, Day #13 IV Vancomycin and Day #7 IV Cefepime  2. Stage 3 sacral wound  3. Right foot wound infection, secondary to MRSA and MDR Acinetobacter baumannii, on IV antibiotics above, resolved  4. Covid-19 infection with no clear evidence of pneumonitis  5. Chronic hypoxic respiratory failure, with interstitial edema   6. Probable Candida UTI with marked pyuria and funguria  7. Sepsis with new fever, elevated CRP  8. Elevated LDH, possibly secondary to #4  9. Altered mental status, baseline     Comments:   Her right foot wound has healed and her sacral wound does not appear to be grossly infected. Perhaps her sepsis is due to Candida UTI. I am not seeing in worsening in her respiratory status but it is not likely related to Covid-19. CXR suggests pulmonary edema.                 Plan:   1. Continue IV Vancomycin and Cefepime (ordered)  2. Culture taken from sacral wound  3. Send urine culture if not already sent  4. Start IV Fluconazole (ordered)  5. Follow-up blood cultures, urine and wound cultures  6. I still do think that specific treatment for Covid-19 is needed at this time, however, Dexamethasone may be a consideration if she worsensat this time  4. Cleared for discharge from [address] [place] [address].  Valentino Dallas, MD

## 2022-02-20 NOTE — H&P
GENERAL GENERIC H&P/CONSULT    Subjective:    89F with a history of CKD, hypertension, stroke with left residual defects, bradycardia status post AICD 2 months ago, dysphagia status post PEG tube. Recent history of Covid 19.    2/9/22 admitted to hospital for altered mental status and fevers. Managed for worsening sacral decubitus wound with wound care and antibiotics. 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 Hospital stay complicated by pleural effusion s/p thoracentesis 2/14/22 with 800cc fluid removed. 2/18/22 discharged home with iv vancomycin and cefepime via PICC as well as continuous home oxygen. 2/20/22 returns to hospital because of fevers and increased lethargy. During the ED course found to be in acute renal failure, hypotensive, managed for septic shock and I have been asked to admit her to hospital.       Past Medical History:   Diagnosis Date    Chronic kidney disease     acute tubual necrosis    Clostridium difficile infection     Elevated troponin 9/10/2014    Hypertension     Skin cancer     [facility]roke [facility])     2001 left residual      Past Surgical History:   Procedure Laterality Date    HX APPENDECTOMY  2/2008    HX HEENT      cataract    HX ORTHOPAEDIC  04/2010    left total hip - hip fx, left knee surgery    IR FLUORO GUIDE PLC CVAD  1/6/2022    IR THORACENTESIS NDL PUNC ASP W IMAGE  2/14/2022      Prior to Admission medications    Medication Sig Start Date End Date Taking? Authorizing Provider   insulin lispro (HUMALOG) 100 unit/mL injection INITIATE CORRECTIVE INSULIN PROTOCOL (RASHEEDA): RX RASHEEDA Normal Sensitivity (Average weight) AC (before meals), Q6H, and Q4H CORRECTIONAL SCALE only For Blood Sugar (mg/dl) of :           140-199=2 units          200-249=3 units 250-299=5 units 300-349=7 units 350 or greater = Call MD Give in addition to basal medications.  Do Not Hold for NPO BEDTIME CORRECTIONAL sliding scale when scheduled: 200-249=2 units 250-299=3 units 300-349=4 units 350 or greater = Call MD Give in addition to basal medications. Do Not Hold for NPO Fast Acting - Administer Immediately - or within 15 minutes of start of meal, if mealtime coverage. 2/19/22   Saroj Robertson NP   insulin pump-infus. set-meter (Accu-Chek Combo System) kit For Accu-Cheks before meals and at bedtime 2/18/22   Saroj Robertson NP   Insulin Syringe-Needle, Dis Un 0.5 mL 29 gauge x 1/2\" syrg 1 box of 100 syringes, for sliding scale insulin 2/18/22   Saroj Robertson NP   Lancing Device with Lancets (Accu-Chek Multiclix Lancet) kit Used to check blood sugars before meals and at bedtime 2/18/22   Saroj Robertson NP   cefepime (MAXIPIME) 2 gram injection 2,000 mg by IntraVENous route every twelve (12) hours for 14 days. 2/17/22 3/3/22  Nicolás Staton MD   [facility]) 500 mg injection 500 mg by IntraVENous route every twenty-four (24) hours for 14 doses. 2/17/22 3/3/22  Nicolás Staton MD   Eliquis 2.5 mg tablet Take 2.5 mg by mouth two (2) times a day. 1/11/22   Provider, Historical   furosemide (LASIX) 40 mg tablet 40 mg daily. 11/20/21   Provider, Historical   acetaminophen (TYLENOL) 325 mg tablet Take 2 Tablets by mouth every six (6) hours as needed for Pain or Fever. 1/6/22   Joanne Clay MD   ascorbic acid, vitamin C, (VITAMIN C) 500 mg tablet Take 1 Tablet by mouth two (2) times a day. 1/6/22   Joanen Clay MD   zinc sulfate (ZINCATE) 50 mg zinc (220 mg) capsule Take 1 Capsule by mouth daily.  1/7/22   Joanne Clay MD     Allergies   Allergen Reactions    Neosporin [Benzalkonium Chloride] Rash      Social History     Tobacco Use    Smoking status: Never Smoker    Smokeless tobacco: Never Used   Substance Use Topics    Alcohol use: Never      Family History   Family history unknown: Yes      Review of Systems   Unable to perform ROS: Patient nonverbal   All other systems reviewed and are negative. Objective:    No intake/output data recorded. No intake/output data recorded. Patient Vitals for the past 8 hrs:   BP Temp Pulse Resp SpO2 Height Weight   02/20/22 1656 99/60 -- 60 23 97 % -- --   02/20/22 1417 (!) 101/53 (!) 101.1 °F (38.4 °C) 62 (!) 31 99 % 4' 11\" (1.499 m) 45.4 kg (100 lb)     Physical Exam  Vitals and nursing note reviewed. Exam conducted with a chaperone present. Constitutional:       General: She is in acute distress. Appearance: She is ill-appearing. HENT:      Head: Normocephalic. Mouth/Throat:      Mouth: Mucous membranes are dry. Eyes:      Pupils: Pupils are equal, round, and reactive to light. Cardiovascular:      Rate and Rhythm: Normal rate and regular rhythm. Pulses: Normal pulses. Heart sounds: Normal heart sounds. Pulmonary:      Effort: Pulmonary effort is normal.      Breath sounds: Normal breath sounds. Abdominal:      General: Abdomen is flat. Musculoskeletal:      Cervical back: Normal range of motion and neck supple. Skin:     Comments: Large open sacral wound Stage 3   Neurological:      Mental Status: She is disoriented. Psychiatric:      Comments: Unable to assess          Labs:    Recent Results (from the past 24 hour(s))   CBC WITH AUTOMATED DIFF    Collection Time: 02/20/22  3:06 PM   Result Value Ref Range    WBC 10.9 3.6 - 11.0 K/uL    RBC 2.28 (L) 3.80 - 5.20 M/uL    HGB 6.7 (L) 11.5 - 16.0 g/dL    HCT 22.8 (L) 35.0 - 47.0 %    .0 (H) 80.0 - 99.0 FL    MCH 29.4 26.0 - 34.0 PG    MCHC 29.4 (L) 30.0 - 36.5 g/dL    RDW 22.4 (H) 11.5 - 14.5 %    PLATELET 993 (L) 818 - 400 K/uL    MPV 12.1 8.9 - 12.9 FL    NRBC 3.6 (H) 0.0  WBC    ABSOLUTE NRBC 0.39 (H) 0.00 - 0.01 K/uL    NEUTROPHILS 76 (H) 32 - 75 %    LYMPHOCYTES 11 (L) 12 - 49 %    MONOCYTES 9 5 - 13 %    EOSINOPHILS 2 0 - 7 %    BASOPHILS 0 0 - 1 %    IMMATURE GRANULOCYTES 2 (H) 0 - 0.5 %    ABS. NEUTROPHILS 8.3 (H) 1.8 - 8.0 K/UL    ABS.  LYMPHOCYTES 1.2 0.8 - 3.5 K/UL    ABS. MONOCYTES 1.0 0.0 - 1.0 K/UL    ABS. EOSINOPHILS 0.2 0.0 - 0.4 K/UL    ABS. BASOPHILS 0.0 0.0 - 0.1 K/UL    ABS. IMM. GRANS. 0.2 (H) 0.00 - 0.04 K/UL    DF AUTOMATED     METABOLIC PANEL, COMPREHENSIVE    Collection Time: 02/20/22  3:06 PM   Result Value Ref Range    Sodium 151 (H) 136 - 145 mmol/L    Potassium 4.2 3.5 - 5.1 mmol/L    Chloride 116 (H) 97 - 108 mmol/L    CO2 29 21 - 32 mmol/L    Anion gap 6 5 - 15 mmol/L    Glucose 110 (H) 65 - 100 mg/dL     (H) 6 - 20 mg/dL    Creatinine 2.07 (H) 0.55 - 1.02 mg/dL    BUN/Creatinine ratio 56 (H) 12 - 20      GFR est AA 27 (L) >60 ml/min/1.73m2    GFR est non-AA 22 (L) >60 ml/min/1.73m2    Calcium 8.9 8.5 - 10.1 mg/dL    Bilirubin, total 1.3 (H) 0.2 - 1.0 mg/dL    AST (SGOT) 29 15 - 37 U/L    ALT (SGPT) 25 12 - 78 U/L    Alk. phosphatase 210 (H) 45 - 117 U/L    Protein, total 6.5 6.4 - 8.2 g/dL    Albumin 2.5 (L) 3.5 - 5.0 g/dL    Globulin 4.0 2.0 - 4.0 g/dL    A-G Ratio 0.6 (L) 1.1 - 2.2     LACTIC ACID    Collection Time: 02/20/22  3:06 PM   Result Value Ref Range    Lactic acid 3.6 (HH) 0.4 - 2.0 mmol/L     Chest X-Ray:   XR CHEST PORT     Comparison: February 12, 2022.     Bilateral pleural effusions again demonstrated decreased in size. Pleural  effusions remain greater on the right than left. Moderate perihilar pulmonary  vascular congestion and interstitial edema persist. No lobar infiltrate or  consolidation. No other new pulmonary opacity. Heart is moderately enlarged. Left-sided pacemaker unchanged. The bony thorax is unchanged.     IMPRESSION  Decrease in bilateral pleural effusions. Unchanged moderate perihilar vascular congestion and interstitial edema. Assessment:  Active Problems:    Septic shock ([facility] [address] (2/20/2022)    89F with a history of CKD, hypertension, stroke with left residual defects, bradycardia status post AICD 2 months ago, dysphagia status post PEG tube.  Recent history of Covid 19.    2/9/22 admitted to hospital for altered mental status and fevers. Managed for worsening sacral decubitus wound with wound care and antibiotics. 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 Hospital stay complicated by pleural effusion s/p thoracentesis 2/14/22 with 800cc fluid removed. 2/18/22 discharged home with iv vancomycin and cefepime via PICC as well as continuous home oxygen. 2/20/22 returns to hospital because of fevers and increased lethargy. During the ED course found to be in acute renal failure, hypotensive, managed for septic shock and I have been asked to admit her to hospital.    Plan:    1) Septic shock at admission in a patient with worsening sacral wound further complications of MDR soft tissue infection. Admit to hospital and ICU   Hydration as tolerated, monitor in light of recent pleural effusion   Holding vancomycin for acute renal failure, checking vanc level   Continue cefepime   Blood and wound cultures pending   Wound care inpatient and outpatient    2) Acute on chronic renal failure in the setting of volume depletion and active infection further complicated by high dose furosemide. Supportive care   Hydration as tolerated    3) Cardiovascular issues including pacemaker, atrial fibrillation, heart failure. Telemetry monitoring   Hold furosemide   Continue her Eliquis for now, though watch hemoglobin    4) DVT prophylaxis.      Continue her Eliquis for now, though watch hemoglobin     Signed:  Karol Weber MD 2/20/2022

## 2022-02-21 NOTE — PROGRESS NOTES
Progress Note    Patient: Carmen Colon MRN: [id number]  SSN: [id number]    YOB: 1922  Age: 80 y.o. Sex: female      Admit Date: 2/20/2022    LOS: 1 day     Subjective:   Patient followed for chronic sacral wound infection, recently discharged but readmitted because of respiratory distress. She was admitted to the ICU but remains on nasal cannula. She was febrile. WBC has increased along with procal and CRP. Cultures still pending. She is on Cefepime, Vancomycin and Fluconazole. Objective:     Vitals:    02/21/22 0900 02/21/22 1000 02/21/22 1100 02/21/22 1200   BP: (!) 89/45 98/62 99/63    Pulse: 61 62 61 60   Resp: 24 25 21    Temp:   97.4 °F (36.3 °C)    SpO2: 93% 96% 98%    Weight:       Height:            Intake and Output:  Current Shift: No intake/output data recorded. Last three shifts: No intake/output data recorded. Physical Exam:    Constitutional:       General: She is not in acute distress. Appearance: She is ill-appearing. HENT:      Head: Normocephalic and atraumatic. Right Ear: External ear normal.      Left Ear: External ear normal.      Nose: Nose normal.      Comments: Nasal O2 1 L/min     Mouth/Throat:      Mouth: Mucous membranes are dry. Eyes:      Pupils: Pupils are equal, round, and reactive to light. Cardiovascular:      Rate and Rhythm: Normal rate and regular rhythm. Heart sounds: No murmur heard. Pulmonary:      Breath sounds: No wheezing or rales. Comments: Diminished BS bilaterally L>R  Abdominal:      General: Bowel sounds are normal. There is no distension. Palpations: Abdomen is soft. Tenderness: There is no abdominal tenderness. Comments: PEG site with mild erythema, no exudate       Genitourinary:     Comments: Govea catheter  Musculoskeletal:      Cervical back: Neck supple. Right lower leg: No edema. Left lower leg: No edema.       Comments: Right foot with gauze bandage; medial foot wound exposed and has largely healed    Left hip wound with exudate      Left calf bandaged with gauze     Skin:     Findings: No rash. Comments: Large open sacral wound that is mostly dry with normal granulation tissue, Stage 3   Neurological:      Comments: Unable to assess   Psychiatric:      Comments: Unable to assess     Lab/Data Review:     WBC 11,500  Lactic acid 3.1    Procal 0.87 <0.38  CRP 8.81 <7.40    Blood cultures (2/20) Pending  Urine culture (2/21) Pending  Wound culture sacrum (2/20) Pending  Wound culture sacrum (2/20) Pending    No new CXR  Assessment:     Active Problems:    Septic shock ([address] (2/20/2022)       1. Sacral wound infection, secondary to MRSA and MDR Acinetobacter baumannii, Day #14 IV Vancomycin and Day #8 IV Cefepime  2. Stage 3 sacral wound  3. Right foot wound infection, secondary to MRSA and MDR Acinetobacter baumannii, on IV antibiotics above, resolved  4. Covid-19 infection with no clear evidence of pneumonitis  5. Chronic hypoxic respiratory failure, with interstitial edema   6. Probable Candida UTI with marked pyuria and funguria, Day #2 IV Fluconazole  7. Sepsis with new fever, elevated CRP  8. Elevated LDH, possibly secondary to #4  9. Altered mental status, baseline     Comments:   Clinical response suboptimal so far and possibly related to MDR Acinetobacter which was only intermediate to Cefepime.            Plan:   1. Continue IV Vancomycin and Cefepime (ordered)  2. Continue Fluconazole  3. Follow-up blood, wound and urine culture  4.  In am, repeat CBC, CRP and procal    Signed By: Makenzie Bonilla MD     February 21, 2022

## 2022-02-21 NOTE — PROGRESS NOTES
Problem: Diabetes Self-Management  Goal: *Disease process and treatment process  Description: Define diabetes and identify own type of diabetes; list 3 options for treating diabetes. Outcome: Progressing Towards Goal  Goal: *Incorporating nutritional management into lifestyle  Description: Describe effect of type, amount and timing of food on blood glucose; list 3 methods for planning meals. Outcome: Progressing Towards Goal  Goal: *Incorporating physical activity into lifestyle  Description: State effect of exercise on blood glucose levels. Outcome: Progressing Towards Goal  Goal: *Developing strategies to promote health/change behavior  Description: Define the ABC's of diabetes; identify appropriate screenings, schedule and personal plan for screenings. Outcome: Progressing Towards Goal  Goal: *Using medications safely  Description: State effect of diabetes medications on diabetes; name diabetes medication taking, action and side effects. Outcome: Progressing Towards Goal  Goal: *Monitoring blood glucose, interpreting and using results  Description: Identify recommended blood glucose targets  and personal targets. Outcome: Progressing Towards Goal  Goal: *Prevention, detection, treatment of acute complications  Description: List symptoms of hyper- and hypoglycemia; describe how to treat low blood sugar and actions for lowering  high blood glucose level. Outcome: Progressing Towards Goal  Goal: *Prevention, detection and treatment of chronic complications  Description: Define the natural course of diabetes and describe the relationship of blood glucose levels to long term complications of diabetes.   Outcome: Progressing Towards Goal  Goal: *Developing strategies to address psychosocial issues  Description: Describe feelings about living with diabetes; identify support needed and support network  Outcome: Progressing Towards Goal  Goal: *Insulin pump training  Outcome: Progressing Towards Goal  Goal: *Sick day guidelines  Outcome: Progressing Towards Goal  Goal: *Patient Specific Goal (EDIT GOAL, INSERT TEXT)  Outcome: Progressing Towards Goal     Problem: Patient Education: Go to Patient Education Activity  Goal: Patient/Family Education  Outcome: Progressing Towards Goal     Problem: Falls - Risk of  Goal: *Absence of Falls  Description: Document Vernia Colder Fall Risk and appropriate interventions in the flowsheet. Outcome: Progressing Towards Goal  Note: Fall Risk Interventions:       Mentation Interventions: Bed/chair exit alarm    Medication Interventions: Bed/chair exit alarm    Elimination Interventions: Bed/chair exit alarm,Toileting schedule/hourly rounds              Problem: Patient Education: Go to Patient Education Activity  Goal: Patient/Family Education  Outcome: Progressing Towards Goal     Problem: Airway Clearance - Ineffective  Goal: Achieve or maintain patent airway  Outcome: Progressing Towards Goal     Problem: Gas Exchange - Impaired  Goal: Absence of hypoxia  Outcome: Progressing Towards Goal  Goal: Promote optimal lung function  Outcome: Progressing Towards Goal     Problem: Breathing Pattern - Ineffective  Goal: Ability to achieve and maintain a regular respiratory rate  Outcome: Progressing Towards Goal     Problem:  Body Temperature -  Risk of, Imbalanced  Goal: Ability to maintain a body temperature within defined limits  Outcome: Progressing Towards Goal  Goal: Will regain or maintain usual level of consciousness  Outcome: Progressing Towards Goal  Goal: Complications related to the disease process, condition or treatment will be avoided or minimized  Outcome: Progressing Towards Goal     Problem: Isolation Precautions - Risk of Spread of Infection  Goal: Prevent transmission of infectious organism to others  Outcome: Progressing Towards Goal     Problem: Nutrition Deficits  Goal: Optimize nutrtional status  Outcome: Progressing Towards Goal     Problem: Risk for Fluid Volume Deficit  Goal: Maintain normal heart rhythm  Outcome: Progressing Towards Goal  Goal: Maintain absence of muscle cramping  Outcome: Progressing Towards Goal  Goal: Maintain normal serum potassium, sodium, calcium, phosphorus, and pH  Outcome: Progressing Towards Goal     Problem: Loneliness or Risk for Loneliness  Goal: Demonstrate positive use of time alone when socialization is not possible  Outcome: Progressing Towards Goal     Problem: Fatigue  Goal: Verbalize increase energy and improved vitality  Outcome: Progressing Towards Goal     Problem: Patient Education: Go to Patient Education Activity  Goal: Patient/Family Education  Outcome: Progressing Towards Goal     Problem: Risk for Spread of Infection  Goal: Prevent transmission of infectious organism to others  Description: Prevent the transmission of infectious organisms to other patients, staff members, and visitors. Outcome: Progressing Towards Goal     Problem: Patient Education:  Go to Education Activity  Goal: Patient/Family Education  Outcome: Progressing Towards Goal     Problem: Pressure Injury - Risk of  Goal: *Prevention of pressure injury  Description: Document Charles Scale and appropriate interventions in the flowsheet. Outcome: Progressing Towards Goal  Note: Pressure Injury Interventions:  Sensory Interventions: Assess changes in LOC,Assess need for specialty bed,Avoid rigorous massage over bony prominences,Float heels,Minimize linen layers,Keep linens dry and wrinkle-free,Monitor skin under medical devices,Turn and reposition approx.  every two hours (pillows and wedges if needed)    Moisture Interventions: Absorbent underpads,Apply protective barrier, creams and emollients,Assess need for specialty bed,Check for incontinence Q2 hours and as needed,Internal/External urinary devices,Minimize layers,Moisture barrier,Maintain skin hydration (lotion/cream)    Activity Interventions: Assess need for specialty bed,Pressure redistribution bed/mattress(bed type),PT/OT evaluation    Mobility Interventions: Assess need for specialty bed,Float heels,HOB 30 degrees or less,Pressure redistribution bed/mattress (bed type),Turn and reposition approx.  every two hours(pillow and wedges),PT/OT evaluation    Nutrition Interventions: Discuss nutritional consult with provider    Friction and Shear Interventions: Apply protective barrier, creams and emollients,Feet elevated on foot rest,Foam dressings/transparent film/skin sealants,HOB 30 degrees or less,Transfer aides:transfer board/Vic lift/ceiling lift,Transferring/repositioning devices                Problem: Patient Education: Go to Patient Education Activity  Goal: Patient/Family Education  Outcome: Progressing Towards Goal

## 2022-02-21 NOTE — PROGRESS NOTES
Reason for Readmission:    Fever/increased lethargy           RUR Score/Risk Level:    25%     PCP: First and Last name:  Kwasi Ty   Name of Practice:    Are you a current patient: Yes/No: Yes   Approximate date of last visit:    Can you participate in a virtual visit with your PCP:     Is a Care Conference indicated:  No    Did you attend your follow up appointment (s): If not, why not: Unable to get appointment; returned to hospital w/i 3 days          Resources/supports as identified by patient/family:          Top Challenges facing patient (as identified by patient/family and CM): Finances/Medication cost?       Transportation        Support system or lack thereof? Living arrangements? Self-care/ADLs/Cognition? Current Advanced Directive/Advance Care Plan:             Plan for utilizing home health:                Transition of Care Plan:    Based on readmission, the patient's previous Plan of Care   has been evaluated and/or modified. The current Transition of Care Plan is:           CM spoke with patient's son, Ambrocio Muse ([phone]) to complete discharge planning assessment. Patient lives with her son in a single level home with 0 steps to enter and egress. She is mostly bedbound, and has a wheelchair, walker, and cane at home. She also has tube feeding supplies and home oxygen in place. Her son drives her to appointments. Preferred home health company is [facility]. Discharge disposition: home with home health services. CM team will continue to follow.

## 2022-02-21 NOTE — PROGRESS NOTES
Patient's son, Pancho Tellez, called for an update. He would like an exception made so he can visit patient. I advised son to call in the morning to speak to the unit manager.

## 2022-02-21 NOTE — ED PROVIDER NOTES
EMERGENCY DEPARTMENT HISTORY AND PHYSICAL EXAM      Date: 2/20/2022  Patient Name: Lori Terry      History of Presenting Illness     Chief Complaint   Patient presents with    Respiratory Distress       History Provided By: Patient's Son    HPI: Lori Terry, 80 y.o. female with a past medical history significant CKD stroke COVID presents to the ED with cc of aspiratory distress, sacral wound. Patient also has had fever. She is a recent admission for similar presentation. Patient son states that she got a pleural effusion effusion drained which helped her respiratory status but she has been tachypneic for multiple weeks. Additionally he notes that patient is getting cefepime and vancomycin at home for her chronic sacral decubitus ulcer. Her last dose of cefepime was at 10 AM today her last dose of vancomycin was at noon. Patient is Covid positive though has been Covid positive times weeks. She did get the Covid vaccines and booster. Patient is full code. Patient unable to contribute to history secondary to mental status change. There are no other complaints, changes, or physical findings at this time.     PCP: Angela Dawson MD    Current Facility-Administered Medications   Medication Dose Route Frequency Provider Last Rate Last Admin    sodium chloride (NS) flush 5-40 mL  5-40 mL IntraVENous Q8H Mando Stewart MD   10 mL at 02/20/22 1840    sodium chloride (NS) flush 5-40 mL  5-40 mL IntraVENous PRN Simona Angel MD        acetaminophen (TYLENOL) tablet 650 mg  650 mg Oral Q6H PRN Simona Angel MD        Or   Nuzhat Tabares acetaminophen (TYLENOL) suppository 650 mg  650 mg Rectal Q6H PRN Simona Angel MD        polyethylene glycol [facility]) packet 17 g  17 g Oral DAILY PRN Simona Angel MD        ondansetron [facility] ODT) tablet 4 mg  4 mg Oral Q8H PRN Simona Angel MD        Or    ondansetron [facility]) injection 4 mg  4 mg IntraVENous Q6H PRN Simona Angel MD        0.9% sodium chloride infusion  75 mL/hr IntraVENous CONTINUOUS Carole Hunt MD 75 mL/hr at 02/20/22 1838 75 mL/hr at 02/20/22 1893    ascorbic acid (vitamin C) (VITAMIN C) tablet 500 mg  500 mg Oral BID Carole Hunt MD        apixaban [facility]) tablet 2.5 mg  2.5 mg Oral BID Carole Hunt MD        [facility] ON 2/21/2022] zinc sulfate (ZINCATE) 50 mg zinc (220 mg) capsule 1 Capsule  1 Capsule Oral DAILY Mando Padilla MD        cefepime (MAXIPIME) injection 2 g  2 g IntraVENous Q24H Mando Padilla MD        fluconazole (DIFLUCAN) 200mg/100 mL IVPB (premix)  200 mg IntraVENous Q24H Carole Hutn  mL/hr at 02/20/22 2054 200 mg at 02/20/22 2054     Current Outpatient Medications   Medication Sig Dispense Refill    insulin lispro (HUMALOG) 100 unit/mL injection INITIATE CORRECTIVE INSULIN PROTOCOL (RASHEEDA): RX RASHEEDA Normal Sensitivity (Average weight) AC (before meals), Q6H, and Q4H CORRECTIONAL SCALE only For Blood Sugar (mg/dl) of :           140-199=2 units          200-249=3 units 250-299=5 units 300-349=7 units 350 or greater = Call MD Give in addition to basal medications. Do Not Hold for NPO BEDTIME CORRECTIONAL sliding scale when scheduled: 200-249=2 units 250-299=3 units 300-349=4 units 350 or greater = Call MD Give in addition to basal medications. Do Not Hold for NPO Fast Acting - Administer Immediately - or within 15 minutes of start of meal, if mealtime coverage. 1 Each 0    insulin pump-infus. set-meter (Accu-Chek Combo System) kit For Accu-Cheks before meals and at bedtime 1 Kit 0    Insulin Syringe-Needle, Dis Un 0.5 mL 29 gauge x 1/2\" syrg 1 box of 100 syringes, for sliding scale insulin 1 Each 0    Lancing Device with Lancets (Accu-Chek Multiclix Lancet) kit Used to check blood sugars before meals and at bedtime 1 Kit 0    cefepime (MAXIPIME) 2 gram injection 2,000 mg by IntraVENous route every twelve (12) hours for 14 days.  56 g 0    vancomycin (VANCOCIN) 500 mg injection 500 mg by IntraVENous route every twenty-four (24) hours for 14 doses. 7000 mg 0    Eliquis 2.5 mg tablet Take 2.5 mg by mouth two (2) times a day.  furosemide (LASIX) 40 mg tablet 40 mg daily.  acetaminophen (TYLENOL) 325 mg tablet Take 2 Tablets by mouth every six (6) hours as needed for Pain or Fever. 60 Tablet 0    ascorbic acid, vitamin C, (VITAMIN C) 500 mg tablet Take 1 Tablet by mouth two (2) times a day. 60 Tablet 0    zinc sulfate (ZINCATE) 50 mg zinc (220 mg) capsule Take 1 Capsule by mouth daily. 30 Capsule 0       Past History     Past Medical History:  Past Medical History:   Diagnosis Date    Chronic kidney disease     acute tubual necrosis    Clostridium difficile infection     Elevated troponin 9/10/2014    Hypertension     Skin cancer     Stroke [facility])     2001 left residual       Past Surgical History:  Past Surgical History:   Procedure Laterality Date    HX APPENDECTOMY  2/2008    HX HEENT      cataract    HX ORTHOPAEDIC  04/2010    left total hip - hip fx, left knee surgery    IR FLUORO GUIDE PLC CVAD  1/6/2022    IR THORACENTESIS NDL PUNC ASP W IMAGE  2/14/2022       Family History:  Family History   Family history unknown: Yes       Social History:  Social History     Tobacco Use    Smoking status: Never Smoker    Smokeless tobacco: Never Used   Vaping Use    Vaping Use: Never used   Substance Use Topics    Alcohol use: Never    Drug use: Never       Allergies: Allergies   Allergen Reactions    Neosporin [Benzalkonium Chloride] Rash         Review of Systems     Review of Systems   Unable to perform ROS: Mental status change       Physical Exam     Physical Exam  Vitals and nursing note reviewed. Constitutional:       General: She is in acute distress. Appearance: Normal appearance. She is toxic-appearing. Comments: Cachectic   HENT:      Head: Normocephalic and atraumatic. Nose: Nose normal.      Mouth/Throat:      Mouth: Mucous membranes are dry.    Eyes: Conjunctiva/sclera: Conjunctivae normal.   Cardiovascular:      Rate and Rhythm: Tachycardia present. Pulses: Normal pulses. Pulmonary:      Effort: Respiratory distress present. Comments: Breath sounds throughout. Tachypnea  Abdominal:      Tenderness: There is no abdominal tenderness. Musculoskeletal:         General: No swelling or deformity. Normal range of motion. Skin:     General: Skin is warm and dry. Findings: No rash. Comments: Large sacral decubitus ulcer that is unstageable. Scattered wounds and bruises   Neurological:      Comments: Lethargic. Does not follow commands. Psychiatric:      Comments: Unable to assess         Lab and Diagnostic Study Results     Labs -     Recent Results (from the past 12 hour(s))   CBC WITH AUTOMATED DIFF    Collection Time: 02/20/22  3:06 PM   Result Value Ref Range    WBC 10.9 3.6 - 11.0 K/uL    RBC 2.28 (L) 3.80 - 5.20 M/uL    HGB 6.7 (L) 11.5 - 16.0 g/dL    HCT 22.8 (L) 35.0 - 47.0 %    .0 (H) 80.0 - 99.0 FL    MCH 29.4 26.0 - 34.0 PG    MCHC 29.4 (L) 30.0 - 36.5 g/dL    RDW 22.4 (H) 11.5 - 14.5 %    PLATELET 550 (L) 837 - 400 K/uL    MPV 12.1 8.9 - 12.9 FL    NRBC 3.6 (H) 0.0  WBC    ABSOLUTE NRBC 0.39 (H) 0.00 - 0.01 K/uL    NEUTROPHILS 76 (H) 32 - 75 %    LYMPHOCYTES 11 (L) 12 - 49 %    MONOCYTES 9 5 - 13 %    EOSINOPHILS 2 0 - 7 %    BASOPHILS 0 0 - 1 %    IMMATURE GRANULOCYTES 2 (H) 0 - 0.5 %    ABS. NEUTROPHILS 8.3 (H) 1.8 - 8.0 K/UL    ABS. LYMPHOCYTES 1.2 0.8 - 3.5 K/UL    ABS. MONOCYTES 1.0 0.0 - 1.0 K/UL    ABS. EOSINOPHILS 0.2 0.0 - 0.4 K/UL    ABS. BASOPHILS 0.0 0.0 - 0.1 K/UL    ABS. IMM.  GRANS. 0.2 (H) 0.00 - 0.04 K/UL    DF AUTOMATED     METABOLIC PANEL, COMPREHENSIVE    Collection Time: 02/20/22  3:06 PM   Result Value Ref Range    Sodium 151 (H) 136 - 145 mmol/L    Potassium 4.2 3.5 - 5.1 mmol/L    Chloride 116 (H) 97 - 108 mmol/L    CO2 29 21 - 32 mmol/L    Anion gap 6 5 - 15 mmol/L    Glucose 110 (H) 65 - 100 mg/dL     (H) 6 - 20 mg/dL    Creatinine 2.07 (H) 0.55 - 1.02 mg/dL    BUN/Creatinine ratio 56 (H) 12 - 20      GFR est AA 27 (L) >60 ml/min/1.73m2    GFR est non-AA 22 (L) >60 ml/min/1.73m2    Calcium 8.9 8.5 - 10.1 mg/dL    Bilirubin, total 1.3 (H) 0.2 - 1.0 mg/dL    AST (SGOT) 29 15 - 37 U/L    ALT (SGPT) 25 12 - 78 U/L    Alk.  phosphatase 210 (H) 45 - 117 U/L    Protein, total 6.5 6.4 - 8.2 g/dL    Albumin 2.5 (L) 3.5 - 5.0 g/dL    Globulin 4.0 2.0 - 4.0 g/dL    A-G Ratio 0.6 (L) 1.1 - 2.2     LACTIC ACID    Collection Time: 02/20/22  3:06 PM   Result Value Ref Range    Lactic acid 3.6 (HH) 0.4 - 2.0 mmol/L   VANCOMYCIN, RANDOM    Collection Time: 02/20/22  6:05 PM   Result Value Ref Range    Vancomycin, random 30.5 ug/mL   URINALYSIS W/ RFLX MICROSCOPIC    Collection Time: 02/20/22  7:00 PM   Result Value Ref Range    Color Yellow/Straw      Appearance Turbid (A) Clear      Specific gravity 1.024 1.003 - 1.030      pH (UA) 5.0 5.0 - 8.0      Protein >300 (A) Negative mg/dL    Glucose Negative Negative mg/dL    Ketone 5 (A) Negative mg/dL    Bilirubin Negative Negative      Blood Moderate (A) Negative      Urobilinogen 2.0 (H) 0.1 - 1.0 EU/dL    Nitrites Negative Negative      Leukocyte Esterase Moderate (A) Negative      WBC >100 (H) 0 - 4 /hpf    RBC >100 (H) 0 - 5 /hpf    Bacteria Negative Negative /hpf    Mucus 2+ /lpf    PRESUMPTIVE YEAST Present     PROCALCITONIN    Collection Time: 02/20/22  7:00 PM   Result Value Ref Range    Procalcitonin 0.87 (H) 0 ng/mL   C REACTIVE PROTEIN, QT    Collection Time: 02/20/22  7:00 PM   Result Value Ref Range    C-Reactive protein 8.81 (H) 0.00 - 0.60 mg/dL   LD    Collection Time: 02/20/22  7:00 PM   Result Value Ref Range     (H) 81 - 246 U/L   URINE MICROSCOPIC    Collection Time: 02/20/22  7:00 PM   Result Value Ref Range    WBC >100 (H) 0 - 4 /hpf    RBC >100 (H) 0 - 5 /hpf    Bacteria Negative Negative /hpf    Budding yeast Present (A) Negative Radiologic Studies -   [unfilled]  CT Results  (Last 48 hours)    None        CXR Results  (Last 48 hours)               02/20/22 1544  XR CHEST PORT Final result    Impression:  Decrease in bilateral pleural effusions. Unchanged moderate perihilar vascular congestion and interstitial edema. Narrative:  XR CHEST PORT       Comparison: February 12, 2022. Bilateral pleural effusions again demonstrated decreased in size. Pleural   effusions remain greater on the right than left. Moderate perihilar pulmonary   vascular congestion and interstitial edema persist. No lobar infiltrate or   consolidation. No other new pulmonary opacity. Heart is moderately enlarged. Left-sided pacemaker unchanged. The bony thorax is unchanged. Medical Decision Making and ED Course   - I am the first and primary provider for this patient AND AM THE PRIMARY PROVIDER OF RECORD. - I reviewed the vital signs, available nursing notes, past medical history, past surgical history, family history and social history. - Initial assessment performed. The patients presenting problems have been discussed, and the staff are in agreement with the care plan formulated and outlined with them. I have encouraged them to ask questions as they arise throughout their visit. Vital Signs-Reviewed the patient's vital signs. Patient Vitals for the past 12 hrs:   Temp Pulse Resp BP SpO2   02/20/22 1803 -- -- -- (!) 82/55 --   02/20/22 1800 98.2 °F (36.8 °C) -- -- -- 97 %   02/20/22 1656 -- 60 23 99/60 97 %   02/20/22 1417 (!) 101.1 °F (38.4 °C) 62 (!) 31 (!) 101/53 99 %     Records Reviewed: Nursing Notes    ED Course:       ED Course as of 02/20/22 2102   Sun Feb 20, 2022   132 80year-old female presents for evaluation of respiratory distress and fever. Patient has a recent diagnosis of COVID. She also has a large sacral decubitus ulcer and is required thoracentesis for pleural effusions.   She has a PICC line at home and is currently on vancomycin and cefepime. Last doses were 10 AM for cefepime today and need for vancomycin. Her son, her POA, states that when she was discharged in the hospital she was tachypneic and her respiratory status has essentially been unchanged. She has been on 2 L. However the fever is new and she appears to be working harder to breathe. When rectal temperature was obtained patient was noted to be impacted with stool in her wound. She was cleaned and manually disimpacted by the RN. She has scattered wounds throughout her body. She does have a PEG tube. She has a PICC line as well. Differential diagnosis includes sepsis versus decubitus ulcer infection versus worsening of COVID versus UTI versus recurrent pleural effusion versus pneumonia versus sepsis versus bacteremia. Patient will require admission. Getting labs including a CBC, CMP, lactate, UA,blood cultures and chest x-ray. [LW]   1532 EKG performed at 1502. Ventricular paced rhythm with a rate of 60. Normal QRS, prolonged QTC. Abnormal ECG. [LW]   9783 Patient's labs reveal CARRIE and hypernatremia. She is given 500 cc of fluid. Discussed with inpatient physician who agreed with plan to hold off on any additional fluids as patient is tenuous from a respiratory standpoint. Dr. Shirley Russo accepts admission. [LW]      ED Course User Index  [LW] Lroee Rm MD         Consultations:       Consultations: -  Hospitalist Consultant: Dr. Shirley Russo: We have asked for emergent assistance with regard to this patient. We have discussed the patients HPI, ROS, PE and results this far. They will come and evaluate the patient for admission.         Procedures and Critical Care     CRITICAL CARE NOTE :  9:02 PM  Amount of Critical Care Time: 33 minutes    IMPENDING DETERIORATION -Respiratory, Cardiovascular and Metabolic  ASSOCIATED RISK FACTORS - Hypoxia and CNS Decompensation  MANAGEMENT- Bedside Assessment  INTERPRETATION -  Blood Gases, Blood Pressure and Cardiac Output Measures   INTERVENTIONS - Metobolic interventions  CASE REVIEW - Hospitalist/Intensivist  TREATMENT RESPONSE -Stable  PERFORMED BY - Self    NOTES   :  I have spent critical care time involved in lab review, consultations with specialist, family decision- making, bedside attention and documentation. This time excludes time spent in any separate billed procedures. During this entire length of time I was immediately available to the patient . Kayla Sullivan MD        Disposition     Disposition: Admitted to Floor Medical Floor the case was discussed with the admitting physician Tea Marie. Admitted    Diagnosis     Clinical Impression:   1. Sepsis with acute hypoxic respiratory failure without septic shock, due to unspecified organism ([facility] [address]    2. CARRIE (acute kidney injury) ([facility] [address]    3. Hypernatremia    4. Pressure injury of sacral region, unstageable [facility])        Attestations:    Kayla Sullivan MD    Please note that this dictation was completed with [employer], the computer voice recognition software. Quite often unanticipated grammatical, syntax, homophones, and other interpretive errors are inadvertently transcribed by the computer software. Please disregard these errors. Please excuse any errors that have escaped final proofreading. Thank you.

## 2022-02-21 NOTE — ED NOTES
.. TRANSFER - OUT REPORT:    Verbal report given to Ruth GONGORA on Brii Esquivel  being transferred to ICU for routine progression of care       Report consisted of patients Situation, Background, Assessment and   Recommendations(SBAR). Information from the following report(s) SBAR was reviewed with the receiving nurse. Lines:   Peripheral IV 02/20/22 Anterior;Right Forearm (Active)   Site Assessment Clean, dry, & intact 02/20/22 1509   Phlebitis Assessment 0 02/20/22 1509   Infiltration Assessment 0 02/20/22 1509   Dressing Status Clean, dry, & intact 02/20/22 1509   Hub Color/Line Status Pink 02/20/22 1509        Opportunity for questions and clarification was provided.       Patient transported with:   Registered Nurse

## 2022-02-21 NOTE — CONSULTS
Nephrology Consult    Patient: Diego Angeles MRN: [id number]  SSN: [id number]    YOB: 1922  Age: 80 y.o. Sex: female      Subjective:   Reason for the consultation. Acute kidney injury  History of present illness. The patient is 40-year-old woman with underlying history of old stroke, hypertension, bradycardia, status post AICD placement, dysphagia status post PEG placement, recent Covid infection, infected sacral decubitus wound and was been on IV vancomycin and cefepime, pleural effusion status post thoracentesis of 800 mL fluid on 2/14/2022 was admitted on 2/20 with fever, altered mental status, diagnosed with severe sepsis. On admission her BUN was elevated at 116 and creatinine 2.07 from her baseline of 0.86 on 2/18 which prompted a nephrology consultation. She was severely hypotensive on admission. She is not on any pressors. She is on maintenance IV fluids. She is on nasal cannula. She has Govea in place and urine output has not been documented. No noticed lower extremity swelling.     Past Medical History:   Diagnosis Date    Chronic kidney disease     acute tubual necrosis    Clostridium difficile infection     Elevated troponin 9/10/2014    Hypertension     Skin cancer     Stroke [facility])     2001 left residual     Past Surgical History:   Procedure Laterality Date    HX APPENDECTOMY  2/2008    HX HEENT      cataract    HX ORTHOPAEDIC  04/2010    left total hip - hip fx, left knee surgery    IR FLUORO GUIDE PLC CVAD  1/6/2022    IR THORACENTESIS NDL PUNC ASP W IMAGE  2/14/2022      Family History   Family history unknown: Yes     Social History     Tobacco Use    Smoking status: Never Smoker    Smokeless tobacco: Never Used   Substance Use Topics    Alcohol use: Never      Current Facility-Administered Medications   Medication Dose Route Frequency Provider Last Rate Last Admin    sodium chloride (23.4%) 0.225 % in sterile water 1,000 mL infusion   IntraVENous CONTINUOUS Krishan Walker MD        sodium chloride (NS) flush 5-40 mL  5-40 mL IntraVENous Q8H Mando Garcia MD   10 mL at 02/21/22 0510    sodium chloride (NS) flush 5-40 mL  5-40 mL IntraVENous PRN Mitchell Aggarwal MD        acetaminophen (TYLENOL) tablet 650 mg  650 mg Oral Q6H PRN Mitchell Aggarwal MD        Or   Aleksandar Man acetaminophen (TYLENOL) suppository 650 mg  650 mg Rectal Q6H PRN Mitchell Aggarwal MD        polyethylene glycol [facility]) packet 17 g  17 g Oral DAILY PRN Mitchell Aggarwal MD        ondansetron (ZOFRAN ODT) tablet 4 mg  4 mg Oral Q8H PRN Mitchell Aggarwal MD        Or    ondansetron [facility]) injection 4 mg  4 mg IntraVENous Q6H PRN Mitchell Aggarwal MD        ascorbic acid (vitamin C) (VITAMIN C) tablet 500 mg  500 mg Oral BID Mando Garcia MD   500 mg at 02/20/22 2106    apixaban (ELIQUIS) tablet 2.5 mg  2.5 mg Oral BID Mitchell Aggarwal MD   2.5 mg at 02/20/22 2106    zinc sulfate (ZINCATE) 50 mg zinc (220 mg) capsule 1 Capsule  1 Capsule Oral DAILY Mando Garcia MD        cefepime (MAXIPIME) injection 2 g  2 g IntraVENous Q24H Mitchell Aggarwal MD   2 g at 02/20/22 2106    fluconazole (DIFLUCAN) 200mg/100 mL IVPB (premix)  200 mg IntraVENous Q24H Mitchell Aggarwal  mL/hr at 02/20/22 2054 200 mg at 02/20/22 2054        Allergies   Allergen Reactions    Neosporin [Benzalkonium Chloride] Rash       Review of Systems:  Unable to obtain    Objective:     Vitals:    02/21/22 0400 02/21/22 0500 02/21/22 0600 02/21/22 0700   BP: 102/73 (!) 84/54 96/63    Pulse: 60 68 60    Resp: 28 23 27    Temp:    97.5 °F (36.4 °C)   SpO2: 98% 97% 97%    Weight:       Height:            Physical Exam:  General: Frail and weak  Eyes: sclera anicteric  Oral Cavity: No thrush or ulcers  Neck: no JVD  Chest: Fair bilateral air entry  Heart: normal sounds  Abdomen: soft and non tender   :  Govea+  Lower Extremities: no edema  Skin: no rash  Neuro: AMS           Assessment:     Hospital Problems  Date Reviewed: 1/5/2022          Codes Class Noted POA    Septic shock ([facility] [address] ICD-10-CM: A41.9, R65.21  ICD-9-CM: 038.9, 785.52, 995.92  2/20/2022 Unknown              Plan:   1-acute kidney injury.    -Etiology is prerenal azotemia from hypotension and severe sepsis.    -On admission BUN was elevated at 116 and creatinine 2.07.    -Her baseline creatinine was 0.86 on 2/18/22.    -Clinically she looks volume down. -She has Govea cath in place and urine output has not been documented. -I will order a renal ultrasound.    -I will continue on slow maintenance IV fluids. 2.  Hypernatremia.    -From free water deficit.    -Na 151.    -I will start her on hypotonic IV fluids.    -I will start her on water flushes via PEG tube. 3.  Anemia.    -Severe. -Hemoglobin is 6.6 only.    -No evidence of bleeding.    -She might need unit blood transfusion 2 units. -GI consultation should be considered. 4.  Severe sepsis.    -On admission temp 800.9 and systolic blood pressure 82 only. -CBC showed leukocytosis. - Cultures are pending.    -Recent hospitalization for infected sacral decubitus ulcer and was discharged on IV vancomycin and IV cefepime. -ID has been consulted. 5.  History of A. fib.    -Status post pacemaker placement. On Eliquis. Thank for the consultation.     Signed By: Wardell Mcburney, MD     February 21, 2022

## 2022-02-21 NOTE — H&P
GENERAL GENERIC H&P/CONSULT    Admission Hx:    89F with a history of CKD, hypertension, stroke with left residual defects, bradycardia status post AICD 2 months ago, dysphagia status post PEG tube. Recent history of Covid 19.    2/9/22 admitted to hospital for altered mental status and fevers. Managed for worsening sacral decubitus wound with wound care and antibiotics. 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 Hospital stay complicated by pleural effusion s/p thoracentesis 2/14/22 with 800cc fluid removed. 2/18/22 discharged home with iv vancomycin and cefepime via PICC as well as continuous home oxygen. 2/20/22 returns to hospital because of fevers and increased lethargy. During the ED course found to be in acute renal failure, hypotensive, managed for septic shock and I have been asked to admit her to hospital.  ------------------------    SUBJECTIVE: Arousable but not communicative.        Past Medical History:   Diagnosis Date    Chronic kidney disease     acute tubual necrosis    Clostridium difficile infection     Elevated troponin 9/10/2014    Hypertension     Skin cancer     Stroke [facility])     2001 left residual      Past Surgical History:   Procedure Laterality Date    HX APPENDECTOMY  2/2008    HX HEENT      cataract    HX ORTHOPAEDIC  04/2010    left total hip - hip fx, left knee surgery    IR FLUORO GUIDE PLC CVAD  1/6/2022    IR THORACENTESIS NDL PUNC ASP W IMAGE  2/14/2022      Prior to Admission medications    Medication Sig Start Date End Date Taking? Authorizing Provider   insulin lispro (HUMALOG) 100 unit/mL injection INITIATE CORRECTIVE INSULIN PROTOCOL (RASHEEDA): RX RASHEEDA Normal Sensitivity (Average weight) AC (before meals), Q6H, and Q4H CORRECTIONAL SCALE only For Blood Sugar (mg/dl) of :           140-199=2 units          200-249=3 units 250-299=5 units 300-349=7 units 350 or greater = Call MD Give in addition to basal medications.  Do Not Hold for NPO BEDTIME CORRECTIONAL sliding scale when scheduled: 200-249=2 units 250-299=3 units 300-349=4 units 350 or greater = Call MD Give in addition to basal medications. Do Not Hold for NPO Fast Acting - Administer Immediately - or within 15 minutes of start of meal, if mealtime coverage. 2/19/22   Rody Mitchell NP   insulin pump-infus. set-meter (Accu-Chek Combo System) kit For Accu-Cheks before meals and at bedtime 2/18/22   Rody Mitchell NP   Insulin Syringe-Needle, Dis Un 0.5 mL 29 gauge x 1/2\" syrg 1 box of 100 syringes, for sliding scale insulin 2/18/22   Rody Mitchell NP   Lancing Device with Lancets (Accu-Chek Multiclix Lancet) kit Used to check blood sugars before meals and at bedtime 2/18/22   Rody Mitchell NP   cefepime (MAXIPIME) 2 gram injection 2,000 mg by IntraVENous route every twelve (12) hours for 14 days. 2/17/22 3/3/22  Mirtha Davila MD   vancomycin [facility]) 500 mg injection 500 mg by IntraVENous route every twenty-four (24) hours for 14 doses. 2/17/22 3/3/22  Mirtha Davila MD   Eliquis 2.5 mg tablet Take 2.5 mg by mouth two (2) times a day. 1/11/22   Provider, Historical   furosemide (LASIX) 40 mg tablet 40 mg daily. 11/20/21   Provider, Historical   acetaminophen (TYLENOL) 325 mg tablet Take 2 Tablets by mouth every six (6) hours as needed for Pain or Fever. 1/6/22   Jaky Mohr MD   ascorbic acid, vitamin C, (VITAMIN C) 500 mg tablet Take 1 Tablet by mouth two (2) times a day. 1/6/22   Jaky Mohr MD   zinc sulfate (ZINCATE) 50 mg zinc (220 mg) capsule Take 1 Capsule by mouth daily.  1/7/22   Jaky Mohr MD     Allergies   Allergen Reactions    Neosporin [Benzalkonium Chloride] Rash      Social History     Tobacco Use    Smoking status: Never Smoker    Smokeless tobacco: Never Used   Substance Use Topics    Alcohol use: Never      Family History   Family history unknown: Yes      Review of Systems   Unable to perform ROS: Patient nonverbal   All other systems reviewed and are negative. Objective:    Patient Vitals for the past 8 hrs:   BP Temp Pulse Resp SpO2   02/21/22 0700 -- 97.5 °F (36.4 °C) -- -- --   02/21/22 0600 96/63 -- 60 27 97 %   02/21/22 0500 (!) 84/54 -- 68 23 97 %   02/21/22 0400 102/73 -- 60 28 98 %   02/21/22 0302 -- 97.8 °F (36.6 °C) -- -- --   02/21/22 0300 94/66 -- 62 27 97 %   02/21/22 0200 139/75 97.5 °F (36.4 °C) 60 23 98 %   02/21/22 0051 96/66 -- 65 22 99 %     Physical Exam  Vitals and nursing note reviewed. Exam conducted with a chaperone present. Constitutional:       General: She is in acute distress. Appearance: She is ill-appearing. HENT:      Head: Normocephalic. Mouth/Throat:      Mouth: Mucous membranes are dry. Eyes:      Pupils: Pupils are equal, round, and reactive to light. Cardiovascular:      Rate and Rhythm: Normal rate and regular rhythm. Pulses: Normal pulses. Heart sounds: Normal heart sounds. Pulmonary:      Effort: Pulmonary effort is normal.      Breath sounds: Normal breath sounds. Abdominal:      General: Abdomen is flat. Musculoskeletal:      Cervical back: Normal range of motion and neck supple. Skin:     Comments: Large open sacral wound Stage 3   Neurological:      Mental Status: She is disoriented.    Psychiatric:      Comments: Unable to assess          Labs:    Recent Results (from the past 24 hour(s))   EKG, 12 LEAD, INITIAL    Collection Time: 02/20/22  3:02 PM   Result Value Ref Range    Ventricular Rate 60 BPM    Atrial Rate 267 BPM    QRS Duration 144 ms    Q-T Interval 512 ms    QTC Calculation (Bezet) 512 ms    Calculated R Axis -72 degrees    Calculated T Axis 96 degrees    Diagnosis       Ventricular-paced rhythm  Abnormal ECG  When compared with ECG of 24-JAN-2022 01:32,  No significant change was found  Confirmed by Yessi Mike MD, [facility] ([id number]) on 2/20/2022 10:26:27 PM     CBC WITH AUTOMATED DIFF    Collection Time: 02/20/22  3:06 PM Result Value Ref Range    WBC 10.9 3.6 - 11.0 K/uL    RBC 2.28 (L) 3.80 - 5.20 M/uL    HGB 6.7 (L) 11.5 - 16.0 g/dL    HCT 22.8 (L) 35.0 - 47.0 %    .0 (H) 80.0 - 99.0 FL    MCH 29.4 26.0 - 34.0 PG    MCHC 29.4 (L) 30.0 - 36.5 g/dL    RDW 22.4 (H) 11.5 - 14.5 %    PLATELET 627 (L) 769 - 400 K/uL    MPV 12.1 8.9 - 12.9 FL    NRBC 3.6 (H) 0.0  WBC    ABSOLUTE NRBC 0.39 (H) 0.00 - 0.01 K/uL    NEUTROPHILS 76 (H) 32 - 75 %    LYMPHOCYTES 11 (L) 12 - 49 %    MONOCYTES 9 5 - 13 %    EOSINOPHILS 2 0 - 7 %    BASOPHILS 0 0 - 1 %    IMMATURE GRANULOCYTES 2 (H) 0 - 0.5 %    ABS. NEUTROPHILS 8.3 (H) 1.8 - 8.0 K/UL    ABS. LYMPHOCYTES 1.2 0.8 - 3.5 K/UL    ABS. MONOCYTES 1.0 0.0 - 1.0 K/UL    ABS. EOSINOPHILS 0.2 0.0 - 0.4 K/UL    ABS. BASOPHILS 0.0 0.0 - 0.1 K/UL    ABS. IMM. GRANS. 0.2 (H) 0.00 - 0.04 K/UL    DF AUTOMATED     METABOLIC PANEL, COMPREHENSIVE    Collection Time: 02/20/22  3:06 PM   Result Value Ref Range    Sodium 151 (H) 136 - 145 mmol/L    Potassium 4.2 3.5 - 5.1 mmol/L    Chloride 116 (H) 97 - 108 mmol/L    CO2 29 21 - 32 mmol/L    Anion gap 6 5 - 15 mmol/L    Glucose 110 (H) 65 - 100 mg/dL     (H) 6 - 20 mg/dL    Creatinine 2.07 (H) 0.55 - 1.02 mg/dL    BUN/Creatinine ratio 56 (H) 12 - 20      GFR est AA 27 (L) >60 ml/min/1.73m2    GFR est non-AA 22 (L) >60 ml/min/1.73m2    Calcium 8.9 8.5 - 10.1 mg/dL    Bilirubin, total 1.3 (H) 0.2 - 1.0 mg/dL    AST (SGOT) 29 15 - 37 U/L    ALT (SGPT) 25 12 - 78 U/L    Alk.  phosphatase 210 (H) 45 - 117 U/L    Protein, total 6.5 6.4 - 8.2 g/dL    Albumin 2.5 (L) 3.5 - 5.0 g/dL    Globulin 4.0 2.0 - 4.0 g/dL    A-G Ratio 0.6 (L) 1.1 - 2.2     LACTIC ACID    Collection Time: 02/20/22  3:06 PM   Result Value Ref Range    Lactic acid 3.6 (HH) 0.4 - 2.0 mmol/L   VANCOMYCIN, RANDOM    Collection Time: 02/20/22  6:05 PM   Result Value Ref Range    Vancomycin, random 30.5 ug/mL   URINALYSIS W/ RFLX MICROSCOPIC    Collection Time: 02/20/22  7:00 PM   Result Value Ref Range    Color Yellow/Straw      Appearance Turbid (A) Clear      Specific gravity 1.024 1.003 - 1.030      pH (UA) 5.0 5.0 - 8.0      Protein >300 (A) Negative mg/dL    Glucose Negative Negative mg/dL    Ketone 5 (A) Negative mg/dL    Bilirubin Negative Negative      Blood Moderate (A) Negative      Urobilinogen 2.0 (H) 0.1 - 1.0 EU/dL    Nitrites Negative Negative      Leukocyte Esterase Moderate (A) Negative      WBC >100 (H) 0 - 4 /hpf    RBC >100 (H) 0 - 5 /hpf    Bacteria Negative Negative /hpf    Mucus 2+ /lpf    PRESUMPTIVE YEAST Present     PROCALCITONIN    Collection Time: 02/20/22  7:00 PM   Result Value Ref Range    Procalcitonin 0.87 (H) 0 ng/mL   C REACTIVE PROTEIN, QT    Collection Time: 02/20/22  7:00 PM   Result Value Ref Range    C-Reactive protein 8.81 (H) 0.00 - 0.60 mg/dL   LD    Collection Time: 02/20/22  7:00 PM   Result Value Ref Range     (H) 81 - 246 U/L   URINE MICROSCOPIC    Collection Time: 02/20/22  7:00 PM   Result Value Ref Range    WBC >100 (H) 0 - 4 /hpf    RBC >100 (H) 0 - 5 /hpf    Bacteria Negative Negative /hpf    Budding yeast Present (A) Negative     METABOLIC PANEL, COMPREHENSIVE    Collection Time: 02/21/22  2:30 AM   Result Value Ref Range    Sodium 151 (H) 136 - 145 mmol/L    Potassium 4.4 3.5 - 5.1 mmol/L    Chloride 117 (H) 97 - 108 mmol/L    CO2 28 21 - 32 mmol/L    Anion gap 6 5 - 15 mmol/L    Glucose 108 (H) 65 - 100 mg/dL     (H) 6 - 20 mg/dL    Creatinine 2.26 (H) 0.55 - 1.02 mg/dL    BUN/Creatinine ratio 52 (H) 12 - 20      GFR est AA 24 (L) >60 ml/min/1.73m2    GFR est non-AA 20 (L) >60 ml/min/1.73m2    Calcium 8.5 8.5 - 10.1 mg/dL    Bilirubin, total 1.6 (H) 0.2 - 1.0 mg/dL    AST (SGOT) 33 15 - 37 U/L    ALT (SGPT) 23 12 - 78 U/L    Alk.  phosphatase 144 (H) 45 - 117 U/L    Protein, total 6.2 (L) 6.4 - 8.2 g/dL    Albumin 2.3 (L) 3.5 - 5.0 g/dL    Globulin 3.9 2.0 - 4.0 g/dL    A-G Ratio 0.6 (L) 1.1 - 2.2     CBC WITH AUTOMATED DIFF Collection Time: 02/21/22  2:30 AM   Result Value Ref Range    WBC 11.5 (H) 3.6 - 11.0 K/uL    RBC 2.26 (L) 3.80 - 5.20 M/uL    HGB 6.6 (L) 11.5 - 16.0 g/dL    HCT 22.7 (L) 35.0 - 47.0 %    .4 (H) 80.0 - 99.0 FL    MCH 29.2 26.0 - 34.0 PG    MCHC 29.1 (L) 30.0 - 36.5 g/dL    RDW 22.5 (H) 11.5 - 14.5 %    PLATELET 134 (L) 842 - 400 K/uL    MPV 12.3 8.9 - 12.9 FL    NRBC 1.3 (H) 0.0  WBC    ABSOLUTE NRBC 0.15 (H) 0.00 - 0.01 K/uL    NEUTROPHILS 82 (H) 32 - 75 %    LYMPHOCYTES 9 (L) 12 - 49 %    MONOCYTES 7 5 - 13 %    EOSINOPHILS 1 0 - 7 %    BASOPHILS 0 0 - 1 %    IMMATURE GRANULOCYTES 1 (H) 0 - 0.5 %    ABS. NEUTROPHILS 9.5 (H) 1.8 - 8.0 K/UL    ABS. LYMPHOCYTES 1.0 0.8 - 3.5 K/UL    ABS. MONOCYTES 0.8 0.0 - 1.0 K/UL    ABS. EOSINOPHILS 0.1 0.0 - 0.4 K/UL    ABS. BASOPHILS 0.0 0.0 - 0.1 K/UL    ABS. IMM. GRANS. 0.2 (H) 0.00 - 0.04 K/UL    DF AUTOMATED     LACTIC ACID    Collection Time: 02/21/22  2:30 AM   Result Value Ref Range    Lactic acid 3.1 (HH) 0.4 - 2.0 mmol/L   MRSA SCREEN - PCR (NASAL)    Collection Time: 02/21/22  3:00 AM   Result Value Ref Range    MRSA by PCR, Nasal DETECTED (A) Not Detected       Chest X-Ray:   XR CHEST PORT     Comparison: February 12, 2022.     Bilateral pleural effusions again demonstrated decreased in size. Pleural  effusions remain greater on the right than left. Moderate perihilar pulmonary  vascular congestion and interstitial edema persist. No lobar infiltrate or  consolidation. No other new pulmonary opacity. Heart is moderately enlarged. Left-sided pacemaker unchanged. The bony thorax is unchanged.     IMPRESSION  Decrease in bilateral pleural effusions. Unchanged moderate perihilar vascular congestion and interstitial edema. Assessment:  Active Problems:    Septic shock ([address] (2/20/2022)      Plan: Continue ICU Mgmt    1) Septic shock at admission in a patient with worsening sacral wound further complications of MDR soft tissue infection.      Holding vancomycin for acute renal failure, checking vanc level   Continue cefepime   Blood and wound cultures pending   Wound care inpatient and outpatient   ID Consult    2) CARRIE/CKD in the setting of volume depletion and active infection further complicated by high dose furosemide. IVF    3) Cardiovascular issues including pacemaker, atrial fibrillation, heart failure. Telemetry monitoring   Hold furosemide   Cont.  Eliquis    4) DVT prophylaxis: Eliquis (chronic)     Signed:  Collette Schultz, MD 2/21/2022

## 2022-02-21 NOTE — PROGRESS NOTES
Vancomycin    Indication for Antimicrobials: Sepsis, SSTI     Consult placed by: Dr. Heather Jeronimo    Significant Cultures:    blood: ngtd - prelim   wound, sacral: ngtd - prelim   urine: pending   MRSA: detected - Final    Labs:  Recent Labs     22  0230 22  1506   CREA 2.26* 2.07*   * 116*   WBC 11.5* 10.9     Temp (24hrs), Av.7 °F (36.5 °C), Min:97.4 °F (36.3 °C), Max:98.2 °F (36.8 °C)      Is the Patient on Dialysis? No    Creatinine Clearance (mL/min):   CrCl (Actual Body Weight): 9.7  CrCl (Adjusted Body Weight): 9.7  CrCl (Ideal Body Weight): 9.7    Goal level: Trough 10-15 mcg/mL     Impression/Plan:   CARRIE on top of CKD  Patient was on home vancomycin IV regimen  Level last night was supratherapeutic  --vanc level = 30.5  Will NOT give dose today  Will check level tomorrow with AM labs  --suspect patient will be supratherapeutic again  Antimicrobial stop date TBD     Pharmacy will follow daily and adjust medications as appropriate for renal function and/or serum levels.     Thank you,  [facility]

## 2022-02-21 NOTE — CONSULTS
Full consultation dictated. [id number]. Patient admitted with septic shock. However did not require pressors. Blood pressure still soft. Continue with fluid resuscitation. ID on the case. No specific need for COVID-19 treatment. Continued supportive care in this elderly patient. Thank you.

## 2022-02-21 NOTE — CONSULTS
Consult    Patient: Marcie Fernandez MRN: [id number]  SSN: [id number]    YOB: 1922  Age: 80 y.o. Sex: female      Subjective: Marcie Fernandez is a 80 y.o. female who is being seen for diastolic heart failure. Patient is a poor historian. She is with history of stroke and left sided residual weakness from 2001, history of skin cancer, hypertension, CKD. Patient was recently discharged from the hospital.  She recently received a pacemaker for bradycardia 2 months ago. She is status post PEG tube. Who presented for altered mental status and fever. Her son is a physician was trying to treat the patient for volume overload and possible aspiration pneumonia with Lasix and amoxicillin. She became hypoxemic. She was found to be Covid positive. She was discharged on antibiotic. She was managed for worsening sacral decubitus ulcer. She presented again with fever and increased lethargy.     Past Medical History:   Diagnosis Date    Chronic kidney disease     acute tubual necrosis    Clostridium difficile infection     Elevated troponin 9/10/2014    Hypertension     Skin cancer     Stroke [facility])     2001 left residual     Past Surgical History:   Procedure Laterality Date    HX APPENDECTOMY  2/2008    HX HEENT      cataract    HX ORTHOPAEDIC  04/2010    left total hip - hip fx, left knee surgery    IR FLUORO GUIDE PLC CVAD  1/6/2022    IR THORACENTESIS NDL PUNC ASP W IMAGE  2/14/2022      Family History   Family history unknown: Yes     Social History     Tobacco Use    Smoking status: Never Smoker    Smokeless tobacco: Never Used   Substance Use Topics    Alcohol use: Never      Current Facility-Administered Medications   Medication Dose Route Frequency Provider Last Rate Last Admin    sodium chloride (23.4%) 0.225 % in sterile water 1,000 mL infusion   IntraVENous CONTINUOUS Kelvin Marquez MD 75 mL/hr at 02/21/22 1034 New Bag at 02/21/22 1034    pantoprazole (PROTONIX) 40 mg in 0.9% sodium chloride 10 mL injection  40 mg IntraVENous DAILY Edy Ceja MD   40 mg at 02/21/22 1432    [START ON 2/22/2022] Vancomycin random level - please draw with AM labs on 2/22. Thanks!    Other Cyndie Mckeon MD        VANCOMYCIN INFORMATION NOTE   Other Rx Dosing/Monitoring Linda Gutierrez MD        sodium chloride (NS) flush 5-40 mL  5-40 mL IntraVENous Q8H Mando Gambino MD   10 mL at 02/21/22 1433    sodium chloride (NS) flush 5-40 mL  5-40 mL IntraVENous PRN Jos Houston MD        acetaminophen (TYLENOL) tablet 650 mg  650 mg Oral Q6H PRN Jos Houston MD        Or   Verl Raw acetaminophen (TYLENOL) suppository 650 mg  650 mg Rectal Q6H PRN Jos Houston MD        polyethylene glycol [facility]) packet 17 g  17 g Oral DAILY PRN Jos Houston MD        ondansetron (ZOFRAN ODT) tablet 4 mg  4 mg Oral Q8H PRN Jos Houston MD        Or    ondansetron [facility]) injection 4 mg  4 mg IntraVENous Q6H PRN Jos Houston MD        ascorbic acid (vitamin C) (VITAMIN C) tablet 500 mg  500 mg Oral BID Mando Gambino MD   500 mg at 02/21/22 1028    apixaban (ELIQUIS) tablet 2.5 mg  2.5 mg Oral BID Jos Houston MD   2.5 mg at 02/21/22 1029    zinc sulfate (ZINCATE) 50 mg zinc (220 mg) capsule 1 Capsule  1 Capsule Oral DAILY Jos Houston MD   1 Capsule at 02/21/22 1028    cefepime (MAXIPIME) injection 2 g  2 g IntraVENous Q24H Jos Houston MD   2 g at 02/20/22 2106    fluconazole (DIFLUCAN) 200mg/100 mL IVPB (premix)  200 mg IntraVENous Q24H Jos Houston  mL/hr at 02/20/22 2054 200 mg at 02/20/22 2054        Allergies   Allergen Reactions    Neosporin [Benzalkonium Chloride] Rash       Review of Systems:  Unable to obtain    Objective:     Vitals:    02/21/22 1100 02/21/22 1200 02/21/22 1300 02/21/22 1400   BP: 99/63 100/64 (!) 106/58 (!) 89/58   Pulse: 61 60 61 61   Resp: 21 20 25 24   Temp: 97.4 °F (36.3 °C)      SpO2: 98% 99% 100% 99%   Weight:       Height: Physical Exam:  General:   Confused, contracted. Eyes:  Conjunctivae/corneas clear. PERRL, EOMs intact. Fundi benign   Ears:  Normal TMs and external ear canals both ears. Nose: Nares normal. Septum midline. Mucosa normal. No drainage or sinus tenderness. Mouth/Throat: Lips, mucosa, and tongue normal. Teeth and gums normal.   Neck: Supple, symmetrical, trachea midline, no adenopathy, thyroid: no enlargment/tenderness/nodules, no carotid bruit and no JVD. Back:   Symmetric, no curvature. ROM normal. No CVA tenderness. Lungs:   Clear to auscultation bilaterally. Heart:  Regular rate and rhythm, S1, S2 normal, no murmur, click, rub or gallop. Abdomen:   Soft, non-tender. Bowel sounds normal. No masses,  No organomegaly. Extremities: Extremities normal, atraumatic, no cyanosis or edema. Pulses: 2+ and symmetric all extremities. Skin: Skin color, texture, turgor normal. No rashes or lesions   Lymph nodes: Cervical, supraclavicular, and axillary nodes normal.   Neurologic:   nonverbal.         Assessment:     Hospital Problems  Date Reviewed: 1/5/2022          Codes Class Noted POA    Septic shock ([facility] [address] ICD-10-CM: A41.9, R65.21  ICD-9-CM: 038.9, 785.52, 995.92  2/20/2022 Unknown            Patient is a 71-year-old white female with:  1. Heart failure  2. COVID pneumonitis  3. Anemia  4. Thrombocytopenia  5. Hypernatremia  6. Acute kidney injury  7. Severe mitral regurgitation. ,  Moderate posterior mitral annular calcification  8. Severe tricuspid regurgitation  9. Moderate pulmonary regurgitation  10. Status post PEG tube  11. Status post pacemaker  12. Pleural effusion status post thoracentesis with 800 cc removed. 13.  Septic shock  14. Sacral decubitus ulcers  15. Atrial fibrillation      Plan:     White count 11.5, hemoglobin is 6.6, platelet count 577. Sodium 151, potassium 4.4, creatinine 2.26 from 0.6 last week. BUN has increased from 58-1 18. Liver function is okay.   Echo last week showed EF of 55 to 60%, no reported diastolic dysfunction. Septal thickening. Mildly calcified aortic valve. Severe MR. She appears to be dehydrated with acute worsening of kidney function. I agree to hold off diuretic. She is currently on apixaban 2.5 mg twice a day adjusted for kidney function. Currently on IV fluid. Surgeon consulted for decubitus ulcer. Will follow nephrology recommendation. No further cardiac testing is planned at this time. She is currently in a paced rhythm with underlying atrial fibrillation. Continue supportive care. Thank you  For involving me in Mrs. Ric downing.   Signed By: Natalia Yeh MD     February 21, 2022

## 2022-02-21 NOTE — CONSULTS
[address] [place] [address]    Name:  Mel Jacome  MR#:  [id number]  :  1922  ACCOUNT #:  [de-identified]  DATE OF SERVICE:  2022    ATTENDING PHYSICIAN:  Rudi Ulloa. Orquidea Perez MD, hospitalist.    REASON FOR CONSULTATION:  ICU consult, the patient with septic shock. HISTORY OF PRESENT ILLNESS:  The patient is a 51-year-old  female. Information obtained from current medical records. The patient is nonverbal.  Discussed with RN. She is a full code. Apparently, she lives with her son in a single-level home. She is bed bound and wheelchair bound. She has a PEG tube in place. She has multiple decubitus ulcers. Apparently, wound cultures have grown multidrug resistant Acinetobacter and MRSA. She also had a recent hospitalization with bilateral pleural effusions and underwent thoracentesis on . She was discharged home on IV vancomycin and cefepime via PICC line as well as continuous home oxygen on . Yesterday, on , she was sent back to the hospital because of fevers and increasing lethargy. She was found to be hypotensive and in acute renal failure. She was admitted for septic shock. She is currently on quarter normal saline because of hypernatremia. Nephrology is also on the case. She apparently has not been on any pressors according to the nurse. Pressors were ordered but never initiated. She is not in any acute distress. She is on 1.5 L of nasal cannula. PAST MEDICAL HISTORY:  Significant for chronic kidney disease, hypertension, skin cancer, history of stroke, and she is bedridden with multiple decubitus ulcers and PEG tube placement. History of bradycardia status post pacemaker insertion, history of recent COVID infection, and bilateral pleural effusions requiring thoracentesis on  with removal of about 800 mL of what appears to be transudative effusion.     PAST SURGICAL HISTORY:  She is status post orthopedic surgeries, appendectomy, and cataract surgery. ALLERGIES:  NEOSPORIN. MEDICATIONS:  Currently, the patient is started on,  1. Quarter normal saline at 75 mL per hour. 2.  Eliquis 2.5 mg p.o. b.i.d.  3.  Ascorbic acid 500 mg p.o. b.i.d.  4.  Maxipime 2 g IV daily. 5.  Fluconazole 200 mg IV daily. 6.  Other p.r.n. medications. SOCIAL HISTORY:  She apparently lives with her son. Unable to obtain any history of significant drug, alcohol, or tobacco abuse. OCCUPATIONAL HISTORY:  Largely unobtainable. FAMILY HISTORY:  Largely unobtainable. REVIEW OF SYSTEMS:  Largely unobtainable. PHYSICAL EXAMINATION:  GENERAL:  The patient is an elderly frail  female who is lying in the right decubitus position. She appears to have contractures and multiple skin decubitus. VITAL SIGNS:  Temperature is 97.4 and T-max was 101.1, pulse is 60 per minute, respiratory rate is 21 per minute, and blood pressure is 99/63. Saturation on nasal cannula O2 is 98%. HEENT:  Shows pupils are equal and reactive to light. Pallor is noted. Nasal passages appear to be patent and she is on nasal cannula. She will not open her mouth. NECK:  Appears to be supple. Trachea appears to be central.  No obvious JVD. LUNGS:  She is not using any accessory muscles of respiratory. CHEST:  Symmetrical.  She has diminished breath sounds over the lung bases with some crackles. HEART:  Rhythm is regular with monitor showing paced rhythm at a rate of 60 beats per minute. ABDOMEN:  Status post PEG tube. It is soft and appears to be benign. Bowel sounds are audible. EXTREMITIES:  Do not show any cyanosis or clubbing. She has some pitting edema. She has contractures. Pulses are palpable. NEUROLOGIC SYSTEM:  Examination shows that the patient is uncommunicative. She has a history of stroke. She has contractures. SKIN:  Shows decubitus ulcers, mainly in the sacral regions.     DIAGNOSTIC AND LABORATORY DATA:  Chest x-ray done yesterday shows decreased bilateral pleural effusions as compared to a prior study of 02/12/2022. WBC count is 11.5, with neutrophils of 82%, hemoglobin is 6.6, platelet count of 620. Sodium is 151, potassium is 4.4, BUN is 118, creatinine is 2.24, blood glucose of 108. Rapid COVID test was positive on 02/18. MRSA screen is positive. D-dimer is 8.87. Lactic acid 3.1. Ferritin 269. CRP is elevated at 8.8. ASSESSMENT AND PLAN:  1. Septic shock. The patient has multiple sacral decubitus. It was thought that her septic shock is because of infected decubitus. 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 Infectious Disease is already on the case. She is on cefepime and holding vancomycin. She is getting quarter-normal saline because of hypernatremia; however, pressors were ordered but were never initiated according to the registered nurse. We will continue to monitor her closely. She is a full code. 2.  Bilateral pleural effusions. They appear to be transudative, however, improved from recent study of 02/12/2022. She had a thoracentesis done on 02/14 as well. She is currently on 2 L of oxygen, not in any distress. 3.  Acute on chronic renal failure. Nephrology is also on the case. Continue with intravenous fluids. 4.  History of bradycardia and pacemaker insertion, also has history of atrial fibrillation and congestive heart failure. Apparently, she is on Eliquis 2.5 mg p.o. b.i.d. which may be continued while closely monitoring her hemoglobin. She does have anemia. 5.  Recent COVID-19 infection. No specific treatment is needed at this time. We will avoid steroids because of sepsis and wound infection. 6.  For deep venous thrombosis prophylaxis, she is already on Eliquis. 7.  For gastrointestinal stress prophylaxis, consider starting her on Protonix intravenously. Thank you for allowing me to participate in the care of this patient.   I will follow the patient closely with you. It should be mentioned that she is a full code, but her prognosis is very poor. Approximately one hour was spent in the management of this patient. She remains critically ill.       Monae Porras MD      [id number]  D:  02/21/2022 13:08  T:  02/21/2022 16:51  JOB #:  [id number]

## 2022-02-22 NOTE — WOUND CARE
Ordered a Envella Air-Fluidized bed for patient from [facility], confirmation [id number]. Bed should deliver this afternoon.

## 2022-02-22 NOTE — PROGRESS NOTES
Sent Dr. Ann Marie Arevalo message on perfect serve regarding hypothermia, patient is currently on warming bed.

## 2022-02-22 NOTE — PROGRESS NOTES
Progress Note    Patient: Aruna Montague MRN: [id number]  SSN: [id number]    YOB: 1922  Age: 80 y.o. Sex: female      Admit Date: 2/20/2022    LOS: 2 days     Subjective:     No acute events overnight    Objective:     Vitals:    02/22/22 0700 02/22/22 0701 02/22/22 0813 02/22/22 1100   BP:       Pulse:       Resp:       Temp: 97 °F (36.1 °C)   97.4 °F (36.3 °C)   SpO2:  98% 100%    Weight:       Height:            Intake and Output:  Current Shift: No intake/output data recorded. Last three shifts: 02/20 1901 - 02/22 0700  In: 1800 [I.V.:1380]  Out: 52 [Urine:52]    Physical Exam:   General:   Nonverbal.   Eyes:  Conjunctivae/corneas clear. PERRL, EOMs intact. Fundi benign   Ears:  Normal TMs and external ear canals both ears. Nose: Nares normal. Septum midline. Mucosa normal. No drainage or sinus tenderness. Mouth/Throat: Lips, mucosa, and tongue normal. Teeth and gums normal.   Neck: Supple, symmetrical, trachea midline, no adenopathy, thyroid: no enlargment/tenderness/nodules, no carotid bruit and no JVD. Back:   Symmetric, no curvature. ROM normal. No CVA tenderness. Lungs:   Clear to auscultation bilaterally. Heart:  Regular rate and rhythm, S1, S2 normal, no murmur, click, rub or gallop. Abdomen:   Soft, non-tender. Bowel sounds normal. No masses,  No organomegaly. Extremities: Extremities normal, atraumatic, no cyanosis or edema. Pulses: 2+ and symmetric all extremities. Skin: Skin color, texture, turgor normal. No rashes or lesions   Lymph nodes: Cervical, supraclavicular, and axillary nodes normal.   Neurologic: CNII-XII intact. Normal strength, sensation and reflexes throughout. Lab/Data Review: All lab results for the last 24 hours reviewed. Assessment:     Active Problems:    Septic shock ([address] (2/20/2022)    Patient is a 42-year-old white female with:  1. Heart failure  2. COVID pneumonitis  3. Anemia  4. Thrombocytopenia  5. Hypernatremia  6. Acute kidney injury  7. Severe mitral regurgitation. ,  Moderate posterior mitral annular calcification  8. Severe tricuspid regurgitation  9. Moderate pulmonary regurgitation  10. Status post PEG tube  11. Status post pacemaker  12. Pleural effusion status post thoracentesis with 800 cc removed. 13.  Septic shock  14. Sacral decubitus ulcers  15. Atrial fibrillation    Plan:     Currently in paced rhythm. Underlying rhythm is atrial fibrillation. Consider blood transfusion. Hemoglobin of 6.5. I held her apixaban. Kidney function is worse. Creatinine 2.6 and BUN of 135. BNP is elevated. Currently on IV fluid.   Signed By: Kunal Bhakta MD     February 22, 2022

## 2022-02-22 NOTE — PROGRESS NOTES
Progress Note    Patient: Ondina Clark MRN: [id number]  SSN: [id number]    YOB: 1922  Age: 80 y.o. Sex: female      Admit Date: 2/20/2022    LOS: 2 days     Subjective:     89F with a history of CKD, hypertension, stroke with left residual defects, bradycardia status post AICD 2 months ago, dysphagia status post PEG tube with acute encephalopathy s/t septic shock s.t UTI and sacral decubitus ulcer. HOSPITAL COURSE:   He was started with vancomycin and cefipime, Sacral wound infection, secondary to MRSA and MDR Acinetobacter baumannii, Candida UTI with marked pyuria and funguria. Started on levophed and 0.22% saline,     Review of Systems:  Unable to perform ROS: Patient nonverbal       Objective:     Vitals:    02/22/22 1300 02/22/22 1400 02/22/22 1500 02/22/22 1600   BP: (!) 87/52 (!) 81/59 101/64 (!) 84/52   Pulse: 60 60 65 61   Resp: 21 24 21 21   Temp:   (!) 94.6 °F (34.8 °C) (!) 94.6 °F (34.8 °C)   SpO2: 99% 96% 97% 99%   Weight:       Height:            Intake and Output:  Current Shift: No intake/output data recorded. Last three shifts: 02/20 1901 - 02/22 0700  In: 1800 [I.V.:1380]  Out: 52 [Urine:52]      Physical Exam:      Physical Exam  Vitals and nursing note reviewed. Exam conducted with a chaperone present. Constitutional:       General: She is in acute distress. Appearance: She is ill-appearing. HENT:      Head: Normocephalic. Mouth/Throat:      Mouth: Mucous membranes are dry. Eyes:      Pupils: Pupils are equal, round, and reactive to light. Cardiovascular:      Rate and Rhythm: Normal rate and regular rhythm. Pulses: Normal pulses. Heart sounds: Normal heart sounds. Pulmonary:      Effort: Pulmonary effort is normal.      Breath sounds: Normal breath sounds. Abdominal:      General: Abdomen is flat. Musculoskeletal:      Cervical back: Normal range of motion and neck supple.    Skin:     Comments: Large open sacral wound Stage 3 Neurological:      Mental Status: She is disoriented. Psychiatric:      Comments: Unable to assess         Lab/Data Review:  Recent Results (from the past 24 hour(s))   CBC WITH AUTOMATED DIFF    Collection Time: 02/22/22  3:59 AM   Result Value Ref Range    WBC 12.1 (H) 3.6 - 11.0 K/uL    RBC 2.22 (L) 3.80 - 5.20 M/uL    HGB 6.5 (L) 11.5 - 16.0 g/dL    HCT 22.4 (L) 35.0 - 47.0 %    .9 (H) 80.0 - 99.0 FL    MCH 29.3 26.0 - 34.0 PG    MCHC 29.0 (L) 30.0 - 36.5 g/dL    RDW 22.6 (H) 11.5 - 14.5 %    PLATELET 459 (L) 536 - 400 K/uL    MPV 12.7 8.9 - 12.9 FL    NRBC 2.8 (H) 0.0  WBC    ABSOLUTE NRBC 0.34 (H) 0.00 - 0.01 K/uL    NEUTROPHILS 82 (H) 32 - 75 %    LYMPHOCYTES 8 (L) 12 - 49 %    MONOCYTES 7 5 - 13 %    EOSINOPHILS 1 0 - 7 %    BASOPHILS 0 0 - 1 %    IMMATURE GRANULOCYTES 2 (H) 0 - 0.5 %    ABS. NEUTROPHILS 9.9 (H) 1.8 - 8.0 K/UL    ABS. LYMPHOCYTES 1.0 0.8 - 3.5 K/UL    ABS. MONOCYTES 0.8 0.0 - 1.0 K/UL    ABS. EOSINOPHILS 0.2 0.0 - 0.4 K/UL    ABS. BASOPHILS 0.0 0.0 - 0.1 K/UL    ABS. IMM.  GRANS. 0.2 (H) 0.00 - 0.04 K/UL    DF AUTOMATED     MAGNESIUM    Collection Time: 02/22/22  3:59 AM   Result Value Ref Range    Magnesium 3.0 (H) 1.6 - 2.4 mg/dL   C REACTIVE PROTEIN, QT    Collection Time: 02/22/22  3:59 AM   Result Value Ref Range    C-Reactive protein 8.13 (H) 0.00 - 0.60 mg/dL   LACTIC ACID    Collection Time: 02/22/22  3:59 AM   Result Value Ref Range    Lactic acid 5.2 (HH) 0.4 - 2.0 mmol/L   RENAL FUNCTION PANEL    Collection Time: 02/22/22  3:59 AM   Result Value Ref Range    Sodium 147 (H) 136 - 145 mmol/L    Potassium 4.6 3.5 - 5.1 mmol/L    Chloride 113 (H) 97 - 108 mmol/L    CO2 21 21 - 32 mmol/L    Anion gap 13 5 - 15 mmol/L    Glucose 66 65 - 100 mg/dL     (H) 6 - 20 mg/dL    Creatinine 2.60 (H) 0.55 - 1.02 mg/dL    BUN/Creatinine ratio 52 (H) 12 - 20      GFR est AA 21 (L) >60 ml/min/1.73m2    GFR est non-AA 17 (L) >60 ml/min/1.73m2    Calcium 8.0 (L) 8.5 - 10.1 mg/dL Phosphorus 3.5 2.6 - 4.7 mg/dL    Albumin 2.1 (L) 3.5 - 5.0 g/dL   PROCALCITONIN    Collection Time: 02/22/22  3:59 AM   Result Value Ref Range    Procalcitonin 1.10 (H) 0 ng/mL   VANCOMYCIN, RANDOM    Collection Time: 02/22/22  3:59 AM   Result Value Ref Range    Vancomycin, random 24.7 ug/mL   NT-PRO BNP    Collection Time: 02/22/22  3:59 AM   Result Value Ref Range    NT pro-BNP 31,959 (H) <450 pg/mL   HEPATIC FUNCTION PANEL    Collection Time: 02/22/22  3:59 AM   Result Value Ref Range    Protein, total 5.9 (L) 6.4 - 8.2 g/dL    Albumin 2.1 (L) 3.5 - 5.0 g/dL    Globulin 3.8 2.0 - 4.0 g/dL    A-G Ratio 0.6 (L) 1.1 - 2.2      Bilirubin, total 1.9 (H) 0.2 - 1.0 mg/dL    Bilirubin, direct 1.1 (H) 0.0 - 0.2 mg/dL    Alk. phosphatase 104 45 - 117 U/L    AST (SGOT) 56 (H) 15 - 37 U/L    ALT (SGPT) 32 12 - 78 U/L   GLUCOSE, POC    Collection Time: 02/22/22  7:48 AM   Result Value Ref Range    Glucose (POC) 75 65 - 117 mg/dL    Performed by Suly Doe          Assessment and plan:      (1) septic shock: levophed to Keep MAP > 65    (2) Acute encephalopathy : GCS 12.     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 MRSA and MDR Acinetobacter baumannii. D9    (5) COVID-19 infection     (6) Candida UTI: diflucaine D3    (7) hypernatremia : 0.225% NaCL in sterile water    (8) anemia : transfuse 1 PRBC    (9) SSS- Pafib with PPM. Hold eliquis    DVT ppx: SCD    DISPO: poor prognosis.      Signed By: Dany Pak MD     February 22, 2022

## 2022-02-22 NOTE — PROGRESS NOTES
Pulmonary Progress Note      NAME: Juanito Christy   :  3/25/1922  MRM:  [id number]    Date/Time: 2022  11:46 AM         Subjective:     Patient seen and examined. Discussed with the RN. She is on 1 L of oxygen. No distress. She remains a full code. On hypotonic IV fluids. Sodium is better. However urine output is still very poor. She is nonverbal.      Past Medical History reviewed and unchanged from Admission History and Physical       Objective:     Physical Exam     Vitals:      Last 24hrs VS reviewed since prior progress note. Most recent are:    Visit Vitals  BP (!) 88/53   Pulse 60   Temp 97 °F (36.1 °C)   Resp 18   Ht 4' 11\" (1.499 m)   Wt 48 kg (105 lb 13.1 oz)   SpO2 100%   Breastfeeding No   BMI 21.37 kg/m²     SpO2 Readings from Last 6 Encounters:   22 100%   22 93%   22 94%   22 98%   21 98%   21 99%    O2 Flow Rate (L/min): 1 l/min       Intake/Output Summary (Last 24 hours) at 2022 1146  Last data filed at 2022 0300  Gross per 24 hour   Intake 1800 ml   Output 52 ml   Net 1748 ml      GENERAL:  The patient is an elderly frail  female who is lying in the right decubitus position. She appears to have contractures and multiple skin decubitus. Saturation on nasal cannula O2 is 98%. HEENT:  Shows pupils are equal and reactive to light. Pallor is noted. Nasal passages appear to be patent and she is on nasal cannula. She will not open her mouth. NECK:  Appears to be supple. Trachea appears to be central.  No obvious JVD. LUNGS:  She is not using any accessory muscles of respiratory. CHEST:  Symmetrical.  She has diminished breath sounds over the lung bases with some crackles. HEART:  Rhythm is regular with monitor showing paced rhythm at a rate of 60 beats per minute. ABDOMEN:  Status post PEG tube. It is soft and appears to be benign. Bowel sounds are audible. EXTREMITIES:  Do not show any cyanosis or clubbing.   She has some pitting edema. She has contractures. Pulses are palpable. NEUROLOGIC SYSTEM:  Examination shows that the patient is uncommunicative. She has a history of stroke. She has contractures. SKIN:  Shows decubitus ulcers, mainly in the sacral regions. US RETROPERITONEUM COMP   Final Result   Limited exam, as above. No sonographic evidence of medical renal disease or   hydronephrosis. XR CHEST PORT   Final Result   Decrease in bilateral pleural effusions. Unchanged moderate perihilar vascular congestion and interstitial edema.           Lab Data Reviewed: (see below)      Medications:  Current Facility-Administered Medications   Medication Dose Route Frequency    [START ON 2/23/2022] Vancomycin lab reminder - draw level 02/23 @ 0400   Other ONCE    sodium chloride (23.4%) 0.225 % in sterile water 1,000 mL infusion   IntraVENous CONTINUOUS    pantoprazole (PROTONIX) 40 mg in 0.9% sodium chloride 10 mL injection  40 mg IntraVENous DAILY    VANCOMYCIN INFORMATION NOTE   Other Rx Dosing/Monitoring    NOREPINephrine (LEVOPHED) 8 mg in 0.9% NS 250ml infusion  0.5-16 mcg/min IntraVENous TITRATE    sodium chloride (NS) flush 5-40 mL  5-40 mL IntraVENous Q8H    sodium chloride (NS) flush 5-40 mL  5-40 mL IntraVENous PRN    acetaminophen (TYLENOL) tablet 650 mg  650 mg Oral Q6H PRN    Or    acetaminophen (TYLENOL) suppository 650 mg  650 mg Rectal Q6H PRN    polyethylene glycol (MIRALAX) packet 17 g  17 g Oral DAILY PRN    ondansetron (ZOFRAN ODT) tablet 4 mg  4 mg Oral Q8H PRN    Or    ondansetron (ZOFRAN) injection 4 mg  4 mg IntraVENous Q6H PRN    ascorbic acid (vitamin C) (VITAMIN C) tablet 500 mg  500 mg Oral BID    apixaban (ELIQUIS) tablet 2.5 mg  2.5 mg Oral BID    zinc sulfate (ZINCATE) 50 mg zinc (220 mg) capsule 1 Capsule  1 Capsule Oral DAILY    cefepime (MAXIPIME) injection 2 g  2 g IntraVENous Q24H    fluconazole (DIFLUCAN) 200mg/100 mL IVPB (premix)  200 mg IntraVENous Q24H ______________________________________________________________________      Lab Review:     Recent Labs     02/22/22  0359 02/21/22  0230 02/20/22  1506   WBC 12.1* 11.5* 10.9   HGB 6.5* 6.6* 6.7*   HCT 22.4* 22.7* 22.8*   * 128* 135*     Recent Labs     02/22/22  0359 02/21/22  0230 02/20/22  1506   * 151* 151*   K 4.6 4.4 4.2   * 117* 116*   CO2 21 28 29   GLU 66 108* 110*   * 118* 116*   CREA 2.60* 2.26* 2.07*   CA 8.0* 8.5 8.9   PHOS 3.5  --   --    ALB 2.1* 2.3* 2.5*   ALT  --  23 25     No components found for: GLPOC  No results for input(s): PH, PCO2, PO2, HCO3, FIO2 in the last 72 hours. No results for input(s): INR, INREXT, INREXT in the last 72 hours. Other pertinent lab:   Labs on admit: Chest x-ray done 2/20 shows decreased bilateral pleural effusions as compared to a prior study of 02/12/2022. WBC count is 11.5, with neutrophils of 82%, hemoglobin is 6.6, platelet count of 593. Sodium is 151, potassium is 4.4, BUN is 118, creatinine is 2.24, blood glucose of 108. Rapid COVID test was positive on 02/18. MRSA screen is positive. D-dimer is 8.87. Lactic acid 3.1. Ferritin 269. CRP is elevated at 8.8. Assessment & Plan:      1. Septic shock. The patient has multiple sacral decubitus. It was thought that her septic shock is because of infected decubitus. 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 Infectious Disease is already on the case. She is on cefepime, fluconazole and vancomycin. She is getting quarter-normal saline because of hypernatremia; however, pressors were ordered but were never initiated according to the registered nurse. We will continue to monitor her closely. She is a full code. 2.  Bilateral pleural effusions. They appear to be transudative, however, improved from recent study of 02/12/2022. She had a thoracentesis done on 02/14 as well.  She is currently on 1 L of oxygen, not in any distress. Yesterday she was on 2 L oxygen. 3.  Acute on chronic renal failure. Hypernatremia, improved. Nephrology is also on the case. Continue with intravenous fluids. 4.  History of bradycardia and pacemaker insertion, also has history of atrial fibrillation and congestive heart failure. She has paced rhythm with background of atrial fibrillation. Cardiology on the case. Apparently, she is on Eliquis 2.5 mg p.o. b.i.d. which may be continued while closely monitoring her hemoglobin. She does have anemia. 5.  Recent COVID-19 infection. No specific treatment is needed at this time. We will avoid steroids because of sepsis and wound infection. 6.  For deep venous thrombosis prophylaxis, she is already on Eliquis. 7.  For gastrointestinal stress prophylaxis, on Protonix intravenously. 8.  Anemia. May need blood transfusion as per primary team.     Thank you for allowing me to participate in the care of this patient. I will follow the patient closely with you. It should be mentioned that she is a full code, but her prognosis is very poor. Approximately 1/2 hour was spent in the management of this patient.   She may be transferred out of the ICU         Lefty Horvath MD

## 2022-02-22 NOTE — PROGRESS NOTES
Nephrology Consult    Patient: Zaynab Christina MRN: [id number]  SSN: [id number]    YOB: 1922  Age: 80 y.o. Sex: female      Subjective:    The pt is seen in ICU  On single pressor  Very weak and frail  BUN/Cr  135/2.60  No fevers, wbc 12.1    Past Medical History:   Diagnosis Date    Chronic kidney disease     acute tubual necrosis    Clostridium difficile infection     Elevated troponin 9/10/2014    Hypertension     Skin cancer     Stroke [facility])     2001 left residual     Past Surgical History:   Procedure Laterality Date    HX APPENDECTOMY  2/2008    HX HEENT      cataract    HX ORTHOPAEDIC  04/2010    left total hip - hip fx, left knee surgery    IR FLUORO GUIDE PLC CVAD  1/6/2022    IR THORACENTESIS NDL PUNC ASP W IMAGE  2/14/2022      Family History   Family history unknown: Yes     Social History     Tobacco Use    Smoking status: Never Smoker    Smokeless tobacco: Never Used   Substance Use Topics    Alcohol use: Never      Current Facility-Administered Medications   Medication Dose Route Frequency Provider Last Rate Last Admin    [START ON 2/23/2022] Vancomycin lab reminder - draw level 02/23 @ 0400   Other Ashanti Lucas MD        sodium chloride (23.4%) 0.225 % in sterile water 1,000 mL infusion   IntraVENous CONTINUOUS Kodi Lopez MD 75 mL/hr at 02/22/22 0106 New Bag at 02/22/22 0106    pantoprazole (PROTONIX) 40 mg in 0.9% sodium chloride 10 mL injection  40 mg IntraVENous DAILY Reyna Herbert MD   40 mg at 02/22/22 5665 Serg Handwoodtirso Aguiar Ne   Other Rx Dosing/Monitoring Bessy Helm MD        NOREPINephrine (LEVOPHED) 8 mg in 0.9% NS 250ml infusion  0.5-16 mcg/min IntraVENous TITRATE Esther Crabtree MD        sodium chloride (NS) flush 5-40 mL  5-40 mL IntraVENous Q8H Rosana Bernard MD   10 mL at 02/22/22 8363    sodium chloride (NS) flush 5-40 mL  5-40 mL IntraVENous PRN Rosana Bernard MD        acetaminophen (TYLENOL) tablet 650 mg  650 mg Oral Q6H PRN Geoffrey Hoang MD        Or   Hugo Roblero acetaminophen (TYLENOL) suppository 650 mg  650 mg Rectal Q6H PRN Geoffrey Hoang MD        polyethylene glycol [facility]) packet 17 g  17 g Oral DAILY PRN Mando Castillo MD        ondansetron [facility] ODT) tablet 4 mg  4 mg Oral Q8H PRN Geoffrey Hoang MD        Or    ondansetron [facility]) injection 4 mg  4 mg IntraVENous Q6H PRN Geoffrey Hoang MD        ascorbic acid (vitamin C) (VITAMIN C) tablet 500 mg  500 mg Oral BID Mando Castillo MD   500 mg at 02/22/22 1005    apixaban (ELIQUIS) tablet 2.5 mg  2.5 mg Oral BID Geoffrey Hoang MD   2.5 mg at 02/22/22 1005    zinc sulfate (ZINCATE) 50 mg zinc (220 mg) capsule 1 Capsule  1 Capsule Oral DAILY Geoffrey Hoang MD   1 Capsule at 02/22/22 1005    cefepime (MAXIPIME) injection 2 g  2 g IntraVENous Q24H Geoffrey Hoang MD   2 g at 02/21/22 2147    fluconazole (DIFLUCAN) 200mg/100 mL IVPB (premix)  200 mg IntraVENous Q24H Geoffrey Hoang  mL/hr at 02/21/22 2145 200 mg at 02/21/22 2145        Allergies   Allergen Reactions    Neosporin [Benzalkonium Chloride] Rash       Review of Systems:  Unable to obtain    Objective:     Vitals:    02/22/22 0600 02/22/22 0700 02/22/22 0701 02/22/22 0813   BP: (!) 88/53      Pulse: 60      Resp: 18      Temp:  97 °F (36.1 °C)     SpO2: 100%  98% 100%   Weight:       Height:            Physical Exam:  General: Frail and weak  Eyes: sclera anicteric  Oral Cavity: No thrush or ulcers  Neck: no JVD  Chest: Fair bilateral air entry  Heart: normal sounds  Abdomen: soft and non tender   :  Govea+  Lower Extremities: no edema  Skin: no rash  Neuro: AMS           Assessment:     Hospital Problems  Date Reviewed: 1/5/2022          Codes Class Noted POA    Septic shock ([facility] [address] ICD-10-CM: A41.9, R65.21  ICD-9-CM: 038.9, 785.52, 995.92  2/20/2022 Unknown              Plan:   1-acute kidney injury.    -Etiology is prerenal azotemia from hypotension and severe sepsis.    -On admission BUN was elevated at 116 and creatinine 2.07.    -worse BUN/Cr  135/2.60  -Her baseline creatinine was 0.86 on 2/18/22.    -Clinically she looked volume down. -She has Govea cath in place and urine output has not been documented.    -A renal ultrasound showed no hydronephrosis. -not candidate for the dialysis. -I will continue on slow maintenance IV fluids. 2.  Hypernatremia.    -From free water deficit.    -Na 151-->147.    -I will continue on hypotonic IV fluids. 3.  Anemia.    -Severe. -Hemoglobin is 6.5 only.    -No evidence of bleeding.    -She might need unit blood transfusion 2 units. -GI consultation should be considered. 4.  Severe sepsis.    -On admission temp 211.4 and systolic blood pressure 82 only. -CBC showed leukocytosis. -Recent hospitalization for infected sacral decubitus ulcer and was discharged on IV vancomycin and IV cefepime.   -on iv vanc/cefepime/fluconazole   -on single pressor. -ID has been consulted. 5.  History of A. fib.    -Status post pacemaker placement. On Eliquis.         Signed By: Rebeca Márquez MD     February 22, 2022

## 2022-02-22 NOTE — PROGRESS NOTES
Progress Note    Patient: Deana Vanessa MRN: [id number]  SSN: [id number]    YOB: 1922  Age: 80 y.o. Sex: female      Admit Date: 2/20/2022    LOS: 2 days     Subjective:   Patient followed for chronic sacral wound infection, recently discharged but readmitted because of respiratory distress. She was admitted to the ICU but remains on nasal cannula. She was febrile. WBC has increased along with procal and CRP. Cultures still pending. She is on Cefepime, Vancomycin and Fluconazole. Objective:     Vitals:    02/22/22 0500 02/22/22 0600 02/22/22 0700 02/22/22 0813   BP: 99/70 (!) 88/53     Pulse: 60 60     Resp: 16 18     Temp:   97 °F (36.1 °C)    SpO2: 100% 100%  100%   Weight:       Height:            Intake and Output:  Current Shift: No intake/output data recorded. Last three shifts: 02/20 1901 - 02/22 0700  In: 1800 [I.V.:1380]  Out: 52 [Urine:52]    Physical Exam:    Constitutional:       General: She is not in acute distress. Appearance: She is ill-appearing. HENT:      Head: Normocephalic and atraumatic. Right Ear: External ear normal.      Left Ear: External ear normal.      Nose: Nose normal.      Comments: Nasal O2 1 L/min     Mouth/Throat:      Mouth: Mucous membranes are dry. Eyes:      Pupils: Pupils are equal, round, and reactive to light. Cardiovascular:      Rate and Rhythm: Normal rate and regular rhythm. Heart sounds: No murmur heard. Pulmonary:      Breath sounds: No wheezing or rales. Comments: Diminished BS bilaterally L>R  Abdominal:      General: Bowel sounds are normal. There is no distension. Palpations: Abdomen is soft. Tenderness: There is no abdominal tenderness. Comments: PEG site with mild erythema, no exudate       Genitourinary:     Comments: Govea catheter  Musculoskeletal:      Cervical back: Neck supple. Right lower leg: No edema. Left lower leg: No edema.       Comments: Right foot with gauze bandage; medial foot wound exposed and has largely healed    Left hip wound with exudate      Left calf bandaged with gauze     Skin:     Findings: No rash. Comments: Large open sacral wound that is mostly dry with normal granulation tissue, Stage 3   Neurological:      Comments: Unable to assess   Psychiatric:      Comments: Unable to assess     Lab/Data Review:     WBC 12,100  Lactic acid 5.2    Procal 1.10 < 0.87 <0.38  CRP 8.13 <8.81 <7.40    Blood cultures (2/20) No growth 1 day  Urine culture (2/21) Candida albicans  Wound culture sacrum (2/20) Pending  Wound culture sacrum (2/20) Pending    No new CXR  Assessment:     Active Problems:    Septic shock ([address] (2/20/2022)       1. Sacral wound infection, secondary to MRSA and MDR Acinetobacter baumannii, Day #15 IV Vancomycin and Day #9 IV Cefepime  2. Stage 3 sacral wound  3. Right foot wound infection, secondary to MRSA and MDR Acinetobacter baumannii, on IV antibiotics above, resolved  4. Covid-19 infection with no clear evidence of pneumonitis  5. Chronic hypoxic respiratory failure, with interstitial edema   6. Probable Candida UTI with marked pyuria and funguria, Day #3 IV Fluconazole  7. Sepsis with new fever, leukocytosis, elevated procal and CRP  8. Elevated LDH, possibly secondary to #4  9. Altered mental status, baseline     Comments:   Clinical response remains suboptimal so far and still possibly related to MDR Acinetobacter which was only intermediate to Cefepime. Waiting for repeat sacral wound cultures.         Plan:   1. Continue IV Vancomycin and Cefepime    2. Continue Fluconazole  3. Follow-up blood, wound cultures  4.  In am, repeat CBC, CRP and procal    Signed By: Michael Prince MD     February 22, 2022

## 2022-02-22 NOTE — PROGRESS NOTES
CM reviewed clinical chart. Patient's discharge plan is to return home with her son with home health services provided by [facility]. CM will continue to follow.

## 2022-02-22 NOTE — PROGRESS NOTES
Vancomycin Note-2022    Admission date 220   Attending Juan Carlos GIRALDO   Indication SSTI        Height Ht Readings from Last 1 Encounters:   22 149.9 cm (59\")       Weight Wt Readings from Last 1 Encounters:   22 48 kg (105 lb 13.1 oz)      IBW ideal body weight      SCr Creatinine   Date Value Ref Range Status   2022 2.60 (H) 0.55 - 1.02 mg/dL Final   2022 2.26 (H) 0.55 - 1.02 mg/dL Final   2022 2.07 (H) 0.55 - 1.02 mg/dL Final     Comment:     Investigated per delta check protocol      CrCl (based on IBW) 8.5 ml/min           Current regimen  Pulse dose   Level Type / Date / Result random / .7   NEW regimen Hold today - scr increase to 2.6   Level Scheduled for  random in AM         Current Antimicrobial Therapy (168h ago, onward)       Ordered     Start Stop    22 0821  Vancomycin lab reminder - draw level  @ 0400  Other,   ONCE        References:    [place]    22 0400 22 1559    22 1517  VANCOMYCIN INFORMATION NOTE  Other,   RX DOSING/MONITORING        References:    [place]    22 1517 --    22 192  cefepime (MAXIPIME) injection 2 g  2 g,   IntraVENous,   EVERY 24 HOURS        Note to Pharmacy: OP SI,000 mg by IntraVENous route every twelve (12) hours for 14 days. References:    [place]    22 2100 --    22  fluconazole (DIFLUCAN) 200mg/100 mL IVPB (premix)  200 mg,   IntraVENous,   EVERY 24 HOURS        References:    [place]    22 --                      Submitted by:  Martínez Garcia, AMBERD

## 2022-02-22 NOTE — WOUND CARE
IP WOUND CONSULT    Juanito Christy  MEDICAL RECORD NUMBER:  [id number]  AGE: 80 y.o. GENDER: female  : 3/25/1922  TODAY'S DATE:  2022    GENERAL     [] Follow-up   [x] New Consult    Juanito Christy is a 80 y.o. female referred by:   [x] Physician  [] Nursing  [] Other:         PAST MEDICAL HISTORY    Past Medical History:   Diagnosis Date    Chronic kidney disease     acute tubual necrosis    Clostridium difficile infection     Elevated troponin 9/10/2014    Hypertension     Skin cancer     Stroke [facility])     2001 left residual        PAST SURGICAL HISTORY    Past Surgical History:   Procedure Laterality Date    HX APPENDECTOMY  2008    HX HEENT      cataract    HX ORTHOPAEDIC  2010    left total hip - hip fx, left knee surgery    IR FLUORO GUIDE PLC CVAD  2022    IR THORACENTESIS NDL PUNC ASP W IMAGE  2022       FAMILY HISTORY    Family History   Family history unknown: Yes         ALLERGIES    Allergies   Allergen Reactions    Neosporin [Benz[place] Chloride] Rash       MEDICATIONS    No current facility-administered medications on file prior to encounter. Current Outpatient Medications on File Prior to Encounter   Medication Sig Dispense Refill    insulin lispro (HUMALOG) 100 unit/mL injection INITIATE CORRECTIVE INSULIN PROTOCOL (RASHEEDA): RX RASHEEDA Normal Sensitivity (Average weight) AC (before meals), Q6H, and Q4H CORRECTIONAL SCALE only For Blood Sugar (mg/dl) of :           140-199=2 units          200-249=3 units 250-299=5 units 300-349=7 units 350 or greater = Call MD Give in addition to basal medications. Do Not Hold for NPO BEDTIME CORRECTIONAL sliding scale when scheduled: 200-249=2 units 250-299=3 units 300-349=4 units 350 or greater = Call MD Give in addition to basal medications. Do Not Hold for NPO Fast Acting - Administer Immediately - or within 15 minutes of start of meal, if mealtime coverage. 1 Each 0    insulin pump-infus.  set-meter (Accu-Chek Combo System) kit For Accu-Cheks before meals and at bedtime 1 Kit 0    Insulin Syringe-Needle, Dis Un 0.5 mL 29 gauge x 1/2\" syrg 1 box of 100 syringes, for sliding scale insulin 1 Each 0    Lancing Device with Lancets (Accu-Chek Multiclix Lancet) kit Used to check blood sugars before meals and at bedtime 1 Kit 0    cefepime (MAXIPIME) 2 gram injection 2,000 mg by IntraVENous route every twelve (12) hours for 14 days. 56 g 0    vancomycin (VANCOCIN) 500 mg injection 500 mg by IntraVENous route every twenty-four (24) hours for 14 doses. 7000 mg 0    Eliquis 2.5 mg tablet Take 2.5 mg by mouth two (2) times a day.  furosemide (LASIX) 40 mg tablet 40 mg daily.  acetaminophen (TYLENOL) 325 mg tablet Take 2 Tablets by mouth every six (6) hours as needed for Pain or Fever. 60 Tablet 0    ascorbic acid, vitamin C, (VITAMIN C) 500 mg tablet Take 1 Tablet by mouth two (2) times a day. 60 Tablet 0    zinc sulfate (ZINCATE) 50 mg zinc (220 mg) capsule Take 1 Capsule by mouth daily. 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     Dressing change: Yes, see flow chart  Verbal consent for picture:  Non-verbal    Contributing Factors: anticoagulation therapy, chronic pressure, decreased mobility, shear force, incontinence of stool, incontinence of urine and malnutrition    Wound Sacrum (Active)   Wound Image   02/22/22 1515   Wound Etiology Pressure Stage 4 02/22/22 1515   Dressing Status New dressing applied 02/22/22 1515   Cleansed Cleansed with saline 02/22/22 1515   Dressing/Treatment Alginate with Ag; Foam 02/22/22 1515   Dressing Change Due 02/23/22 02/22/22 1515   Wound Length (cm) 8.5 cm 02/22/22 1515   Wound Width (cm) 9.5 cm 02/22/22 1515   Wound Depth (cm) 0.8 cm 02/22/22 1515   Wound Surface Area (cm^2) 80.75 cm^2 02/22/22 1515   Change in Wound Size % -12.15 02/22/22 1515   Wound Volume (cm^3) 64.6 cm^3 02/22/22 1515   Wound Healing % -199 02/22/22 1515   Wound Assessment Pink/red;Subcutaneous; Exposed Structure Muscle;Slough 02/22/22 1515   Drainage Amount Small 02/22/22 1515   Drainage Description Lynne;Pink 02/22/22 1515   Wound Odor None 02/22/22 1515   Shanon-Wound/Incision Assessment Fragile;Denuded 02/22/22 1515   Edges Unattached edges; Defined edges 02/22/22 1515   Wound Thickness Description Full thickness 02/22/22 1515   Number of days: 59       Wound Heel Right Non-blanchable Erythema 01/01/22 (Active)   Dressing Status Dry; Intact 02/21/22 0200   Dressing/Treatment Foam 02/21/22 0200   Number of days: 52       Wound Heel Right;Lateral Dry w/eschar 01/01/22 (Active)   Wound Image   02/22/22 1522   Wound Etiology Pressure Stage 3 02/22/22 1522   Dressing Status New dressing applied 02/22/22 1522   Cleansed Cleansed with saline 02/22/22 1522   Dressing/Treatment Alginate with Ag;Foam;Roll gauze 02/22/22 1522   Dressing Change Due 02/24/22 02/22/22 1522   Wound Length (cm) 2.1 cm 02/22/22 1522   Wound Width (cm) 2.6 cm 02/22/22 1522   Wound Depth (cm) 0.3 cm 02/22/22 1522   Wound Surface Area (cm^2) 5.46 cm^2 02/22/22 1522   Change in Wound Size % -446 02/22/22 1522   Wound Volume (cm^3) 1.638 cm^3 02/22/22 1522   Wound Healing % -1538 02/22/22 1522   Wound Assessment Subcutaneous;Pink/red 02/22/22 1522   Drainage Amount Scant 02/22/22 1522   Drainage Description Serosanguinous 02/22/22 1522   Wound Odor None 02/22/22 1522   Shaonn-Wound/Incision Assessment Maceration;Fragile 02/22/22 1522   Edges Unattached edges; Defined edges 02/22/22 1522   Wound Thickness Description Full thickness 02/22/22 1522   Number of days: 52       Wound Foot Left;Lateral Non-blanchable Erythema, Purple/Maroon 01/01/22 (Active)   Dressing Status Clean;Dry; Intact 02/21/22 0200   Dressing/Treatment Foam 02/21/22 0200   Number of days: 52       Wound Leg lower Left;Lateral Partial Thickness 01/01/22 (Active)   Wound Image   02/22/22 1525   Wound Etiology Skin Tear 02/22/22 1525   Dressing Status New dressing applied 02/22/22 1525   Cleansed Cleansed with saline 02/22/22 1525   Dressing/Treatment Honey gel/honey paste; Foam 02/22/22 1525   Dressing Change Due 02/24/22 02/22/22 1525   Wound Assessment Pink/red 02/22/22 1525   Drainage Amount None 02/22/22 1525   Wound Odor None 02/22/22 1525   Shanon-Wound/Incision Assessment Ecchymosis;Fragile 02/22/22 1525   Edges Unattached edges 02/22/22 1525   Wound Thickness Description Partial thickness 02/22/22 1525   Number of days: 52       Wound Foot Right;Medial Partial Thickness 01/01/22 (Active)   Wound Image   02/22/22 1524   Wound Etiology Other (Comment) 02/22/22 1524   Dressing Status New dressing applied 02/22/22 1524   Cleansed Cleansed with saline 02/22/22 1524   Dressing/Treatment Foam;Honey gel/honey paste 02/22/22 1524   Dressing Change Due 02/24/22 02/22/22 1524   Wound Assessment Dry;Pink/red 02/22/22 1524   Drainage Amount None 02/22/22 1524   Wound Odor None 02/22/22 1524   Shanon-Wound/Incision Assessment Intact 02/22/22 1524   Edges Undefined edges 02/22/22 1524   Wound Thickness Description Partial thickness 02/22/22 1524   Number of days: 52       Wound Elbow Right;Lateral Partial Thickness 02/11/22 (Active)   Dressing Status Dry; Intact; Clean 02/21/22 0200   Dressing/Treatment Foam 02/21/22 0200   Number of days: 13       Wound Arm lower Anterior; Left skin tear (Active)   Dressing Status Clean;Dry; Intact 02/21/22 0200   Dressing/Treatment Foam 02/21/22 0200   Number of days: 13       Wound Calf Left Partial Thickness 02/11/22 (Active)   Wound Image   02/22/22 1518   Wound Etiology Skin Tear 02/22/22 1518   Dressing Status New dressing applied 02/22/22 1518   Cleansed Cleansed with saline 02/22/22 1518   Dressing/Treatment Honey gel/honey paste; Foam 02/22/22 1518   Dressing Change Due 02/24/22 02/22/22 1518   Wound Assessment Pink/red 02/22/22 1518   Drainage Amount None 02/22/22 1518   Wound Odor None 02/22/22 1518   Shanon-Wound/Incision Assessment Ecchymosis;Fragile 02/22/22 1518   Edges Unattached edges 02/22/22 1518   Wound Thickness Description Partial thickness 02/22/22 1518   Number of days: 11       Wound Heel Left Blanchable Erythema 02/11/22 (Active)   Wound Image   02/22/22 1519   Wound Etiology Deep Tissue/Injury 02/22/22 1519   Dressing Status New dressing applied 02/22/22 1519   Cleansed Cleansed with saline 02/22/22 1519   Dressing/Treatment Honey gel/honey paste;ABD pad;Roll gauze 02/22/22 1519   Dressing Change Due 02/24/22 02/22/22 1519   Wound Length (cm) 2.3 cm 02/22/22 1519   Wound Width (cm) 2 cm 02/22/22 1519   Wound Depth (cm) 0.1 cm 02/22/22 1519   Wound Surface Area (cm^2) 4.6 cm^2 02/22/22 1519   Wound Volume (cm^3) 0.46 cm^3 02/22/22 1519   Wound Assessment Purple/maroon;Pink/red;Non-blanchable erythema 02/22/22 1519   Drainage Amount None 02/22/22 1519   Wound Odor None 02/22/22 1519   Shanon-Wound/Incision Assessment Blanchable erythema 02/22/22 1519   Edges Undefined edges 02/22/22 1519   Number of days: 11       Wound Leg lower Right; Anterior Partial Thickness 02/22/22 (Active)   Wound Image   02/22/22 1527   Wound Etiology Skin Tear 02/22/22 1527   Dressing Status New dressing applied 02/22/22 1527   Cleansed Cleansed with saline 02/22/22 1527   Dressing/Treatment Foam;Honey gel/honey paste 02/22/22 1527   Dressing Change Due 02/24/22 02/22/22 1527   Wound Assessment Pink/red;Dry 02/22/22 1527   Drainage Amount None 02/22/22 1527   Wound Odor None 02/22/22 1527   Shanon-Wound/Incision Assessment Ecchymosis;Fragile 02/22/22 1527   Edges Unattached edges 02/22/22 1527   Wound Thickness Description Partial thickness 02/22/22 1527   Number of days: 0       Wound Thigh Left;Lateral Partial Thickness (Active)   Wound Image   02/22/22 1532   Wound Etiology Other (Comment) 02/22/22 1532   Dressing Status New dressing applied 02/22/22 1532   Cleansed Cleansed with saline 02/22/22 1532   Dressing/Treatment Honey gel/honey paste; Foam 02/22/22 1532   Dressing Change Due 02/24/22 02/22/22 1532   Wound Assessment Slough;[place]atfield/red 02/22/22 1532   Drainage Amount None 02/22/22 1532   Wound Odor None 02/22/22 1532   Shanon-Wound/Incision Assessment Fragile;Blanchable erythema 02/22/22 1532   Edges Undefined edges 02/22/22 1532   Wound Thickness Description Partial thickness 02/22/22 1532   Number of days: 0       [REMOVED] Wound (Removed)   Number of days: 1003       [REMOVED] Wound Arm lower Right (Removed)   Number of days:        [REMOVED] Wound Back Right Non-blanchable Erythema 01/01/22 (Removed)   Number of days: 41       [REMOVED] Wound Coccyx (Removed)   Number of days: 2          PLAN     Skin Care & Pressure Relief Recommendations  Speciality bed Envella Air-fluidized bed  Minimize layers of linen  Turn/reposition approximately every 2 hours  Pillow wedges  Manage incontinence   Promote continence; Skin Protective lotion/cream to buttocks and sacrum daily and as needed with incontinence care  Offload heels pillows    Charles 13  Blood Glucose: 75 on 2/22/22                             Albumin:  2.1 on 2/22/22   WBCs: 12.1 on 2/22/22    Support Surface: transferred patient onto a Envella Air-Fluidized bed for increased pressure reduction and climate control. 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 Bone exposure is no longer evident and only a small amount of slough remains.   Pack lightly with Opticell Ag and cover with Optifoam Border Sacral foam dressing daily and prn for soiling, see dressing order. Stage 3 PI noted to right lateral heel and open DTI to left medial heel. Apply Therahoney gel and cover with Optifoam non-adherent foam every other day, see dressing order. Ensure heels are floated with pillows at all times. Due to patient small stature and cachexia, heel boots may cause too much twisting to joints. Skin tears to right lateral elbow and BLEs. Apply Therahoney gel and cover with Optifoam Gentle Lite every other day and prn for soiling, see dressing order. Skin tears to LLA are scabbed over, keep clean and dry. Irrigate all dressings with NS to help with removal as skin in fragile and tears easily. Avoid adhesives and band-aids to skin if possible. Maintain a pillow between BLEs to prevent skin-to-skin pressure related skin injury. Ulceration noted to left hip, previously scabbed over but now open with small amount of slough. Apply Therahoney gel and cover with [employer] 6x6 every other day, see dressing order. Ensure turning q2h at 30 degree angle or more to offload sacrum and buttocks. Patient remains on sides well when repositioned. Currently has indwelling perry catheter to manage  incontinence. Maintain HOB at 30 degrees or less, if not contraindicated, to reduce pressure to buttocks and sacrum. Raise foot of bed to help prevent friction and shear injury from sliding down in the bed. Wound healing will be a challenge and patient is high risk for new injuries due to several factors including advanced age, immobility,  and GI incontinence, cachectic, uncontrolled glucose, and fragility of skin. Will continue to follow. Discharge Wound Care Needs:  TBD. Patient would benefit from follow up at the [address] [place] [address] for management of various wounds.       Teaching completed with:   [] Patient           [] Family member       [] Caregiver          [] Nursing  [] Other    Patient/Caregiver Teaching:  Level of patient/caregiver understanding able to:   [] Indicates understanding       [] Needs reinforcement  [] Unsuccessful      [] Verbal Understanding  [] Demonstrated understanding       [] No evidence of learning  [] Refused teaching         [] N/A       Electronically signed by Cassandra Sagastume RN on 2/22/2022 at 3:45 PM

## 2022-02-23 NOTE — PROGRESS NOTES
Vancomycin Note-2022    Admission date 220   Attending Oh Meeks   Indication SSTI        Height Ht Readings from Last 1 Encounters:   22 149.9 cm (59.02\")       Weight Wt Readings from Last 1 Encounters:   22 54.3 kg (119 lb 11.4 oz)      IBW ideal body weight      SCr Creatinine   Date Value Ref Range Status   2022 2.60 (H) 0.55 - 1.02 mg/dL Final   2022 2.26 (H) 0.55 - 1.02 mg/dL Final   2022 2.07 (H) 0.55 - 1.02 mg/dL Final     Comment:     Investigated per delta check protocol      CrCl (based on IBW) 8.5 ml/min           Current regimen  Pulse dose   Level Type / Date / Result random  level today ( ) is at  24.8   NEW regimen No vancomycin dose today. Level Scheduled for  random in AM at 0600         Current Antimicrobial Therapy (168h ago, onward)       Ordered     Start Stop    22 0821  Vancomycin lab reminder - draw level  @ 0400  Other,   ONCE        References:    [place]    22 0400 22 1559    22 1517  VANCOMYCIN INFORMATION NOTE  Other,   RX DOSING/MONITORING        References:    [place]    22 1517 --    22 192  cefepime (MAXIPIME) injection 2 g  2 g,   IntraVENous,   EVERY 24 HOURS        Note to Pharmacy: OP SI,000 mg by IntraVENous route every twelve (12) hours for 14 days.      References:    [facility]    22 2100 --    22  fluconazole (DIFLUCAN) 200mg/100 mL IVPB (premix)  200 mg,   IntraVENous,   EVERY 24 HOURS        References:    Lex[facility]omp    22 --                      Submitted by: Amisha Mcdaniel, PHARMD

## 2022-02-23 NOTE — PROGRESS NOTES
Comprehensive Nutrition Assessment    Type and Reason for Visit: Initial,Consult (wounds, TF)    Nutrition Recommendations/Plan:   Continue diet- NPO, advance as medically appropriate. With hemodynamic stability, initiate the following:   Osmolite 1.2 at 55ml/hr  Flush with 75ml water q4hrs  Provides 1584 kcal, 73 gm PRO, 1532 mL H2O (Meets ~100% needs)  NO [place] until acute septic shock is over      Nutrition Assessment:  Readmit after d/c x2 days for fevers and increased lethargy. +CARRIE, +UTI  +septic shock. Currently in ICU on NC, x1 increasing pressor, on warming blanket. S/p 1 U PRBC for anemia. RD to leave TF recs once appropriate for initiation, pt currently hemodynamically unstable. Start Kit Labs for wound healing once septic shock resolved. Labs: H/H 8.9/30.0, Na 147, , Cr 2.60, BG 66-75, AST 56, TBili 1.9, Mg 3.0, Alb 2.1. Meds: Eliquis, Vit C, cefepime, fluconazole, lasix, levophed, ZnSu. Malnutrition Assessment:  Malnutrition Status:  Mild malnutrition    Context:  Chronic illness     Findings of the 6 clinical characteristics of malnutrition:   Energy Intake:  No significant decrease in energy intake (on home TF)  Weight Loss:  7 - Greater than 5% over 1 month (3.2kg x2 months (6.6%))     Body Fat Loss:  Unable to assess,     Muscle Mass Loss:  Unable to assess,    Fluid Accumulation:  No significant fluid accumulation,        Estimated Daily Nutrient Needs:  Energy (kcal): 1410-1645kcals (30-35kcals/kg EDW); Weight Used for Energy Requirements: Other (specify) (EDW 47kg)  Protein (g): 71-80g (1.5-1.7g/kg); Weight Used for Protein Requirements: Other (specify) (47kg EDW)  Fluid (ml/day): 1410-1645ml; Method Used for Fluid Requirements: 1 ml/kcal      Nutrition Related Findings:  Unable to complete NFPE d/t COVID-19 precautions. Hx dysphagia, now on chronic PEG TF. +vomitng, small amount. Last BM 2/22. No edema documented at this time though ts fluid+.         Wounds:   Multiple,Pressure injury,Stage IV,Partial thickness,Skin tears (Stage 4 sacrum; PT- R Elbow, L thigh, L calf,  BL Lower legs, R medial foot; Skin tear- L lower arm; Erythema BL heels.)       Current Nutrition Therapies:  DIET NPO    Anthropometric Measures:  · Height:  4' 11.02\" (149.9 cm)  · Current Body Wt:  54.3 kg (119 lb 11.4 oz) (2/22)   · Admission Body Wt:  119 lb 11.4 oz (2/22)    · Usual Body Wt:   (rashid)     · Ideal Body Wt:  95 lbs:  126 %   · BMI Category:  Normal weight (BMI 18.5-24. 9)     Wt Readings from Last 10 Encounters:   02/22/22 54.3 kg (119 lb 11.4 oz)   02/15/22 45.4 kg (100 lb)   01/23/22 45.4 kg (100 lb)   01/02/22 43.1 kg (95 lb)   12/25/21 61.2 kg (135 lb)   12/17/21 48.6 kg (107 lb 2.3 oz)   03/21/19 50.5 kg (111 lb 5.3 oz)   09/12/14 55.1 kg (121 lb 7.6 oz)   03/01/12 61.2 kg (135 lb)   Wt loss of 3.2kg x2 months (6.6%), ? Recent wt gain per wt hx, may be d/t fluid. Nutrition Diagnosis:   · Inadequate oral intake related to cognitive or neurological impairment,biting/chewing (masticatory) difficulty,swallowing difficulty as evidenced by nutrition support-enteral nutrition      Nutrition Interventions:   Food and/or Nutrient Delivery: Continue NPO,Start tube feeding  Nutrition Education and Counseling: No recommendations at this time  Coordination of Nutrition Care: Continue to monitor while inpatient    Goals:  Meet >75% of EENs in 5-7 days. Maintain CBW within +/- 0.5 kg x7 days. Improve labs/lytes to WNL x7 days. Improve skin integrity       Nutrition Monitoring and Evaluation:   Behavioral-Environmental Outcomes: None identified  Food/Nutrient Intake Outcomes: Enteral nutrition intake/tolerance  Physical Signs/Symptoms Outcomes: Biochemical data,Weight,Hemodynamic status,Skin    Discharge Planning:     Too soon to determine,Enteral nutrition     Electronically signed by Sarah Kearney on 2/23/2022 at 8:51 AM    Contact: Ext [phone], or via [employer]

## 2022-02-23 NOTE — PROGRESS NOTES
Nephrology Consult    Patient: Zaynab Christina MRN: [id number]  SSN: [id number]    YOB: 1922  Age: 80 y.o. Sex: female      Subjective: The pt is seen in ICU  On single pressor  Very weak and frail  BUN/Cr  133/3.26 (worse)  CO2 10 only  Will dc IVF  As pt with resp distress  Cxray effusions and edema    Past Medical History:   Diagnosis Date    Chronic kidney disease     acute tubual necrosis    Clostridium difficile infection     Elevated troponin 9/10/2014    Hypertension     Skin cancer     Stroke [facility])     2001 left residual     Past Surgical History:   Procedure Laterality Date    HX APPENDECTOMY  2/2008    HX HEENT      cataract    HX ORTHOPAEDIC  04/2010    left total hip - hip fx, left knee surgery    IR FLUORO GUIDE PLC CVAD  1/6/2022    IR THORACENTESIS NDL PUNC ASP W IMAGE  2/14/2022      Family History   Family history unknown: Yes     Social History     Tobacco Use    Smoking status: Never Smoker    Smokeless tobacco: Never Used   Substance Use Topics    Alcohol use: Never      Current Facility-Administered Medications   Medication Dose Route Frequency Provider Last Rate Last Admin    furosemide (LASIX) injection 40 mg  40 mg IntraVENous BID Juan GIRALDO MD   40 mg at 02/23/22 0900    [START ON 2/24/2022] Vancomycin Random level to be drawn on 2/24/22 at 0600 pre.    Other Mando Trevino MD Selene Morales [facility]] pantoprazole (PROTONIX) 40 mg in 0.9% sodium chloride 10 mL injection  40 mg IntraVENous DAILY Edy Ceja MD        dextrose 10% infusion 0-250 mL  0-250 mL IntraVENous PRN Susan Madison MD   250 mL at 02/23/22 1201    dextrose 5% 1,000 mL with sodium bicarbonate (8.4%) 150 mEq infusion   IntraVENous CONTINUOUS Kodi Lopez  mL/hr at 02/23/22 1307 New Bag at 02/23/22 1307    Vancomycin lab reminder - draw level 02/23 @ 0400   Other Martinez Lucas MD        0.9% sodium chloride infusion 250 mL  250 mL IntraVENous PRN Claudy Martel MD        VANCOMYCIN INFORMATION NOTE   Other Rx Dosing/Monitoring Niru Finch MD        NOREPINephrine (LEVOPHED) 8 mg in 0.9% NS 250ml infusion  0.5-16 mcg/min IntraVENous TITRATE Ridge Hendrix MD 20.6 mL/hr at 02/23/22 1231 11 mcg/min at 02/23/22 1231    sodium chloride (NS) flush 5-40 mL  5-40 mL IntraVENous Q8H Kendra Paulson MD   10 mL at 02/23/22 0908    sodium chloride (NS) flush 5-40 mL  5-40 mL IntraVENous PRN Kendra Paulson MD        acetaminophen (TYLENOL) tablet 650 mg  650 mg Oral Q6H PRN Kendra Paulson MD        Or   [facility] acetaminophen (TYLENOL) suppository 650 mg  650 mg Rectal Q6H PRN Kendra Paulson MD        polyethylene glycol [facility]) packet 17 g  17 g Oral DAILY PRN Kendra Paulson MD        ondansetron (ZOFRAN ODT) tablet 4 mg  4 mg Oral Q8H PRN Kendra Paulson MD        Or    ondansetron [facility]) injection 4 mg  4 mg IntraVENous Q6H PRN Kendra Paulson MD        ascorbic acid (vitamin C) (VITAMIN C) tablet 500 mg  500 mg Oral BID JordynMando Nguyen MD   500 mg at 02/23/22 0900    [Held by provider] apixaban Radha Tesfaye) tablet 2.5 mg  2.5 mg Oral BID Kendra Paulson MD   2.5 mg at 02/22/22 1005    zinc sulfate (ZINCATE) 50 mg zinc (220 mg) capsule 1 Capsule  1 Capsule Oral DAILY Kendra Paulson MD   1 Capsule at 02/23/22 3763    cefepime (MAXIPIME) injection 2 g  2 g IntraVENous Q24H Kendra Paulson MD   2 g at 02/22/22 2112    fluconazole (DIFLUCAN) 200mg/100 mL IVPB (premix)  200 mg IntraVENous Q24H Kendra Paulson  mL/hr at 02/22/22 2111 200 mg at 02/22/22 2111        Allergies   Allergen Reactions    Neosporin [Benzalkonium Chloride] Rash       Review of Systems:  Unable to obtain    Objective:     Vitals:    02/23/22 1100 02/23/22 1200 02/23/22 1300 02/23/22 1400   BP: (!) 75/44 (!) 93/56 (!) 80/47 (!) 86/43   Pulse: 60      Resp: 24 21 21 24   Temp: 98.6 °F (37 °C)      SpO2: 99% 100% 100% 100%   Weight:       Height:            Physical Exam:  General: Frail and weak  Eyes: sclera anicteric  Oral Cavity: No thrush or ulcers  Neck: no JVD  Chest: Fair bilateral air entry  Heart: normal sounds  Abdomen: soft and non tender   :  Govea+  Lower Extremities: no edema  Skin: no rash  Neuro: AMS           Assessment:     Hospital Problems  Date Reviewed: 1/5/2022          Codes Class Noted POA    Septic shock ([facility] [address] ICD-10-CM: A41.9, R65.21  ICD-9-CM: 038.9, 785.52, 995.92  2/20/2022 Unknown              Plan:   1-acute kidney injury.    -Etiology is prerenal azotemia from hypotension and severe sepsis.    -On admission BUN was elevated at 116 and creatinine 2.07.    -worse BUN/Cr  133/3.26  -Her baseline creatinine was 0.86 on 2/18/22.    -Clinically she looked volume down. -She has Govea cath in place and urine output has not been documented.    -A renal ultrasound showed no hydronephrosis. -not candidate for the dialysis. -I will dc IVF    2. Hypernatremia.    -From free water deficit.    -Na 151-->145.    -I will dc IV fluids. 3.  Anemia.    -Severe. -Hemoglobin is 6.5 only.    -No evidence of bleeding.    -She might need unit blood transfusion 2 units. -GI consultation should be considered. 4.  Severe sepsis.    -On admission temp 236.4 and systolic blood pressure 82 only. -CBC showed leukocytosis. -Recent hospitalization for infected sacral decubitus ulcer and was discharged on IV vancomycin and IV cefepime.   -on iv vanc/cefepime/fluconazole   -on single pressor. -ID has been consulted. 5.  History of A. fib.    -Status post pacemaker placement. On Eliquis.     6. Metabolic acidosis, HAG:  -CO2 10, LA 5.6  -iv NaHCO3 100 meq push  -repeat CO2 17  -iv NaHCO3 100 meq             Signed By: Jian Leon MD     February 23, 2022

## 2022-02-23 NOTE — PROGRESS NOTES
Spiritual Care Assessment/Progress Note  [facility]      NAME: Demetrio Alvarenga      MRN: [id number]  AGE: 80 y.o. SEX: female  [place] Affiliation: Voodoo   Language: English     2/23/2022     Total Time (in minutes): 16     Spiritual Assessment begun in [address] ICU through conversation with:         [x]Patient        [x] Family    [] Friend(s)        Reason for Consult: Request by staff     Spiritual beliefs: (Please include comment if needed)     [] Identifies with a remington tradition:         [] Supported by a remington community:            [] Claims no spiritual orientation:           [] Seeking spiritual identity:                [] Adheres to an individual form of spirituality:           [x] Not able to assess:                           Identified resources for coping:      [] Prayer                               [] Music                  [] Guided Imagery     [] Family/friends                 [] Pet visits     [] Devotional reading                         [x] Unknown     [] Other:                                              Interventions offered during this visit: (See comments for more details)    Patient Interventions:  Other (comment),Initial visit (Silent support and prayer)     Family/Friend(s): Other (comment) (Silent support)     Plan of Care:     [] Support spiritual and/or cultural needs    [] Support AMD and/or advance care planning process      [] Support grieving process   [] Coordinate Rites and/or Rituals    [] Coordination with community clergy   [] No spiritual needs identified at this time   [] Detailed Plan of Care below (See Comments)  [] Make referral to Music Therapy  [] Make referral to Pet Therapy     [] Make referral to Addiction services  [] Make referral to [facility]  [] Make referral to Spiritual Care Partner  [] No future visits requested        [x] Contact Spiritual Care for further referrals     Comments:  Visited patient in 2 ICU for family support and consultation per nurse's request.  A physician was was meeting with patient's son in the room during the visit. Patient appeared to be non-engaging. Provided silent support and prayer per listed remington tradition. Consulted with and advised nurse to contact [facility] for any further referrals. Contact chaplains for further referrals. Chaplain Karen Maurice M.Div.    can be reached by calling the  at [facility]  [phone]

## 2022-02-23 NOTE — PROGRESS NOTES
Progress Note    Patient: Deana Vanessa MRN: [id number]  SSN: [id number]    YOB: 1922  Age: 80 y.o. Sex: female      Admit Date: 2/20/2022    LOS: 3 days     Subjective:     No acute events overnight    Objective:     Vitals:    02/23/22 0300 02/23/22 0500 02/23/22 0600 02/23/22 0700   BP: 127/75 (!) 109/54 90/77    Pulse: 60 60 60    Resp: 16 25 24    Temp:    97.2 °F (36.2 °C)   SpO2: 96% 96% 100%    Weight:       Height:            Intake and Output:  Current Shift: No intake/output data recorded. Last three shifts: 02/21 1901 - 02/23 0700  In: 2377.1 [I.V.:1380]  Out: 25 [Urine:25]    Physical Exam:   General:   Nonverbal.   Eyes:  Conjunctivae/corneas clear. PERRL, EOMs intact. Fundi benign   Ears:  Normal TMs and external ear canals both ears. Nose: Nares normal. Septum midline. Mucosa normal. No drainage or sinus tenderness. Mouth/Throat: Lips, mucosa, and tongue normal. Teeth and gums normal.   Neck: Supple, symmetrical, trachea midline, no adenopathy, thyroid: no enlargment/tenderness/nodules, no carotid bruit and no JVD. Back:   Symmetric, no curvature. ROM normal. No CVA tenderness. Lungs:    Coarse breath sounds   Heart:  Regular rate and rhythm, S1, S2 normal, no murmur, click, rub or gallop. Abdomen:   Soft, non-tender. Bowel sounds normal. No masses,  No organomegaly. Extremities: Extremities normal, atraumatic, no cyanosis or edema. Pulses: 2+ and symmetric all extremities. Skin: Skin color, texture, turgor normal. No rashes or lesions   Lymph nodes: Cervical, supraclavicular, and axillary nodes normal.   Neurologic: CNII-XII intact. Normal strength, sensation and reflexes throughout. Lab/Data Review: All lab results for the last 24 hours reviewed. Assessment:     Active Problems:    Septic shock ([address] (2/20/2022)    Patient is a 59-year-old white female with:  1. Heart failure  2. COVID pneumonitis  3. Anemia  4. Thrombocytopenia  5. Hypernatremia  6. Acute kidney injury  7. Severe mitral regurgitation. ,  Moderate posterior mitral annular calcification  8. Severe tricuspid regurgitation  9. Moderate pulmonary regurgitation  10. Status post PEG tube  11. Status post pacemaker  12. Pleural effusion status post thoracentesis with 800 cc removed. 13.  Septic shock  14. Sacral decubitus ulcers  15. Atrial fibrillation    Plan:      she received 1 unit of blood. Hemoglobin 8.9, platelet count 57, white count is 15.9. BMP is pending. We will continue to hold off apixaban for now. She is currently on IV Lasix. Blood pressure is soft. I will think she is somewhat volume overloaded as she is maintaining her sats. She appears to be comfortable laying flat. I believe [id number] is complicating the picture. We will follow up on her  Kidney function. Anyhow her BUN was 135 and creatinine of 2.6 of yesterday.   Signed By: Shayy Jonas MD     February 23, 2022

## 2022-02-23 NOTE — PROGRESS NOTES
Lab called about morning labs. CO2 10 and BS 17. Accucheck done. BS 21. Called Doctor Samayoa. Orders received for D10 250cc and given. Repeat BS >110. Orders received for 100meq of Sodium Bicarb and a Sodium Bicarb drip. Both given. Patient has increased frothy secretions, labored breathing. Doctor Yao in communication with son concerning code status. Will continue to monitor.

## 2022-02-23 NOTE — PROGRESS NOTES
Progress Note    Patient: Lori Terry MRN: [id number]  SSN: [id number]    YOB: 1922  Age: 80 y.o. Sex: female      Admit Date: 2/20/2022    LOS: 3 days     Subjective:   Patient followed for chronic sacral wound infection, recently discharged but readmitted because of respiratory distress. She was admitted to the ICU but remains on nasal cannula. She was febrile. WBC has increased along with procal and CRP. Cultures still pending. She is on Cefepime, Vancomycin and Fluconazole. Her renal failure is worsening. Objective:     Vitals:    02/23/22 0852 02/23/22 0900 02/23/22 0923 02/23/22 1000   BP:  (!) 92/52  (!) 92/54   Pulse:       Resp:  25  20   Temp:       SpO2:  98% 100% 100%   Weight:       Height: 4' 11.02\" (1.499 m)           Intake and Output:  Current Shift: No intake/output data recorded. Last three shifts: 02/21 1901 - 02/23 0700  In: 2377.1 [I.V.:1380]  Out: 25 [Urine:25]    Physical Exam:    Constitutional:       General: She is not in acute distress. Appearance: She is ill-appearing. HENT:      Head: Normocephalic and atraumatic. Right Ear: External ear normal.      Left Ear: External ear normal.      Nose: Nose normal.      Comments: Nasal O2 2 L/min     Mouth/Throat:      Mouth: Mucous membranes are dry. Eyes:      Pupils: Pupils are equal, round, and reactive to light. Cardiovascular:      Rate and Rhythm: Normal rate and regular rhythm. Heart sounds: No murmur heard. Pulmonary:      Breath sounds: No wheezing or rales. Comments: Diminished BS bilaterally L>R  Abdominal:      General: Bowel sounds are normal. There is no distension. Palpations: Abdomen is soft. Tenderness: There is no abdominal tenderness. Comments: PEG site with mild erythema, no exudate       Genitourinary:     Comments: Govea catheter  Musculoskeletal:      Cervical back: Neck supple. Right lower leg: No edema. Left lower leg: No edema.       Comments: Right foot with gauze bandage; medial foot wound exposed and has largely healed  Left hip wound with exudate  Left calf bandaged with gauze  Skin:     Findings: No rash. Comments: Large open sacral wound that is mostly dry with normal granulation tissue, Stage 3   Neurological:      Comments: Unable to assess   Psychiatric:      Comments: Unable to assess     Lab/Data Review:     WBC 14,900  Lactic acid 11.4     Procal 1.35 <1.10 < 0.87 <0.38  CRP 9.34 <8.13 <8.81 <7.40    Blood cultures (2/20) No growth 3 days  Urine culture (2/21) Candida albicans  Wound culture sacrum (2/20) Yeasts, Coagulase negative Staphylococci FINAL  Wound culture sacrum (2/20) Yeasts FINAL    CXR (2/23) Similar combination moderate volume dependent pleural effusions, right greater  than left, and basilar atelectasis. Assessment:     Active Problems:    Septic shock ([address] (2/20/2022)       1. Sacral wound infection, secondary to MRSA and MDR Acinetobacter baumannii, Day #16 IV Vancomycin and Day #10 IV Cefepime  2. Stage 3 sacral wound  3. Right foot wound infection, secondary to MRSA and MDR Acinetobacter baumannii, on IV antibiotics above, resolved  4. Covid-19 infection with no clear evidence of pneumonitis  5. Chronic hypoxic respiratory failure, with interstitial edema   6. Candida UTI with marked pyuria and funguria, Day #4 IV Fluconazole  7. Sepsis with new fever, leukocytosis, elevated procal and CRP  8. Elevated LDH, possibly secondary to #4  9. Altered mental status, baseline  10. Worsening renal failure    Comments:   Clinical response remains suboptimal so far and still possibly related to MDR Acinetobacter which was only intermediate to Cefepime, however it has not been re-isolated from sacral wound this time and has grown only yeasts and coag negative Staph.  With worsening sepsis, would favor starting Cefiderocol for Acinetobacter.      Plan:   1. Discontinue Cefepime   2. Start Cefiderocol IV for MDR Acinetobacter  3. Discontinue Vancomycin given worsening renal failure  4. Start IV Daptomycin for MRSA  5. Continue Fluconazole but modify dose to 200 mg every 48 hours  6. Follow-up blood cultures  7.  In am, repeat CBC, CRP and procal      Signed By: Barbara Lopez MD     February 23, 2022

## 2022-02-23 NOTE — PROGRESS NOTES
Pulmonary Progress Note      NAME: Diego Angeles   :  3/25/1922  MRM:  [id number]    Date/Time: 2022  11:46 AM         Subjective:     Patient seen and examined. Discussed with the RN. She is on 1 L of oxygen. However this morning she was frothing at the mouth. Mild distress. IV fluids were discontinued. She was given Lasix here. She is now on Levophed at 7 mics. She remains a full code. On hypotonic IV fluids. However urine output is still very poor. She is nonverbal but appears to be in pain. Past Medical History reviewed and unchanged from Admission History and Physical       Objective:     Physical Exam     Vitals:      Last 24hrs VS reviewed since prior progress note. Most recent are:    Visit Vitals  BP (!) 92/54 (BP 1 Location: Right upper arm, BP Patient Position: At rest)   Pulse 61   Temp 98.6 °F (37 °C)   Resp 20   Ht 4' 11.02\" (1.499 m)   Wt 54.3 kg (119 lb 11.4 oz)   SpO2 100%   Breastfeeding No   BMI 24.17 kg/m²     SpO2 Readings from Last 6 Encounters:   22 100%   22 93%   22 94%   22 98%   21 98%   21 99%    O2 Flow Rate (L/min): 2 l/min       Intake/Output Summary (Last 24 hours) at 2022 1134  Last data filed at 2022 0300  Gross per 24 hour   Intake 577.1 ml   Output 25 ml   Net 552.1 ml      GENERAL:  The patient is an elderly frail  female who is lying in the right decubitus position. She appears to have contractures and multiple skin decubitus. Saturation on nasal cannula O2 is 94%. HEENT:  Shows pupils are equal and reactive to light. Pallor is noted. Nasal passages appear to be patent and she is on nasal cannula. She will not open her mouth. NECK:  Appears to be supple. Trachea appears to be central.  No obvious JVD. LUNGS:  She is not using any accessory muscles of respiratory. CHEST:  Symmetrical.  She has diminished breath sounds over the lung bases with increased scattered crackles.   HEART:  Rhythm is regular with monitor showing paced rhythm at a rate of 60 beats per minute. ABDOMEN:  Status post PEG tube. It is soft and appears to be benign. Bowel sounds are audible. EXTREMITIES:  Do not show any cyanosis or clubbing. She has some pitting edema. She has contractures. Pulses are palpable. NEUROLOGIC SYSTEM:  Examination shows that the patient is uncommunicative. She has a history of stroke. She has contractures. SKIN:  Shows decubitus ulcers, mainly in the sacral regions. XR CHEST PORT   Final Result      US RETROPERITONEUM COMP   Final Result   Limited exam, as above. No sonographic evidence of medical renal disease or   hydronephrosis. XR CHEST PORT   Final Result   Decrease in bilateral pleural effusions. Unchanged moderate perihilar vascular congestion and interstitial edema. Lab Data Reviewed: (see below)      Medications:  Current Facility-Administered Medications   Medication Dose Route Frequency    furosemide (LASIX) injection 40 mg  40 mg IntraVENous BID    [START ON 2/24/2022] Vancomycin Random level to be drawn on 2/24/22 at 0600 pre.    Other ONCE    Vancomycin lab reminder - draw level 02/23 @ 0400   Other ONCE    0.9% sodium chloride infusion 250 mL  250 mL IntraVENous PRN    [Held by provider] sodium chloride (23.4%) 0.225 % in sterile water 1,000 mL infusion   IntraVENous CONTINUOUS    VANCOMYCIN INFORMATION NOTE   Other Rx Dosing/Monitoring    NOREPINephrine (LEVOPHED) 8 mg in 0.9% NS 250ml infusion  0.5-16 mcg/min IntraVENous TITRATE    sodium chloride (NS) flush 5-40 mL  5-40 mL IntraVENous Q8H    sodium chloride (NS) flush 5-40 mL  5-40 mL IntraVENous PRN    acetaminophen (TYLENOL) tablet 650 mg  650 mg Oral Q6H PRN    Or    acetaminophen (TYLENOL) suppository 650 mg  650 mg Rectal Q6H PRN    polyethylene glycol (MIRALAX) packet 17 g  17 g Oral DAILY PRN    ondansetron (ZOFRAN ODT) tablet 4 mg  4 mg Oral Q8H PRN    Or    ondansetron (ZOFRAN) injection 4 mg  4 mg IntraVENous Q6H PRN    ascorbic acid (vitamin C) (VITAMIN C) tablet 500 mg  500 mg Oral BID    [Held by provider] apixaban (ELIQUIS) tablet 2.5 mg  2.5 mg Oral BID    zinc sulfate (ZINCATE) 50 mg zinc (220 mg) capsule 1 Capsule  1 Capsule Oral DAILY    cefepime (MAXIPIME) injection 2 g  2 g IntraVENous Q24H    fluconazole (DIFLUCAN) 200mg/100 mL IVPB (premix)  200 mg IntraVENous Q24H       ______________________________________________________________________      Lab Review:     Recent Labs     02/23/22  0515 02/22/22  0359 02/21/22  0230   WBC 14.9* 12.1* 11.5*   HGB 8.9* 6.5* 6.6*   HCT 30.0* 22.4* 22.7*    131* 128*     Recent Labs     02/22/22  0359 02/21/22  0230 02/20/22  1506   * 151* 151*   K 4.6 4.4 4.2   * 117* 116*   CO2 21 28 29   GLU 66 108* 110*   * 118* 116*   CREA 2.60* 2.26* 2.07*   CA 8.0* 8.5 8.9   MG 3.0*  --   --    PHOS 3.5  --   --    ALB 2.1*  2.1* 2.3* 2.5*   ALT 32 23 25     No components found for: GLPOC  No results for input(s): PH, PCO2, PO2, HCO3, FIO2 in the last 72 hours. No results for input(s): INR, INREXT, INREXT, INREXT in the last 72 hours. Other pertinent lab:   Labs on admit: Chest x-ray done 2/20 shows decreased bilateral pleural effusions as compared to a prior study of 02/12/2022. WBC count is 11.5, with neutrophils of 82%, hemoglobin is 6.6, platelet count of 307. Sodium is 151, potassium is 4.4, BUN is 118, creatinine is 2.24, blood glucose of 108. Rapid COVID test was positive on 02/18. MRSA screen is positive. D-dimer is 8.87. Lactic acid 3.1. Ferritin 269. CRP is elevated at 8.8. Assessment & Plan:      1. Septic shock. The patient has multiple sacral decubitus. It is thought that her septic shock is because of infected decubitus. 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 Infectious Disease is already on the case.   She is on cefepime, fluconazole and vancomycin. She was getting quarter-normal saline because of hypernatremia; however, now she is started on Levophed at 7 mics. Target map of 65 and higher. She was apparently frothing at the mouth this morning. IV fluids were discontinued. She has been given Lasix 40 mg IV x1. We will continue to monitor her closely. She is a full code. 2.  Bilateral pleural effusions. They appear to be transudative, however, improved from recent study of 02/12/2022. She had a thoracentesis done on 02/14 as well. She is currently on 1 L of oxygen, not in any distress. Chest x-ray done today shows no change in bilateral pleural effusions. 3.  Acute on chronic renal failure. Hypernatremia, improved. Nephrology is also on the case. 4.  History of bradycardia and pacemaker insertion, also has history of atrial fibrillation and congestive heart failure. She has paced rhythm with background of atrial fibrillation. Cardiology on the case. Apparently, she is on Eliquis 2.5 mg p.o. b.i.d. which may be continued while closely monitoring her hemoglobin. She does have anemia. 5.  Recent COVID-19 infection. No specific treatment is needed at this time. We will avoid steroids because of sepsis and wound infection. 6.  For deep venous thrombosis prophylaxis, she is already on Eliquis. 7.  For gastrointestinal stress prophylaxis, on Protonix intravenously. 8.  Anemia. Hemoglobin today is 8.9.     Thank you for allowing me to participate in the care of this patient. I will follow the patient closely with you. It should be mentioned that she is a full code, but her prognosis is very poor.     More than 50 minutes were spent in the management of this patient.   Shayy Dee MD

## 2022-02-23 NOTE — PROGRESS NOTES
Clinical chart reviewed by CM. Discharge disposition is to return home with her son with home health services provided by [facility]. CM will continue to follow.

## 2022-02-24 NOTE — PROGRESS NOTES
CM reviewed clinical chart. Patient's discharge plan is to return home with home health services provided by [facility]. CM will continue to follow.

## 2022-02-24 NOTE — PROGRESS NOTES
BS this morning <20. D10 bolus given per orders. Patient's BS continue to drop after PRN bolus. Called Doctor Eulalio Solares. D10 drip ordered at 50cc/hr and blood sugars every two hours.

## 2022-02-24 NOTE — PROGRESS NOTES
Progress Note    Patient: Carmen Colon MRN: [id number]  SSN: [id number]    YOB: 1922  Age: 80 y.o. Sex: female      Admit Date: 2/20/2022    LOS: 4 days     Subjective:     She is currently on a BiPAP. She had a temperature of 100.6. Objective:     Vitals:    02/24/22 0400 02/24/22 0500 02/24/22 0600 02/24/22 0700   BP: (!) 96/55 (!) 80/45 (!) 98/52 (!) 90/46   Pulse: 60 80 60 60   Resp: 20 25 28 28   Temp:  (!) 100.6 °F (38.1 °C)  99.1 °F (37.3 °C)   SpO2: 100% 100% 98% 98%   Weight:       Height:            Intake and Output:  Current Shift: No intake/output data recorded. Last three shifts: 02/22 1901 - 02/24 0700  In: 1577.1 [I.V.:1000]  Out: 0     Physical Exam:   General:   Nonverbal.   Eyes:  Conjunctivae/corneas clear. PERRL, EOMs intact. Fundi benign   Ears:  Normal TMs and external ear canals both ears. Nose: Nares normal. Septum midline. Mucosa normal. No drainage or sinus tenderness. Mouth/Throat: Lips, mucosa, and tongue normal. Teeth and gums normal.   Neck: Supple, symmetrical, trachea midline, no adenopathy, thyroid: no enlargment/tenderness/nodules, no carotid bruit and no JVD. Back:   Symmetric, no curvature. ROM normal. No CVA tenderness. Lungs:    Coarse breath sounds   Heart:  Regular rate and rhythm, S1, S2 normal, no murmur, click, rub or gallop. Abdomen:   Soft, non-tender. Bowel sounds normal. No masses,  No organomegaly. Extremities: Extremities normal, atraumatic, no cyanosis or edema. Pulses: 2+ and symmetric all extremities. Skin: Skin color, texture, turgor normal. No rashes or lesions   Lymph nodes: Cervical, supraclavicular, and axillary nodes normal.   Neurologic: CNII-XII intact. Normal strength, sensation and reflexes throughout. Lab/Data Review: All lab results for the last 24 hours reviewed. Assessment:     Active Problems:    Septic shock ([address] (2/20/2022)    Patient is a 20-year-old white female with:  1.   Heart failure  2. COVID pneumonitis  3. Anemia  4. Thrombocytopenia  5. Hypernatremia  6. Acute kidney injury  7. Severe mitral regurgitation. ,  Moderate posterior mitral annular calcification  8. Severe tricuspid regurgitation  9. Moderate pulmonary regurgitation  10. Status post PEG tube  11. Status post pacemaker  12. Pleural effusion status post thoracentesis with 800 cc removed. 13.  Septic shock  14. Sacral decubitus ulcers  15. Atrial fibrillation    Plan:     She received 1 unit of blood. She is currently on a BiPAP. Currently in paced rhythm. She is currently on Levophed. Blood pressure is dipping down. She is currently DNR and DNI. Prognosis critical.  Hemoglobin 8.0, platelet 942. Sodium 145, potassium 5.2, BMP is from yesterday. Recheck labs.       Signed By: Gretta Murillo MD     February 24, 2022

## 2022-02-24 NOTE — PROGRESS NOTES
Pulmonary Progress Note      NAME: Maynor Gandara   :  3/25/1922  MRM:  [id number]    Date/Time: 2022  11:46 AM         Subjective:     Patient seen and examined. Discussed with the RN. She is now DNR/DNI. Apparently over the night she was desaturating. Oxygen was increased and placed on nonrebreather mask and still desaturating. She is now on BiPAP 15/7 with 100% O2. She is also on Levophed at 18 mics. Blood gases are ordered and pending. She appears emaciated and in mild distress. However urine output is still very poor. She is nonverbal but appears to be in pain. Past Medical History reviewed and unchanged from Admission History and Physical       Objective:     Physical Exam     Vitals:      Last 24hrs VS reviewed since prior progress note. Most recent are:    Visit Vitals  BP (!) 93/49 (BP 1 Location: Right upper arm, BP Patient Position: At rest)   Pulse 60   Temp 99.1 °F (37.3 °C)   Resp (!) 32   Ht 4' 11.02\" (1.499 m)   Wt 56.1 kg (123 lb 10.9 oz)   SpO2 92%   Breastfeeding No   BMI 24.97 kg/m²     SpO2 Readings from Last 6 Encounters:   22 92%   22 93%   22 94%   22 98%   21 98%   21 99%    O2 Flow Rate (L/min): 2 l/min       Intake/Output Summary (Last 24 hours) at 2022 1200  Last data filed at 2022 0300  Gross per 24 hour   Intake 1200 ml   Output 0 ml   Net 1200 ml      GENERAL:  The patient is an elderly frail  female who is lying in the right decubitus position. She appears to have contractures and multiple skin decubitus. Saturation on BiPAP is in the low 90% range. HEENT:  Shows pupils are equal and reactive to light. Pallor is noted. Nasal passages appear to be patent and she is on nasal cannula. She will not open her mouth. NECK:  Appears to be supple. Trachea appears to be central.  No obvious JVD. LUNGS:  She is not using any accessory muscles of respiratory.   CHEST:  Symmetrical.  She has diminished breath sounds with increased scattered rhonchi and crackles. HEART:  Rhythm is regular with monitor showing paced rhythm at a rate of 60 beats per minute. ABDOMEN:  Status post PEG tube. It is soft and appears to be benign. Bowel sounds are audible. EXTREMITIES:  Do not show any cyanosis or clubbing. She has some pitting edema. She has contractures. Pulses are palpable. NEUROLOGIC SYSTEM:  Examination shows that the patient is uncommunicative. She has a history of stroke. She has contractures. SKIN:  Shows decubitus ulcers, mainly in the sacral regions. XR CHEST PORT   Final Result      US RETROPERITONEUM COMP   Final Result   Limited exam, as above. No sonographic evidence of medical renal disease or   hydronephrosis. XR CHEST PORT   Final Result   Decrease in bilateral pleural effusions. Unchanged moderate perihilar vascular congestion and interstitial edema.           Lab Data Reviewed: (see below)      Medications:  Current Facility-Administered Medications   Medication Dose Route Frequency    NOREPINephrine (LEVOPHED) 8 mg in 0.9% NS 250ml infusion  0.5-120 mcg/min IntraVENous TITRATE    furosemide (LASIX) injection 40 mg  40 mg IntraVENous BID    pantoprazole (PROTONIX) 40 mg in 0.9% sodium chloride 10 mL injection  40 mg IntraVENous DAILY    dextrose 10% infusion 0-250 mL  0-250 mL IntraVENous PRN    DAPTOmycin (CUBICIN) 350 mg in 0.9% sodium chloride 50 mL IVPB  6 mg/kg IntraVENous Q48H    ceFIDerocoL (FETROJA) 0.75 g in 0.9% sodium chloride 100 mL IVPB  0.75 g IntraVENous Q12H    fluconazole (DIFLUCAN) 200mg/100 mL IVPB (premix)  200 mg IntraVENous Q48H    0.9% sodium chloride infusion 250 mL  250 mL IntraVENous PRN    sodium chloride (NS) flush 5-40 mL  5-40 mL IntraVENous Q8H    sodium chloride (NS) flush 5-40 mL  5-40 mL IntraVENous PRN    acetaminophen (TYLENOL) tablet 650 mg  650 mg Oral Q6H PRN    Or    acetaminophen (TYLENOL) suppository 650 mg  650 mg Rectal Q6H PRN    polyethylene glycol (MIRALAX) packet 17 g  17 g Oral DAILY PRN    ondansetron (ZOFRAN ODT) tablet 4 mg  4 mg Oral Q8H PRN    Or    ondansetron (ZOFRAN) injection 4 mg  4 mg IntraVENous Q6H PRN    ascorbic acid (vitamin C) (VITAMIN C) tablet 500 mg  500 mg Oral BID    [Held by provider] apixaban (ELIQUIS) tablet 2.5 mg  2.5 mg Oral BID    zinc sulfate (ZINCATE) 50 mg zinc (220 mg) capsule 1 Capsule  1 Capsule Oral DAILY       ______________________________________________________________________      Lab Review:     Recent Labs     02/24/22  0520 02/23/22  0515 02/22/22  0359   WBC 20.4* 14.9* 12.1*   HGB 8.0* 8.9* 6.5*   HCT 26.9* 30.0* 22.4*   * 157 131*     Recent Labs     02/24/22  0520 02/23/22  1320 02/23/22  0515 02/22/22  0359 02/22/22  0359   * 145 143   < > 147*   K 5.3* 5.2* 5.2*   < > 4.6   * 107 109*   < > 113*   CO2 19* 17* 10*   < > 21   GLU 5* 68 17*   < > 66   * 133* 132*   < > 135*   CREA 3.42* 3.26* 3.08*   < > 2.60*   CA 7.5* 7.7* 8.0*   < > 8.0*   MG  --   --   --   --  3.0*   PHOS 7.0*  --  5.6*  --  3.5   ALB 2.0*  --  2.2*  --  2.1*  2.1*   ALT  --   --   --   --  32    < > = values in this interval not displayed. No components found for: [employer] [facility]     02/24/22  1005   PH 7.23*   PCO2 36   PO2 97   HCO3 15*   FIO2 100.0     No results for input(s): INR, INREXT, INREXT, INREXT in the last 72 hours. Other pertinent lab:   Labs on admit: Chest x-ray done 2/20 shows decreased bilateral pleural effusions as compared to a prior study of 02/12/2022. WBC count is 11.5, with neutrophils of 82%, hemoglobin is 6.6, platelet count of 760. Sodium is 151, potassium is 4.4, BUN is 118, creatinine is 2.24, blood glucose of 108. Rapid COVID test was positive on 02/18. MRSA screen is positive. D-dimer is 8.87. Lactic acid 3.1. Ferritin 269. CRP is elevated at 8.8. Assessment & Plan:      1. Septic shock.   The patient has multiple sacral decubitus. It is thought that her septic shock is because of infected decubitus. 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 Infectious Disease is already on the case. She is on cefepime, fluconazole and vancomycin. She is now DNR/DNI. She was started on Levophed on the 2/23. Today she is on [de-identified] mics of. Target a map of 65 and higher. She has developed acute hypoxic respiratory failure. Over the course of the night she was desaturating, initially placed on nonrebreather mask and now on BiPAP 15/7 with 100% O2. I will obtain a blood gas. Chest x-ray will be obtained. However prognosis is very poor. 2.  Bilateral pleural effusions. They appear to be transudative, however, improved from recent study of 02/12/2022. She had a thoracentesis done on 02/14 as well. She is currently on 1 L of oxygen, not in any distress. Chest x-ray done 2/23 shows no change in bilateral pleural effusions. 3.  Acute on chronic renal failure. Hypernatremia, improved. Nephrology is also on the case. She is not a candidate for dialysis. 4.  History of bradycardia and pacemaker insertion, also has history of atrial fibrillation and congestive heart failure. She has paced rhythm with background of atrial fibrillation. Cardiology on the case. Apparently, she is on Eliquis 2.5 mg p.o. b.i.d. which may be continued while closely monitoring her hemoglobin. She does have anemia. 5.  Recent COVID-19 infection. No specific treatment is needed at this time. We will avoid steroids because of sepsis and wound infection. 6.  For deep venous thrombosis prophylaxis, she is already on Eliquis. 7.  For gastrointestinal stress prophylaxis, on Protonix intravenously. 8.  Anemia. Hemoglobin today is 8.0.     Thank you for allowing me to participate in the care of this patient. I will follow the patient closely with you.   It should be mentioned that she is now DNR/DNI, but her prognosis is very poor.     More than 50 minutes were spent in the management of this patient.   Cirsti Black MD

## 2022-02-24 NOTE — PROGRESS NOTES
Progress Note    Patient: Zaynab Christina MRN: [id number]  SSN: [id number]    YOB: 1922  Age: 80 y.o. Sex: female      Admit Date: 2/20/2022    LOS: 3 days     Subjective:     89F with a history of CKD, hypertension, stroke with left residual defects, bradycardia status post AICD 2 months ago, dysphagia status post PEG tube with acute encephalopathy s/t septic shock s.t UTI and sacral decubitus ulcer. HOSPITAL COURSE:   He was started with vancomycin and cefipime, Sacral wound infection, secondary to MRSA and MDR Acinetobacter baumannii, Candida UTI with marked pyuria and funguria. Started on levophed and 0.22% saline, discussed with son- agreed for DNR/DNI. antibiotcs changes to daptomycin and cefiderecol. Review of Systems:  Unable to perform ROS: Patient nonverbal       Objective:     Vitals:    02/23/22 1500 02/23/22 1600 02/23/22 1700 02/23/22 1800   BP: (!) 83/46 (!) 83/44 (!) 80/43 (!) 85/41   Pulse: 60      Resp: 22 24 28 28   Temp: 99.7 °F (37.6 °C)      SpO2: 100% 100% 100% 100%   Weight:       Height:            Intake and Output:  Current Shift: No intake/output data recorded. Last three shifts: 02/22 0701 - 02/23 1900  In: 1677.1 [I.V.:1000]  Out: 25 [Urine:25]      Physical Exam:      Physical Exam  Vitals and nursing note reviewed. Exam conducted with a chaperone present. Constitutional:       General: She is in acute distress. Appearance: She is ill-appearing. HENT:      Head: Normocephalic. Mouth/Throat:      Mouth: Mucous membranes are dry. Eyes:      Pupils: Pupils are equal, round, and reactive to light. Cardiovascular:      Rate and Rhythm: Normal rate and regular rhythm. Pulses: Normal pulses. Heart sounds: Normal heart sounds. Pulmonary:      Effort: Pulmonary effort is normal.      Breath sounds: Normal breath sounds. Abdominal:      General: Abdomen is flat.    Musculoskeletal:      Cervical back: Normal range of motion and neck supple. Skin:     Comments: Large open sacral wound Stage 3   Neurological:      Mental Status: She is disoriented. Psychiatric:      Comments: Unable to assess         Lab/Data Review:  Recent Results (from the past 24 hour(s))   VANCOMYCIN, RANDOM    Collection Time: 02/23/22  5:15 AM   Result Value Ref Range    Vancomycin, random 24.8 ug/mL   CBC WITH AUTOMATED DIFF    Collection Time: 02/23/22  5:15 AM   Result Value Ref Range    WBC 14.9 (H) 3.6 - 11.0 K/uL    RBC 2.98 (L) 3.80 - 5.20 M/uL    HGB 8.9 (L) 11.5 - 16.0 g/dL    HCT 30.0 (L) 35.0 - 47.0 %    .7 (H) 80.0 - 99.0 FL    MCH 29.9 26.0 - 34.0 PG    MCHC 29.7 (L) 30.0 - 36.5 g/dL    RDW 20.3 (H) 11.5 - 14.5 %    PLATELET 909 897 - 632 K/uL    NRBC 5.5 (H) 0.0  WBC    ABSOLUTE NRBC 0.82 (H) 0.00 - 0.01 K/uL    NEUTROPHILS 88 (H) 32 - 75 %    LYMPHOCYTES 4 (L) 12 - 49 %    MONOCYTES 6 5 - 13 %    EOSINOPHILS 0 0 - 7 %    BASOPHILS 0 0 - 1 %    IMMATURE GRANULOCYTES 2 (H) 0 - 0.5 %    ABS. NEUTROPHILS 13.1 (H) 1.8 - 8.0 K/UL    ABS. LYMPHOCYTES 0.6 (L) 0.8 - 3.5 K/UL    ABS. MONOCYTES 0.9 0.0 - 1.0 K/UL    ABS. EOSINOPHILS 0.0 0.0 - 0.4 K/UL    ABS. BASOPHILS 0.0 0.0 - 0.1 K/UL    ABS. IMM.  GRANS. 0.4 (H) 0.00 - 0.04 K/UL    DF AUTOMATED     C REACTIVE PROTEIN, QT    Collection Time: 02/23/22  5:15 AM   Result Value Ref Range    C-Reactive protein 9.34 (H) 0.00 - 0.60 mg/dL   PROCALCITONIN    Collection Time: 02/23/22  5:15 AM   Result Value Ref Range    Procalcitonin 1.35 (H) 0 ng/mL   RENAL FUNCTION PANEL    Collection Time: 02/23/22  5:15 AM   Result Value Ref Range    Sodium 143 136 - 145 mmol/L    Potassium 5.2 (H) 3.5 - 5.1 mmol/L    Chloride 109 (H) 97 - 108 mmol/L    CO2 10 (LL) 21 - 32 mmol/L    Anion gap 24 (H) 5 - 15 mmol/L    Glucose 17 (LL) 65 - 100 mg/dL     (H) 6 - 20 mg/dL    Creatinine 3.08 (H) 0.55 - 1.02 mg/dL    BUN/Creatinine ratio 43 (H) 12 - 20      GFR est AA 17 (L) >60 ml/min/1.73m2    GFR est non-AA 14 (L) >60 ml/min/1.73m2    Calcium 8.0 (L) 8.5 - 10.1 mg/dL    Phosphorus 5.6 (H) 2.6 - 4.7 mg/dL    Albumin 2.2 (L) 3.5 - 5.0 g/dL   GLUCOSE, POC    Collection Time: 02/23/22 11:57 AM   Result Value Ref Range    Glucose (POC) 21 (LL) 65 - 117 mg/dL    Performed by Ann Gayle    GLUCOSE, POC    Collection Time: 02/23/22 12:30 PM   Result Value Ref Range    Glucose (POC) 107 65 - 117 mg/dL    Performed by Ann Gayle    LACTIC ACID    Collection Time: 02/23/22  1:20 PM   Result Value Ref Range    Lactic acid 11.4 (HH) 0.4 - 2.0 mmol/L   METABOLIC PANEL, BASIC    Collection Time: 02/23/22  1:20 PM   Result Value Ref Range    Sodium 145 136 - 145 mmol/L    Potassium 5.2 (H) 3.5 - 5.1 mmol/L    Chloride 107 97 - 108 mmol/L    CO2 17 (L) 21 - 32 mmol/L    Anion gap 21 (H) 5 - 15 mmol/L    Glucose 68 65 - 100 mg/dL     (H) 6 - 20 mg/dL    Creatinine 3.26 (H) 0.55 - 1.02 mg/dL    BUN/Creatinine ratio 41 (H) 12 - 20      GFR est AA 16 (L) >60 ml/min/1.73m2    GFR est non-AA 13 (L) >60 ml/min/1.73m2    Calcium 7.7 (L) 8.5 - 10.1 mg/dL         Assessment and plan:      (1) septic shock: levophed to Keep MAP > 65    (2) Acute encephalopathy : GCS 12.     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 MRSA and MDR Acinetobacter baumannii. D9    (5) COVID-19 infection     (6) Candida UTI: diflucaine D3    (7) hypernatremia : 0.225% NaCL in sterile water    (8) anemia : transfuse 1 PRBC    (9) SSS- Pafib with PPM. Hold eliquis    DVT ppx: SCD    DISPO: poor prognosis.  DNR DNI    Signed By: Gary Bose MD     February 23, 2022

## 2022-02-24 NOTE — PROGRESS NOTES
Nephrology Consult    Patient: Juanito Christy MRN: [id number]  SSN: [id number]    YOB: 1922  Age: 80 y.o. Sex: female      Subjective:    The pt is seen in ICU  On single pressor  Very weak and frail  obtunded  BUN/Cr  133/3.4 (worse)  CO2 19   Off IVF  DNR status now  Family might consider comfort care today     Past Medical History:   Diagnosis Date    Chronic kidney disease     acute tubual necrosis    Clostridium difficile infection     Elevated troponin 9/10/2014    Hypertension     Skin cancer     Stroke [facility])     2001 left residual     Past Surgical History:   Procedure Laterality Date    HX APPENDECTOMY  2/2008    HX HEENT      cataract    HX ORTHOPAEDIC  04/2010    left total hip - hip fx, left knee surgery    IR FLUORO GUIDE PLC CVAD  1/6/2022    IR THORACENTESIS NDL PUNC ASP W IMAGE  2/14/2022      Family History   Family history unknown: Yes     Social History     Tobacco Use    Smoking status: Never Smoker    Smokeless tobacco: Never Used   Substance Use Topics    Alcohol use: Never      Current Facility-Administered Medications   Medication Dose Route Frequency Provider Last Rate Last Admin    NOREPINephrine (LEVOPHED) 8 mg in 0.9% NS 250ml infusion  0.5-120 mcg/min IntraVENous TITRATE Ethan Arias .9 mL/hr at 02/24/22 0951 81 mcg/min at 02/24/22 0951    furosemide (LASIX) injection 40 mg  40 mg IntraVENous BID Cate Quintana MD   40 mg at 02/24/22 0384    pantoprazole (PROTONIX) 40 mg in 0.9% sodium chloride 10 mL injection  40 mg IntraVENous DAILY Rufus Nunez MD   40 mg at 02/24/22 5748    dextrose 10% infusion 0-250 mL  0-250 mL IntraVENous PRN Cate Quintana MD   250 mL at 02/24/22 0752    DAPTOmycin (CUBICIN) 350 mg in 0.9% sodium chloride 50 mL IVPB  6 mg/kg IntraVENous Q48H Nataliya Mata  mL/hr at 02/23/22 1725 350 mg at 02/23/22 1725    ceFIDerocoL (FETROJA) 0.75 g in 0.9% sodium chloride 100 mL IVPB  0.75 g IntraVENous Q12H Lizandro Staples MD   0.75 g at 02/24/22 5002    fluconazole (DIFLUCAN) 200mg/100 mL IVPB (premix)  200 mg IntraVENous Q48H Lizandro Staples MD        0.9% sodium chloride infusion 250 mL  250 mL IntraVENous PRN Lexis Andrade MD        sodium chloride (NS) flush 5-40 mL  5-40 mL IntraVENous Q8H Mando Messina MD   10 mL at 02/24/22 0514    sodium chloride (NS) flush 5-40 mL  5-40 mL IntraVENous PRN Michele Shafer MD        acetaminophen (TYLENOL) tablet 650 mg  650 mg Oral Q6H PRN Michele Shafer MD   650 mg at 02/23/22 2318    Or    acetaminophen (TYLENOL) suppository 650 mg  650 mg Rectal Q6H PRN Michele Shafer MD        polyethylene glycol [facility]) packet 17 g  17 g Oral DAILY PRN Michele Shafer MD        ondansetron [facility] ODT) tablet 4 mg  4 mg Oral Q8H PRN Michele Shafer MD        Or    ondansetron [facility]) injection 4 mg  4 mg IntraVENous Q6H PRN Michele Shafer MD        ascorbic acid (vitamin C) (VITAMIN C) tablet 500 mg  500 mg Oral BID Michele Shafer MD   500 mg at 02/24/22 0848    [Held by provider] apixaban Clarine Jaskaran) tablet 2.5 mg  2.5 mg Oral BID Michele Shafer MD   2.5 mg at 02/22/22 1005    zinc sulfate (ZINCATE) 50 mg zinc (220 mg) capsule 1 Capsule  1 Capsule Oral DAILY Michele Shafer MD   1 Capsule at 02/24/22 0100        Allergies   Allergen Reactions    Neosporin [Benzalkonium Chloride] Rash       Review of Systems:  Unable to obtain    Objective:     Vitals:    02/24/22 0800 02/24/22 0814 02/24/22 0900 02/24/22 1000   BP: (!) 95/49  (!) 93/50 (!) 93/49   Pulse:       Resp: 24  28 (!) 32   Temp:       SpO2: 94% 92% 96% 96%   Weight:       Height:            Physical Exam:  General: Frail and weak  Eyes: sclera anicteric  Oral Cavity: No thrush or ulcers  Neck: no JVD  Chest: Fair bilateral air entry  Heart: normal sounds  Abdomen: soft and non tender   :  Gvoea+  Lower Extremities: no edema  Skin: no rash  Neuro: AMS           Assessment:     Hospital Problems  Date Reviewed: 1/5/2022          Codes Class Noted POA    Septic shock ([facility] [address] ICD-10-CM: A41.9, R65.21  ICD-9-CM: 038.9, 785.52, 995.92  2/20/2022 Unknown              Plan:   1-acute kidney injury.    -Etiology is prerenal azotemia from hypotension and severe sepsis.    -On admission BUN was elevated at 116 and creatinine 2.07.    -worse BUN/Cr  133/3.42  -Her baseline creatinine was 0.86 on 2/18/22.    -Clinically she looked volume down. -She has Govea cath in place and urine output has not been documented.    -A renal ultrasound showed no hydronephrosis. -not candidate for the dialysis. -off IVF    2. Hypernatremia.    -From free water deficit.    -Na 151-->147. -off  IV fluids. 3.  Anemia.    -Severe. -Hemoglobin was 6.5 only.    -No evidence of bleeding.    -received blood tx, Hb 8.0    4. Severe sepsis.    -On admission temp 488.7 and systolic blood pressure 82 only. -CBC showed leukocytosis. -Recent hospitalization for infected sacral decubitus ulcer and was discharged on IV vancomycin and IV cefepime.   -on iv vanc/cefepime/fluconazole   -on single pressor. -ID has been consulted. 5.  History of A. fib.    -Status post pacemaker placement. On Eliquis.     6. Metabolic acidosis, HAG:  -CO2 10->19, LA 5.6  -iv NaHCO3 100 meq push x 2            Signed By: Sarita Lora MD     February 24, 2022

## 2022-02-24 NOTE — PROGRESS NOTES
Progress Note    Patient: Shahnaz Radford MRN: [id number]  SSN: [id number]    YOB: 1922  Age: 80 y.o. Sex: female      Admit Date: 2/20/2022    LOS: 4 days     Subjective:   Patient followed for chronic sacral wound infection, recently discharged but readmitted because of respiratory distress. She was admitted to the ICU and is now on Ventimask. She is now febrile with increasing WBC and procal.  She is on Cefiderocol and Daptomycin along with Fluconazole. Family member at bedside. Objective:     Vitals:    02/24/22 0400 02/24/22 0500 02/24/22 0600 02/24/22 0700   BP: (!) 96/55 (!) 80/45 (!) 98/52 (!) 90/46   Pulse: 60 80 60 60   Resp: 20 25 28 28   Temp:  (!) 100.6 °F (38.1 °C)  99.1 °F (37.3 °C)   SpO2: 100% 100% 98% 98%   Weight:       Height:            Intake and Output:  Current Shift: No intake/output data recorded. Last three shifts: 02/22 1901 - 02/24 0700  In: 1577.1 [I.V.:1000]  Out: 0     Physical Exam:    Constitutional:       General: She is not in acute distress. Appearance: She is ill-appearing. HENT:      Head: Normocephalic and atraumatic. Right Ear: External ear normal.      Left Ear: External ear normal.      Nose: Nose normal.      Comments: BIPAP 100%      Mouth/Throat:      Mouth: Mucous membranes are dry. Eyes:      Pupils: Pupils are equal, round, and reactive to light. Cardiovascular:      Rate and Rhythm: Normal rate and regular rhythm. Heart sounds: No murmur heard. Pulmonary:      Breath sounds: No wheezing or rales. Comments: Diminished BS bilaterally L>R  Abdominal:      General: Bowel sounds are normal. There is no distension. Palpations: Abdomen is soft. Tenderness: There is no abdominal tenderness. Comments: PEG site with mild erythema, no exudate       Genitourinary:     Comments: Govea catheter  Musculoskeletal:       Right lower leg: No edema. Left lower leg: No edema.       Comments: Right foot medial wound healed; superficial wounds on both calves  Left hip wound with exudate  Skin:     Findings: No rash. Comments: Large open sacral wound that is mostly dry with normal granulation tissue, Stage 3   Neurological:      Comments: Unable to assess   Psychiatric:      Comments: Unable to assess     Lab/Data Review:     WBC 20,400  Lactic acid 11.4     Procal 1.35 <1.10 < 0.87 <0.38  CRP 8.65 <9.34 <8.13 <8.81 <7.40    Blood cultures (2/20) No growth 4 days  Urine culture (2/21) Candida albicans  Wound culture sacrum (2/20) Yeasts, Coagulase negative Staphylococci FINAL  Wound culture sacrum (2/20) Yeasts FINAL    CXR (2/23) Similar combination moderate volume dependent pleural effusions, right greater  than left, and basilar atelectasis. Assessment:     Active Problems:    Septic shock ([address] (2/20/2022)       1. Sacral wound infection, secondary to MRSA and MDR Acinetobacter baumannii, Day #2 Daptomycin and Cefiderocol  2. Stage 3 sacral wound  3. Right foot wound infection, secondary to MRSA and MDR Acinetobacter baumannii, on IV antibiotics above, resolved  4. Covid-19 infection with no clear evidence of pneumonitis  5. Chronic hypoxic respiratory failure, with interstitial edema   6. Candida UTI with marked pyuria and funguria, Day #4 IV Fluconazole  7. Sepsis with new fever, leukocytosis, elevated procal and CRP  8. Elevated LDH, possibly secondary to #4  9. Altered mental status, baseline  10. Worsening renal failure    Comments:   Patient now febrile but Cefiderocol just started yesterday. Plan:   1. Continue Cefiderocol IV for MDR Acinetobacter and Daptomycin for MRSA  2. Continue Fluconazole but modify dose to 200 mg every 48 hours  3. Blood cultures, urinalysis w/reflex culture  4. Follow-up pending blood cultures  7.  In am, repeat CBC, CRP and procal      Signed By: Mahnaz Mejia MD     February 24, 2022

## 2022-02-25 NOTE — PROGRESS NOTES
Progress Note    Patient: Brii Esquivel MRN: [id number]  SSN: [id number]    YOB: 1922  Age: 80 y.o. Sex: female      Admit Date: 2/20/2022    LOS: 5 days     Subjective:     Blood pressure is dropping and she is becoming hypoxemic. Objective:     Vitals:    02/25/22 0300 02/25/22 0400 02/25/22 0500 02/25/22 0600   BP: (!) 86/52 (!) 84/54 (!) 84/57 (!) 88/63   Pulse: 60 65 66 85   Resp: 26 13 10 8   Temp: 97 °F (36.1 °C) 96.8 °F (36 °C) (!) 96.4 °F (35.8 °C)    SpO2: (!) 68% (!) 66% (!) 39% (!) 43%   Weight:       Height:            Intake and Output:  Current Shift: No intake/output data recorded. Last three shifts: 02/23 1901 - 02/25 0700  In: 2896 [I.V.:2696]  Out: 0     Physical Exam:   General:   Nonverbal.   Eyes:  Conjunctivae/corneas clear. PERRL, EOMs intact. Fundi benign   Ears:  Normal TMs and external ear canals both ears. Nose: Nares normal. Septum midline. Mucosa normal. No drainage or sinus tenderness. Mouth/Throat: Lips, mucosa, and tongue normal. Teeth and gums normal.   Neck: Supple, symmetrical, trachea midline, no adenopathy, thyroid: no enlargment/tenderness/nodules, no carotid bruit and no JVD. Back:   Symmetric, no curvature. ROM normal. No CVA tenderness. Lungs:    Coarse breath sounds   Heart:  Regular rate and rhythm, S1, S2 normal, no murmur, click, rub or gallop. Abdomen:   Soft, non-tender. Bowel sounds normal. No masses,  No organomegaly. Extremities: Extremities normal, atraumatic, no cyanosis or edema. Pulses: 2+ and symmetric all extremities. Skin: Skin color, texture, turgor normal. No rashes or lesions   Lymph nodes: Cervical, supraclavicular, and axillary nodes normal.   Neurologic: CNII-XII intact. Normal strength, sensation and reflexes throughout. Lab/Data Review: All lab results for the last 24 hours reviewed.          Assessment:     Active Problems:    Septic shock ([address] (2/20/2022)    Patient is a 59-year-old white female with:  1. Heart failure  2. COVID pneumonitis  3. Anemia  4. Thrombocytopenia  5. Hypernatremia  6. Acute kidney injury  7. Severe mitral regurgitation. ,  Moderate posterior mitral annular calcification  8. Severe tricuspid regurgitation  9. Moderate pulmonary regurgitation  10. Status post PEG tube  11. Status post pacemaker  12. Pleural effusion status post thoracentesis with 800 cc removed. 13.  Septic shock  14. Sacral decubitus ulcers  15. Atrial fibrillation    Plan:     Currently in paced rhythm. Hypoxemic. Hypotensive. Reduce your urine output.   Prognosis grim      Signed By: Ruba Powell MD     February 25, 2022

## 2022-02-25 NOTE — PROGRESS NOTES
I called the pt's Son, Angle Pineda, and informed him that the pt's Oxygen saturation has remained between 35-50%, while on a 100% Fio2 on the Bipap. He stated that Gaye Patterson may inform his brother, to see if he would like to visit the pt, but that he himself came yesterday and is aware she isn't doing well\".

## 2022-02-25 NOTE — PROGRESS NOTES
[id number] 2 RN verify Emigdio Da Silva RN with shift nurse at bedside. - rhythm asystole, family at bedside, requesting use of magnet on pacemaker. Miryam Bartlett RN unit manager at bedside to verify. [facility], belongings sent with family (clothes only).

## 2022-02-25 NOTE — PROGRESS NOTES
follow up visit in response to staff referral due to patient's death.  consulted with RN upon arrival to ICU, family left prior to s arrival.  provided prayer of commendation for patient and for Gods presence to be with family on their journey of grief, at bedside. Family left prior to s arrival.  advised nurse to contact [facility] for any further referrals.     Visited by Josemanuel Caceres [facility], [facility]    can be contacted by calling  and requesting  on call

## 2022-02-25 NOTE — PROGRESS NOTES
Problem: Diabetes Self-Management  Goal: *Disease process and treatment process  Description: Define diabetes and identify own type of diabetes; list 3 options for treating diabetes. Outcome: Not Progressing Towards Goal  Goal: *Incorporating nutritional management into lifestyle  Description: Describe effect of type, amount and timing of food on blood glucose; list 3 methods for planning meals. Outcome: Not Progressing Towards Goal  Goal: *Monitoring blood glucose, interpreting and using results  Description: Identify recommended blood glucose targets  and personal targets. Outcome: Progressing Towards Goal     Problem: Falls - Risk of  Goal: *Absence of Falls  Description: Document Steph Don Fall Risk and appropriate interventions in the flowsheet.   Outcome: Progressing Towards Goal  Note: Fall Risk Interventions:       Mentation Interventions: Adequate sleep, hydration, pain control,Room close to nurse's station    Medication Interventions: Bed/chair exit alarm,Evaluate medications/consider consulting pharmacy    Elimination Interventions: Bed/chair exit alarm,Call light in reach

## 2022-02-26 LAB
BACTERIA SPEC CULT: NORMAL
SPECIAL REQUESTS,SREQ: NORMAL

## 2022-03-03 LAB
BACTERIA SPEC CULT: NORMAL
SPECIAL REQUESTS,SREQ: NORMAL

## 2022-03-23 NOTE — PROGRESS NOTES
Progress Note    Patient: Janina Nichols MRN: [id number]  SSN: [id number]    YOB: 1922  Age: 80 y.o. Sex: female      Admit Date: 2/20/2022    LOS: 5 days     Subjective:     89F with a history of CKD, hypertension, stroke with left residual defects, bradycardia status post AICD 2 months ago, dysphagia status post PEG tube with acute encephalopathy s/t septic shock s.t UTI and sacral decubitus ulcer. HOSPITAL COURSE:   He was started with vancomycin and cefipime, Sacral wound infection, secondary to MRSA and MDR Acinetobacter baumannii, Candida UTI with marked pyuria and funguria. Started on levophed and 0.22% saline,     Review of Systems:  Unable to perform ROS: Patient nonverbal       Objective:     Vitals:    02/25/22 0300 02/25/22 0400 02/25/22 0500 02/25/22 0600   BP: (!) 86/52 (!) 84/54 (!) 84/57 (!) 88/63   Pulse: 60 65 66 85   Resp: 26 13 10 8   Temp: 97 °F (36.1 °C) 96.8 °F (36 °C) (!) 96.4 °F (35.8 °C)    SpO2: (!) 68% (!) 66% (!) 39% (!) 43%   Weight:       Height:            Intake and Output:  Current Shift: No intake/output data recorded. Last three shifts: No intake/output data recorded. Physical Exam:      Physical Exam  Vitals and nursing note reviewed. Exam conducted with a chaperone present. Constitutional:       General: She is in acute distress. Appearance: She is ill-appearing. HENT:      Head: Normocephalic. Mouth/Throat:      Mouth: Mucous membranes are dry. Eyes:      Pupils: Pupils are equal, round, and reactive to light. Cardiovascular:      Rate and Rhythm: Normal rate and regular rhythm. Pulses: Normal pulses. Heart sounds: Normal heart sounds. Pulmonary:      Effort: Pulmonary effort is normal.      Breath sounds: Normal breath sounds. Abdominal:      General: Abdomen is flat. Musculoskeletal:      Cervical back: Normal range of motion and neck supple.    Skin:     Comments: Large open sacral wound Stage 3   Neurological: Mental Status: She is disoriented. Psychiatric:      Comments: Unable to assess         Lab/Data Review:  No results found for this or any previous visit (from the past 24 hour(s)). Assessment and plan:      (1) septic shock: levophed to Keep MAP > 65    (2) Acute encephalopathy : GCS 12.     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 MRSA and MDR Acinetobacter baumannii. D9    (5) COVID-19 infection     (6) Candida UTI: diflucaine D3    (7) hypernatremia : 0.225% NaCL in sterile water    (8) anemia : transfuse 1 PRBC    (9) SSS- Pafib with PPM. Hold eliquis    DVT ppx: SCD    DISPO: poor prognosis.      Signed By: Irlanda Taylor MD     March 23, 2022

## 2022-04-19 NOTE — DISCHARGE SUMMARY
Death Summary   Date of death: @/25/22    Hospital course: see progress notes/imaging/labs/records for specifics:  ED:  89F with a history of CKD, hypertension, stroke with left residual defects, bradycardia status post AICD 2 months ago, dysphagia status post PEG tube. Recent history of Covid 19.     2/9/22 admitted to hospital for altered mental status and fevers. Managed for worsening sacral decubitus wound with wound care and antibiotics. 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 Hospital stay complicated by pleural effusion s/p thoracentesis 2/14/22 with 800cc fluid removed.     2/18/22 discharged home with iv vancomycin and cefepime via PICC as well as continuous home oxygen.     2/20/22 returns to hospital because of fevers and increased lethargy. During the ED course found to be in acute renal failure, hypotensive, managed for septic shock and I have been asked to admit her to hospital.        Course:  Admit secondary to septic shock, worsening stage 4 decubitus ulcer and mdr ssti. Acute chronic renal failure in the setting of volume depletion and sepsis somplicated by furosemide. Followed by ID, General surgery, pulmonology/critical care, cardiology, nephrology. Despite aggressive medical care she did not respond to therapy, ultimately dnr/dni status, passed 2/25/22.             Cause of death:  Sepsis/septic shock  Multifactorial  Sacral wound infection poa stage 4, wound  mrsa + mdr acitenobacter  Acute/chronic hypoxic rsp failure  Covid-19 pneumonitis  Candida uti  Severe tricuspid regurgitation  S/p thoracentesis  ppm

## 2023-10-30 NOTE — ED NOTES
Patient confirms medications and allergies are accurate via patients echeck in completion, and or denies any changes since last reviewed/verified.       Katie Garcia, Virtual Facilitator  Is the patient currently in the state of [place]? YES    Visit mode:TELEPHONE    If the visit is dropped, the patient can be reconnected by: TELEPHONE VISIT: Phone number:   Telephone Information:   Mobile [phone]       Will anyone else be joining the visit? NO  (If patient encounters technical issues they should call [phone]    How would you like to obtain your AVS? MyChart    Are changes needed to the allergy or medication list? No    Reason for visit: No chief complaint on file.    Katie Garcia VVF       TRANSFER - OUT REPORT:    Verbal report given to Hernandez Wood RN (name) on Becki Cornea  being transferred to [address] [facility] (unit) for routine progression of care       Report consisted of patients Situation, Background, Assessment and   Recommendations(SBAR). Information from the following report(s) SBAR, Kardex and ED Summary was reviewed with the receiving nurse. Lines:   PICC Double Lumen 01/06/22 Right;Brachial (Active)       Peripheral IV 02/09/22 Anterior;Right Forearm (Active)        Opportunity for questions and clarification was provided.       Patient transported with:   [employer]

## (undated) DEVICE — BINDER ABD UNIV H9IN WAIST 45-62IN E SFT COT PREM 3 PNL

## (undated) DEVICE — THE BITE BLOCK PEDIATRIC, LATEX FREE STRAP IS USED TO PROTECT THE ENDOSCOPE INSERTION TUBE FROM BEING BITTEN BY THE PATIENT.

## (undated) DEVICE — KIT GASTMY PERC PEG PULL 20FR -- ENDOVIVE BX/2

## (undated) DEVICE — THE ENDO CARRY-ON PROCEDURE KIT CONTAINS ALL OF THE SUPPLIES AND INFECTION PREVENTION PRODUCTS NEEDED FOR ENDOSCOPIC PROCEDURES: Brand: ENDO CARRY-ON PROCEDURE KIT